# Patient Record
Sex: FEMALE | Race: WHITE | NOT HISPANIC OR LATINO | Employment: OTHER | URBAN - METROPOLITAN AREA
[De-identification: names, ages, dates, MRNs, and addresses within clinical notes are randomized per-mention and may not be internally consistent; named-entity substitution may affect disease eponyms.]

---

## 2017-03-02 ENCOUNTER — GENERIC CONVERSION - ENCOUNTER (OUTPATIENT)
Dept: OTHER | Facility: OTHER | Age: 82
End: 2017-03-02

## 2017-03-21 ENCOUNTER — HOSPITAL ENCOUNTER (INPATIENT)
Facility: HOSPITAL | Age: 82
LOS: 2 days | Discharge: HOME | DRG: 202 | End: 2017-03-23
Attending: EMERGENCY MEDICINE | Admitting: INTERNAL MEDICINE
Payer: MEDICARE

## 2017-03-21 ENCOUNTER — GENERIC CONVERSION - ENCOUNTER (OUTPATIENT)
Dept: OTHER | Facility: OTHER | Age: 82
End: 2017-03-21

## 2017-03-21 ENCOUNTER — APPOINTMENT (EMERGENCY)
Dept: RADIOLOGY | Facility: HOSPITAL | Age: 82
DRG: 202 | End: 2017-03-21
Payer: MEDICARE

## 2017-03-21 DIAGNOSIS — R06.02 SHORTNESS OF BREATH: Primary | ICD-10-CM

## 2017-03-21 LAB
ALBUMIN SERPL BCP-MCNC: 3.9 G/DL (ref 3.5–5)
ALP SERPL-CCNC: 92 U/L (ref 46–116)
ALT SERPL W P-5'-P-CCNC: 20 U/L (ref 12–78)
ANION GAP SERPL CALCULATED.3IONS-SCNC: 9 MMOL/L (ref 4–13)
ARTERIAL PATENCY WRIST A: ABNORMAL
AST SERPL W P-5'-P-CCNC: 20 U/L (ref 5–45)
BASE EXCESS BLDA CALC-SCNC: -1 MMOL/L (ref -2–3)
BASOPHILS # BLD AUTO: 0.03 THOUSANDS/ΜL (ref 0–0.1)
BASOPHILS NFR BLD AUTO: 0 % (ref 0–1)
BILIRUB SERPL-MCNC: 0.4 MG/DL (ref 0.2–1)
BUN SERPL-MCNC: 35 MG/DL (ref 5–25)
CA-I BLD-SCNC: 1.14 MMOL/L (ref 1.12–1.32)
CALCIUM SERPL-MCNC: 8.9 MG/DL (ref 8.3–10.1)
CHLORIDE SERPL-SCNC: 103 MMOL/L (ref 100–108)
CO2 SERPL-SCNC: 30 MMOL/L (ref 21–32)
CREAT SERPL-MCNC: 1.49 MG/DL (ref 0.6–1.3)
DS:DELIVERY SYSTEM: ABNORMAL
EOSINOPHIL # BLD AUTO: 0.56 THOUSAND/ΜL (ref 0–0.61)
EOSINOPHIL NFR BLD AUTO: 6 % (ref 0–6)
ERYTHROCYTE [DISTWIDTH] IN BLOOD BY AUTOMATED COUNT: 14.6 % (ref 11.6–15.1)
FIO2 GAS DIL.REBREATH: 32 L
GFR SERPL CREATININE-BSD FRML MDRD: 33.3 ML/MIN/1.73SQ M
GLUCOSE SERPL-MCNC: 95 MG/DL (ref 65–140)
GLUCOSE SERPL-MCNC: 98 MG/DL (ref 65–140)
HCO3 BLDA-SCNC: 22.9 MMOL/L (ref 22–28)
HCT VFR BLD AUTO: 35.5 % (ref 34.8–46.1)
HCT VFR BLD CALC: 35 % (ref 34.8–46.1)
HGB BLD-MCNC: 11.4 G/DL (ref 11.5–15.4)
HGB BLDA-MCNC: 11.9 G/DL (ref 11.5–15.4)
LYMPHOCYTES # BLD AUTO: 1.94 THOUSANDS/ΜL (ref 0.6–4.47)
LYMPHOCYTES NFR BLD AUTO: 21 % (ref 14–44)
MCH RBC QN AUTO: 29.5 PG (ref 26.8–34.3)
MCHC RBC AUTO-ENTMCNC: 32.1 G/DL (ref 31.4–37.4)
MCV RBC AUTO: 92 FL (ref 82–98)
MONOCYTES # BLD AUTO: 0.68 THOUSAND/ΜL (ref 0.17–1.22)
MONOCYTES NFR BLD AUTO: 8 % (ref 4–12)
NEUTROPHILS # BLD AUTO: 5.87 THOUSANDS/ΜL (ref 1.85–7.62)
NEUTS SEG NFR BLD AUTO: 65 % (ref 43–75)
NT-PROBNP SERPL-MCNC: 1316 PG/ML
PCO2 BLD: 24 MMOL/L (ref 21–32)
PCO2 BLD: 33.3 MM HG (ref 36–44)
PH BLD: 7.44 [PH] (ref 7.35–7.45)
PLATELET # BLD AUTO: 241 THOUSANDS/UL (ref 149–390)
PMV BLD AUTO: 10.5 FL (ref 8.9–12.7)
PO2 BLD: 70 MM HG (ref 75–129)
POTASSIUM BLD-SCNC: 3.9 MMOL/L (ref 3.5–5.3)
POTASSIUM SERPL-SCNC: 4 MMOL/L (ref 3.5–5.3)
PROT SERPL-MCNC: 7.8 G/DL (ref 6.4–8.2)
RBC # BLD AUTO: 3.87 MILLION/UL (ref 3.81–5.12)
SAMPLE SITE: ABNORMAL
SAO2 % BLD FROM PO2: 95 % (ref 95–98)
SODIUM BLD-SCNC: 141 MMOL/L (ref 136–145)
SODIUM SERPL-SCNC: 142 MMOL/L (ref 136–145)
SPECIMEN SOURCE: ABNORMAL
TROPONIN I SERPL-MCNC: <0.02 NG/ML
WBC # BLD AUTO: 9.08 THOUSAND/UL (ref 4.31–10.16)

## 2017-03-21 PROCEDURE — 36600 WITHDRAWAL OF ARTERIAL BLOOD: CPT

## 2017-03-21 PROCEDURE — 81002 URINALYSIS NONAUTO W/O SCOPE: CPT | Performed by: PHYSICIAN ASSISTANT

## 2017-03-21 PROCEDURE — 36415 COLL VENOUS BLD VENIPUNCTURE: CPT | Performed by: PHYSICIAN ASSISTANT

## 2017-03-21 PROCEDURE — 80053 COMPREHEN METABOLIC PANEL: CPT | Performed by: PHYSICIAN ASSISTANT

## 2017-03-21 PROCEDURE — 82947 ASSAY GLUCOSE BLOOD QUANT: CPT

## 2017-03-21 PROCEDURE — 84295 ASSAY OF SERUM SODIUM: CPT

## 2017-03-21 PROCEDURE — 71020 HB CHEST X-RAY 2VW FRONTAL&LATL: CPT

## 2017-03-21 PROCEDURE — 85025 COMPLETE CBC W/AUTO DIFF WBC: CPT | Performed by: PHYSICIAN ASSISTANT

## 2017-03-21 PROCEDURE — 96374 THER/PROPH/DIAG INJ IV PUSH: CPT

## 2017-03-21 PROCEDURE — 83880 ASSAY OF NATRIURETIC PEPTIDE: CPT | Performed by: PHYSICIAN ASSISTANT

## 2017-03-21 PROCEDURE — 82330 ASSAY OF CALCIUM: CPT

## 2017-03-21 PROCEDURE — 84484 ASSAY OF TROPONIN QUANT: CPT | Performed by: PHYSICIAN ASSISTANT

## 2017-03-21 PROCEDURE — 93005 ELECTROCARDIOGRAM TRACING: CPT | Performed by: PHYSICIAN ASSISTANT

## 2017-03-21 PROCEDURE — 84132 ASSAY OF SERUM POTASSIUM: CPT

## 2017-03-21 PROCEDURE — 85014 HEMATOCRIT: CPT

## 2017-03-21 PROCEDURE — 82803 BLOOD GASES ANY COMBINATION: CPT

## 2017-03-21 RX ORDER — METHYLPREDNISOLONE SODIUM SUCCINATE 125 MG/2ML
125 INJECTION, POWDER, LYOPHILIZED, FOR SOLUTION INTRAMUSCULAR; INTRAVENOUS ONCE
Status: COMPLETED | OUTPATIENT
Start: 2017-03-21 | End: 2017-03-21

## 2017-03-21 RX ADMIN — METHYLPREDNISOLONE SODIUM SUCCINATE 125 MG: 125 INJECTION, POWDER, FOR SOLUTION INTRAMUSCULAR; INTRAVENOUS at 22:28

## 2017-03-22 PROBLEM — J20.9 BRONCHITIS, ACUTE, WITH BRONCHOSPASM: Status: ACTIVE | Noted: 2017-03-22

## 2017-03-22 PROBLEM — R06.02 SHORTNESS OF BREATH: Status: ACTIVE | Noted: 2017-03-22

## 2017-03-22 LAB
ALBUMIN SERPL BCP-MCNC: 3.6 G/DL (ref 3.5–5)
ALP SERPL-CCNC: 83 U/L (ref 46–116)
ALT SERPL W P-5'-P-CCNC: 19 U/L (ref 12–78)
ANION GAP SERPL CALCULATED.3IONS-SCNC: 12 MMOL/L (ref 4–13)
AST SERPL W P-5'-P-CCNC: 19 U/L (ref 5–45)
ATRIAL RATE: 74 BPM
BILIRUB SERPL-MCNC: 0.3 MG/DL (ref 0.2–1)
BUN SERPL-MCNC: 30 MG/DL (ref 5–25)
CALCIUM SERPL-MCNC: 8.6 MG/DL (ref 8.3–10.1)
CHLORIDE SERPL-SCNC: 103 MMOL/L (ref 100–108)
CLARITY, POC: CLEAR
CO2 SERPL-SCNC: 27 MMOL/L (ref 21–32)
COLOR, POC: YELLOW
CREAT SERPL-MCNC: 1.5 MG/DL (ref 0.6–1.3)
ERYTHROCYTE [DISTWIDTH] IN BLOOD BY AUTOMATED COUNT: 14.4 % (ref 11.6–15.1)
EXT BILIRUBIN, UA: NEGATIVE
EXT BLOOD URINE: NEGATIVE
EXT GLUCOSE, UA: NEGATIVE
EXT KETONES: NEGATIVE
EXT NITRITE, UA: NEGATIVE
EXT PH, UA: 5
EXT PROTEIN, UA: NEGATIVE
EXT SPECIFIC GRAVITY, UA: 1.01
EXT UROBILINOGEN: 0.2
GFR SERPL CREATININE-BSD FRML MDRD: 33 ML/MIN/1.73SQ M
GLUCOSE SERPL-MCNC: 171 MG/DL (ref 65–140)
HCT VFR BLD AUTO: 32.2 % (ref 34.8–46.1)
HGB BLD-MCNC: 10.3 G/DL (ref 11.5–15.4)
L PNEUMO1 AG UR QL IA.RAPID: NEGATIVE
MCH RBC QN AUTO: 29.3 PG (ref 26.8–34.3)
MCHC RBC AUTO-ENTMCNC: 32 G/DL (ref 31.4–37.4)
MCV RBC AUTO: 92 FL (ref 82–98)
P AXIS: 72 DEGREES
PLATELET # BLD AUTO: 225 THOUSANDS/UL (ref 149–390)
PMV BLD AUTO: 10.5 FL (ref 8.9–12.7)
POTASSIUM SERPL-SCNC: 3.9 MMOL/L (ref 3.5–5.3)
PR INTERVAL: 210 MS
PROT SERPL-MCNC: 7.2 G/DL (ref 6.4–8.2)
QRS AXIS: 31 DEGREES
QRSD INTERVAL: 80 MS
QT INTERVAL: 400 MS
QTC INTERVAL: 444 MS
RBC # BLD AUTO: 3.52 MILLION/UL (ref 3.81–5.12)
S PNEUM AG UR QL: NEGATIVE
SODIUM SERPL-SCNC: 142 MMOL/L (ref 136–145)
T WAVE AXIS: 50 DEGREES
VENTRICULAR RATE: 74 BPM
WBC # BLD AUTO: 8.84 THOUSAND/UL (ref 4.31–10.16)
WBC # BLD EST: NORMAL 10*3/UL

## 2017-03-22 PROCEDURE — 87449 NOS EACH ORGANISM AG IA: CPT | Performed by: INTERNAL MEDICINE

## 2017-03-22 PROCEDURE — 94760 N-INVAS EAR/PLS OXIMETRY 1: CPT

## 2017-03-22 PROCEDURE — 36415 COLL VENOUS BLD VENIPUNCTURE: CPT | Performed by: INTERNAL MEDICINE

## 2017-03-22 PROCEDURE — 94664 DEMO&/EVAL PT USE INHALER: CPT

## 2017-03-22 PROCEDURE — 85027 COMPLETE CBC AUTOMATED: CPT | Performed by: INTERNAL MEDICINE

## 2017-03-22 PROCEDURE — 94640 AIRWAY INHALATION TREATMENT: CPT

## 2017-03-22 PROCEDURE — 80053 COMPREHEN METABOLIC PANEL: CPT

## 2017-03-22 PROCEDURE — 99285 EMERGENCY DEPT VISIT HI MDM: CPT

## 2017-03-22 RX ORDER — ROPINIROLE 1 MG/1
1 TABLET, FILM COATED ORAL EVERY 6 HOURS
Status: DISCONTINUED | OUTPATIENT
Start: 2017-03-22 | End: 2017-03-23 | Stop reason: HOSPADM

## 2017-03-22 RX ORDER — ALBUTEROL SULFATE 90 UG/1
2 AEROSOL, METERED RESPIRATORY (INHALATION) EVERY 6 HOURS PRN
Status: DISCONTINUED | OUTPATIENT
Start: 2017-03-22 | End: 2017-03-23 | Stop reason: HOSPADM

## 2017-03-22 RX ORDER — FLUTICASONE PROPIONATE 50 MCG
1 SPRAY, SUSPENSION (ML) NASAL DAILY
Status: DISCONTINUED | OUTPATIENT
Start: 2017-03-22 | End: 2017-03-23 | Stop reason: HOSPADM

## 2017-03-22 RX ORDER — LEVOTHYROXINE SODIUM 0.07 MG/1
37.5 TABLET ORAL
Status: DISCONTINUED | OUTPATIENT
Start: 2017-03-22 | End: 2017-03-23 | Stop reason: HOSPADM

## 2017-03-22 RX ORDER — METHYLPREDNISOLONE SODIUM SUCCINATE 40 MG/ML
40 INJECTION, POWDER, LYOPHILIZED, FOR SOLUTION INTRAMUSCULAR; INTRAVENOUS EVERY 8 HOURS SCHEDULED
Status: DISCONTINUED | OUTPATIENT
Start: 2017-03-22 | End: 2017-03-22

## 2017-03-22 RX ORDER — PANTOPRAZOLE SODIUM 40 MG/1
40 TABLET, DELAYED RELEASE ORAL 2 TIMES DAILY
Status: DISCONTINUED | OUTPATIENT
Start: 2017-03-22 | End: 2017-03-23 | Stop reason: HOSPADM

## 2017-03-22 RX ORDER — IPRATROPIUM BROMIDE AND ALBUTEROL SULFATE 2.5; .5 MG/3ML; MG/3ML
3 SOLUTION RESPIRATORY (INHALATION)
Status: DISCONTINUED | OUTPATIENT
Start: 2017-03-22 | End: 2017-03-22

## 2017-03-22 RX ORDER — FUROSEMIDE 10 MG/ML
20 INJECTION INTRAMUSCULAR; INTRAVENOUS ONCE
Status: COMPLETED | OUTPATIENT
Start: 2017-03-22 | End: 2017-03-22

## 2017-03-22 RX ORDER — LATANOPROST 50 UG/ML
1 SOLUTION/ DROPS OPHTHALMIC
Status: DISCONTINUED | OUTPATIENT
Start: 2017-03-22 | End: 2017-03-23 | Stop reason: HOSPADM

## 2017-03-22 RX ORDER — METOPROLOL TARTRATE 50 MG/1
50 TABLET, FILM COATED ORAL 2 TIMES DAILY
Status: DISCONTINUED | OUTPATIENT
Start: 2017-03-22 | End: 2017-03-23 | Stop reason: HOSPADM

## 2017-03-22 RX ORDER — GUAIFENESIN 100 MG/5ML
200 SOLUTION ORAL EVERY 4 HOURS PRN
Status: DISCONTINUED | OUTPATIENT
Start: 2017-03-22 | End: 2017-03-23 | Stop reason: HOSPADM

## 2017-03-22 RX ORDER — LORATADINE 10 MG/1
10 TABLET ORAL DAILY
Status: DISCONTINUED | OUTPATIENT
Start: 2017-03-22 | End: 2017-03-23 | Stop reason: HOSPADM

## 2017-03-22 RX ORDER — TRAMADOL HYDROCHLORIDE 50 MG/1
50 TABLET ORAL 2 TIMES DAILY PRN
Status: DISCONTINUED | OUTPATIENT
Start: 2017-03-22 | End: 2017-03-23 | Stop reason: HOSPADM

## 2017-03-22 RX ORDER — AMLODIPINE BESYLATE 5 MG/1
5 TABLET ORAL
Status: DISCONTINUED | OUTPATIENT
Start: 2017-03-22 | End: 2017-03-23 | Stop reason: HOSPADM

## 2017-03-22 RX ORDER — AZITHROMYCIN 250 MG/1
250 TABLET, FILM COATED ORAL EVERY 24 HOURS
Status: DISCONTINUED | OUTPATIENT
Start: 2017-03-23 | End: 2017-03-23 | Stop reason: HOSPADM

## 2017-03-22 RX ORDER — GABAPENTIN 300 MG/1
300 CAPSULE ORAL DAILY
Status: DISCONTINUED | OUTPATIENT
Start: 2017-03-22 | End: 2017-03-23 | Stop reason: HOSPADM

## 2017-03-22 RX ORDER — LOPERAMIDE HYDROCHLORIDE 2 MG/1
2 CAPSULE ORAL AS NEEDED
Status: DISCONTINUED | OUTPATIENT
Start: 2017-03-22 | End: 2017-03-23 | Stop reason: HOSPADM

## 2017-03-22 RX ORDER — LEVALBUTEROL 1.25 MG/.5ML
1.25 SOLUTION, CONCENTRATE RESPIRATORY (INHALATION)
Status: DISCONTINUED | OUTPATIENT
Start: 2017-03-22 | End: 2017-03-23 | Stop reason: HOSPADM

## 2017-03-22 RX ORDER — ACETAMINOPHEN 325 MG/1
650 TABLET ORAL ONCE
Status: COMPLETED | OUTPATIENT
Start: 2017-03-22 | End: 2017-03-22

## 2017-03-22 RX ORDER — BACLOFEN 10 MG/1
10 TABLET ORAL
Status: DISCONTINUED | OUTPATIENT
Start: 2017-03-22 | End: 2017-03-23 | Stop reason: HOSPADM

## 2017-03-22 RX ORDER — POLYVINYL ALCOHOL 14 MG/ML
1 SOLUTION/ DROPS OPHTHALMIC AS NEEDED
Status: DISCONTINUED | OUTPATIENT
Start: 2017-03-22 | End: 2017-03-23 | Stop reason: HOSPADM

## 2017-03-22 RX ORDER — DOCUSATE SODIUM 100 MG/1
100 CAPSULE, LIQUID FILLED ORAL 2 TIMES DAILY
COMMUNITY
End: 2017-03-23 | Stop reason: HOSPADM

## 2017-03-22 RX ORDER — ALBUTEROL SULFATE 90 UG/1
2 AEROSOL, METERED RESPIRATORY (INHALATION) EVERY 6 HOURS PRN
COMMUNITY
End: 2018-01-22 | Stop reason: ALTCHOICE

## 2017-03-22 RX ORDER — MELATONIN
2000 DAILY
Status: DISCONTINUED | OUTPATIENT
Start: 2017-03-22 | End: 2017-03-23 | Stop reason: HOSPADM

## 2017-03-22 RX ORDER — METHYLPREDNISOLONE SODIUM SUCCINATE 40 MG/ML
40 INJECTION, POWDER, LYOPHILIZED, FOR SOLUTION INTRAMUSCULAR; INTRAVENOUS EVERY 12 HOURS SCHEDULED
Status: DISCONTINUED | OUTPATIENT
Start: 2017-03-22 | End: 2017-03-23 | Stop reason: HOSPADM

## 2017-03-22 RX ORDER — MAGNESIUM HYDROXIDE/ALUMINUM HYDROXICE/SIMETHICONE 120; 1200; 1200 MG/30ML; MG/30ML; MG/30ML
15 SUSPENSION ORAL EVERY 4 HOURS PRN
Status: DISCONTINUED | OUTPATIENT
Start: 2017-03-22 | End: 2017-03-23 | Stop reason: HOSPADM

## 2017-03-22 RX ORDER — ALBUTEROL SULFATE 2.5 MG/3ML
2.5 SOLUTION RESPIRATORY (INHALATION) EVERY 4 HOURS PRN
Status: DISCONTINUED | OUTPATIENT
Start: 2017-03-22 | End: 2017-03-23 | Stop reason: HOSPADM

## 2017-03-22 RX ORDER — BISACODYL 10 MG
10 SUPPOSITORY, RECTAL RECTAL AS NEEDED
Status: DISCONTINUED | OUTPATIENT
Start: 2017-03-22 | End: 2017-03-23 | Stop reason: HOSPADM

## 2017-03-22 RX ORDER — DIPHENHYDRAMINE HCL 25 MG
25 TABLET ORAL
COMMUNITY
End: 2017-04-17 | Stop reason: HOSPADM

## 2017-03-22 RX ORDER — GABAPENTIN 300 MG/1
CAPSULE ORAL
Status: DISPENSED
Start: 2017-03-22 | End: 2017-03-23

## 2017-03-22 RX ORDER — OXCARBAZEPINE 150 MG/1
150 TABLET, FILM COATED ORAL 2 TIMES DAILY
Status: DISCONTINUED | OUTPATIENT
Start: 2017-03-22 | End: 2017-03-23 | Stop reason: HOSPADM

## 2017-03-22 RX ORDER — ACETAMINOPHEN 325 MG/1
650 TABLET ORAL EVERY 4 HOURS PRN
Status: DISCONTINUED | OUTPATIENT
Start: 2017-03-22 | End: 2017-03-23 | Stop reason: HOSPADM

## 2017-03-22 RX ADMIN — LEVALBUTEROL HYDROCHLORIDE 1.25 MG: 1.25 SOLUTION, CONCENTRATE RESPIRATORY (INHALATION) at 20:22

## 2017-03-22 RX ADMIN — ROPINIROLE HYDROCHLORIDE 1 MG: 1 TABLET, FILM COATED ORAL at 12:30

## 2017-03-22 RX ADMIN — PANTOPRAZOLE SODIUM 40 MG: 40 TABLET, DELAYED RELEASE ORAL at 13:29

## 2017-03-22 RX ADMIN — METHYLPREDNISOLONE SODIUM SUCCINATE 40 MG: 40 INJECTION, POWDER, FOR SOLUTION INTRAMUSCULAR; INTRAVENOUS at 05:35

## 2017-03-22 RX ADMIN — CHOLECALCIFEROL TAB 25 MCG (1000 UNIT) 2000 UNITS: 25 TAB at 13:29

## 2017-03-22 RX ADMIN — ACETAMINOPHEN 650 MG: 325 TABLET ORAL at 01:49

## 2017-03-22 RX ADMIN — ROPINIROLE HYDROCHLORIDE 1 MG: 1 TABLET, FILM COATED ORAL at 00:26

## 2017-03-22 RX ADMIN — IPRATROPIUM BROMIDE AND ALBUTEROL SULFATE 3 ML: .5; 3 SOLUTION RESPIRATORY (INHALATION) at 15:28

## 2017-03-22 RX ADMIN — IPRATROPIUM BROMIDE AND ALBUTEROL SULFATE 3 ML: .5; 3 SOLUTION RESPIRATORY (INHALATION) at 07:52

## 2017-03-22 RX ADMIN — GABAPENTIN 300 MG: 300 CAPSULE ORAL at 12:11

## 2017-03-22 RX ADMIN — PANTOPRAZOLE SODIUM 40 MG: 40 TABLET, DELAYED RELEASE ORAL at 17:52

## 2017-03-22 RX ADMIN — FUROSEMIDE 20 MG: 10 INJECTION, SOLUTION INTRAMUSCULAR; INTRAVENOUS at 04:49

## 2017-03-22 RX ADMIN — ROPINIROLE HYDROCHLORIDE 1 MG: 1 TABLET, FILM COATED ORAL at 17:52

## 2017-03-22 RX ADMIN — AMLODIPINE BESYLATE 5 MG: 5 TABLET ORAL at 22:41

## 2017-03-22 RX ADMIN — APIXABAN 5 MG: 5 TABLET, FILM COATED ORAL at 17:52

## 2017-03-22 RX ADMIN — APIXABAN 5 MG: 5 TABLET, FILM COATED ORAL at 13:29

## 2017-03-22 RX ADMIN — IPRATROPIUM BROMIDE 0.5 MG: 0.5 SOLUTION RESPIRATORY (INHALATION) at 20:22

## 2017-03-22 RX ADMIN — OXCARBAZEPINE 150 MG: 150 TABLET ORAL at 13:29

## 2017-03-22 RX ADMIN — METOPROLOL TARTRATE 50 MG: 50 TABLET ORAL at 13:29

## 2017-03-22 RX ADMIN — ROPINIROLE HYDROCHLORIDE 1 MG: 1 TABLET, FILM COATED ORAL at 06:12

## 2017-03-22 RX ADMIN — METOPROLOL TARTRATE 50 MG: 50 TABLET ORAL at 17:52

## 2017-03-22 RX ADMIN — OXCARBAZEPINE 150 MG: 150 TABLET ORAL at 17:51

## 2017-03-22 RX ADMIN — TRAMADOL HYDROCHLORIDE 50 MG: 50 TABLET, COATED ORAL at 15:50

## 2017-03-22 RX ADMIN — METHYLPREDNISOLONE SODIUM SUCCINATE 40 MG: 40 INJECTION, POWDER, FOR SOLUTION INTRAMUSCULAR; INTRAVENOUS at 20:40

## 2017-03-22 RX ADMIN — LATANOPROST 1 DROP: 50 SOLUTION OPHTHALMIC at 22:42

## 2017-03-22 RX ADMIN — BACLOFEN 10 MG: 10 TABLET ORAL at 22:41

## 2017-03-22 RX ADMIN — LORATADINE 10 MG: 10 TABLET ORAL at 13:29

## 2017-03-22 RX ADMIN — FLUTICASONE PROPIONATE 1 SPRAY: 50 SPRAY, METERED NASAL at 13:29

## 2017-03-22 RX ADMIN — AZITHROMYCIN FOR INJECTION INJECTION, POWDER, LYOPHILIZED, FOR SOLUTION 500 MG: 500 INJECTION INTRAVENOUS at 01:46

## 2017-03-22 RX ADMIN — BIMATOPROST 1 DROP: 0.1 SOLUTION/ DROPS OPHTHALMIC at 22:41

## 2017-03-22 RX ADMIN — LEVOTHYROXINE SODIUM 37.5 MCG: 75 TABLET ORAL at 13:28

## 2017-03-23 VITALS
TEMPERATURE: 98 F | SYSTOLIC BLOOD PRESSURE: 148 MMHG | HEIGHT: 60 IN | WEIGHT: 156.09 LBS | RESPIRATION RATE: 20 BRPM | BODY MASS INDEX: 30.64 KG/M2 | HEART RATE: 65 BPM | OXYGEN SATURATION: 95 % | DIASTOLIC BLOOD PRESSURE: 65 MMHG

## 2017-03-23 LAB
BASOPHILS # BLD MANUAL: 0 THOUSAND/UL (ref 0–0.1)
BASOPHILS NFR MAR MANUAL: 0 % (ref 0–1)
EOSINOPHIL # BLD MANUAL: 0 THOUSAND/UL (ref 0–0.4)
EOSINOPHIL NFR BLD MANUAL: 0 % (ref 0–6)
ERYTHROCYTE [DISTWIDTH] IN BLOOD BY AUTOMATED COUNT: 14.4 % (ref 11.6–15.1)
HCT VFR BLD AUTO: 32.6 % (ref 34.8–46.1)
HGB BLD-MCNC: 10.5 G/DL (ref 11.5–15.4)
LYMPHOCYTES # BLD AUTO: 0.84 THOUSAND/UL (ref 0.6–4.47)
LYMPHOCYTES # BLD AUTO: 6 % (ref 14–44)
MCH RBC QN AUTO: 29.2 PG (ref 26.8–34.3)
MCHC RBC AUTO-ENTMCNC: 32.2 G/DL (ref 31.4–37.4)
MCV RBC AUTO: 91 FL (ref 82–98)
MONOCYTES # BLD AUTO: 0.42 THOUSAND/UL (ref 0–1.22)
MONOCYTES NFR BLD: 3 % (ref 4–12)
NEUTROPHILS # BLD MANUAL: 12.77 THOUSAND/UL (ref 1.85–7.62)
NEUTS BAND NFR BLD MANUAL: 6 % (ref 0–8)
NEUTS SEG NFR BLD AUTO: 85 % (ref 43–75)
PLATELET # BLD AUTO: 260 THOUSANDS/UL (ref 149–390)
PLATELET BLD QL SMEAR: ADEQUATE
PMV BLD AUTO: 11.2 FL (ref 8.9–12.7)
RBC # BLD AUTO: 3.59 MILLION/UL (ref 3.81–5.12)
TOTAL CELLS COUNTED SPEC: 100
WBC # BLD AUTO: 14.03 THOUSAND/UL (ref 4.31–10.16)

## 2017-03-23 PROCEDURE — 85027 COMPLETE CBC AUTOMATED: CPT | Performed by: PHYSICIAN ASSISTANT

## 2017-03-23 PROCEDURE — 94760 N-INVAS EAR/PLS OXIMETRY 1: CPT

## 2017-03-23 PROCEDURE — 94640 AIRWAY INHALATION TREATMENT: CPT

## 2017-03-23 PROCEDURE — 85007 BL SMEAR W/DIFF WBC COUNT: CPT | Performed by: PHYSICIAN ASSISTANT

## 2017-03-23 RX ORDER — TORSEMIDE 20 MG/1
20 TABLET ORAL DAILY
Qty: 30 TABLET | Refills: 0 | Status: ON HOLD | OUTPATIENT
Start: 2017-03-24 | End: 2017-04-17

## 2017-03-23 RX ORDER — LEVOTHYROXINE SODIUM 0.07 MG/1
37.5 TABLET ORAL
Qty: 15 TABLET | Refills: 0 | Status: SHIPPED | OUTPATIENT
Start: 2017-03-23 | End: 2017-07-29 | Stop reason: HOSPADM

## 2017-03-23 RX ORDER — ALBUTEROL SULFATE 2.5 MG/3ML
2.5 SOLUTION RESPIRATORY (INHALATION) EVERY 4 HOURS PRN
Qty: 75 ML | Refills: 0 | Status: SHIPPED | OUTPATIENT
Start: 2017-03-23 | End: 2017-04-13

## 2017-03-23 RX ORDER — PREDNISONE 20 MG/1
60 TABLET ORAL SEE ADMIN INSTRUCTIONS
Qty: 30 TABLET | Refills: 0 | Status: SHIPPED | OUTPATIENT
Start: 2017-03-23 | End: 2017-04-04

## 2017-03-23 RX ORDER — TRAMADOL HYDROCHLORIDE 50 MG/1
50 TABLET ORAL 2 TIMES DAILY PRN
Qty: 30 TABLET | Refills: 0 | Status: SHIPPED | OUTPATIENT
Start: 2017-03-23 | End: 2017-04-02

## 2017-03-23 RX ORDER — AZITHROMYCIN 250 MG/1
250 TABLET, FILM COATED ORAL ONCE
Qty: 1 TABLET | Refills: 0 | Status: SHIPPED | OUTPATIENT
Start: 2017-03-24 | End: 2017-03-24

## 2017-03-23 RX ORDER — GUAIFENESIN 100 MG/5ML
200 SOLUTION ORAL EVERY 4 HOURS PRN
Qty: 30 ML | Refills: 0 | Status: SHIPPED | OUTPATIENT
Start: 2017-03-23 | End: 2017-04-17 | Stop reason: HOSPADM

## 2017-03-23 RX ADMIN — PANTOPRAZOLE SODIUM 40 MG: 40 TABLET, DELAYED RELEASE ORAL at 08:11

## 2017-03-23 RX ADMIN — TRAMADOL HYDROCHLORIDE 50 MG: 50 TABLET, COATED ORAL at 00:24

## 2017-03-23 RX ADMIN — METOPROLOL TARTRATE 50 MG: 50 TABLET ORAL at 08:10

## 2017-03-23 RX ADMIN — LORATADINE 10 MG: 10 TABLET ORAL at 08:11

## 2017-03-23 RX ADMIN — AZITHROMYCIN 250 MG: 250 TABLET, FILM COATED ORAL at 06:45

## 2017-03-23 RX ADMIN — METHYLPREDNISOLONE SODIUM SUCCINATE 40 MG: 40 INJECTION, POWDER, FOR SOLUTION INTRAMUSCULAR; INTRAVENOUS at 08:15

## 2017-03-23 RX ADMIN — FLUTICASONE PROPIONATE 1 SPRAY: 50 SPRAY, METERED NASAL at 08:15

## 2017-03-23 RX ADMIN — LEVALBUTEROL HYDROCHLORIDE 1.25 MG: 1.25 SOLUTION, CONCENTRATE RESPIRATORY (INHALATION) at 13:09

## 2017-03-23 RX ADMIN — LEVOTHYROXINE SODIUM 37.5 MCG: 75 TABLET ORAL at 06:45

## 2017-03-23 RX ADMIN — CHOLECALCIFEROL TAB 25 MCG (1000 UNIT) 2000 UNITS: 25 TAB at 08:14

## 2017-03-23 RX ADMIN — APIXABAN 5 MG: 5 TABLET, FILM COATED ORAL at 08:11

## 2017-03-23 RX ADMIN — IPRATROPIUM BROMIDE 0.5 MG: 0.5 SOLUTION RESPIRATORY (INHALATION) at 09:07

## 2017-03-23 RX ADMIN — ROPINIROLE HYDROCHLORIDE 1 MG: 1 TABLET, FILM COATED ORAL at 06:45

## 2017-03-23 RX ADMIN — GABAPENTIN 300 MG: 300 CAPSULE ORAL at 08:23

## 2017-03-23 RX ADMIN — LEVALBUTEROL HYDROCHLORIDE 1.25 MG: 1.25 SOLUTION, CONCENTRATE RESPIRATORY (INHALATION) at 09:06

## 2017-03-23 RX ADMIN — Medication 400 MG: at 08:11

## 2017-03-23 RX ADMIN — IPRATROPIUM BROMIDE 0.5 MG: 0.5 SOLUTION RESPIRATORY (INHALATION) at 13:09

## 2017-03-23 RX ADMIN — OXCARBAZEPINE 150 MG: 150 TABLET ORAL at 08:11

## 2017-03-23 RX ADMIN — ROPINIROLE HYDROCHLORIDE 1 MG: 1 TABLET, FILM COATED ORAL at 00:22

## 2017-03-23 RX ADMIN — ROPINIROLE HYDROCHLORIDE 1 MG: 1 TABLET, FILM COATED ORAL at 12:23

## 2017-03-24 ENCOUNTER — LAB REQUISITION (OUTPATIENT)
Dept: LAB | Facility: HOSPITAL | Age: 82
End: 2017-03-24
Payer: MEDICARE

## 2017-03-24 DIAGNOSIS — A41.01 SEPSIS DUE TO METHICILLIN SUSCEPTIBLE STAPHYLOCOCCUS AUREUS (HCC): ICD-10-CM

## 2017-03-24 PROCEDURE — 87147 CULTURE TYPE IMMUNOLOGIC: CPT | Performed by: PHYSICIAN ASSISTANT

## 2017-03-24 PROCEDURE — 87070 CULTURE OTHR SPECIMN AEROBIC: CPT | Performed by: PHYSICIAN ASSISTANT

## 2017-03-24 PROCEDURE — 87186 SC STD MICRODIL/AGAR DIL: CPT | Performed by: PHYSICIAN ASSISTANT

## 2017-03-24 PROCEDURE — 87205 SMEAR GRAM STAIN: CPT | Performed by: PHYSICIAN ASSISTANT

## 2017-03-27 LAB
BACTERIA WND AEROBE CULT: ABNORMAL
BACTERIA WND AEROBE CULT: ABNORMAL
GRAM STN SPEC: ABNORMAL

## 2017-04-03 ENCOUNTER — ALLSCRIPTS OFFICE VISIT (OUTPATIENT)
Dept: OTHER | Facility: OTHER | Age: 82
End: 2017-04-03

## 2017-04-13 ENCOUNTER — GENERIC CONVERSION - ENCOUNTER (OUTPATIENT)
Dept: OTHER | Facility: OTHER | Age: 82
End: 2017-04-13

## 2017-04-13 ENCOUNTER — APPOINTMENT (EMERGENCY)
Dept: RADIOLOGY | Facility: HOSPITAL | Age: 82
DRG: 291 | End: 2017-04-13
Payer: MEDICARE

## 2017-04-13 ENCOUNTER — HOSPITAL ENCOUNTER (INPATIENT)
Facility: HOSPITAL | Age: 82
LOS: 3 days | Discharge: NURSING, REHAB OR LONG-TERM CARE | DRG: 291 | End: 2017-04-17
Attending: EMERGENCY MEDICINE | Admitting: FAMILY MEDICINE
Payer: MEDICARE

## 2017-04-13 DIAGNOSIS — N18.30 CHRONIC KIDNEY DISEASE, STAGE III (MODERATE) (HCC): ICD-10-CM

## 2017-04-13 DIAGNOSIS — N28.9 ACUTE RENAL INSUFFICIENCY: ICD-10-CM

## 2017-04-13 DIAGNOSIS — I50.9 CHF EXACERBATION (HCC): Primary | ICD-10-CM

## 2017-04-13 DIAGNOSIS — I50.32 DIASTOLIC CHF, CHRONIC (HCC): ICD-10-CM

## 2017-04-13 DIAGNOSIS — I12.9 HYPERTENSIVE CHRONIC KIDNEY DISEASE WITH STAGE 1 THROUGH STAGE 4 CHRONIC KIDNEY DISEASE, OR UNSPECIFIED CHRONIC KIDNEY DISEASE: ICD-10-CM

## 2017-04-13 DIAGNOSIS — E78.5 HYPERLIPIDEMIA: ICD-10-CM

## 2017-04-13 PROBLEM — J20.9 BRONCHITIS, ACUTE, WITH BRONCHOSPASM: Status: RESOLVED | Noted: 2017-03-22 | Resolved: 2017-04-13

## 2017-04-13 LAB
ALBUMIN SERPL BCP-MCNC: 2.9 G/DL (ref 3.5–5)
ALP SERPL-CCNC: 81 U/L (ref 46–116)
ALT SERPL W P-5'-P-CCNC: 16 U/L (ref 12–78)
ANION GAP SERPL CALCULATED.3IONS-SCNC: 9 MMOL/L (ref 4–13)
AST SERPL W P-5'-P-CCNC: 20 U/L (ref 5–45)
ATRIAL RATE: 136 BPM
BASOPHILS # BLD AUTO: 0.03 THOUSANDS/ΜL (ref 0–0.1)
BASOPHILS NFR BLD AUTO: 0 % (ref 0–1)
BILIRUB SERPL-MCNC: 0.2 MG/DL (ref 0.2–1)
BUN SERPL-MCNC: 37 MG/DL (ref 5–25)
CALCIUM SERPL-MCNC: 8.4 MG/DL (ref 8.3–10.1)
CHLORIDE SERPL-SCNC: 103 MMOL/L (ref 100–108)
CO2 SERPL-SCNC: 30 MMOL/L (ref 21–32)
CREAT SERPL-MCNC: 1.72 MG/DL (ref 0.6–1.3)
EOSINOPHIL # BLD AUTO: 0.23 THOUSAND/ΜL (ref 0–0.61)
EOSINOPHIL NFR BLD AUTO: 3 % (ref 0–6)
ERYTHROCYTE [DISTWIDTH] IN BLOOD BY AUTOMATED COUNT: 14.4 % (ref 11.6–15.1)
GFR SERPL CREATININE-BSD FRML MDRD: 28.2 ML/MIN/1.73SQ M
GLUCOSE SERPL-MCNC: 118 MG/DL (ref 65–140)
HCT VFR BLD AUTO: 29.8 % (ref 34.8–46.1)
HGB BLD-MCNC: 9.5 G/DL (ref 11.5–15.4)
LYMPHOCYTES # BLD AUTO: 0.71 THOUSANDS/ΜL (ref 0.6–4.47)
LYMPHOCYTES NFR BLD AUTO: 9 % (ref 14–44)
MCH RBC QN AUTO: 29.4 PG (ref 26.8–34.3)
MCHC RBC AUTO-ENTMCNC: 31.9 G/DL (ref 31.4–37.4)
MCV RBC AUTO: 92 FL (ref 82–98)
MONOCYTES # BLD AUTO: 0.64 THOUSAND/ΜL (ref 0.17–1.22)
MONOCYTES NFR BLD AUTO: 8 % (ref 4–12)
NEUTROPHILS # BLD AUTO: 6.12 THOUSANDS/ΜL (ref 1.85–7.62)
NEUTS SEG NFR BLD AUTO: 80 % (ref 43–75)
NT-PROBNP SERPL-MCNC: 1127 PG/ML
PLATELET # BLD AUTO: 199 THOUSANDS/UL (ref 149–390)
PMV BLD AUTO: 11.3 FL (ref 8.9–12.7)
POTASSIUM SERPL-SCNC: 3.9 MMOL/L (ref 3.5–5.3)
PROT SERPL-MCNC: 6.9 G/DL (ref 6.4–8.2)
QRS AXIS: 4 DEGREES
QRSD INTERVAL: 86 MS
QT INTERVAL: 390 MS
QTC INTERVAL: 417 MS
RBC # BLD AUTO: 3.23 MILLION/UL (ref 3.81–5.12)
SODIUM SERPL-SCNC: 142 MMOL/L (ref 136–145)
T WAVE AXIS: 53 DEGREES
TROPONIN I SERPL-MCNC: <0.02 NG/ML
VENTRICULAR RATE: 69 BPM
WBC # BLD AUTO: 7.73 THOUSAND/UL (ref 4.31–10.16)

## 2017-04-13 PROCEDURE — 71020 HB CHEST X-RAY 2VW FRONTAL&LATL: CPT

## 2017-04-13 PROCEDURE — 85025 COMPLETE CBC W/AUTO DIFF WBC: CPT | Performed by: EMERGENCY MEDICINE

## 2017-04-13 PROCEDURE — 99285 EMERGENCY DEPT VISIT HI MDM: CPT

## 2017-04-13 PROCEDURE — 83880 ASSAY OF NATRIURETIC PEPTIDE: CPT | Performed by: EMERGENCY MEDICINE

## 2017-04-13 PROCEDURE — 93005 ELECTROCARDIOGRAM TRACING: CPT | Performed by: EMERGENCY MEDICINE

## 2017-04-13 PROCEDURE — 36415 COLL VENOUS BLD VENIPUNCTURE: CPT | Performed by: EMERGENCY MEDICINE

## 2017-04-13 PROCEDURE — 84484 ASSAY OF TROPONIN QUANT: CPT | Performed by: EMERGENCY MEDICINE

## 2017-04-13 PROCEDURE — 80053 COMPREHEN METABOLIC PANEL: CPT | Performed by: EMERGENCY MEDICINE

## 2017-04-13 RX ORDER — ONDANSETRON 2 MG/ML
4 INJECTION INTRAMUSCULAR; INTRAVENOUS EVERY 6 HOURS PRN
Status: DISCONTINUED | OUTPATIENT
Start: 2017-04-13 | End: 2017-04-17 | Stop reason: HOSPADM

## 2017-04-13 RX ORDER — ROPINIROLE 1 MG/1
1 TABLET, FILM COATED ORAL 3 TIMES DAILY
Status: DISCONTINUED | OUTPATIENT
Start: 2017-04-13 | End: 2017-04-13 | Stop reason: SDUPTHER

## 2017-04-13 RX ORDER — ALBUTEROL SULFATE 90 UG/1
2 AEROSOL, METERED RESPIRATORY (INHALATION) EVERY 6 HOURS PRN
Status: DISCONTINUED | OUTPATIENT
Start: 2017-04-13 | End: 2017-04-17 | Stop reason: HOSPADM

## 2017-04-13 RX ORDER — TRAMADOL HYDROCHLORIDE 50 MG/1
50 TABLET ORAL EVERY 6 HOURS
Status: DISCONTINUED | OUTPATIENT
Start: 2017-04-13 | End: 2017-04-17 | Stop reason: HOSPADM

## 2017-04-13 RX ORDER — LANOLIN ALCOHOL/MO/W.PET/CERES
3 CREAM (GRAM) TOPICAL
Status: DISCONTINUED | OUTPATIENT
Start: 2017-04-13 | End: 2017-04-17 | Stop reason: HOSPADM

## 2017-04-13 RX ORDER — BISACODYL 10 MG
10 SUPPOSITORY, RECTAL RECTAL AS NEEDED
Status: DISCONTINUED | OUTPATIENT
Start: 2017-04-13 | End: 2017-04-17 | Stop reason: HOSPADM

## 2017-04-13 RX ORDER — LORATADINE 10 MG/1
10 TABLET ORAL DAILY
Status: DISCONTINUED | OUTPATIENT
Start: 2017-04-13 | End: 2017-04-17 | Stop reason: HOSPADM

## 2017-04-13 RX ORDER — LATANOPROST 50 UG/ML
1 SOLUTION/ DROPS OPHTHALMIC
Status: DISCONTINUED | OUTPATIENT
Start: 2017-04-13 | End: 2017-04-17 | Stop reason: HOSPADM

## 2017-04-13 RX ORDER — GABAPENTIN 300 MG/1
300 CAPSULE ORAL
Status: DISCONTINUED | OUTPATIENT
Start: 2017-04-13 | End: 2017-04-17 | Stop reason: HOSPADM

## 2017-04-13 RX ORDER — ACETAMINOPHEN 325 MG/1
650 TABLET ORAL EVERY 4 HOURS PRN
Status: ON HOLD | COMMUNITY
End: 2017-07-29

## 2017-04-13 RX ORDER — AMPICILLIN TRIHYDRATE 250 MG
1 CAPSULE ORAL 2 TIMES DAILY
COMMUNITY
End: 2018-03-07 | Stop reason: SDUPTHER

## 2017-04-13 RX ORDER — ACETAMINOPHEN 325 MG/1
650 TABLET ORAL EVERY 6 HOURS PRN
Status: DISCONTINUED | OUTPATIENT
Start: 2017-04-13 | End: 2017-04-17 | Stop reason: HOSPADM

## 2017-04-13 RX ORDER — ROPINIROLE 1 MG/1
1 TABLET, FILM COATED ORAL EVERY 6 HOURS
Status: DISCONTINUED | OUTPATIENT
Start: 2017-04-13 | End: 2017-04-17 | Stop reason: HOSPADM

## 2017-04-13 RX ORDER — METOPROLOL TARTRATE 50 MG/1
50 TABLET, FILM COATED ORAL 2 TIMES DAILY
Status: DISCONTINUED | OUTPATIENT
Start: 2017-04-13 | End: 2017-04-17 | Stop reason: HOSPADM

## 2017-04-13 RX ORDER — MINERAL OIL 100 G/100G
1 OIL RECTAL ONCE
COMMUNITY
End: 2017-04-17 | Stop reason: HOSPADM

## 2017-04-13 RX ORDER — FUROSEMIDE 10 MG/ML
40 INJECTION INTRAMUSCULAR; INTRAVENOUS ONCE
Status: COMPLETED | OUTPATIENT
Start: 2017-04-13 | End: 2017-04-13

## 2017-04-13 RX ORDER — TRIAMCINOLONE ACETONIDE 1 MG/G
1 CREAM TOPICAL 2 TIMES DAILY
COMMUNITY
End: 2017-07-14 | Stop reason: ALTCHOICE

## 2017-04-13 RX ORDER — FUROSEMIDE 10 MG/ML
40 INJECTION INTRAMUSCULAR; INTRAVENOUS
Status: DISCONTINUED | OUTPATIENT
Start: 2017-04-14 | End: 2017-04-16

## 2017-04-13 RX ORDER — LANOLIN ALCOHOL/MO/W.PET/CERES
3 CREAM (GRAM) TOPICAL
COMMUNITY
End: 2017-07-29 | Stop reason: HOSPADM

## 2017-04-13 RX ORDER — CHOLECALCIFEROL (VITAMIN D3) 125 MCG
200 CAPSULE ORAL DAILY
Status: DISCONTINUED | OUTPATIENT
Start: 2017-04-13 | End: 2017-04-17 | Stop reason: HOSPADM

## 2017-04-13 RX ORDER — IBUPROFEN 200 MG
TABLET ORAL
COMMUNITY
End: 2017-04-17 | Stop reason: HOSPADM

## 2017-04-13 RX ORDER — CALCITRIOL 0.25 UG/1
0.25 CAPSULE, LIQUID FILLED ORAL DAILY
COMMUNITY
End: 2018-03-07 | Stop reason: SDUPTHER

## 2017-04-13 RX ORDER — AMLODIPINE BESYLATE 5 MG/1
5 TABLET ORAL
Status: DISCONTINUED | OUTPATIENT
Start: 2017-04-13 | End: 2017-04-17 | Stop reason: HOSPADM

## 2017-04-13 RX ORDER — FLUTICASONE PROPIONATE 50 MCG
1 SPRAY, SUSPENSION (ML) NASAL DAILY
Status: DISCONTINUED | OUTPATIENT
Start: 2017-04-13 | End: 2017-04-17 | Stop reason: HOSPADM

## 2017-04-13 RX ORDER — CALCITRIOL 0.25 UG/1
0.25 CAPSULE, LIQUID FILLED ORAL 3 TIMES WEEKLY
Status: DISCONTINUED | OUTPATIENT
Start: 2017-04-14 | End: 2017-04-17 | Stop reason: HOSPADM

## 2017-04-13 RX ORDER — LEVOTHYROXINE SODIUM 0.07 MG/1
37.5 TABLET ORAL
Status: DISCONTINUED | OUTPATIENT
Start: 2017-04-14 | End: 2017-04-17 | Stop reason: HOSPADM

## 2017-04-13 RX ORDER — MELATONIN
2000 DAILY
Status: DISCONTINUED | OUTPATIENT
Start: 2017-04-13 | End: 2017-04-17 | Stop reason: HOSPADM

## 2017-04-13 RX ORDER — BRIMONIDINE TARTRATE 0.15 %
1 DROPS OPHTHALMIC (EYE) 2 TIMES DAILY
Status: DISCONTINUED | OUTPATIENT
Start: 2017-04-13 | End: 2017-04-17 | Stop reason: HOSPADM

## 2017-04-13 RX ORDER — DOCUSATE SODIUM 100 MG/1
100 CAPSULE, LIQUID FILLED ORAL 2 TIMES DAILY
COMMUNITY
End: 2018-01-30 | Stop reason: HOSPADM

## 2017-04-13 RX ORDER — OXCARBAZEPINE 150 MG/1
150 TABLET, FILM COATED ORAL
Status: DISCONTINUED | OUTPATIENT
Start: 2017-04-13 | End: 2017-04-17 | Stop reason: HOSPADM

## 2017-04-13 RX ORDER — PANTOPRAZOLE SODIUM 40 MG/1
40 TABLET, DELAYED RELEASE ORAL
Status: DISCONTINUED | OUTPATIENT
Start: 2017-04-13 | End: 2017-04-17 | Stop reason: HOSPADM

## 2017-04-13 RX ORDER — BACLOFEN 10 MG/1
10 TABLET ORAL
Status: DISCONTINUED | OUTPATIENT
Start: 2017-04-13 | End: 2017-04-17 | Stop reason: HOSPADM

## 2017-04-13 RX ORDER — TRIAMCINOLONE ACETONIDE 1 MG/G
1 CREAM TOPICAL 2 TIMES DAILY
Status: DISCONTINUED | OUTPATIENT
Start: 2017-04-13 | End: 2017-04-17 | Stop reason: HOSPADM

## 2017-04-13 RX ADMIN — LATANOPROST 1 DROP: 50 SOLUTION/ DROPS OPHTHALMIC at 21:23

## 2017-04-13 RX ADMIN — GABAPENTIN 300 MG: 300 CAPSULE ORAL at 21:22

## 2017-04-13 RX ADMIN — BACLOFEN 10 MG: 10 TABLET ORAL at 21:22

## 2017-04-13 RX ADMIN — FUROSEMIDE 40 MG: 10 INJECTION, SOLUTION INTRAMUSCULAR; INTRAVENOUS at 14:05

## 2017-04-13 RX ADMIN — TRAMADOL HYDROCHLORIDE 50 MG: 50 TABLET, COATED ORAL at 17:19

## 2017-04-13 RX ADMIN — TRIAMCINOLONE ACETONIDE 1 APPLICATION: 1 CREAM TOPICAL at 17:19

## 2017-04-13 RX ADMIN — APIXABAN 2.5 MG: 2.5 TABLET, FILM COATED ORAL at 17:17

## 2017-04-13 RX ADMIN — FUROSEMIDE 40 MG: 10 INJECTION, SOLUTION INTRAMUSCULAR; INTRAVENOUS at 17:17

## 2017-04-13 RX ADMIN — BRIMONIDINE TARTRATE 1 DROP: 1.5 SOLUTION OPHTHALMIC at 17:19

## 2017-04-13 RX ADMIN — ROPINIROLE HYDROCHLORIDE 1 MG: 1 TABLET, FILM COATED ORAL at 23:24

## 2017-04-13 RX ADMIN — MELATONIN TAB 3 MG 3 MG: 3 TAB at 21:22

## 2017-04-13 RX ADMIN — TRAMADOL HYDROCHLORIDE 50 MG: 50 TABLET, COATED ORAL at 23:24

## 2017-04-13 RX ADMIN — METOPROLOL TARTRATE 50 MG: 50 TABLET ORAL at 17:17

## 2017-04-13 RX ADMIN — PANTOPRAZOLE SODIUM 40 MG: 40 TABLET, DELAYED RELEASE ORAL at 16:13

## 2017-04-13 RX ADMIN — BIMATOPROST 1 DROP: 0.1 SOLUTION/ DROPS OPHTHALMIC at 21:23

## 2017-04-13 RX ADMIN — ROPINIROLE HYDROCHLORIDE 1 MG: 1 TABLET, FILM COATED ORAL at 17:17

## 2017-04-13 RX ADMIN — AMLODIPINE BESYLATE 5 MG: 5 TABLET ORAL at 21:22

## 2017-04-13 RX ADMIN — OXCARBAZEPINE 150 MG: 150 TABLET ORAL at 21:22

## 2017-04-14 ENCOUNTER — APPOINTMENT (OUTPATIENT)
Dept: NON INVASIVE DIAGNOSTICS | Facility: HOSPITAL | Age: 82
DRG: 291 | End: 2017-04-14
Payer: MEDICARE

## 2017-04-14 ENCOUNTER — APPOINTMENT (OUTPATIENT)
Dept: RADIOLOGY | Facility: HOSPITAL | Age: 82
DRG: 291 | End: 2017-04-14
Payer: MEDICARE

## 2017-04-14 ENCOUNTER — GENERIC CONVERSION - ENCOUNTER (OUTPATIENT)
Dept: OTHER | Facility: OTHER | Age: 82
End: 2017-04-14

## 2017-04-14 PROBLEM — R07.9 CHEST PAIN: Status: ACTIVE | Noted: 2017-04-14

## 2017-04-14 LAB
ANION GAP SERPL CALCULATED.3IONS-SCNC: 10 MMOL/L (ref 4–13)
ATRIAL RATE: 88 BPM
BASOPHILS # BLD AUTO: 0.03 THOUSANDS/ΜL (ref 0–0.1)
BASOPHILS NFR BLD AUTO: 0 % (ref 0–1)
BUN SERPL-MCNC: 32 MG/DL (ref 5–25)
CALCIUM SERPL-MCNC: 8.5 MG/DL (ref 8.3–10.1)
CHLORIDE SERPL-SCNC: 104 MMOL/L (ref 100–108)
CO2 SERPL-SCNC: 29 MMOL/L (ref 21–32)
CREAT SERPL-MCNC: 1.52 MG/DL (ref 0.6–1.3)
EOSINOPHIL # BLD AUTO: 0.21 THOUSAND/ΜL (ref 0–0.61)
EOSINOPHIL NFR BLD AUTO: 3 % (ref 0–6)
ERYTHROCYTE [DISTWIDTH] IN BLOOD BY AUTOMATED COUNT: 14.3 % (ref 11.6–15.1)
ERYTHROCYTE [DISTWIDTH] IN BLOOD BY AUTOMATED COUNT: 14.4 % (ref 11.6–15.1)
GFR SERPL CREATININE-BSD FRML MDRD: 32.5 ML/MIN/1.73SQ M
GLUCOSE P FAST SERPL-MCNC: 97 MG/DL (ref 65–99)
GLUCOSE SERPL-MCNC: 97 MG/DL (ref 65–140)
HCT VFR BLD AUTO: 30.6 % (ref 34.8–46.1)
HCT VFR BLD AUTO: 32.1 % (ref 34.8–46.1)
HGB BLD-MCNC: 10.2 G/DL (ref 11.5–15.4)
HGB BLD-MCNC: 9.6 G/DL (ref 11.5–15.4)
LYMPHOCYTES # BLD AUTO: 1.01 THOUSANDS/ΜL (ref 0.6–4.47)
LYMPHOCYTES NFR BLD AUTO: 15 % (ref 14–44)
MCH RBC QN AUTO: 29.2 PG (ref 26.8–34.3)
MCH RBC QN AUTO: 29.2 PG (ref 26.8–34.3)
MCHC RBC AUTO-ENTMCNC: 31.4 G/DL (ref 31.4–37.4)
MCHC RBC AUTO-ENTMCNC: 31.8 G/DL (ref 31.4–37.4)
MCV RBC AUTO: 92 FL (ref 82–98)
MCV RBC AUTO: 93 FL (ref 82–98)
MONOCYTES # BLD AUTO: 0.58 THOUSAND/ΜL (ref 0.17–1.22)
MONOCYTES NFR BLD AUTO: 9 % (ref 4–12)
NEUTROPHILS # BLD AUTO: 4.86 THOUSANDS/ΜL (ref 1.85–7.62)
NEUTS SEG NFR BLD AUTO: 73 % (ref 43–75)
NT-PROBNP SERPL-MCNC: 1651 PG/ML
P AXIS: 54 DEGREES
PLATELET # BLD AUTO: 188 THOUSANDS/UL (ref 149–390)
PLATELET # BLD AUTO: 216 THOUSANDS/UL (ref 149–390)
PMV BLD AUTO: 10.9 FL (ref 8.9–12.7)
PMV BLD AUTO: 10.9 FL (ref 8.9–12.7)
POTASSIUM SERPL-SCNC: 3.7 MMOL/L (ref 3.5–5.3)
PR INTERVAL: 218 MS
QRS AXIS: 21 DEGREES
QRSD INTERVAL: 90 MS
QT INTERVAL: 378 MS
QTC INTERVAL: 457 MS
RBC # BLD AUTO: 3.29 MILLION/UL (ref 3.81–5.12)
RBC # BLD AUTO: 3.49 MILLION/UL (ref 3.81–5.12)
SODIUM SERPL-SCNC: 143 MMOL/L (ref 136–145)
T WAVE AXIS: 65 DEGREES
TROPONIN I SERPL-MCNC: <0.02 NG/ML
VENTRICULAR RATE: 88 BPM
WBC # BLD AUTO: 5.84 THOUSAND/UL (ref 4.31–10.16)
WBC # BLD AUTO: 6.69 THOUSAND/UL (ref 4.31–10.16)

## 2017-04-14 PROCEDURE — 84484 ASSAY OF TROPONIN QUANT: CPT | Performed by: FAMILY MEDICINE

## 2017-04-14 PROCEDURE — 71010 HB CHEST X-RAY 1 VIEW FRONTAL (PORTABLE): CPT

## 2017-04-14 PROCEDURE — 85027 COMPLETE CBC AUTOMATED: CPT | Performed by: FAMILY MEDICINE

## 2017-04-14 PROCEDURE — 93005 ELECTROCARDIOGRAM TRACING: CPT

## 2017-04-14 PROCEDURE — 85025 COMPLETE CBC W/AUTO DIFF WBC: CPT | Performed by: FAMILY MEDICINE

## 2017-04-14 PROCEDURE — 83880 ASSAY OF NATRIURETIC PEPTIDE: CPT | Performed by: FAMILY MEDICINE

## 2017-04-14 PROCEDURE — 80048 BASIC METABOLIC PNL TOTAL CA: CPT | Performed by: FAMILY MEDICINE

## 2017-04-14 PROCEDURE — 93306 TTE W/DOPPLER COMPLETE: CPT

## 2017-04-14 RX ORDER — POTASSIUM CHLORIDE 20 MEQ/1
20 TABLET, EXTENDED RELEASE ORAL ONCE
Status: COMPLETED | OUTPATIENT
Start: 2017-04-14 | End: 2017-04-14

## 2017-04-14 RX ORDER — LORAZEPAM 2 MG/ML
0.5 INJECTION INTRAMUSCULAR ONCE
Status: COMPLETED | OUTPATIENT
Start: 2017-04-14 | End: 2017-04-14

## 2017-04-14 RX ORDER — POTASSIUM CHLORIDE 20 MEQ/1
40 TABLET, EXTENDED RELEASE ORAL ONCE
Status: DISCONTINUED | OUTPATIENT
Start: 2017-04-14 | End: 2017-04-17 | Stop reason: HOSPADM

## 2017-04-14 RX ADMIN — ROPINIROLE HYDROCHLORIDE 1 MG: 1 TABLET, FILM COATED ORAL at 23:03

## 2017-04-14 RX ADMIN — APIXABAN 2.5 MG: 2.5 TABLET, FILM COATED ORAL at 17:41

## 2017-04-14 RX ADMIN — TRAMADOL HYDROCHLORIDE 50 MG: 50 TABLET, COATED ORAL at 17:42

## 2017-04-14 RX ADMIN — METOPROLOL TARTRATE 50 MG: 50 TABLET ORAL at 17:41

## 2017-04-14 RX ADMIN — OXCARBAZEPINE 150 MG: 150 TABLET ORAL at 21:20

## 2017-04-14 RX ADMIN — MELATONIN TAB 3 MG 3 MG: 3 TAB at 21:20

## 2017-04-14 RX ADMIN — METOPROLOL TARTRATE 50 MG: 50 TABLET ORAL at 09:52

## 2017-04-14 RX ADMIN — ROPINIROLE HYDROCHLORIDE 1 MG: 1 TABLET, FILM COATED ORAL at 17:42

## 2017-04-14 RX ADMIN — FLUTICASONE PROPIONATE 1 SPRAY: 50 SPRAY, METERED NASAL at 10:06

## 2017-04-14 RX ADMIN — PANTOPRAZOLE SODIUM 40 MG: 40 TABLET, DELAYED RELEASE ORAL at 06:22

## 2017-04-14 RX ADMIN — BACLOFEN 10 MG: 10 TABLET ORAL at 21:20

## 2017-04-14 RX ADMIN — TRAMADOL HYDROCHLORIDE 50 MG: 50 TABLET, COATED ORAL at 12:52

## 2017-04-14 RX ADMIN — FUROSEMIDE 40 MG: 10 INJECTION, SOLUTION INTRAMUSCULAR; INTRAVENOUS at 16:04

## 2017-04-14 RX ADMIN — CALCITRIOL 0.25 MCG: 0.25 CAPSULE ORAL at 10:07

## 2017-04-14 RX ADMIN — Medication 400 MG: at 09:52

## 2017-04-14 RX ADMIN — APIXABAN 2.5 MG: 2.5 TABLET, FILM COATED ORAL at 09:52

## 2017-04-14 RX ADMIN — FUROSEMIDE 80 MG: 10 INJECTION, SOLUTION INTRAMUSCULAR; INTRAVENOUS at 08:28

## 2017-04-14 RX ADMIN — LATANOPROST 1 DROP: 50 SOLUTION/ DROPS OPHTHALMIC at 21:24

## 2017-04-14 RX ADMIN — PANTOPRAZOLE SODIUM 40 MG: 40 TABLET, DELAYED RELEASE ORAL at 16:04

## 2017-04-14 RX ADMIN — BIMATOPROST 1 DROP: 0.1 SOLUTION/ DROPS OPHTHALMIC at 21:21

## 2017-04-14 RX ADMIN — Medication 200 MG: at 09:52

## 2017-04-14 RX ADMIN — GABAPENTIN 300 MG: 300 CAPSULE ORAL at 21:20

## 2017-04-14 RX ADMIN — LEVOTHYROXINE SODIUM 37.5 MCG: 75 TABLET ORAL at 06:21

## 2017-04-14 RX ADMIN — POTASSIUM CHLORIDE 20 MEQ: 1500 TABLET, EXTENDED RELEASE ORAL at 17:42

## 2017-04-14 RX ADMIN — BRIMONIDINE TARTRATE 1 DROP: 1.5 SOLUTION OPHTHALMIC at 10:06

## 2017-04-14 RX ADMIN — AMLODIPINE BESYLATE 5 MG: 5 TABLET ORAL at 21:18

## 2017-04-14 RX ADMIN — CHOLECALCIFEROL TAB 25 MCG (1000 UNIT) 2000 UNITS: 25 TAB at 09:52

## 2017-04-14 RX ADMIN — TRAMADOL HYDROCHLORIDE 50 MG: 50 TABLET, COATED ORAL at 06:22

## 2017-04-14 RX ADMIN — TRAMADOL HYDROCHLORIDE 50 MG: 50 TABLET, COATED ORAL at 23:03

## 2017-04-14 RX ADMIN — ROPINIROLE HYDROCHLORIDE 1 MG: 1 TABLET, FILM COATED ORAL at 12:52

## 2017-04-14 RX ADMIN — BRIMONIDINE TARTRATE 1 DROP: 1.5 SOLUTION OPHTHALMIC at 17:44

## 2017-04-14 RX ADMIN — ROPINIROLE HYDROCHLORIDE 1 MG: 1 TABLET, FILM COATED ORAL at 06:22

## 2017-04-14 RX ADMIN — TRIAMCINOLONE ACETONIDE 1 APPLICATION: 1 CREAM TOPICAL at 10:06

## 2017-04-14 RX ADMIN — LORATADINE 10 MG: 10 TABLET ORAL at 09:52

## 2017-04-14 RX ADMIN — LORAZEPAM 0.5 MG: 2 INJECTION, SOLUTION INTRAMUSCULAR; INTRAVENOUS at 09:51

## 2017-04-15 LAB
ANION GAP SERPL CALCULATED.3IONS-SCNC: 8 MMOL/L (ref 4–13)
BUN SERPL-MCNC: 31 MG/DL (ref 5–25)
CALCIUM SERPL-MCNC: 8.8 MG/DL (ref 8.3–10.1)
CHLORIDE SERPL-SCNC: 104 MMOL/L (ref 100–108)
CO2 SERPL-SCNC: 30 MMOL/L (ref 21–32)
CREAT SERPL-MCNC: 1.48 MG/DL (ref 0.6–1.3)
GFR SERPL CREATININE-BSD FRML MDRD: 33.5 ML/MIN/1.73SQ M
GLUCOSE SERPL-MCNC: 91 MG/DL (ref 65–140)
POTASSIUM SERPL-SCNC: 4.2 MMOL/L (ref 3.5–5.3)
SODIUM SERPL-SCNC: 142 MMOL/L (ref 136–145)

## 2017-04-15 PROCEDURE — G8979 MOBILITY GOAL STATUS: HCPCS

## 2017-04-15 PROCEDURE — 97162 PT EVAL MOD COMPLEX 30 MIN: CPT

## 2017-04-15 PROCEDURE — 80048 BASIC METABOLIC PNL TOTAL CA: CPT | Performed by: FAMILY MEDICINE

## 2017-04-15 PROCEDURE — G8978 MOBILITY CURRENT STATUS: HCPCS

## 2017-04-15 PROCEDURE — 97110 THERAPEUTIC EXERCISES: CPT

## 2017-04-15 RX ADMIN — ROPINIROLE HYDROCHLORIDE 1 MG: 1 TABLET, FILM COATED ORAL at 17:07

## 2017-04-15 RX ADMIN — TRAMADOL HYDROCHLORIDE 50 MG: 50 TABLET, COATED ORAL at 05:35

## 2017-04-15 RX ADMIN — OXCARBAZEPINE 150 MG: 150 TABLET ORAL at 21:24

## 2017-04-15 RX ADMIN — PANTOPRAZOLE SODIUM 40 MG: 40 TABLET, DELAYED RELEASE ORAL at 17:07

## 2017-04-15 RX ADMIN — LORATADINE 10 MG: 10 TABLET ORAL at 08:41

## 2017-04-15 RX ADMIN — METOPROLOL TARTRATE 50 MG: 50 TABLET ORAL at 17:07

## 2017-04-15 RX ADMIN — LEVOTHYROXINE SODIUM 37.5 MCG: 75 TABLET ORAL at 05:35

## 2017-04-15 RX ADMIN — FLUTICASONE PROPIONATE 1 SPRAY: 50 SPRAY, METERED NASAL at 08:41

## 2017-04-15 RX ADMIN — GABAPENTIN 300 MG: 300 CAPSULE ORAL at 21:24

## 2017-04-15 RX ADMIN — APIXABAN 2.5 MG: 2.5 TABLET, FILM COATED ORAL at 17:07

## 2017-04-15 RX ADMIN — TRAMADOL HYDROCHLORIDE 50 MG: 50 TABLET, COATED ORAL at 11:34

## 2017-04-15 RX ADMIN — ROPINIROLE HYDROCHLORIDE 1 MG: 1 TABLET, FILM COATED ORAL at 05:35

## 2017-04-15 RX ADMIN — TRAMADOL HYDROCHLORIDE 50 MG: 50 TABLET, COATED ORAL at 23:41

## 2017-04-15 RX ADMIN — FUROSEMIDE 40 MG: 10 INJECTION, SOLUTION INTRAMUSCULAR; INTRAVENOUS at 08:41

## 2017-04-15 RX ADMIN — AMLODIPINE BESYLATE 5 MG: 5 TABLET ORAL at 21:24

## 2017-04-15 RX ADMIN — ROPINIROLE HYDROCHLORIDE 1 MG: 1 TABLET, FILM COATED ORAL at 11:34

## 2017-04-15 RX ADMIN — BRIMONIDINE TARTRATE 1 DROP: 1.5 SOLUTION OPHTHALMIC at 08:41

## 2017-04-15 RX ADMIN — PANTOPRAZOLE SODIUM 40 MG: 40 TABLET, DELAYED RELEASE ORAL at 05:36

## 2017-04-15 RX ADMIN — CHOLECALCIFEROL TAB 25 MCG (1000 UNIT) 2000 UNITS: 25 TAB at 08:40

## 2017-04-15 RX ADMIN — BRIMONIDINE TARTRATE 1 DROP: 1.5 SOLUTION OPHTHALMIC at 17:08

## 2017-04-15 RX ADMIN — Medication 200 MG: at 08:40

## 2017-04-15 RX ADMIN — FUROSEMIDE 40 MG: 10 INJECTION, SOLUTION INTRAMUSCULAR; INTRAVENOUS at 17:07

## 2017-04-15 RX ADMIN — MELATONIN TAB 3 MG 3 MG: 3 TAB at 21:24

## 2017-04-15 RX ADMIN — LATANOPROST 1 DROP: 50 SOLUTION/ DROPS OPHTHALMIC at 21:24

## 2017-04-15 RX ADMIN — BIMATOPROST 1 DROP: 0.1 SOLUTION/ DROPS OPHTHALMIC at 21:24

## 2017-04-15 RX ADMIN — ROPINIROLE HYDROCHLORIDE 1 MG: 1 TABLET, FILM COATED ORAL at 23:41

## 2017-04-15 RX ADMIN — TRIAMCINOLONE ACETONIDE 1 APPLICATION: 1 CREAM TOPICAL at 08:42

## 2017-04-15 RX ADMIN — TRIAMCINOLONE ACETONIDE 1 APPLICATION: 1 CREAM TOPICAL at 17:08

## 2017-04-15 RX ADMIN — Medication 400 MG: at 08:41

## 2017-04-15 RX ADMIN — BACLOFEN 10 MG: 10 TABLET ORAL at 21:24

## 2017-04-15 RX ADMIN — METOPROLOL TARTRATE 50 MG: 50 TABLET ORAL at 08:40

## 2017-04-15 RX ADMIN — APIXABAN 2.5 MG: 2.5 TABLET, FILM COATED ORAL at 08:41

## 2017-04-15 RX ADMIN — TRAMADOL HYDROCHLORIDE 50 MG: 50 TABLET, COATED ORAL at 17:07

## 2017-04-16 ENCOUNTER — APPOINTMENT (INPATIENT)
Dept: ULTRASOUND IMAGING | Facility: HOSPITAL | Age: 82
DRG: 291 | End: 2017-04-16
Payer: MEDICARE

## 2017-04-16 LAB
ANION GAP SERPL CALCULATED.3IONS-SCNC: 7 MMOL/L (ref 4–13)
BUN SERPL-MCNC: 30 MG/DL (ref 5–25)
CALCIUM SERPL-MCNC: 8.8 MG/DL (ref 8.3–10.1)
CHLORIDE SERPL-SCNC: 100 MMOL/L (ref 100–108)
CO2 SERPL-SCNC: 33 MMOL/L (ref 21–32)
CREAT SERPL-MCNC: 1.65 MG/DL (ref 0.6–1.3)
GFR SERPL CREATININE-BSD FRML MDRD: 29.6 ML/MIN/1.73SQ M
GLUCOSE SERPL-MCNC: 95 MG/DL (ref 65–140)
POTASSIUM SERPL-SCNC: 3.7 MMOL/L (ref 3.5–5.3)
SODIUM SERPL-SCNC: 140 MMOL/L (ref 136–145)

## 2017-04-16 PROCEDURE — 80048 BASIC METABOLIC PNL TOTAL CA: CPT | Performed by: HOSPITALIST

## 2017-04-16 PROCEDURE — 93970 EXTREMITY STUDY: CPT

## 2017-04-16 RX ORDER — TORSEMIDE 20 MG/1
20 TABLET ORAL
Status: DISCONTINUED | OUTPATIENT
Start: 2017-04-16 | End: 2017-04-16

## 2017-04-16 RX ORDER — TORSEMIDE 20 MG/1
20 TABLET ORAL 2 TIMES DAILY
Status: DISCONTINUED | OUTPATIENT
Start: 2017-04-16 | End: 2017-04-17 | Stop reason: HOSPADM

## 2017-04-16 RX ADMIN — AMLODIPINE BESYLATE 5 MG: 5 TABLET ORAL at 21:38

## 2017-04-16 RX ADMIN — Medication 400 MG: at 07:54

## 2017-04-16 RX ADMIN — METOPROLOL TARTRATE 50 MG: 50 TABLET ORAL at 07:54

## 2017-04-16 RX ADMIN — BRIMONIDINE TARTRATE 1 DROP: 1.5 SOLUTION OPHTHALMIC at 17:30

## 2017-04-16 RX ADMIN — PANTOPRAZOLE SODIUM 40 MG: 40 TABLET, DELAYED RELEASE ORAL at 17:29

## 2017-04-16 RX ADMIN — GABAPENTIN 300 MG: 300 CAPSULE ORAL at 21:38

## 2017-04-16 RX ADMIN — CHOLECALCIFEROL TAB 25 MCG (1000 UNIT) 2000 UNITS: 25 TAB at 07:54

## 2017-04-16 RX ADMIN — BIMATOPROST 1 DROP: 0.1 SOLUTION/ DROPS OPHTHALMIC at 21:37

## 2017-04-16 RX ADMIN — LEVOTHYROXINE SODIUM 37.5 MCG: 75 TABLET ORAL at 05:07

## 2017-04-16 RX ADMIN — BACLOFEN 10 MG: 10 TABLET ORAL at 21:38

## 2017-04-16 RX ADMIN — PANTOPRAZOLE SODIUM 40 MG: 40 TABLET, DELAYED RELEASE ORAL at 05:07

## 2017-04-16 RX ADMIN — TRIAMCINOLONE ACETONIDE 1 APPLICATION: 1 CREAM TOPICAL at 07:54

## 2017-04-16 RX ADMIN — Medication 200 MG: at 07:54

## 2017-04-16 RX ADMIN — TRAMADOL HYDROCHLORIDE 50 MG: 50 TABLET, COATED ORAL at 05:07

## 2017-04-16 RX ADMIN — ROPINIROLE HYDROCHLORIDE 1 MG: 1 TABLET, FILM COATED ORAL at 17:29

## 2017-04-16 RX ADMIN — BRIMONIDINE TARTRATE 1 DROP: 1.5 SOLUTION OPHTHALMIC at 07:56

## 2017-04-16 RX ADMIN — FLUTICASONE PROPIONATE 1 SPRAY: 50 SPRAY, METERED NASAL at 07:54

## 2017-04-16 RX ADMIN — OXCARBAZEPINE 150 MG: 150 TABLET ORAL at 21:38

## 2017-04-16 RX ADMIN — ROPINIROLE HYDROCHLORIDE 1 MG: 1 TABLET, FILM COATED ORAL at 05:07

## 2017-04-16 RX ADMIN — TORSEMIDE 20 MG: 20 TABLET ORAL at 13:24

## 2017-04-16 RX ADMIN — TRAMADOL HYDROCHLORIDE 50 MG: 50 TABLET, COATED ORAL at 13:22

## 2017-04-16 RX ADMIN — TRIAMCINOLONE ACETONIDE 1 APPLICATION: 1 CREAM TOPICAL at 17:30

## 2017-04-16 RX ADMIN — ROPINIROLE HYDROCHLORIDE 1 MG: 1 TABLET, FILM COATED ORAL at 13:22

## 2017-04-16 RX ADMIN — APIXABAN 2.5 MG: 2.5 TABLET, FILM COATED ORAL at 17:29

## 2017-04-16 RX ADMIN — TRAMADOL HYDROCHLORIDE 50 MG: 50 TABLET, COATED ORAL at 17:29

## 2017-04-16 RX ADMIN — APIXABAN 2.5 MG: 2.5 TABLET, FILM COATED ORAL at 07:53

## 2017-04-16 RX ADMIN — LORATADINE 10 MG: 10 TABLET ORAL at 07:54

## 2017-04-16 RX ADMIN — METOPROLOL TARTRATE 50 MG: 50 TABLET ORAL at 17:30

## 2017-04-16 RX ADMIN — TORSEMIDE 20 MG: 20 TABLET ORAL at 17:30

## 2017-04-16 RX ADMIN — MELATONIN TAB 3 MG 3 MG: 3 TAB at 21:38

## 2017-04-16 RX ADMIN — LATANOPROST 1 DROP: 50 SOLUTION/ DROPS OPHTHALMIC at 21:38

## 2017-04-17 VITALS
HEIGHT: 60 IN | TEMPERATURE: 97.7 F | SYSTOLIC BLOOD PRESSURE: 136 MMHG | OXYGEN SATURATION: 98 % | RESPIRATION RATE: 20 BRPM | WEIGHT: 157.63 LBS | DIASTOLIC BLOOD PRESSURE: 60 MMHG | BODY MASS INDEX: 30.95 KG/M2 | HEART RATE: 65 BPM

## 2017-04-17 LAB
ANION GAP SERPL CALCULATED.3IONS-SCNC: 11 MMOL/L (ref 4–13)
BUN SERPL-MCNC: 29 MG/DL (ref 5–25)
CALCIUM SERPL-MCNC: 8.6 MG/DL (ref 8.3–10.1)
CHLORIDE SERPL-SCNC: 100 MMOL/L (ref 100–108)
CO2 SERPL-SCNC: 29 MMOL/L (ref 21–32)
CREAT SERPL-MCNC: 1.51 MG/DL (ref 0.6–1.3)
GFR SERPL CREATININE-BSD FRML MDRD: 32.8 ML/MIN/1.73SQ M
GLUCOSE SERPL-MCNC: 88 MG/DL (ref 65–140)
POTASSIUM SERPL-SCNC: 3.4 MMOL/L (ref 3.5–5.3)
SODIUM SERPL-SCNC: 140 MMOL/L (ref 136–145)

## 2017-04-17 PROCEDURE — 80048 BASIC METABOLIC PNL TOTAL CA: CPT | Performed by: HOSPITALIST

## 2017-04-17 RX ORDER — POTASSIUM CHLORIDE 20 MEQ/1
40 TABLET, EXTENDED RELEASE ORAL ONCE
Status: COMPLETED | OUTPATIENT
Start: 2017-04-17 | End: 2017-04-17

## 2017-04-17 RX ORDER — TORSEMIDE 20 MG/1
20 TABLET ORAL 2 TIMES DAILY
Qty: 60 TABLET | Refills: 0 | Status: SHIPPED | OUTPATIENT
Start: 2017-04-17 | End: 2017-07-20 | Stop reason: HOSPADM

## 2017-04-17 RX ORDER — TRAMADOL HYDROCHLORIDE 50 MG/1
50 TABLET ORAL EVERY 6 HOURS
Qty: 20 TABLET | Refills: 0 | Status: SHIPPED | OUTPATIENT
Start: 2017-04-17 | End: 2017-04-22

## 2017-04-17 RX ADMIN — TRAMADOL HYDROCHLORIDE 50 MG: 50 TABLET, COATED ORAL at 00:17

## 2017-04-17 RX ADMIN — TRIAMCINOLONE ACETONIDE 1 APPLICATION: 1 CREAM TOPICAL at 08:47

## 2017-04-17 RX ADMIN — PANTOPRAZOLE SODIUM 40 MG: 40 TABLET, DELAYED RELEASE ORAL at 06:21

## 2017-04-17 RX ADMIN — METOPROLOL TARTRATE 50 MG: 50 TABLET ORAL at 08:46

## 2017-04-17 RX ADMIN — Medication 200 MG: at 08:46

## 2017-04-17 RX ADMIN — ROPINIROLE HYDROCHLORIDE 1 MG: 1 TABLET, FILM COATED ORAL at 00:17

## 2017-04-17 RX ADMIN — POTASSIUM CHLORIDE 40 MEQ: 1500 TABLET, EXTENDED RELEASE ORAL at 12:07

## 2017-04-17 RX ADMIN — CALCITRIOL 0.25 MCG: 0.25 CAPSULE ORAL at 08:47

## 2017-04-17 RX ADMIN — CHOLECALCIFEROL TAB 25 MCG (1000 UNIT) 2000 UNITS: 25 TAB at 08:46

## 2017-04-17 RX ADMIN — ROPINIROLE HYDROCHLORIDE 1 MG: 1 TABLET, FILM COATED ORAL at 06:21

## 2017-04-17 RX ADMIN — TORSEMIDE 20 MG: 20 TABLET ORAL at 08:46

## 2017-04-17 RX ADMIN — ROPINIROLE HYDROCHLORIDE 1 MG: 1 TABLET, FILM COATED ORAL at 12:07

## 2017-04-17 RX ADMIN — BRIMONIDINE TARTRATE 1 DROP: 1.5 SOLUTION OPHTHALMIC at 08:47

## 2017-04-17 RX ADMIN — LORATADINE 10 MG: 10 TABLET ORAL at 08:46

## 2017-04-17 RX ADMIN — PANTOPRAZOLE SODIUM 40 MG: 40 TABLET, DELAYED RELEASE ORAL at 16:03

## 2017-04-17 RX ADMIN — TRAMADOL HYDROCHLORIDE 50 MG: 50 TABLET, COATED ORAL at 06:21

## 2017-04-17 RX ADMIN — APIXABAN 2.5 MG: 2.5 TABLET, FILM COATED ORAL at 08:46

## 2017-04-17 RX ADMIN — Medication 400 MG: at 08:46

## 2017-04-17 RX ADMIN — FLUTICASONE PROPIONATE 1 SPRAY: 50 SPRAY, METERED NASAL at 08:48

## 2017-04-17 RX ADMIN — TRAMADOL HYDROCHLORIDE 50 MG: 50 TABLET, COATED ORAL at 12:08

## 2017-04-17 RX ADMIN — LEVOTHYROXINE SODIUM 37.5 MCG: 75 TABLET ORAL at 06:21

## 2017-04-20 ENCOUNTER — ALLSCRIPTS OFFICE VISIT (OUTPATIENT)
Dept: OTHER | Facility: OTHER | Age: 82
End: 2017-04-20

## 2017-04-27 ENCOUNTER — ALLSCRIPTS OFFICE VISIT (OUTPATIENT)
Dept: OTHER | Facility: OTHER | Age: 82
End: 2017-04-27

## 2017-05-02 ENCOUNTER — GENERIC CONVERSION - ENCOUNTER (OUTPATIENT)
Dept: OTHER | Facility: OTHER | Age: 82
End: 2017-05-02

## 2017-05-16 ENCOUNTER — LAB REQUISITION (OUTPATIENT)
Dept: LAB | Facility: HOSPITAL | Age: 82
End: 2017-05-16
Payer: MEDICARE

## 2017-05-16 DIAGNOSIS — A41.01 SEPSIS DUE TO METHICILLIN SUSCEPTIBLE STAPHYLOCOCCUS AUREUS (HCC): ICD-10-CM

## 2017-05-16 PROCEDURE — 87205 SMEAR GRAM STAIN: CPT | Performed by: PHYSICIAN ASSISTANT

## 2017-05-16 PROCEDURE — 87147 CULTURE TYPE IMMUNOLOGIC: CPT | Performed by: PHYSICIAN ASSISTANT

## 2017-05-16 PROCEDURE — 87070 CULTURE OTHR SPECIMN AEROBIC: CPT | Performed by: PHYSICIAN ASSISTANT

## 2017-05-16 PROCEDURE — 87186 SC STD MICRODIL/AGAR DIL: CPT | Performed by: PHYSICIAN ASSISTANT

## 2017-05-18 ENCOUNTER — GENERIC CONVERSION - ENCOUNTER (OUTPATIENT)
Dept: OTHER | Facility: OTHER | Age: 82
End: 2017-05-18

## 2017-05-20 LAB
BACTERIA WND AEROBE CULT: ABNORMAL
GRAM STN SPEC: ABNORMAL

## 2017-05-25 DIAGNOSIS — N18.30 CHRONIC KIDNEY DISEASE, STAGE III (MODERATE) (HCC): ICD-10-CM

## 2017-05-25 DIAGNOSIS — I12.9 HYPERTENSIVE CHRONIC KIDNEY DISEASE WITH STAGE 1 THROUGH STAGE 4 CHRONIC KIDNEY DISEASE, OR UNSPECIFIED CHRONIC KIDNEY DISEASE: ICD-10-CM

## 2017-05-25 DIAGNOSIS — E78.5 HYPERLIPIDEMIA: ICD-10-CM

## 2017-07-06 ENCOUNTER — ALLSCRIPTS OFFICE VISIT (OUTPATIENT)
Dept: OTHER | Facility: OTHER | Age: 82
End: 2017-07-06

## 2017-07-14 ENCOUNTER — GENERIC CONVERSION - ENCOUNTER (OUTPATIENT)
Dept: OTHER | Facility: OTHER | Age: 82
End: 2017-07-14

## 2017-07-14 ENCOUNTER — APPOINTMENT (EMERGENCY)
Dept: ULTRASOUND IMAGING | Facility: HOSPITAL | Age: 82
DRG: 291 | End: 2017-07-14
Payer: MEDICARE

## 2017-07-14 ENCOUNTER — APPOINTMENT (EMERGENCY)
Dept: RADIOLOGY | Facility: HOSPITAL | Age: 82
DRG: 291 | End: 2017-07-14
Payer: MEDICARE

## 2017-07-14 ENCOUNTER — HOSPITAL ENCOUNTER (INPATIENT)
Facility: HOSPITAL | Age: 82
LOS: 3 days | Discharge: NURSING, REHAB OR LONG-TERM CARE | DRG: 291 | End: 2017-07-20
Attending: EMERGENCY MEDICINE | Admitting: INTERNAL MEDICINE
Payer: MEDICARE

## 2017-07-14 DIAGNOSIS — I50.33 ACUTE ON CHRONIC DIASTOLIC CONGESTIVE HEART FAILURE (HCC): ICD-10-CM

## 2017-07-14 DIAGNOSIS — R09.02 HYPOXIA: ICD-10-CM

## 2017-07-14 DIAGNOSIS — I25.10 CAD (CORONARY ARTERY DISEASE): ICD-10-CM

## 2017-07-14 DIAGNOSIS — N17.9 ACUTE RENAL FAILURE SUPERIMPOSED ON CHRONIC KIDNEY DISEASE (HCC): ICD-10-CM

## 2017-07-14 DIAGNOSIS — N18.9 ACUTE RENAL FAILURE SUPERIMPOSED ON CHRONIC KIDNEY DISEASE (HCC): ICD-10-CM

## 2017-07-14 DIAGNOSIS — L03.90 CELLULITIS: ICD-10-CM

## 2017-07-14 DIAGNOSIS — R50.9 FEVER: ICD-10-CM

## 2017-07-14 DIAGNOSIS — R07.9 CHEST PAIN, UNSPECIFIED TYPE: Primary | ICD-10-CM

## 2017-07-14 LAB
ALBUMIN SERPL BCP-MCNC: 4 G/DL (ref 3.5–5)
ALP SERPL-CCNC: 100 U/L (ref 46–116)
ALT SERPL W P-5'-P-CCNC: 19 U/L (ref 12–78)
ANION GAP SERPL CALCULATED.3IONS-SCNC: 8 MMOL/L (ref 4–13)
APTT PPP: 47 SECONDS (ref 23–35)
AST SERPL W P-5'-P-CCNC: 24 U/L (ref 5–45)
BACTERIA UR QL AUTO: ABNORMAL /HPF
BASOPHILS # BLD AUTO: 0.05 THOUSANDS/ΜL (ref 0–0.1)
BASOPHILS NFR BLD AUTO: 1 % (ref 0–1)
BILIRUB DIRECT SERPL-MCNC: 0.08 MG/DL (ref 0–0.2)
BILIRUB SERPL-MCNC: 0.3 MG/DL (ref 0.2–1)
BILIRUB UR QL STRIP: NEGATIVE
BUN SERPL-MCNC: 36 MG/DL (ref 5–25)
CALCIUM SERPL-MCNC: 9.6 MG/DL (ref 8.3–10.1)
CHLORIDE SERPL-SCNC: 101 MMOL/L (ref 100–108)
CLARITY UR: CLEAR
CO2 SERPL-SCNC: 32 MMOL/L (ref 21–32)
COLOR UR: YELLOW
CREAT SERPL-MCNC: 1.38 MG/DL (ref 0.6–1.3)
EOSINOPHIL # BLD AUTO: 0.42 THOUSAND/ΜL (ref 0–0.61)
EOSINOPHIL NFR BLD AUTO: 5 % (ref 0–6)
ERYTHROCYTE [DISTWIDTH] IN BLOOD BY AUTOMATED COUNT: 14.4 % (ref 11.6–15.1)
GFR SERPL CREATININE-BSD FRML MDRD: 36.3 ML/MIN/1.73SQ M
GLUCOSE SERPL-MCNC: 122 MG/DL (ref 65–140)
GLUCOSE SERPL-MCNC: 124 MG/DL (ref 65–140)
GLUCOSE UR STRIP-MCNC: NEGATIVE MG/DL
HCT VFR BLD AUTO: 35.5 % (ref 34.8–46.1)
HGB BLD-MCNC: 11.6 G/DL (ref 11.5–15.4)
HGB UR QL STRIP.AUTO: NEGATIVE
INR PPP: 1.22 (ref 0.86–1.16)
KETONES UR STRIP-MCNC: NEGATIVE MG/DL
LEUKOCYTE ESTERASE UR QL STRIP: ABNORMAL
LYMPHOCYTES # BLD AUTO: 1.1 THOUSANDS/ΜL (ref 0.6–4.47)
LYMPHOCYTES NFR BLD AUTO: 14 % (ref 14–44)
MCH RBC QN AUTO: 29.7 PG (ref 26.8–34.3)
MCHC RBC AUTO-ENTMCNC: 32.7 G/DL (ref 31.4–37.4)
MCV RBC AUTO: 91 FL (ref 82–98)
MONOCYTES # BLD AUTO: 0.64 THOUSAND/ΜL (ref 0.17–1.22)
MONOCYTES NFR BLD AUTO: 8 % (ref 4–12)
NEUTROPHILS # BLD AUTO: 5.7 THOUSANDS/ΜL (ref 1.85–7.62)
NEUTS SEG NFR BLD AUTO: 72 % (ref 43–75)
NITRITE UR QL STRIP: NEGATIVE
NON-SQ EPI CELLS URNS QL MICRO: ABNORMAL /HPF
NT-PROBNP SERPL-MCNC: 1398 PG/ML
OTHER STN SPEC: ABNORMAL
PH UR STRIP.AUTO: 8 [PH] (ref 4.5–8)
PLATELET # BLD AUTO: 314 THOUSANDS/UL (ref 149–390)
PLATELET # BLD AUTO: 321 THOUSANDS/UL (ref 149–390)
PMV BLD AUTO: 10.5 FL (ref 8.9–12.7)
PMV BLD AUTO: 10.7 FL (ref 8.9–12.7)
POTASSIUM SERPL-SCNC: 4.1 MMOL/L (ref 3.5–5.3)
PROT SERPL-MCNC: 8.1 G/DL (ref 6.4–8.2)
PROT UR STRIP-MCNC: NEGATIVE MG/DL
PROTHROMBIN TIME: 15.8 SECONDS (ref 12.1–14.4)
RBC # BLD AUTO: 3.9 MILLION/UL (ref 3.81–5.12)
RBC #/AREA URNS AUTO: ABNORMAL /HPF
SODIUM SERPL-SCNC: 141 MMOL/L (ref 136–145)
SP GR UR STRIP.AUTO: 1.01 (ref 1–1.03)
TROPONIN I SERPL-MCNC: <0.02 NG/ML
UROBILINOGEN UR QL STRIP.AUTO: 0.2 E.U./DL
WBC # BLD AUTO: 7.91 THOUSAND/UL (ref 4.31–10.16)
WBC #/AREA URNS AUTO: ABNORMAL /HPF

## 2017-07-14 PROCEDURE — 84484 ASSAY OF TROPONIN QUANT: CPT | Performed by: INTERNAL MEDICINE

## 2017-07-14 PROCEDURE — 85610 PROTHROMBIN TIME: CPT | Performed by: EMERGENCY MEDICINE

## 2017-07-14 PROCEDURE — 81001 URINALYSIS AUTO W/SCOPE: CPT | Performed by: INTERNAL MEDICINE

## 2017-07-14 PROCEDURE — 84484 ASSAY OF TROPONIN QUANT: CPT | Performed by: EMERGENCY MEDICINE

## 2017-07-14 PROCEDURE — 99285 EMERGENCY DEPT VISIT HI MDM: CPT

## 2017-07-14 PROCEDURE — 80076 HEPATIC FUNCTION PANEL: CPT | Performed by: EMERGENCY MEDICINE

## 2017-07-14 PROCEDURE — 87040 BLOOD CULTURE FOR BACTERIA: CPT | Performed by: INTERNAL MEDICINE

## 2017-07-14 PROCEDURE — 85025 COMPLETE CBC W/AUTO DIFF WBC: CPT | Performed by: EMERGENCY MEDICINE

## 2017-07-14 PROCEDURE — 36415 COLL VENOUS BLD VENIPUNCTURE: CPT | Performed by: EMERGENCY MEDICINE

## 2017-07-14 PROCEDURE — 85049 AUTOMATED PLATELET COUNT: CPT | Performed by: INTERNAL MEDICINE

## 2017-07-14 PROCEDURE — 85730 THROMBOPLASTIN TIME PARTIAL: CPT | Performed by: EMERGENCY MEDICINE

## 2017-07-14 PROCEDURE — 71020 HB CHEST X-RAY 2VW FRONTAL&LATL: CPT

## 2017-07-14 PROCEDURE — 80048 BASIC METABOLIC PNL TOTAL CA: CPT | Performed by: EMERGENCY MEDICINE

## 2017-07-14 PROCEDURE — 83880 ASSAY OF NATRIURETIC PEPTIDE: CPT | Performed by: EMERGENCY MEDICINE

## 2017-07-14 PROCEDURE — 82948 REAGENT STRIP/BLOOD GLUCOSE: CPT

## 2017-07-14 PROCEDURE — 93971 EXTREMITY STUDY: CPT

## 2017-07-14 PROCEDURE — 93005 ELECTROCARDIOGRAM TRACING: CPT

## 2017-07-14 PROCEDURE — 93005 ELECTROCARDIOGRAM TRACING: CPT | Performed by: INTERNAL MEDICINE

## 2017-07-14 RX ORDER — ACETAMINOPHEN 325 MG/1
650 TABLET ORAL EVERY 4 HOURS PRN
Status: DISCONTINUED | OUTPATIENT
Start: 2017-07-14 | End: 2017-07-20 | Stop reason: HOSPADM

## 2017-07-14 RX ORDER — TRAMADOL HYDROCHLORIDE 50 MG/1
50 TABLET ORAL EVERY 6 HOURS PRN
Status: ON HOLD | COMMUNITY
End: 2017-07-20

## 2017-07-14 RX ORDER — BACLOFEN 10 MG/1
10 TABLET ORAL
Status: DISCONTINUED | OUTPATIENT
Start: 2017-07-14 | End: 2017-07-20 | Stop reason: HOSPADM

## 2017-07-14 RX ORDER — AMLODIPINE BESYLATE 5 MG/1
5 TABLET ORAL
Status: DISCONTINUED | OUTPATIENT
Start: 2017-07-14 | End: 2017-07-17

## 2017-07-14 RX ORDER — NYSTATIN 100000 [USP'U]/G
POWDER TOPICAL 2 TIMES DAILY PRN
Status: DISCONTINUED | OUTPATIENT
Start: 2017-07-14 | End: 2017-07-20 | Stop reason: HOSPADM

## 2017-07-14 RX ORDER — TORSEMIDE 20 MG/1
20 TABLET ORAL
Status: DISCONTINUED | OUTPATIENT
Start: 2017-07-14 | End: 2017-07-18

## 2017-07-14 RX ORDER — TRAMADOL HYDROCHLORIDE 50 MG/1
50 TABLET ORAL EVERY 6 HOURS PRN
Status: DISCONTINUED | OUTPATIENT
Start: 2017-07-14 | End: 2017-07-20 | Stop reason: HOSPADM

## 2017-07-14 RX ORDER — CHOLECALCIFEROL (VITAMIN D3) 125 MCG
200 CAPSULE ORAL DAILY
Status: DISCONTINUED | OUTPATIENT
Start: 2017-07-15 | End: 2017-07-20 | Stop reason: HOSPADM

## 2017-07-14 RX ORDER — GABAPENTIN 300 MG/1
300 CAPSULE ORAL
Status: DISCONTINUED | OUTPATIENT
Start: 2017-07-14 | End: 2017-07-16

## 2017-07-14 RX ORDER — FUROSEMIDE 10 MG/ML
40 INJECTION INTRAMUSCULAR; INTRAVENOUS ONCE
Status: COMPLETED | OUTPATIENT
Start: 2017-07-14 | End: 2017-07-14

## 2017-07-14 RX ORDER — LATANOPROST 50 UG/ML
1 SOLUTION/ DROPS OPHTHALMIC
Status: DISCONTINUED | OUTPATIENT
Start: 2017-07-14 | End: 2017-07-20 | Stop reason: HOSPADM

## 2017-07-14 RX ORDER — CALCITRIOL 0.25 UG/1
0.25 CAPSULE, LIQUID FILLED ORAL 3 TIMES WEEKLY
Status: DISCONTINUED | OUTPATIENT
Start: 2017-07-14 | End: 2017-07-20 | Stop reason: HOSPADM

## 2017-07-14 RX ORDER — LORATADINE 10 MG/1
10 TABLET ORAL DAILY
Status: DISCONTINUED | OUTPATIENT
Start: 2017-07-15 | End: 2017-07-20 | Stop reason: HOSPADM

## 2017-07-14 RX ORDER — MAGNESIUM HYDROXIDE/ALUMINUM HYDROXICE/SIMETHICONE 120; 1200; 1200 MG/30ML; MG/30ML; MG/30ML
15 SUSPENSION ORAL EVERY 6 HOURS PRN
Status: DISCONTINUED | OUTPATIENT
Start: 2017-07-14 | End: 2017-07-20 | Stop reason: HOSPADM

## 2017-07-14 RX ORDER — DOCUSATE SODIUM 100 MG/1
100 CAPSULE, LIQUID FILLED ORAL 2 TIMES DAILY
Status: DISCONTINUED | OUTPATIENT
Start: 2017-07-14 | End: 2017-07-20 | Stop reason: HOSPADM

## 2017-07-14 RX ORDER — POLYETHYLENE GLYCOL 3350 17 G/17G
17 POWDER, FOR SOLUTION ORAL DAILY
Status: DISCONTINUED | OUTPATIENT
Start: 2017-07-15 | End: 2017-07-20 | Stop reason: HOSPADM

## 2017-07-14 RX ORDER — ALBUTEROL SULFATE 90 UG/1
2 AEROSOL, METERED RESPIRATORY (INHALATION) EVERY 6 HOURS PRN
Status: DISCONTINUED | OUTPATIENT
Start: 2017-07-14 | End: 2017-07-20 | Stop reason: HOSPADM

## 2017-07-14 RX ORDER — METOPROLOL TARTRATE 50 MG/1
50 TABLET, FILM COATED ORAL 2 TIMES DAILY
Status: DISCONTINUED | OUTPATIENT
Start: 2017-07-14 | End: 2017-07-20 | Stop reason: HOSPADM

## 2017-07-14 RX ORDER — FLUTICASONE PROPIONATE 50 MCG
1 SPRAY, SUSPENSION (ML) NASAL DAILY
Status: DISCONTINUED | OUTPATIENT
Start: 2017-07-15 | End: 2017-07-20 | Stop reason: HOSPADM

## 2017-07-14 RX ORDER — ROPINIROLE 1 MG/1
1 TABLET, FILM COATED ORAL EVERY 6 HOURS
Status: DISCONTINUED | OUTPATIENT
Start: 2017-07-14 | End: 2017-07-20 | Stop reason: HOSPADM

## 2017-07-14 RX ORDER — LANOLIN ALCOHOL/MO/W.PET/CERES
3 CREAM (GRAM) TOPICAL
Status: DISCONTINUED | OUTPATIENT
Start: 2017-07-14 | End: 2017-07-20 | Stop reason: HOSPADM

## 2017-07-14 RX ORDER — AMPICILLIN TRIHYDRATE 250 MG
1 CAPSULE ORAL 2 TIMES DAILY
Status: DISCONTINUED | OUTPATIENT
Start: 2017-07-14 | End: 2017-07-20 | Stop reason: HOSPADM

## 2017-07-14 RX ORDER — BRIMONIDINE TARTRATE 0.15 %
1 DROPS OPHTHALMIC (EYE) 2 TIMES DAILY
Status: DISCONTINUED | OUTPATIENT
Start: 2017-07-14 | End: 2017-07-20 | Stop reason: HOSPADM

## 2017-07-14 RX ORDER — MELATONIN
1000 DAILY
Status: DISCONTINUED | OUTPATIENT
Start: 2017-07-15 | End: 2017-07-20 | Stop reason: HOSPADM

## 2017-07-14 RX ORDER — PANTOPRAZOLE SODIUM 40 MG/1
40 TABLET, DELAYED RELEASE ORAL
Status: DISCONTINUED | OUTPATIENT
Start: 2017-07-15 | End: 2017-07-20 | Stop reason: HOSPADM

## 2017-07-14 RX ORDER — MELATONIN
1000 DAILY
COMMUNITY
End: 2018-03-20 | Stop reason: ALTCHOICE

## 2017-07-14 RX ORDER — OXCARBAZEPINE 150 MG/1
75 TABLET, FILM COATED ORAL
Status: DISCONTINUED | OUTPATIENT
Start: 2017-07-14 | End: 2017-07-20 | Stop reason: HOSPADM

## 2017-07-14 RX ORDER — FUROSEMIDE 10 MG/ML
40 INJECTION INTRAMUSCULAR; INTRAVENOUS DAILY
Status: DISCONTINUED | OUTPATIENT
Start: 2017-07-15 | End: 2017-07-17

## 2017-07-14 RX ORDER — BISACODYL 10 MG
10 SUPPOSITORY, RECTAL RECTAL AS NEEDED
Status: DISCONTINUED | OUTPATIENT
Start: 2017-07-14 | End: 2017-07-20 | Stop reason: HOSPADM

## 2017-07-14 RX ORDER — LEVOTHYROXINE SODIUM 0.07 MG/1
37.5 TABLET ORAL
Status: DISCONTINUED | OUTPATIENT
Start: 2017-07-15 | End: 2017-07-20 | Stop reason: HOSPADM

## 2017-07-14 RX ADMIN — TORSEMIDE 20 MG: 20 TABLET ORAL at 18:29

## 2017-07-14 RX ADMIN — NITROGLYCERIN 1 INCH: 20 OINTMENT TOPICAL at 13:25

## 2017-07-14 RX ADMIN — ROPINIROLE HYDROCHLORIDE 1 MG: 1 TABLET, FILM COATED ORAL at 21:08

## 2017-07-14 RX ADMIN — AMLODIPINE BESYLATE 5 MG: 5 TABLET ORAL at 21:07

## 2017-07-14 RX ADMIN — APIXABAN 5 MG: 5 TABLET, FILM COATED ORAL at 18:29

## 2017-07-14 RX ADMIN — DOCUSATE SODIUM 100 MG: 100 CAPSULE, LIQUID FILLED ORAL at 18:30

## 2017-07-14 RX ADMIN — ROPINIROLE HYDROCHLORIDE 1 MG: 1 TABLET, FILM COATED ORAL at 17:07

## 2017-07-14 RX ADMIN — OXCARBAZEPINE 75 MG: 150 TABLET ORAL at 21:08

## 2017-07-14 RX ADMIN — METOPROLOL TARTRATE 50 MG: 50 TABLET ORAL at 18:29

## 2017-07-14 RX ADMIN — MELATONIN TAB 3 MG 3 MG: 3 TAB at 21:07

## 2017-07-14 RX ADMIN — LATANOPROST 1 DROP: 50 SOLUTION/ DROPS OPHTHALMIC at 21:10

## 2017-07-14 RX ADMIN — BACLOFEN 10 MG: 10 TABLET ORAL at 21:07

## 2017-07-14 RX ADMIN — BIMATOPROST 1 DROP: 0.1 SOLUTION/ DROPS OPHTHALMIC at 21:10

## 2017-07-14 RX ADMIN — CEFAZOLIN SODIUM 1000 MG: 1 SOLUTION INTRAVENOUS at 18:30

## 2017-07-14 RX ADMIN — CALCITRIOL 0.25 MCG: 0.25 CAPSULE ORAL at 18:31

## 2017-07-14 RX ADMIN — GABAPENTIN 300 MG: 300 CAPSULE ORAL at 21:07

## 2017-07-14 RX ADMIN — FUROSEMIDE 40 MG: 10 INJECTION, SOLUTION INTRAMUSCULAR; INTRAVENOUS at 18:30

## 2017-07-15 ENCOUNTER — GENERIC CONVERSION - ENCOUNTER (OUTPATIENT)
Dept: OTHER | Facility: OTHER | Age: 82
End: 2017-07-15

## 2017-07-15 PROBLEM — R60.0 PEDAL EDEMA: Chronic | Status: ACTIVE | Noted: 2017-07-15

## 2017-07-15 LAB
ANION GAP SERPL CALCULATED.3IONS-SCNC: 10 MMOL/L (ref 4–13)
ATRIAL RATE: 78 BPM
ATRIAL RATE: 87 BPM
BUN SERPL-MCNC: 34 MG/DL (ref 5–25)
CALCIUM SERPL-MCNC: 9.5 MG/DL (ref 8.3–10.1)
CHLORIDE SERPL-SCNC: 101 MMOL/L (ref 100–108)
CO2 SERPL-SCNC: 29 MMOL/L (ref 21–32)
CREAT SERPL-MCNC: 1.25 MG/DL (ref 0.6–1.3)
ERYTHROCYTE [DISTWIDTH] IN BLOOD BY AUTOMATED COUNT: 14.5 % (ref 11.6–15.1)
GFR SERPL CREATININE-BSD FRML MDRD: 40.6 ML/MIN/1.73SQ M
GLUCOSE P FAST SERPL-MCNC: 97 MG/DL (ref 65–99)
GLUCOSE SERPL-MCNC: 97 MG/DL (ref 65–140)
HCT VFR BLD AUTO: 33.3 % (ref 34.8–46.1)
HGB BLD-MCNC: 10.6 G/DL (ref 11.5–15.4)
MCH RBC QN AUTO: 29.3 PG (ref 26.8–34.3)
MCHC RBC AUTO-ENTMCNC: 31.8 G/DL (ref 31.4–37.4)
MCV RBC AUTO: 92 FL (ref 82–98)
P AXIS: 82 DEGREES
P AXIS: 95 DEGREES
PLATELET # BLD AUTO: 304 THOUSANDS/UL (ref 149–390)
PMV BLD AUTO: 11.4 FL (ref 8.9–12.7)
POTASSIUM SERPL-SCNC: 3.5 MMOL/L (ref 3.5–5.3)
PR INTERVAL: 236 MS
PR INTERVAL: 246 MS
QRS AXIS: 10 DEGREES
QRS AXIS: 15 DEGREES
QRSD INTERVAL: 86 MS
QRSD INTERVAL: 92 MS
QT INTERVAL: 396 MS
QT INTERVAL: 420 MS
QTC INTERVAL: 476 MS
QTC INTERVAL: 478 MS
RBC # BLD AUTO: 3.62 MILLION/UL (ref 3.81–5.12)
SODIUM SERPL-SCNC: 140 MMOL/L (ref 136–145)
T WAVE AXIS: 37 DEGREES
T WAVE AXIS: 48 DEGREES
VENTRICULAR RATE: 78 BPM
VENTRICULAR RATE: 87 BPM
WBC # BLD AUTO: 9.13 THOUSAND/UL (ref 4.31–10.16)

## 2017-07-15 PROCEDURE — 80048 BASIC METABOLIC PNL TOTAL CA: CPT | Performed by: INTERNAL MEDICINE

## 2017-07-15 PROCEDURE — 93005 ELECTROCARDIOGRAM TRACING: CPT | Performed by: INTERNAL MEDICINE

## 2017-07-15 PROCEDURE — 85027 COMPLETE CBC AUTOMATED: CPT | Performed by: INTERNAL MEDICINE

## 2017-07-15 RX ADMIN — CHOLECALCIFEROL TAB 25 MCG (1000 UNIT) 1000 UNITS: 25 TAB at 08:18

## 2017-07-15 RX ADMIN — LATANOPROST 1 DROP: 50 SOLUTION/ DROPS OPHTHALMIC at 21:11

## 2017-07-15 RX ADMIN — TORSEMIDE 20 MG: 20 TABLET ORAL at 08:18

## 2017-07-15 RX ADMIN — CEFAZOLIN SODIUM 1000 MG: 1 SOLUTION INTRAVENOUS at 16:51

## 2017-07-15 RX ADMIN — ROPINIROLE HYDROCHLORIDE 1 MG: 1 TABLET, FILM COATED ORAL at 16:51

## 2017-07-15 RX ADMIN — METOPROLOL TARTRATE 50 MG: 50 TABLET ORAL at 08:18

## 2017-07-15 RX ADMIN — ROPINIROLE HYDROCHLORIDE 1 MG: 1 TABLET, FILM COATED ORAL at 05:14

## 2017-07-15 RX ADMIN — Medication 400 MG: at 08:18

## 2017-07-15 RX ADMIN — AMLODIPINE BESYLATE 5 MG: 5 TABLET ORAL at 21:12

## 2017-07-15 RX ADMIN — BIMATOPROST 1 DROP: 0.1 SOLUTION/ DROPS OPHTHALMIC at 21:21

## 2017-07-15 RX ADMIN — TRAMADOL HYDROCHLORIDE 50 MG: 50 TABLET, COATED ORAL at 09:41

## 2017-07-15 RX ADMIN — LEVOTHYROXINE SODIUM 37.5 MCG: 75 TABLET ORAL at 05:15

## 2017-07-15 RX ADMIN — TRAMADOL HYDROCHLORIDE 50 MG: 50 TABLET, COATED ORAL at 16:51

## 2017-07-15 RX ADMIN — OXCARBAZEPINE 75 MG: 150 TABLET ORAL at 21:19

## 2017-07-15 RX ADMIN — TRAMADOL HYDROCHLORIDE 50 MG: 50 TABLET, COATED ORAL at 00:08

## 2017-07-15 RX ADMIN — APIXABAN 5 MG: 5 TABLET, FILM COATED ORAL at 08:18

## 2017-07-15 RX ADMIN — METOPROLOL TARTRATE 50 MG: 50 TABLET ORAL at 17:47

## 2017-07-15 RX ADMIN — LORATADINE 10 MG: 10 TABLET ORAL at 08:18

## 2017-07-15 RX ADMIN — GABAPENTIN 300 MG: 300 CAPSULE ORAL at 21:13

## 2017-07-15 RX ADMIN — MELATONIN TAB 3 MG 3 MG: 3 TAB at 21:12

## 2017-07-15 RX ADMIN — FLUTICASONE PROPIONATE 1 SPRAY: 50 SPRAY, METERED NASAL at 08:20

## 2017-07-15 RX ADMIN — CEFAZOLIN SODIUM 1000 MG: 1 SOLUTION INTRAVENOUS at 05:15

## 2017-07-15 RX ADMIN — BRIMONIDINE TARTRATE 1 DROP: 1.5 SOLUTION OPHTHALMIC at 17:47

## 2017-07-15 RX ADMIN — Medication 200 MG: at 08:19

## 2017-07-15 RX ADMIN — ROPINIROLE HYDROCHLORIDE 1 MG: 1 TABLET, FILM COATED ORAL at 21:13

## 2017-07-15 RX ADMIN — BRIMONIDINE TARTRATE 1 DROP: 1.5 SOLUTION OPHTHALMIC at 08:20

## 2017-07-15 RX ADMIN — DOCUSATE SODIUM 100 MG: 100 CAPSULE, LIQUID FILLED ORAL at 17:47

## 2017-07-15 RX ADMIN — TORSEMIDE 20 MG: 20 TABLET ORAL at 16:51

## 2017-07-15 RX ADMIN — FUROSEMIDE 40 MG: 10 INJECTION, SOLUTION INTRAMUSCULAR; INTRAVENOUS at 09:41

## 2017-07-15 RX ADMIN — APIXABAN 5 MG: 5 TABLET, FILM COATED ORAL at 17:47

## 2017-07-15 RX ADMIN — DOCUSATE SODIUM 100 MG: 100 CAPSULE, LIQUID FILLED ORAL at 08:19

## 2017-07-15 RX ADMIN — ROPINIROLE HYDROCHLORIDE 1 MG: 1 TABLET, FILM COATED ORAL at 12:31

## 2017-07-15 RX ADMIN — BACLOFEN 10 MG: 10 TABLET ORAL at 21:12

## 2017-07-15 RX ADMIN — PANTOPRAZOLE SODIUM 40 MG: 40 TABLET, DELAYED RELEASE ORAL at 05:14

## 2017-07-16 ENCOUNTER — APPOINTMENT (OUTPATIENT)
Dept: ULTRASOUND IMAGING | Facility: HOSPITAL | Age: 82
DRG: 291 | End: 2017-07-16
Payer: MEDICARE

## 2017-07-16 PROCEDURE — 93923 UPR/LXTR ART STDY 3+ LVLS: CPT

## 2017-07-16 PROCEDURE — G8987 SELF CARE CURRENT STATUS: HCPCS

## 2017-07-16 PROCEDURE — G8988 SELF CARE GOAL STATUS: HCPCS

## 2017-07-16 PROCEDURE — 93005 ELECTROCARDIOGRAM TRACING: CPT | Performed by: INTERNAL MEDICINE

## 2017-07-16 PROCEDURE — 93925 LOWER EXTREMITY STUDY: CPT

## 2017-07-16 PROCEDURE — 97166 OT EVAL MOD COMPLEX 45 MIN: CPT

## 2017-07-16 RX ORDER — GABAPENTIN 300 MG/1
300 CAPSULE ORAL 2 TIMES DAILY
Status: DISCONTINUED | OUTPATIENT
Start: 2017-07-16 | End: 2017-07-20 | Stop reason: HOSPADM

## 2017-07-16 RX ORDER — GABAPENTIN 300 MG/1
300 CAPSULE ORAL 2 TIMES DAILY
Status: DISCONTINUED | OUTPATIENT
Start: 2017-07-16 | End: 2017-07-16

## 2017-07-16 RX ADMIN — BRIMONIDINE TARTRATE 1 DROP: 1.5 SOLUTION OPHTHALMIC at 10:19

## 2017-07-16 RX ADMIN — METOPROLOL TARTRATE 50 MG: 50 TABLET ORAL at 17:13

## 2017-07-16 RX ADMIN — DOCUSATE SODIUM 100 MG: 100 CAPSULE, LIQUID FILLED ORAL at 17:13

## 2017-07-16 RX ADMIN — Medication 200 MG: at 10:10

## 2017-07-16 RX ADMIN — DOCUSATE SODIUM 100 MG: 100 CAPSULE, LIQUID FILLED ORAL at 10:12

## 2017-07-16 RX ADMIN — AMLODIPINE BESYLATE 5 MG: 5 TABLET ORAL at 22:27

## 2017-07-16 RX ADMIN — TRAMADOL HYDROCHLORIDE 50 MG: 50 TABLET, COATED ORAL at 20:14

## 2017-07-16 RX ADMIN — TRAMADOL HYDROCHLORIDE 50 MG: 50 TABLET, COATED ORAL at 00:07

## 2017-07-16 RX ADMIN — Medication 400 MG: at 10:12

## 2017-07-16 RX ADMIN — GABAPENTIN 300 MG: 300 CAPSULE ORAL at 17:13

## 2017-07-16 RX ADMIN — TORSEMIDE 20 MG: 20 TABLET ORAL at 16:34

## 2017-07-16 RX ADMIN — METOPROLOL TARTRATE 50 MG: 50 TABLET ORAL at 10:11

## 2017-07-16 RX ADMIN — FUROSEMIDE 40 MG: 10 INJECTION, SOLUTION INTRAMUSCULAR; INTRAVENOUS at 10:12

## 2017-07-16 RX ADMIN — CHOLECALCIFEROL TAB 25 MCG (1000 UNIT) 1000 UNITS: 25 TAB at 10:11

## 2017-07-16 RX ADMIN — BIMATOPROST 1 DROP: 0.1 SOLUTION/ DROPS OPHTHALMIC at 22:31

## 2017-07-16 RX ADMIN — PANTOPRAZOLE SODIUM 40 MG: 40 TABLET, DELAYED RELEASE ORAL at 04:59

## 2017-07-16 RX ADMIN — OXCARBAZEPINE 75 MG: 150 TABLET ORAL at 22:27

## 2017-07-16 RX ADMIN — GABAPENTIN 300 MG: 300 CAPSULE ORAL at 10:11

## 2017-07-16 RX ADMIN — LATANOPROST 1 DROP: 50 SOLUTION/ DROPS OPHTHALMIC at 22:30

## 2017-07-16 RX ADMIN — TRAMADOL HYDROCHLORIDE 50 MG: 50 TABLET, COATED ORAL at 08:05

## 2017-07-16 RX ADMIN — MELATONIN TAB 3 MG 3 MG: 3 TAB at 22:29

## 2017-07-16 RX ADMIN — APIXABAN 5 MG: 5 TABLET, FILM COATED ORAL at 17:13

## 2017-07-16 RX ADMIN — CEFAZOLIN SODIUM 1000 MG: 1 SOLUTION INTRAVENOUS at 16:35

## 2017-07-16 RX ADMIN — LORATADINE 10 MG: 10 TABLET ORAL at 10:11

## 2017-07-16 RX ADMIN — LEVOTHYROXINE SODIUM 37.5 MCG: 75 TABLET ORAL at 04:58

## 2017-07-16 RX ADMIN — BACLOFEN 10 MG: 10 TABLET ORAL at 22:28

## 2017-07-16 RX ADMIN — APIXABAN 5 MG: 5 TABLET, FILM COATED ORAL at 10:11

## 2017-07-16 RX ADMIN — FLUTICASONE PROPIONATE 1 SPRAY: 50 SPRAY, METERED NASAL at 10:18

## 2017-07-16 RX ADMIN — ROPINIROLE HYDROCHLORIDE 1 MG: 1 TABLET, FILM COATED ORAL at 16:34

## 2017-07-16 RX ADMIN — CEFAZOLIN SODIUM 1000 MG: 1 SOLUTION INTRAVENOUS at 04:57

## 2017-07-16 RX ADMIN — TORSEMIDE 20 MG: 20 TABLET ORAL at 08:06

## 2017-07-16 RX ADMIN — ROPINIROLE HYDROCHLORIDE 1 MG: 1 TABLET, FILM COATED ORAL at 11:35

## 2017-07-16 RX ADMIN — BRIMONIDINE TARTRATE 1 DROP: 1.5 SOLUTION OPHTHALMIC at 17:15

## 2017-07-16 RX ADMIN — ROPINIROLE HYDROCHLORIDE 1 MG: 1 TABLET, FILM COATED ORAL at 22:28

## 2017-07-16 RX ADMIN — ROPINIROLE HYDROCHLORIDE 1 MG: 1 TABLET, FILM COATED ORAL at 04:59

## 2017-07-17 ENCOUNTER — GENERIC CONVERSION - ENCOUNTER (OUTPATIENT)
Dept: OTHER | Facility: OTHER | Age: 82
End: 2017-07-17

## 2017-07-17 ENCOUNTER — APPOINTMENT (INPATIENT)
Dept: RADIOLOGY | Facility: HOSPITAL | Age: 82
DRG: 291 | End: 2017-07-17
Payer: MEDICARE

## 2017-07-17 PROBLEM — R09.02 HYPOXIA: Status: ACTIVE | Noted: 2017-07-17

## 2017-07-17 PROBLEM — N18.9 ACUTE RENAL FAILURE SUPERIMPOSED ON CHRONIC KIDNEY DISEASE (HCC): Status: ACTIVE | Noted: 2017-07-17

## 2017-07-17 PROBLEM — N17.9 ACUTE RENAL FAILURE SUPERIMPOSED ON CHRONIC KIDNEY DISEASE (HCC): Status: ACTIVE | Noted: 2017-07-17

## 2017-07-17 LAB
AMMONIA PLAS-SCNC: <10 UMOL/L (ref 11–35)
ANION GAP SERPL CALCULATED.3IONS-SCNC: 11 MMOL/L (ref 4–13)
ARTERIAL PATENCY WRIST A: YES
ATRIAL RATE: 101 BPM
ATRIAL RATE: 98 BPM
BACTERIA UR QL AUTO: ABNORMAL /HPF
BASE EXCESS BLDA CALC-SCNC: 6.2 MMOL/L
BASOPHILS # BLD AUTO: 0.04 THOUSANDS/ΜL (ref 0–0.1)
BASOPHILS NFR BLD AUTO: 0 % (ref 0–1)
BILIRUB UR QL STRIP: NEGATIVE
BUN SERPL-MCNC: 39 MG/DL (ref 5–25)
CALCIUM SERPL-MCNC: 8.8 MG/DL (ref 8.3–10.1)
CHLORIDE SERPL-SCNC: 98 MMOL/L (ref 100–108)
CLARITY UR: CLEAR
CO2 SERPL-SCNC: 30 MMOL/L (ref 21–32)
COLOR UR: YELLOW
CREAT SERPL-MCNC: 1.77 MG/DL (ref 0.6–1.3)
EOSINOPHIL # BLD AUTO: 0.14 THOUSAND/ΜL (ref 0–0.61)
EOSINOPHIL NFR BLD AUTO: 1 % (ref 0–6)
ERYTHROCYTE [DISTWIDTH] IN BLOOD BY AUTOMATED COUNT: 14.2 % (ref 11.6–15.1)
GFR SERPL CREATININE-BSD FRML MDRD: 27.2 ML/MIN/1.73SQ M
GLUCOSE P FAST SERPL-MCNC: 111 MG/DL (ref 65–99)
GLUCOSE SERPL-MCNC: 111 MG/DL (ref 65–140)
GLUCOSE SERPL-MCNC: 141 MG/DL (ref 65–140)
GLUCOSE UR STRIP-MCNC: NEGATIVE MG/DL
HCO3 BLDA-SCNC: 30.8 MMOL/L (ref 22–28)
HCT VFR BLD AUTO: 32.1 % (ref 34.8–46.1)
HGB BLD-MCNC: 10.3 G/DL (ref 11.5–15.4)
HGB UR QL STRIP.AUTO: NEGATIVE
KETONES UR STRIP-MCNC: NEGATIVE MG/DL
LEUKOCYTE ESTERASE UR QL STRIP: NEGATIVE
LYMPHOCYTES # BLD AUTO: 1.65 THOUSANDS/ΜL (ref 0.6–4.47)
LYMPHOCYTES NFR BLD AUTO: 14 % (ref 14–44)
MCH RBC QN AUTO: 29.3 PG (ref 26.8–34.3)
MCHC RBC AUTO-ENTMCNC: 32.1 G/DL (ref 31.4–37.4)
MCV RBC AUTO: 92 FL (ref 82–98)
MONOCYTES # BLD AUTO: 1.46 THOUSAND/ΜL (ref 0.17–1.22)
MONOCYTES NFR BLD AUTO: 12 % (ref 4–12)
NEUTROPHILS # BLD AUTO: 8.6 THOUSANDS/ΜL (ref 1.85–7.62)
NEUTS SEG NFR BLD AUTO: 73 % (ref 43–75)
NITRITE UR QL STRIP: NEGATIVE
NON VENT ROOM AIR: 21 %
NON-SQ EPI CELLS URNS QL MICRO: ABNORMAL /HPF
O2 CT BLDA-SCNC: 14.2 ML/DL (ref 16–23)
OXYHGB MFR BLDA: 90.8 % (ref 94–97)
P AXIS: 77 DEGREES
P AXIS: 79 DEGREES
PCO2 BLDA: 44.5 MM HG (ref 36–44)
PH BLDA: 7.46 [PH] (ref 7.35–7.45)
PH UR STRIP.AUTO: 7 [PH] (ref 4.5–8)
PLATELET # BLD AUTO: 262 THOUSANDS/UL (ref 149–390)
PMV BLD AUTO: 10.7 FL (ref 8.9–12.7)
PO2 BLDA: 68.1 MM HG (ref 75–129)
POTASSIUM SERPL-SCNC: 3.5 MMOL/L (ref 3.5–5.3)
PR INTERVAL: 206 MS
PR INTERVAL: 232 MS
PROT UR STRIP-MCNC: ABNORMAL MG/DL
QRS AXIS: 17 DEGREES
QRS AXIS: 8 DEGREES
QRSD INTERVAL: 84 MS
QRSD INTERVAL: 90 MS
QT INTERVAL: 364 MS
QT INTERVAL: 374 MS
QTC INTERVAL: 464 MS
QTC INTERVAL: 484 MS
RBC # BLD AUTO: 3.51 MILLION/UL (ref 3.81–5.12)
RBC #/AREA URNS AUTO: ABNORMAL /HPF
SODIUM SERPL-SCNC: 139 MMOL/L (ref 136–145)
SP GR UR STRIP.AUTO: 1.01 (ref 1–1.03)
SPECIMEN SOURCE: ABNORMAL
T WAVE AXIS: 61 DEGREES
T WAVE AXIS: 73 DEGREES
UROBILINOGEN UR QL STRIP.AUTO: 0.2 E.U./DL
VENTRICULAR RATE: 101 BPM
VENTRICULAR RATE: 98 BPM
WBC # BLD AUTO: 11.89 THOUSAND/UL (ref 4.31–10.16)
WBC #/AREA URNS AUTO: ABNORMAL /HPF

## 2017-07-17 PROCEDURE — 82948 REAGENT STRIP/BLOOD GLUCOSE: CPT

## 2017-07-17 PROCEDURE — 93005 ELECTROCARDIOGRAM TRACING: CPT | Performed by: INTERNAL MEDICINE

## 2017-07-17 PROCEDURE — 87040 BLOOD CULTURE FOR BACTERIA: CPT | Performed by: INTERNAL MEDICINE

## 2017-07-17 PROCEDURE — 82805 BLOOD GASES W/O2 SATURATION: CPT | Performed by: INTERNAL MEDICINE

## 2017-07-17 PROCEDURE — 82140 ASSAY OF AMMONIA: CPT | Performed by: INTERNAL MEDICINE

## 2017-07-17 PROCEDURE — 85025 COMPLETE CBC W/AUTO DIFF WBC: CPT | Performed by: INTERNAL MEDICINE

## 2017-07-17 PROCEDURE — 71010 HB CHEST X-RAY 1 VIEW FRONTAL (PORTABLE): CPT

## 2017-07-17 PROCEDURE — 74000 HB X-RAY EXAM OF ABDOMEN (SINGLE ANTEROPOSTERIOR VIEW) (PORTABLE): CPT

## 2017-07-17 PROCEDURE — 80048 BASIC METABOLIC PNL TOTAL CA: CPT | Performed by: INTERNAL MEDICINE

## 2017-07-17 PROCEDURE — 81001 URINALYSIS AUTO W/SCOPE: CPT | Performed by: INTERNAL MEDICINE

## 2017-07-17 RX ORDER — LIDOCAINE 50 MG/G
1 PATCH TOPICAL EVERY 24 HOURS
Status: DISCONTINUED | OUTPATIENT
Start: 2017-07-17 | End: 2017-07-20 | Stop reason: HOSPADM

## 2017-07-17 RX ORDER — MORPHINE SULFATE 2 MG/ML
0.5 INJECTION, SOLUTION INTRAMUSCULAR; INTRAVENOUS EVERY 4 HOURS PRN
Status: DISCONTINUED | OUTPATIENT
Start: 2017-07-17 | End: 2017-07-20 | Stop reason: HOSPADM

## 2017-07-17 RX ORDER — LEVALBUTEROL 1.25 MG/.5ML
1.25 SOLUTION, CONCENTRATE RESPIRATORY (INHALATION) ONCE
Status: DISCONTINUED | OUTPATIENT
Start: 2017-07-17 | End: 2017-07-17

## 2017-07-17 RX ORDER — FUROSEMIDE 10 MG/ML
20 INJECTION INTRAMUSCULAR; INTRAVENOUS ONCE
Status: COMPLETED | OUTPATIENT
Start: 2017-07-17 | End: 2017-07-17

## 2017-07-17 RX ORDER — ONDANSETRON 2 MG/ML
4 INJECTION INTRAMUSCULAR; INTRAVENOUS EVERY 6 HOURS PRN
Status: DISCONTINUED | OUTPATIENT
Start: 2017-07-17 | End: 2017-07-20 | Stop reason: HOSPADM

## 2017-07-17 RX ORDER — DIPHENHYDRAMINE HYDROCHLORIDE 50 MG/ML
25 INJECTION INTRAMUSCULAR; INTRAVENOUS EVERY 6 HOURS PRN
Status: DISCONTINUED | OUTPATIENT
Start: 2017-07-17 | End: 2017-07-20 | Stop reason: HOSPADM

## 2017-07-17 RX ORDER — LIDOCAINE 50 MG/G
1 PATCH TOPICAL DAILY
Status: DISCONTINUED | OUTPATIENT
Start: 2017-07-18 | End: 2017-07-17

## 2017-07-17 RX ADMIN — ONDANSETRON 4 MG: 2 INJECTION INTRAMUSCULAR; INTRAVENOUS at 08:54

## 2017-07-17 RX ADMIN — FUROSEMIDE 20 MG: 10 INJECTION, SOLUTION INTRAMUSCULAR; INTRAVENOUS at 20:31

## 2017-07-17 RX ADMIN — ROPINIROLE HYDROCHLORIDE 1 MG: 1 TABLET, FILM COATED ORAL at 05:25

## 2017-07-17 RX ADMIN — BRIMONIDINE TARTRATE 1 DROP: 1.5 SOLUTION OPHTHALMIC at 08:50

## 2017-07-17 RX ADMIN — CEFTRIAXONE SODIUM 1000 MG: 10 INJECTION, POWDER, FOR SOLUTION INTRAVENOUS at 20:45

## 2017-07-17 RX ADMIN — BIMATOPROST 1 DROP: 0.1 SOLUTION/ DROPS OPHTHALMIC at 22:16

## 2017-07-17 RX ADMIN — MORPHINE SULFATE 0.5 MG: 2 INJECTION, SOLUTION INTRAMUSCULAR; INTRAVENOUS at 19:18

## 2017-07-17 RX ADMIN — LEVOTHYROXINE SODIUM 37.5 MCG: 75 TABLET ORAL at 05:24

## 2017-07-17 RX ADMIN — CEFAZOLIN SODIUM 1000 MG: 1 SOLUTION INTRAVENOUS at 05:24

## 2017-07-17 RX ADMIN — ACETAMINOPHEN 650 MG: 325 TABLET ORAL at 06:37

## 2017-07-17 RX ADMIN — LATANOPROST 1 DROP: 50 SOLUTION/ DROPS OPHTHALMIC at 22:24

## 2017-07-17 RX ADMIN — DIPHENHYDRAMINE HYDROCHLORIDE 25 MG: 50 INJECTION, SOLUTION INTRAMUSCULAR; INTRAVENOUS at 19:32

## 2017-07-17 RX ADMIN — PANTOPRAZOLE SODIUM 40 MG: 40 TABLET, DELAYED RELEASE ORAL at 05:25

## 2017-07-17 RX ADMIN — LIDOCAINE 1 PATCH: 50 PATCH CUTANEOUS at 21:55

## 2017-07-17 RX ADMIN — VANCOMYCIN HYDROCHLORIDE 1250 MG: 1 INJECTION, POWDER, LYOPHILIZED, FOR SOLUTION INTRAVENOUS at 09:55

## 2017-07-18 ENCOUNTER — GENERIC CONVERSION - ENCOUNTER (OUTPATIENT)
Dept: OTHER | Facility: OTHER | Age: 82
End: 2017-07-18

## 2017-07-18 LAB
ALBUMIN SERPL BCP-MCNC: 3.1 G/DL (ref 3.5–5)
ALP SERPL-CCNC: 72 U/L (ref 46–116)
ALT SERPL W P-5'-P-CCNC: 10 U/L (ref 12–78)
ANION GAP SERPL CALCULATED.3IONS-SCNC: 12 MMOL/L (ref 4–13)
AST SERPL W P-5'-P-CCNC: 19 U/L (ref 5–45)
BILIRUB SERPL-MCNC: 0.6 MG/DL (ref 0.2–1)
BUN SERPL-MCNC: 38 MG/DL (ref 5–25)
CALCIUM SERPL-MCNC: 8.8 MG/DL (ref 8.3–10.1)
CHLORIDE SERPL-SCNC: 100 MMOL/L (ref 100–108)
CO2 SERPL-SCNC: 29 MMOL/L (ref 21–32)
CREAT SERPL-MCNC: 1.79 MG/DL (ref 0.6–1.3)
GFR SERPL CREATININE-BSD FRML MDRD: 26.9 ML/MIN/1.73SQ M
GLUCOSE SERPL-MCNC: 118 MG/DL (ref 65–140)
POTASSIUM SERPL-SCNC: 3.1 MMOL/L (ref 3.5–5.3)
PROT SERPL-MCNC: 7.1 G/DL (ref 6.4–8.2)
SODIUM SERPL-SCNC: 141 MMOL/L (ref 136–145)

## 2017-07-18 PROCEDURE — G8978 MOBILITY CURRENT STATUS: HCPCS

## 2017-07-18 PROCEDURE — 97163 PT EVAL HIGH COMPLEX 45 MIN: CPT

## 2017-07-18 PROCEDURE — 97110 THERAPEUTIC EXERCISES: CPT

## 2017-07-18 PROCEDURE — 80053 COMPREHEN METABOLIC PANEL: CPT | Performed by: NURSE PRACTITIONER

## 2017-07-18 PROCEDURE — G8979 MOBILITY GOAL STATUS: HCPCS

## 2017-07-18 PROCEDURE — 93005 ELECTROCARDIOGRAM TRACING: CPT | Performed by: INTERNAL MEDICINE

## 2017-07-18 PROCEDURE — 92610 EVALUATE SWALLOWING FUNCTION: CPT

## 2017-07-18 RX ORDER — POTASSIUM CHLORIDE 14.9 MG/ML
20 INJECTION INTRAVENOUS EVERY 4 HOURS
Status: COMPLETED | OUTPATIENT
Start: 2017-07-18 | End: 2017-07-18

## 2017-07-18 RX ADMIN — ROPINIROLE HYDROCHLORIDE 1 MG: 1 TABLET, FILM COATED ORAL at 17:56

## 2017-07-18 RX ADMIN — MELATONIN TAB 3 MG 3 MG: 3 TAB at 21:21

## 2017-07-18 RX ADMIN — GABAPENTIN 300 MG: 300 CAPSULE ORAL at 10:29

## 2017-07-18 RX ADMIN — APIXABAN 5 MG: 5 TABLET, FILM COATED ORAL at 17:56

## 2017-07-18 RX ADMIN — OXCARBAZEPINE 75 MG: 150 TABLET ORAL at 21:21

## 2017-07-18 RX ADMIN — METOPROLOL TARTRATE 50 MG: 50 TABLET ORAL at 17:56

## 2017-07-18 RX ADMIN — FLUTICASONE PROPIONATE 1 SPRAY: 50 SPRAY, METERED NASAL at 10:30

## 2017-07-18 RX ADMIN — CEFTRIAXONE SODIUM 1000 MG: 10 INJECTION, POWDER, FOR SOLUTION INTRAVENOUS at 20:00

## 2017-07-18 RX ADMIN — POTASSIUM CHLORIDE 20 MEQ: 200 INJECTION, SOLUTION INTRAVENOUS at 12:50

## 2017-07-18 RX ADMIN — TRAMADOL HYDROCHLORIDE 50 MG: 50 TABLET, COATED ORAL at 13:03

## 2017-07-18 RX ADMIN — BACLOFEN 10 MG: 10 TABLET ORAL at 21:21

## 2017-07-18 RX ADMIN — MORPHINE SULFATE 0.5 MG: 2 INJECTION, SOLUTION INTRAMUSCULAR; INTRAVENOUS at 01:35

## 2017-07-18 RX ADMIN — ROPINIROLE HYDROCHLORIDE 1 MG: 1 TABLET, FILM COATED ORAL at 12:50

## 2017-07-18 RX ADMIN — TRAMADOL HYDROCHLORIDE 50 MG: 50 TABLET, COATED ORAL at 23:54

## 2017-07-18 RX ADMIN — BRIMONIDINE TARTRATE 1 DROP: 1.5 SOLUTION OPHTHALMIC at 10:30

## 2017-07-18 RX ADMIN — APIXABAN 5 MG: 5 TABLET, FILM COATED ORAL at 10:29

## 2017-07-18 RX ADMIN — ROPINIROLE HYDROCHLORIDE 1 MG: 1 TABLET, FILM COATED ORAL at 23:56

## 2017-07-18 RX ADMIN — POTASSIUM CHLORIDE 20 MEQ: 200 INJECTION, SOLUTION INTRAVENOUS at 20:44

## 2017-07-18 RX ADMIN — BIMATOPROST 1 DROP: 0.1 SOLUTION/ DROPS OPHTHALMIC at 21:38

## 2017-07-18 RX ADMIN — LATANOPROST 1 DROP: 50 SOLUTION/ DROPS OPHTHALMIC at 21:38

## 2017-07-18 RX ADMIN — DOCUSATE SODIUM 100 MG: 100 CAPSULE, LIQUID FILLED ORAL at 17:56

## 2017-07-18 RX ADMIN — ROPINIROLE HYDROCHLORIDE 1 MG: 1 TABLET, FILM COATED ORAL at 07:55

## 2017-07-18 RX ADMIN — GABAPENTIN 300 MG: 300 CAPSULE ORAL at 17:56

## 2017-07-18 RX ADMIN — METOPROLOL TARTRATE 50 MG: 50 TABLET ORAL at 10:29

## 2017-07-19 DIAGNOSIS — I50.32 CHRONIC DIASTOLIC HEART FAILURE (HCC): ICD-10-CM

## 2017-07-19 LAB
ANION GAP SERPL CALCULATED.3IONS-SCNC: 10 MMOL/L (ref 4–13)
ATRIAL RATE: 111 BPM
ATRIAL RATE: 79 BPM
BACTERIA BLD CULT: NORMAL
BACTERIA BLD CULT: NORMAL
BUN SERPL-MCNC: 42 MG/DL (ref 5–25)
CALCIUM SERPL-MCNC: 8.8 MG/DL (ref 8.3–10.1)
CHLORIDE SERPL-SCNC: 102 MMOL/L (ref 100–108)
CO2 SERPL-SCNC: 26 MMOL/L (ref 21–32)
CREAT SERPL-MCNC: 1.54 MG/DL (ref 0.6–1.3)
GFR SERPL CREATININE-BSD FRML MDRD: 31.9 ML/MIN/1.73SQ M
GLUCOSE SERPL-MCNC: 105 MG/DL (ref 65–140)
P AXIS: 101 DEGREES
P AXIS: 71 DEGREES
POTASSIUM SERPL-SCNC: 4.1 MMOL/L (ref 3.5–5.3)
PR INTERVAL: 210 MS
PR INTERVAL: 238 MS
QRS AXIS: 10 DEGREES
QRS AXIS: 6 DEGREES
QRSD INTERVAL: 90 MS
QRSD INTERVAL: 94 MS
QT INTERVAL: 362 MS
QT INTERVAL: 424 MS
QTC INTERVAL: 486 MS
QTC INTERVAL: 489 MS
SODIUM SERPL-SCNC: 138 MMOL/L (ref 136–145)
T WAVE AXIS: 41 DEGREES
T WAVE AXIS: 74 DEGREES
VENTRICULAR RATE: 110 BPM
VENTRICULAR RATE: 79 BPM

## 2017-07-19 PROCEDURE — 97535 SELF CARE MNGMENT TRAINING: CPT

## 2017-07-19 PROCEDURE — 97530 THERAPEUTIC ACTIVITIES: CPT

## 2017-07-19 PROCEDURE — 93005 ELECTROCARDIOGRAM TRACING: CPT | Performed by: INTERNAL MEDICINE

## 2017-07-19 PROCEDURE — 80048 BASIC METABOLIC PNL TOTAL CA: CPT | Performed by: HOSPITALIST

## 2017-07-19 RX ORDER — TORSEMIDE 20 MG/1
20 TABLET ORAL DAILY
Status: DISCONTINUED | OUTPATIENT
Start: 2017-07-19 | End: 2017-07-20 | Stop reason: HOSPADM

## 2017-07-19 RX ORDER — TAMSULOSIN HYDROCHLORIDE 0.4 MG/1
0.4 CAPSULE ORAL
Status: DISCONTINUED | OUTPATIENT
Start: 2017-07-19 | End: 2017-07-20 | Stop reason: HOSPADM

## 2017-07-19 RX ADMIN — LIDOCAINE 1 PATCH: 50 PATCH CUTANEOUS at 21:53

## 2017-07-19 RX ADMIN — APIXABAN 5 MG: 5 TABLET, FILM COATED ORAL at 17:36

## 2017-07-19 RX ADMIN — TRAMADOL HYDROCHLORIDE 50 MG: 50 TABLET, COATED ORAL at 19:56

## 2017-07-19 RX ADMIN — OXCARBAZEPINE 75 MG: 150 TABLET ORAL at 21:52

## 2017-07-19 RX ADMIN — BRIMONIDINE TARTRATE 1 DROP: 1.5 SOLUTION OPHTHALMIC at 08:28

## 2017-07-19 RX ADMIN — ROPINIROLE HYDROCHLORIDE 1 MG: 1 TABLET, FILM COATED ORAL at 17:37

## 2017-07-19 RX ADMIN — DOCUSATE SODIUM 100 MG: 100 CAPSULE, LIQUID FILLED ORAL at 08:24

## 2017-07-19 RX ADMIN — LATANOPROST 1 DROP: 50 SOLUTION/ DROPS OPHTHALMIC at 22:00

## 2017-07-19 RX ADMIN — ROPINIROLE HYDROCHLORIDE 1 MG: 1 TABLET, FILM COATED ORAL at 12:18

## 2017-07-19 RX ADMIN — GABAPENTIN 300 MG: 300 CAPSULE ORAL at 17:37

## 2017-07-19 RX ADMIN — MELATONIN TAB 3 MG 3 MG: 3 TAB at 21:52

## 2017-07-19 RX ADMIN — APIXABAN 5 MG: 5 TABLET, FILM COATED ORAL at 08:24

## 2017-07-19 RX ADMIN — CALCITRIOL 0.25 MCG: 0.25 CAPSULE ORAL at 12:18

## 2017-07-19 RX ADMIN — PANTOPRAZOLE SODIUM 40 MG: 40 TABLET, DELAYED RELEASE ORAL at 06:02

## 2017-07-19 RX ADMIN — METOPROLOL TARTRATE 50 MG: 50 TABLET ORAL at 17:36

## 2017-07-19 RX ADMIN — LEVOTHYROXINE SODIUM 37.5 MCG: 75 TABLET ORAL at 05:54

## 2017-07-19 RX ADMIN — DOCUSATE SODIUM 100 MG: 100 CAPSULE, LIQUID FILLED ORAL at 17:37

## 2017-07-19 RX ADMIN — GABAPENTIN 300 MG: 300 CAPSULE ORAL at 08:24

## 2017-07-19 RX ADMIN — FLUTICASONE PROPIONATE 1 SPRAY: 50 SPRAY, METERED NASAL at 08:24

## 2017-07-19 RX ADMIN — METOPROLOL TARTRATE 50 MG: 50 TABLET ORAL at 08:24

## 2017-07-19 RX ADMIN — BACLOFEN 10 MG: 10 TABLET ORAL at 21:53

## 2017-07-19 RX ADMIN — TORSEMIDE 20 MG: 20 TABLET ORAL at 14:43

## 2017-07-19 RX ADMIN — BIMATOPROST 1 DROP: 0.1 SOLUTION/ DROPS OPHTHALMIC at 22:00

## 2017-07-19 RX ADMIN — TRAMADOL HYDROCHLORIDE 50 MG: 50 TABLET, COATED ORAL at 12:17

## 2017-07-19 RX ADMIN — BRIMONIDINE TARTRATE 1 DROP: 1.5 SOLUTION OPHTHALMIC at 17:37

## 2017-07-19 RX ADMIN — TAMSULOSIN HYDROCHLORIDE 0.4 MG: 0.4 CAPSULE ORAL at 17:36

## 2017-07-19 RX ADMIN — ROPINIROLE HYDROCHLORIDE 1 MG: 1 TABLET, FILM COATED ORAL at 23:14

## 2017-07-19 RX ADMIN — CEFTRIAXONE SODIUM 1000 MG: 10 INJECTION, POWDER, FOR SOLUTION INTRAVENOUS at 19:56

## 2017-07-19 RX ADMIN — ROPINIROLE HYDROCHLORIDE 1 MG: 1 TABLET, FILM COATED ORAL at 05:53

## 2017-07-19 RX ADMIN — ACETAMINOPHEN 650 MG: 325 TABLET ORAL at 21:52

## 2017-07-20 ENCOUNTER — GENERIC CONVERSION - ENCOUNTER (OUTPATIENT)
Dept: OTHER | Facility: OTHER | Age: 82
End: 2017-07-20

## 2017-07-20 VITALS
WEIGHT: 169.31 LBS | HEART RATE: 92 BPM | OXYGEN SATURATION: 96 % | TEMPERATURE: 98.2 F | HEIGHT: 60 IN | DIASTOLIC BLOOD PRESSURE: 62 MMHG | BODY MASS INDEX: 33.24 KG/M2 | SYSTOLIC BLOOD PRESSURE: 114 MMHG | RESPIRATION RATE: 20 BRPM

## 2017-07-20 LAB
ANION GAP SERPL CALCULATED.3IONS-SCNC: 12 MMOL/L (ref 4–13)
ATRIAL RATE: 71 BPM
BUN SERPL-MCNC: 45 MG/DL (ref 5–25)
CALCIUM SERPL-MCNC: 8.7 MG/DL (ref 8.3–10.1)
CHLORIDE SERPL-SCNC: 100 MMOL/L (ref 100–108)
CO2 SERPL-SCNC: 27 MMOL/L (ref 21–32)
CREAT SERPL-MCNC: 1.75 MG/DL (ref 0.6–1.3)
GFR SERPL CREATININE-BSD FRML MDRD: 27.6 ML/MIN/1.73SQ M
GLUCOSE SERPL-MCNC: 94 MG/DL (ref 65–140)
P AXIS: 75 DEGREES
POTASSIUM SERPL-SCNC: 3.6 MMOL/L (ref 3.5–5.3)
PR INTERVAL: 224 MS
QRS AXIS: 8 DEGREES
QRSD INTERVAL: 98 MS
QT INTERVAL: 446 MS
QTC INTERVAL: 484 MS
SODIUM SERPL-SCNC: 139 MMOL/L (ref 136–145)
T WAVE AXIS: 47 DEGREES
VENTRICULAR RATE: 71 BPM

## 2017-07-20 PROCEDURE — 97164 PT RE-EVAL EST PLAN CARE: CPT

## 2017-07-20 PROCEDURE — G8978 MOBILITY CURRENT STATUS: HCPCS

## 2017-07-20 PROCEDURE — 97110 THERAPEUTIC EXERCISES: CPT

## 2017-07-20 PROCEDURE — 80048 BASIC METABOLIC PNL TOTAL CA: CPT | Performed by: NURSE PRACTITIONER

## 2017-07-20 PROCEDURE — 97530 THERAPEUTIC ACTIVITIES: CPT

## 2017-07-20 PROCEDURE — G8979 MOBILITY GOAL STATUS: HCPCS

## 2017-07-20 RX ORDER — GABAPENTIN 300 MG/1
300 CAPSULE ORAL 2 TIMES DAILY
Qty: 14 CAPSULE | Refills: 0 | Status: SHIPPED | OUTPATIENT
Start: 2017-07-20 | End: 2017-07-29 | Stop reason: HOSPADM

## 2017-07-20 RX ORDER — BACLOFEN 10 MG/1
10 TABLET ORAL
Qty: 7 TABLET | Refills: 0 | Status: SHIPPED | OUTPATIENT
Start: 2017-07-20 | End: 2017-07-29 | Stop reason: HOSPADM

## 2017-07-20 RX ORDER — TRAMADOL HYDROCHLORIDE 50 MG/1
50 TABLET ORAL EVERY 6 HOURS PRN
Qty: 20 TABLET | Refills: 0 | Status: SHIPPED | OUTPATIENT
Start: 2017-07-20 | End: 2017-07-29 | Stop reason: HOSPADM

## 2017-07-20 RX ORDER — TAMSULOSIN HYDROCHLORIDE 0.4 MG/1
0.4 CAPSULE ORAL
Qty: 7 CAPSULE | Refills: 0 | Status: SHIPPED | OUTPATIENT
Start: 2017-07-20 | End: 2018-03-29 | Stop reason: ALTCHOICE

## 2017-07-20 RX ORDER — TORSEMIDE 20 MG/1
20 TABLET ORAL DAILY
Qty: 30 TABLET | Refills: 0 | Status: SHIPPED | OUTPATIENT
Start: 2017-07-20 | End: 2018-01-30 | Stop reason: HOSPADM

## 2017-07-20 RX ADMIN — ONDANSETRON 4 MG: 2 INJECTION INTRAMUSCULAR; INTRAVENOUS at 05:58

## 2017-07-20 RX ADMIN — ACETAMINOPHEN 650 MG: 325 TABLET ORAL at 04:31

## 2017-07-20 RX ADMIN — MORPHINE SULFATE 0.5 MG: 2 INJECTION, SOLUTION INTRAMUSCULAR; INTRAVENOUS at 02:08

## 2017-07-20 RX ADMIN — FLUTICASONE PROPIONATE 1 SPRAY: 50 SPRAY, METERED NASAL at 10:52

## 2017-07-20 RX ADMIN — GABAPENTIN 300 MG: 300 CAPSULE ORAL at 10:52

## 2017-07-20 RX ADMIN — ALUMINUM HYDROXIDE, MAGNESIUM HYDROXIDE, AND SIMETHICONE 15 ML: 200; 200; 20 SUSPENSION ORAL at 05:58

## 2017-07-20 RX ADMIN — BRIMONIDINE TARTRATE 1 DROP: 1.5 SOLUTION OPHTHALMIC at 10:53

## 2017-07-20 RX ADMIN — TRAMADOL HYDROCHLORIDE 50 MG: 50 TABLET, COATED ORAL at 07:51

## 2017-07-20 RX ADMIN — APIXABAN 5 MG: 5 TABLET, FILM COATED ORAL at 10:52

## 2017-07-20 RX ADMIN — ROPINIROLE HYDROCHLORIDE 1 MG: 1 TABLET, FILM COATED ORAL at 07:51

## 2017-07-20 RX ADMIN — LEVOTHYROXINE SODIUM 37.5 MCG: 75 TABLET ORAL at 05:56

## 2017-07-20 RX ADMIN — ROPINIROLE HYDROCHLORIDE 1 MG: 1 TABLET, FILM COATED ORAL at 12:15

## 2017-07-20 RX ADMIN — METOPROLOL TARTRATE 50 MG: 50 TABLET ORAL at 10:52

## 2017-07-20 RX ADMIN — TORSEMIDE 20 MG: 20 TABLET ORAL at 10:52

## 2017-07-21 ENCOUNTER — APPOINTMENT (EMERGENCY)
Dept: RADIOLOGY | Facility: HOSPITAL | Age: 82
DRG: 948 | End: 2017-07-21
Payer: MEDICARE

## 2017-07-21 ENCOUNTER — HOSPITAL ENCOUNTER (INPATIENT)
Facility: HOSPITAL | Age: 82
LOS: 8 days | Discharge: NURSING, REHAB OR LONG-TERM CARE | DRG: 948 | End: 2017-07-29
Attending: EMERGENCY MEDICINE | Admitting: FAMILY MEDICINE
Payer: MEDICARE

## 2017-07-21 ENCOUNTER — APPOINTMENT (INPATIENT)
Dept: NEUROLOGY | Facility: AMBULATORY SURGERY CENTER | Age: 82
DRG: 948 | End: 2017-07-21
Payer: MEDICARE

## 2017-07-21 ENCOUNTER — GENERIC CONVERSION - ENCOUNTER (OUTPATIENT)
Dept: OTHER | Facility: OTHER | Age: 82
End: 2017-07-21

## 2017-07-21 DIAGNOSIS — I63.9 CEREBROVASCULAR ACCIDENT (CVA), UNSPECIFIED MECHANISM (HCC): Primary | ICD-10-CM

## 2017-07-21 DIAGNOSIS — R47.01 MUTE: ICD-10-CM

## 2017-07-21 DIAGNOSIS — R41.82 ALTERED MENTAL STATUS, UNSPECIFIED ALTERED MENTAL STATUS TYPE: ICD-10-CM

## 2017-07-21 DIAGNOSIS — R41.82 ALTERED MENTAL STATUS: ICD-10-CM

## 2017-07-21 DIAGNOSIS — N18.30 CKD (CHRONIC KIDNEY DISEASE) STAGE 3, GFR 30-59 ML/MIN (HCC): ICD-10-CM

## 2017-07-21 LAB
ABO GROUP BLD: NORMAL
ANION GAP BLD CALC-SCNC: 16 MMOL/L (ref 4–13)
ANION GAP SERPL CALCULATED.3IONS-SCNC: 7 MMOL/L (ref 4–13)
APTT PPP: 42 SECONDS (ref 23–35)
ATRIAL RATE: 86 BPM
BACTERIA UR QL AUTO: NORMAL /HPF
BASE EXCESS BLDA CALC-SCNC: 4 MMOL/L (ref -2–3)
BILIRUB UR QL STRIP: NEGATIVE
BLD GP AB SCN SERPL QL: NEGATIVE
BUN BLD-MCNC: 47 MG/DL (ref 5–25)
BUN SERPL-MCNC: 42 MG/DL (ref 5–25)
CA-I BLD-SCNC: 1.14 MMOL/L (ref 1.12–1.32)
CA-I BLD-SCNC: 1.16 MMOL/L (ref 1.12–1.32)
CALCIUM SERPL-MCNC: 9.2 MG/DL (ref 8.3–10.1)
CHLORIDE BLD-SCNC: 101 MMOL/L (ref 100–108)
CHLORIDE SERPL-SCNC: 103 MMOL/L (ref 100–108)
CLARITY UR: CLEAR
CO2 SERPL-SCNC: 30 MMOL/L (ref 21–32)
COLOR UR: YELLOW
COLOR, POC: YELLOW
CREAT BLD-MCNC: 1.6 MG/DL (ref 0.6–1.3)
CREAT SERPL-MCNC: 1.63 MG/DL (ref 0.6–1.3)
ERYTHROCYTE [DISTWIDTH] IN BLOOD BY AUTOMATED COUNT: 13.6 % (ref 11.6–15.1)
FIO2 GAS DIL.REBREATH: 0.21 L
GFR SERPL CREATININE-BSD FRML MDRD: 29.9 ML/MIN/1.73SQ M
GFR SERPL CREATININE-BSD FRML MDRD: 30.6 ML/MIN/1.73SQ M
GLUCOSE SERPL-MCNC: 100 MG/DL (ref 65–140)
GLUCOSE SERPL-MCNC: 101 MG/DL (ref 65–140)
GLUCOSE SERPL-MCNC: 116 MG/DL (ref 65–140)
GLUCOSE UR STRIP-MCNC: NEGATIVE MG/DL
HCO3 BLDA-SCNC: 27 MMOL/L (ref 22–28)
HCT VFR BLD AUTO: 33.3 % (ref 34.8–46.1)
HCT VFR BLD CALC: 29 % (ref 34.8–46.1)
HCT VFR BLD CALC: 32 % (ref 34.8–46.1)
HGB BLD-MCNC: 10.9 G/DL (ref 11.5–15.4)
HGB BLDA-MCNC: 10.9 G/DL (ref 11.5–15.4)
HGB BLDA-MCNC: 9.9 G/DL (ref 11.5–15.4)
HGB UR QL STRIP.AUTO: ABNORMAL
HYALINE CASTS #/AREA URNS LPF: NORMAL /LPF
INR PPP: 1.5 (ref 0.86–1.16)
KETONES UR STRIP-MCNC: NEGATIVE MG/DL
LEUKOCYTE ESTERASE UR QL STRIP: NEGATIVE
MCH RBC QN AUTO: 29.7 PG (ref 26.8–34.3)
MCHC RBC AUTO-ENTMCNC: 32.7 G/DL (ref 31.4–37.4)
MCV RBC AUTO: 91 FL (ref 82–98)
NITRITE UR QL STRIP: NEGATIVE
NON-SQ EPI CELLS URNS QL MICRO: NORMAL /HPF
P AXIS: 112 DEGREES
PCO2 BLD: 28 MMOL/L (ref 21–32)
PCO2 BLD: 31 MMOL/L (ref 21–32)
PCO2 BLD: 35.5 MM HG (ref 36–44)
PH BLD: 7.49 [PH] (ref 7.35–7.45)
PH UR STRIP.AUTO: 5.5 [PH] (ref 4.5–8)
PLATELET # BLD AUTO: 305 THOUSANDS/UL (ref 149–390)
PMV BLD AUTO: 10.7 FL (ref 8.9–12.7)
PO2 BLD: 64 MM HG (ref 75–129)
POTASSIUM BLD-SCNC: 3.6 MMOL/L (ref 3.5–5.3)
POTASSIUM BLD-SCNC: 4.5 MMOL/L (ref 3.5–5.3)
POTASSIUM SERPL-SCNC: 4 MMOL/L (ref 3.5–5.3)
PR INTERVAL: 234 MS
PROT UR STRIP-MCNC: NEGATIVE MG/DL
PROTHROMBIN TIME: 18.2 SECONDS (ref 12.1–14.4)
QRS AXIS: -4 DEGREES
QRSD INTERVAL: 88 MS
QT INTERVAL: 402 MS
QTC INTERVAL: 481 MS
RBC # BLD AUTO: 3.67 MILLION/UL (ref 3.81–5.12)
RBC #/AREA URNS AUTO: NORMAL /HPF
RH BLD: NEGATIVE
SAO2 % BLD FROM PO2: 94 % (ref 95–98)
SODIUM BLD-SCNC: 140 MMOL/L (ref 136–145)
SODIUM BLD-SCNC: 142 MMOL/L (ref 136–145)
SODIUM SERPL-SCNC: 140 MMOL/L (ref 136–145)
SP GR UR STRIP.AUTO: 1.01 (ref 1–1.03)
SPECIMEN EXPIRATION DATE: NORMAL
SPECIMEN SOURCE: ABNORMAL
SPECIMEN SOURCE: ABNORMAL
T WAVE AXIS: 64 DEGREES
TROPONIN I SERPL-MCNC: <0.02 NG/ML
UROBILINOGEN UR QL STRIP.AUTO: 0.2 E.U./DL
VENTRICULAR RATE: 86 BPM
WBC # BLD AUTO: 7.62 THOUSAND/UL (ref 4.31–10.16)
WBC #/AREA URNS AUTO: NORMAL /HPF

## 2017-07-21 PROCEDURE — 81001 URINALYSIS AUTO W/SCOPE: CPT

## 2017-07-21 PROCEDURE — 86901 BLOOD TYPING SEROLOGIC RH(D): CPT | Performed by: EMERGENCY MEDICINE

## 2017-07-21 PROCEDURE — 82947 ASSAY GLUCOSE BLOOD QUANT: CPT

## 2017-07-21 PROCEDURE — 84295 ASSAY OF SERUM SODIUM: CPT

## 2017-07-21 PROCEDURE — 94760 N-INVAS EAR/PLS OXIMETRY 1: CPT

## 2017-07-21 PROCEDURE — 80047 BASIC METABLC PNL IONIZED CA: CPT

## 2017-07-21 PROCEDURE — 99291 CRITICAL CARE FIRST HOUR: CPT

## 2017-07-21 PROCEDURE — 82330 ASSAY OF CALCIUM: CPT

## 2017-07-21 PROCEDURE — 85027 COMPLETE CBC AUTOMATED: CPT

## 2017-07-21 PROCEDURE — 85610 PROTHROMBIN TIME: CPT | Performed by: EMERGENCY MEDICINE

## 2017-07-21 PROCEDURE — 80048 BASIC METABOLIC PNL TOTAL CA: CPT | Performed by: EMERGENCY MEDICINE

## 2017-07-21 PROCEDURE — 86900 BLOOD TYPING SEROLOGIC ABO: CPT | Performed by: EMERGENCY MEDICINE

## 2017-07-21 PROCEDURE — 70496 CT ANGIOGRAPHY HEAD: CPT

## 2017-07-21 PROCEDURE — 71010 HB CHEST X-RAY 1 VIEW FRONTAL (PORTABLE): CPT

## 2017-07-21 PROCEDURE — 36415 COLL VENOUS BLD VENIPUNCTURE: CPT

## 2017-07-21 PROCEDURE — 93005 ELECTROCARDIOGRAM TRACING: CPT | Performed by: EMERGENCY MEDICINE

## 2017-07-21 PROCEDURE — 85014 HEMATOCRIT: CPT

## 2017-07-21 PROCEDURE — 95951 HB EEG MONITORING/VIDEORECORD: CPT

## 2017-07-21 PROCEDURE — 81002 URINALYSIS NONAUTO W/O SCOPE: CPT | Performed by: EMERGENCY MEDICINE

## 2017-07-21 PROCEDURE — 84484 ASSAY OF TROPONIN QUANT: CPT | Performed by: EMERGENCY MEDICINE

## 2017-07-21 PROCEDURE — 85730 THROMBOPLASTIN TIME PARTIAL: CPT | Performed by: EMERGENCY MEDICINE

## 2017-07-21 PROCEDURE — 86850 RBC ANTIBODY SCREEN: CPT | Performed by: EMERGENCY MEDICINE

## 2017-07-21 PROCEDURE — 36600 WITHDRAWAL OF ARTERIAL BLOOD: CPT

## 2017-07-21 PROCEDURE — 70498 CT ANGIOGRAPHY NECK: CPT

## 2017-07-21 PROCEDURE — 70450 CT HEAD/BRAIN W/O DYE: CPT

## 2017-07-21 PROCEDURE — 84132 ASSAY OF SERUM POTASSIUM: CPT

## 2017-07-21 PROCEDURE — 82803 BLOOD GASES ANY COMBINATION: CPT

## 2017-07-21 RX ORDER — AMPICILLIN TRIHYDRATE 250 MG
1 CAPSULE ORAL 2 TIMES DAILY
Status: DISCONTINUED | OUTPATIENT
Start: 2017-07-21 | End: 2017-07-21 | Stop reason: RX

## 2017-07-21 RX ORDER — LEVOTHYROXINE SODIUM 0.07 MG/1
37.5 TABLET ORAL
Status: DISCONTINUED | OUTPATIENT
Start: 2017-07-22 | End: 2017-07-24

## 2017-07-21 RX ORDER — TORSEMIDE 20 MG/1
20 TABLET ORAL DAILY
Status: DISCONTINUED | OUTPATIENT
Start: 2017-07-22 | End: 2017-07-29 | Stop reason: HOSPADM

## 2017-07-21 RX ORDER — BACLOFEN 10 MG/1
10 TABLET ORAL
Status: DISCONTINUED | OUTPATIENT
Start: 2017-07-21 | End: 2017-07-27

## 2017-07-21 RX ORDER — TRAMADOL HYDROCHLORIDE 50 MG/1
50 TABLET ORAL EVERY 6 HOURS PRN
Status: DISCONTINUED | OUTPATIENT
Start: 2017-07-21 | End: 2017-07-27

## 2017-07-21 RX ORDER — LORATADINE 10 MG/1
10 TABLET ORAL DAILY
Status: DISCONTINUED | OUTPATIENT
Start: 2017-07-22 | End: 2017-07-27

## 2017-07-21 RX ORDER — ALBUTEROL SULFATE 90 UG/1
2 AEROSOL, METERED RESPIRATORY (INHALATION) EVERY 4 HOURS PRN
Status: DISCONTINUED | OUTPATIENT
Start: 2017-07-21 | End: 2017-07-29 | Stop reason: HOSPADM

## 2017-07-21 RX ORDER — ONDANSETRON 2 MG/ML
4 INJECTION INTRAMUSCULAR; INTRAVENOUS EVERY 6 HOURS PRN
Status: DISCONTINUED | OUTPATIENT
Start: 2017-07-21 | End: 2017-07-29 | Stop reason: HOSPADM

## 2017-07-21 RX ORDER — CALCITRIOL 0.25 UG/1
0.25 CAPSULE, LIQUID FILLED ORAL 3 TIMES WEEKLY
Status: DISCONTINUED | OUTPATIENT
Start: 2017-07-24 | End: 2017-07-29 | Stop reason: HOSPADM

## 2017-07-21 RX ORDER — TAMSULOSIN HYDROCHLORIDE 0.4 MG/1
0.4 CAPSULE ORAL
Status: DISCONTINUED | OUTPATIENT
Start: 2017-07-21 | End: 2017-07-29 | Stop reason: HOSPADM

## 2017-07-21 RX ORDER — POLYETHYLENE GLYCOL 3350 17 G/17G
17 POWDER, FOR SOLUTION ORAL DAILY PRN
Status: DISCONTINUED | OUTPATIENT
Start: 2017-07-21 | End: 2017-07-27

## 2017-07-21 RX ORDER — PANTOPRAZOLE SODIUM 40 MG/1
40 TABLET, DELAYED RELEASE ORAL
Status: DISCONTINUED | OUTPATIENT
Start: 2017-07-22 | End: 2017-07-29 | Stop reason: HOSPADM

## 2017-07-21 RX ORDER — FLUTICASONE PROPIONATE 50 MCG
1 SPRAY, SUSPENSION (ML) NASAL DAILY
Status: DISCONTINUED | OUTPATIENT
Start: 2017-07-22 | End: 2017-07-29 | Stop reason: HOSPADM

## 2017-07-21 RX ORDER — DOCUSATE SODIUM 100 MG/1
100 CAPSULE, LIQUID FILLED ORAL 2 TIMES DAILY
Status: DISCONTINUED | OUTPATIENT
Start: 2017-07-21 | End: 2017-07-29 | Stop reason: HOSPADM

## 2017-07-21 RX ORDER — GABAPENTIN 300 MG/1
300 CAPSULE ORAL 2 TIMES DAILY
Status: DISCONTINUED | OUTPATIENT
Start: 2017-07-21 | End: 2017-07-22

## 2017-07-21 RX ORDER — LANOLIN ALCOHOL/MO/W.PET/CERES
3 CREAM (GRAM) TOPICAL
Status: DISCONTINUED | OUTPATIENT
Start: 2017-07-21 | End: 2017-07-27

## 2017-07-21 RX ORDER — ALBUTEROL SULFATE 90 UG/1
2 AEROSOL, METERED RESPIRATORY (INHALATION) EVERY 6 HOURS PRN
Status: DISCONTINUED | OUTPATIENT
Start: 2017-07-21 | End: 2017-07-21

## 2017-07-21 RX ORDER — ASPIRIN 300 MG/1
300 SUPPOSITORY RECTAL ONCE
Status: COMPLETED | OUTPATIENT
Start: 2017-07-21 | End: 2017-07-21

## 2017-07-21 RX ORDER — METOPROLOL TARTRATE 50 MG/1
50 TABLET, FILM COATED ORAL EVERY 12 HOURS SCHEDULED
Status: DISCONTINUED | OUTPATIENT
Start: 2017-07-21 | End: 2017-07-29 | Stop reason: HOSPADM

## 2017-07-21 RX ORDER — LATANOPROST 50 UG/ML
1 SOLUTION/ DROPS OPHTHALMIC
Status: DISCONTINUED | OUTPATIENT
Start: 2017-07-21 | End: 2017-07-29 | Stop reason: HOSPADM

## 2017-07-21 RX ORDER — BRIMONIDINE TARTRATE 0.15 %
1 DROPS OPHTHALMIC (EYE) 2 TIMES DAILY
Status: DISCONTINUED | OUTPATIENT
Start: 2017-07-21 | End: 2017-07-29 | Stop reason: HOSPADM

## 2017-07-21 RX ORDER — BISACODYL 10 MG
10 SUPPOSITORY, RECTAL RECTAL AS NEEDED
Status: DISCONTINUED | OUTPATIENT
Start: 2017-07-21 | End: 2017-07-29 | Stop reason: HOSPADM

## 2017-07-21 RX ORDER — ROPINIROLE 1 MG/1
1 TABLET, FILM COATED ORAL EVERY 6 HOURS
Status: DISCONTINUED | OUTPATIENT
Start: 2017-07-21 | End: 2017-07-27

## 2017-07-21 RX ORDER — OXCARBAZEPINE 150 MG/1
75 TABLET, FILM COATED ORAL
Status: DISCONTINUED | OUTPATIENT
Start: 2017-07-21 | End: 2017-07-26

## 2017-07-21 RX ORDER — ACETAMINOPHEN 325 MG/1
650 TABLET ORAL EVERY 4 HOURS PRN
Status: DISCONTINUED | OUTPATIENT
Start: 2017-07-21 | End: 2017-07-29 | Stop reason: HOSPADM

## 2017-07-21 RX ORDER — MELATONIN
1000 DAILY
Status: DISCONTINUED | OUTPATIENT
Start: 2017-07-22 | End: 2017-07-29 | Stop reason: HOSPADM

## 2017-07-21 RX ADMIN — ROPINIROLE 1 MG: 1 TABLET, FILM COATED ORAL at 19:54

## 2017-07-21 RX ADMIN — OXCARBAZEPINE 75 MG: 150 TABLET ORAL at 21:47

## 2017-07-21 RX ADMIN — BIMATOPROST 1 DROP: 0.1 SOLUTION/ DROPS OPHTHALMIC at 21:39

## 2017-07-21 RX ADMIN — METOPROLOL TARTRATE 50 MG: 50 TABLET ORAL at 21:48

## 2017-07-21 RX ADMIN — BACLOFEN 10 MG: 10 TABLET ORAL at 21:47

## 2017-07-21 RX ADMIN — ASPIRIN 300 MG: 300 SUPPOSITORY RECTAL at 13:07

## 2017-07-21 RX ADMIN — TRAMADOL HYDROCHLORIDE 50 MG: 50 TABLET, COATED ORAL at 19:56

## 2017-07-21 RX ADMIN — SODIUM CHLORIDE 1000 ML: 0.9 INJECTION, SOLUTION INTRAVENOUS at 13:07

## 2017-07-21 RX ADMIN — IODIXANOL 75 ML: 320 INJECTION, SOLUTION INTRAVASCULAR at 12:49

## 2017-07-21 RX ADMIN — TAMSULOSIN HYDROCHLORIDE 0.4 MG: 0.4 CAPSULE ORAL at 20:10

## 2017-07-21 RX ADMIN — APIXABAN 2.5 MG: 2.5 TABLET, FILM COATED ORAL at 19:56

## 2017-07-21 RX ADMIN — MELATONIN TAB 3 MG 3 MG: 3 TAB at 21:48

## 2017-07-21 RX ADMIN — GABAPENTIN 300 MG: 300 CAPSULE ORAL at 19:55

## 2017-07-21 RX ADMIN — LATANOPROST 1 DROP: 50 SOLUTION OPHTHALMIC at 21:39

## 2017-07-22 ENCOUNTER — APPOINTMENT (INPATIENT)
Dept: NEUROLOGY | Facility: AMBULATORY SURGERY CENTER | Age: 82
DRG: 948 | End: 2017-07-22
Payer: MEDICARE

## 2017-07-22 ENCOUNTER — GENERIC CONVERSION - ENCOUNTER (OUTPATIENT)
Dept: OTHER | Facility: OTHER | Age: 82
End: 2017-07-22

## 2017-07-22 PROBLEM — R41.82 ALTERED MENTAL STATUS: Status: ACTIVE | Noted: 2017-07-22

## 2017-07-22 LAB
ALBUMIN SERPL BCP-MCNC: 2.9 G/DL (ref 3.5–5)
ALP SERPL-CCNC: 69 U/L (ref 46–116)
ALT SERPL W P-5'-P-CCNC: 10 U/L (ref 12–78)
ANION GAP SERPL CALCULATED.3IONS-SCNC: 9 MMOL/L (ref 4–13)
AST SERPL W P-5'-P-CCNC: 14 U/L (ref 5–45)
BACTERIA BLD CULT: NORMAL
BACTERIA BLD CULT: NORMAL
BILIRUB SERPL-MCNC: 0.12 MG/DL (ref 0.2–1)
BUN SERPL-MCNC: 34 MG/DL (ref 5–25)
CALCIUM SERPL-MCNC: 8.9 MG/DL (ref 8.3–10.1)
CHLORIDE SERPL-SCNC: 107 MMOL/L (ref 100–108)
CO2 SERPL-SCNC: 26 MMOL/L (ref 21–32)
CREAT SERPL-MCNC: 1.29 MG/DL (ref 0.6–1.3)
ERYTHROCYTE [DISTWIDTH] IN BLOOD BY AUTOMATED COUNT: 13.7 % (ref 11.6–15.1)
GFR SERPL CREATININE-BSD FRML MDRD: 39.2 ML/MIN/1.73SQ M
GLUCOSE SERPL-MCNC: 113 MG/DL (ref 65–140)
HCT VFR BLD AUTO: 29.2 % (ref 34.8–46.1)
HGB BLD-MCNC: 9.6 G/DL (ref 11.5–15.4)
MCH RBC QN AUTO: 29.8 PG (ref 26.8–34.3)
MCHC RBC AUTO-ENTMCNC: 32.9 G/DL (ref 31.4–37.4)
MCV RBC AUTO: 91 FL (ref 82–98)
PLATELET # BLD AUTO: 300 THOUSANDS/UL (ref 149–390)
PMV BLD AUTO: 10.3 FL (ref 8.9–12.7)
POTASSIUM SERPL-SCNC: 3.7 MMOL/L (ref 3.5–5.3)
PROT SERPL-MCNC: 6.9 G/DL (ref 6.4–8.2)
RBC # BLD AUTO: 3.22 MILLION/UL (ref 3.81–5.12)
SODIUM SERPL-SCNC: 142 MMOL/L (ref 136–145)
WBC # BLD AUTO: 6.01 THOUSAND/UL (ref 4.31–10.16)

## 2017-07-22 PROCEDURE — 95951 HB EEG MONITORING/VIDEORECORD: CPT

## 2017-07-22 PROCEDURE — 80053 COMPREHEN METABOLIC PANEL: CPT | Performed by: INTERNAL MEDICINE

## 2017-07-22 PROCEDURE — 92610 EVALUATE SWALLOWING FUNCTION: CPT

## 2017-07-22 PROCEDURE — 85027 COMPLETE CBC AUTOMATED: CPT | Performed by: INTERNAL MEDICINE

## 2017-07-22 RX ORDER — GABAPENTIN 300 MG/1
300 CAPSULE ORAL 2 TIMES DAILY
Status: DISCONTINUED | OUTPATIENT
Start: 2017-07-23 | End: 2017-07-29 | Stop reason: HOSPADM

## 2017-07-22 RX ORDER — GABAPENTIN 300 MG/1
600 CAPSULE ORAL
Status: DISCONTINUED | OUTPATIENT
Start: 2017-07-22 | End: 2017-07-29 | Stop reason: HOSPADM

## 2017-07-22 RX ADMIN — LEVOTHYROXINE SODIUM 37.5 MCG: 75 TABLET ORAL at 06:13

## 2017-07-22 RX ADMIN — BRIMONIDINE TARTRATE 1 DROP: 1.5 SOLUTION OPHTHALMIC at 08:13

## 2017-07-22 RX ADMIN — VITAMIN D, TAB 1000IU (100/BT) 1000 UNITS: 25 TAB at 08:13

## 2017-07-22 RX ADMIN — OXCARBAZEPINE 75 MG: 150 TABLET ORAL at 22:24

## 2017-07-22 RX ADMIN — DOCUSATE SODIUM 100 MG: 100 CAPSULE, LIQUID FILLED ORAL at 18:10

## 2017-07-22 RX ADMIN — ROPINIROLE 1 MG: 1 TABLET, FILM COATED ORAL at 13:09

## 2017-07-22 RX ADMIN — GABAPENTIN 300 MG: 300 CAPSULE ORAL at 08:13

## 2017-07-22 RX ADMIN — ROPINIROLE 1 MG: 1 TABLET, FILM COATED ORAL at 06:14

## 2017-07-22 RX ADMIN — LATANOPROST 1 DROP: 50 SOLUTION OPHTHALMIC at 22:38

## 2017-07-22 RX ADMIN — BIMATOPROST 1 DROP: 0.1 SOLUTION/ DROPS OPHTHALMIC at 22:38

## 2017-07-22 RX ADMIN — TRAMADOL HYDROCHLORIDE 50 MG: 50 TABLET, COATED ORAL at 02:50

## 2017-07-22 RX ADMIN — BRIMONIDINE TARTRATE 1 DROP: 1.5 SOLUTION OPHTHALMIC at 18:10

## 2017-07-22 RX ADMIN — ROPINIROLE 1 MG: 1 TABLET, FILM COATED ORAL at 18:10

## 2017-07-22 RX ADMIN — GABAPENTIN 600 MG: 300 CAPSULE ORAL at 22:23

## 2017-07-22 RX ADMIN — Medication 400 MG: at 08:13

## 2017-07-22 RX ADMIN — LORATADINE 10 MG: 10 TABLET ORAL at 08:13

## 2017-07-22 RX ADMIN — APIXABAN 2.5 MG: 2.5 TABLET, FILM COATED ORAL at 08:13

## 2017-07-22 RX ADMIN — ROPINIROLE 1 MG: 1 TABLET, FILM COATED ORAL at 23:59

## 2017-07-22 RX ADMIN — ROPINIROLE 1 MG: 1 TABLET, FILM COATED ORAL at 00:51

## 2017-07-22 RX ADMIN — TORSEMIDE 20 MG: 20 TABLET ORAL at 08:13

## 2017-07-22 RX ADMIN — TAMSULOSIN HYDROCHLORIDE 0.4 MG: 0.4 CAPSULE ORAL at 18:10

## 2017-07-22 RX ADMIN — MELATONIN TAB 3 MG 3 MG: 3 TAB at 22:23

## 2017-07-22 RX ADMIN — TRAMADOL HYDROCHLORIDE 50 MG: 50 TABLET, COATED ORAL at 22:24

## 2017-07-22 RX ADMIN — PANTOPRAZOLE SODIUM 40 MG: 40 TABLET, DELAYED RELEASE ORAL at 06:14

## 2017-07-22 RX ADMIN — FLUTICASONE PROPIONATE 1 SPRAY: 50 SPRAY, METERED NASAL at 08:13

## 2017-07-22 RX ADMIN — BACLOFEN 10 MG: 10 TABLET ORAL at 22:24

## 2017-07-22 RX ADMIN — METOPROLOL TARTRATE 50 MG: 50 TABLET ORAL at 22:38

## 2017-07-22 RX ADMIN — METOPROLOL TARTRATE 50 MG: 50 TABLET ORAL at 08:13

## 2017-07-22 RX ADMIN — DOCUSATE SODIUM 100 MG: 100 CAPSULE, LIQUID FILLED ORAL at 08:13

## 2017-07-22 RX ADMIN — APIXABAN 2.5 MG: 2.5 TABLET, FILM COATED ORAL at 18:10

## 2017-07-23 ENCOUNTER — GENERIC CONVERSION - ENCOUNTER (OUTPATIENT)
Dept: OTHER | Facility: OTHER | Age: 82
End: 2017-07-23

## 2017-07-23 ENCOUNTER — APPOINTMENT (INPATIENT)
Dept: NEUROLOGY | Facility: AMBULATORY SURGERY CENTER | Age: 82
DRG: 948 | End: 2017-07-23
Payer: MEDICARE

## 2017-07-23 ENCOUNTER — APPOINTMENT (INPATIENT)
Dept: NON INVASIVE DIAGNOSTICS | Facility: HOSPITAL | Age: 82
DRG: 948 | End: 2017-07-23
Payer: MEDICARE

## 2017-07-23 LAB
ARTERIAL PATENCY WRIST A: YES
BASE EXCESS BLDA CALC-SCNC: 5 MMOL/L
HCO3 BLDA-SCNC: 28.6 MMOL/L (ref 22–28)
NON VENT ROOM AIR: 21 %
O2 CT BLDA-SCNC: 13.1 ML/DL (ref 16–23)
OXYHGB MFR BLDA: 95.4 % (ref 94–97)
PCO2 BLDA: 38 MM HG (ref 36–44)
PH BLDA: 7.49 [PH] (ref 7.35–7.45)
PO2 BLDA: 79.3 MM HG (ref 75–129)
SPECIMEN SOURCE: ABNORMAL

## 2017-07-23 PROCEDURE — 95951 HB EEG MONITORING/VIDEORECORD: CPT

## 2017-07-23 PROCEDURE — 93306 TTE W/DOPPLER COMPLETE: CPT

## 2017-07-23 PROCEDURE — 82805 BLOOD GASES W/O2 SATURATION: CPT | Performed by: PSYCHIATRY & NEUROLOGY

## 2017-07-23 RX ADMIN — BRIMONIDINE TARTRATE 1 DROP: 1.5 SOLUTION OPHTHALMIC at 17:07

## 2017-07-23 RX ADMIN — PANTOPRAZOLE SODIUM 40 MG: 40 TABLET, DELAYED RELEASE ORAL at 06:17

## 2017-07-23 RX ADMIN — LATANOPROST 1 DROP: 50 SOLUTION OPHTHALMIC at 21:49

## 2017-07-23 RX ADMIN — MELATONIN TAB 3 MG 3 MG: 3 TAB at 21:43

## 2017-07-23 RX ADMIN — Medication 400 MG: at 09:30

## 2017-07-23 RX ADMIN — LORATADINE 10 MG: 10 TABLET ORAL at 09:30

## 2017-07-23 RX ADMIN — BACLOFEN 10 MG: 10 TABLET ORAL at 21:48

## 2017-07-23 RX ADMIN — GABAPENTIN 300 MG: 300 CAPSULE ORAL at 09:30

## 2017-07-23 RX ADMIN — GABAPENTIN 300 MG: 300 CAPSULE ORAL at 15:24

## 2017-07-23 RX ADMIN — GABAPENTIN 600 MG: 300 CAPSULE ORAL at 21:48

## 2017-07-23 RX ADMIN — ROPINIROLE 1 MG: 1 TABLET, FILM COATED ORAL at 17:07

## 2017-07-23 RX ADMIN — METOPROLOL TARTRATE 50 MG: 50 TABLET ORAL at 21:49

## 2017-07-23 RX ADMIN — APIXABAN 2.5 MG: 2.5 TABLET, FILM COATED ORAL at 09:31

## 2017-07-23 RX ADMIN — TAMSULOSIN HYDROCHLORIDE 0.4 MG: 0.4 CAPSULE ORAL at 17:07

## 2017-07-23 RX ADMIN — BRIMONIDINE TARTRATE 1 DROP: 1.5 SOLUTION OPHTHALMIC at 09:33

## 2017-07-23 RX ADMIN — APIXABAN 2.5 MG: 2.5 TABLET, FILM COATED ORAL at 17:07

## 2017-07-23 RX ADMIN — OXCARBAZEPINE 75 MG: 150 TABLET ORAL at 21:43

## 2017-07-23 RX ADMIN — FLUTICASONE PROPIONATE 1 SPRAY: 50 SPRAY, METERED NASAL at 09:32

## 2017-07-23 RX ADMIN — TRAMADOL HYDROCHLORIDE 50 MG: 50 TABLET, COATED ORAL at 17:07

## 2017-07-23 RX ADMIN — TORSEMIDE 20 MG: 20 TABLET ORAL at 09:31

## 2017-07-23 RX ADMIN — ROPINIROLE 1 MG: 1 TABLET, FILM COATED ORAL at 06:17

## 2017-07-23 RX ADMIN — TRAMADOL HYDROCHLORIDE 50 MG: 50 TABLET, COATED ORAL at 06:19

## 2017-07-23 RX ADMIN — BIMATOPROST 1 DROP: 0.1 SOLUTION/ DROPS OPHTHALMIC at 21:49

## 2017-07-23 RX ADMIN — METOPROLOL TARTRATE 50 MG: 50 TABLET ORAL at 09:31

## 2017-07-24 ENCOUNTER — APPOINTMENT (INPATIENT)
Dept: RADIOLOGY | Facility: HOSPITAL | Age: 82
DRG: 948 | End: 2017-07-24
Payer: MEDICARE

## 2017-07-24 ENCOUNTER — GENERIC CONVERSION - ENCOUNTER (OUTPATIENT)
Dept: OTHER | Facility: OTHER | Age: 82
End: 2017-07-24

## 2017-07-24 PROBLEM — R06.02 SOB (SHORTNESS OF BREATH): Status: RESOLVED | Noted: 2017-03-22 | Resolved: 2017-07-24

## 2017-07-24 PROBLEM — N17.9 ACUTE RENAL FAILURE SUPERIMPOSED ON CHRONIC KIDNEY DISEASE (HCC): Status: RESOLVED | Noted: 2017-07-17 | Resolved: 2017-07-24

## 2017-07-24 PROBLEM — N18.9 ACUTE RENAL FAILURE SUPERIMPOSED ON CHRONIC KIDNEY DISEASE (HCC): Status: RESOLVED | Noted: 2017-07-17 | Resolved: 2017-07-24

## 2017-07-24 PROBLEM — E21.3 HYPERPARATHYROIDISM (HCC): Chronic | Status: ACTIVE | Noted: 2017-07-24

## 2017-07-24 PROBLEM — R60.0 PEDAL EDEMA: Chronic | Status: RESOLVED | Noted: 2017-07-15 | Resolved: 2017-07-24

## 2017-07-24 PROBLEM — R07.9 CHEST PAIN: Status: RESOLVED | Noted: 2017-04-14 | Resolved: 2017-07-24

## 2017-07-24 PROBLEM — R09.02 HYPOXIA: Status: RESOLVED | Noted: 2017-07-17 | Resolved: 2017-07-24

## 2017-07-24 PROBLEM — L03.90 CELLULITIS: Status: RESOLVED | Noted: 2017-07-14 | Resolved: 2017-07-24

## 2017-07-24 LAB
ANION GAP SERPL CALCULATED.3IONS-SCNC: 9 MMOL/L (ref 4–13)
BUN SERPL-MCNC: 31 MG/DL (ref 5–25)
CALCIUM SERPL-MCNC: 8.6 MG/DL (ref 8.3–10.1)
CHLORIDE SERPL-SCNC: 107 MMOL/L (ref 100–108)
CO2 SERPL-SCNC: 28 MMOL/L (ref 21–32)
CREAT SERPL-MCNC: 1.33 MG/DL (ref 0.6–1.3)
ERYTHROCYTE [DISTWIDTH] IN BLOOD BY AUTOMATED COUNT: 13.7 % (ref 11.6–15.1)
GFR SERPL CREATININE-BSD FRML MDRD: 37.8 ML/MIN/1.73SQ M
GLUCOSE SERPL-MCNC: 114 MG/DL (ref 65–140)
HCT VFR BLD AUTO: 29.1 % (ref 34.8–46.1)
HGB BLD-MCNC: 9.4 G/DL (ref 11.5–15.4)
MCH RBC QN AUTO: 29.6 PG (ref 26.8–34.3)
MCHC RBC AUTO-ENTMCNC: 32.3 G/DL (ref 31.4–37.4)
MCV RBC AUTO: 92 FL (ref 82–98)
PLATELET # BLD AUTO: 331 THOUSANDS/UL (ref 149–390)
PMV BLD AUTO: 10.2 FL (ref 8.9–12.7)
POTASSIUM SERPL-SCNC: 3.6 MMOL/L (ref 3.5–5.3)
RBC # BLD AUTO: 3.18 MILLION/UL (ref 3.81–5.12)
SODIUM SERPL-SCNC: 144 MMOL/L (ref 136–145)
T3FREE SERPL-MCNC: 1.93 PG/ML (ref 2.3–4.2)
T4 FREE SERPL-MCNC: 0.95 NG/DL (ref 0.76–1.46)
TSH SERPL DL<=0.05 MIU/L-ACNC: 13 UIU/ML (ref 0.36–3.74)
WBC # BLD AUTO: 6.99 THOUSAND/UL (ref 4.31–10.16)

## 2017-07-24 PROCEDURE — 84481 FREE ASSAY (FT-3): CPT | Performed by: INTERNAL MEDICINE

## 2017-07-24 PROCEDURE — 84439 ASSAY OF FREE THYROXINE: CPT | Performed by: INTERNAL MEDICINE

## 2017-07-24 PROCEDURE — 85027 COMPLETE CBC AUTOMATED: CPT | Performed by: PHYSICIAN ASSISTANT

## 2017-07-24 PROCEDURE — 80048 BASIC METABOLIC PNL TOTAL CA: CPT | Performed by: PHYSICIAN ASSISTANT

## 2017-07-24 PROCEDURE — 70551 MRI BRAIN STEM W/O DYE: CPT

## 2017-07-24 PROCEDURE — 84443 ASSAY THYROID STIM HORMONE: CPT | Performed by: INTERNAL MEDICINE

## 2017-07-24 RX ORDER — LEVOTHYROXINE SODIUM 0.07 MG/1
75 TABLET ORAL
Status: DISCONTINUED | OUTPATIENT
Start: 2017-07-25 | End: 2017-07-29 | Stop reason: HOSPADM

## 2017-07-24 RX ADMIN — TAMSULOSIN HYDROCHLORIDE 0.4 MG: 0.4 CAPSULE ORAL at 15:52

## 2017-07-24 RX ADMIN — CALCITRIOL 0.25 MCG: 0.25 CAPSULE, LIQUID FILLED ORAL at 08:36

## 2017-07-24 RX ADMIN — ROPINIROLE 1 MG: 1 TABLET, FILM COATED ORAL at 12:03

## 2017-07-24 RX ADMIN — PANTOPRAZOLE SODIUM 40 MG: 40 TABLET, DELAYED RELEASE ORAL at 06:22

## 2017-07-24 RX ADMIN — VITAMIN D, TAB 1000IU (100/BT) 1000 UNITS: 25 TAB at 08:37

## 2017-07-24 RX ADMIN — TORSEMIDE 20 MG: 20 TABLET ORAL at 08:35

## 2017-07-24 RX ADMIN — Medication 400 MG: at 08:35

## 2017-07-24 RX ADMIN — BIMATOPROST 1 DROP: 0.1 SOLUTION/ DROPS OPHTHALMIC at 21:14

## 2017-07-24 RX ADMIN — BACLOFEN 10 MG: 10 TABLET ORAL at 21:04

## 2017-07-24 RX ADMIN — BRIMONIDINE TARTRATE 1 DROP: 1.5 SOLUTION OPHTHALMIC at 08:38

## 2017-07-24 RX ADMIN — FLUTICASONE PROPIONATE 1 SPRAY: 50 SPRAY, METERED NASAL at 08:38

## 2017-07-24 RX ADMIN — BRIMONIDINE TARTRATE 1 DROP: 1.5 SOLUTION OPHTHALMIC at 17:49

## 2017-07-24 RX ADMIN — APIXABAN 2.5 MG: 2.5 TABLET, FILM COATED ORAL at 08:37

## 2017-07-24 RX ADMIN — METOPROLOL TARTRATE 50 MG: 50 TABLET ORAL at 21:11

## 2017-07-24 RX ADMIN — TRAMADOL HYDROCHLORIDE 50 MG: 50 TABLET, COATED ORAL at 03:11

## 2017-07-24 RX ADMIN — LATANOPROST 1 DROP: 50 SOLUTION OPHTHALMIC at 21:14

## 2017-07-24 RX ADMIN — MELATONIN TAB 3 MG 3 MG: 3 TAB at 21:04

## 2017-07-24 RX ADMIN — GABAPENTIN 300 MG: 300 CAPSULE ORAL at 08:36

## 2017-07-24 RX ADMIN — LORATADINE 10 MG: 10 TABLET ORAL at 08:36

## 2017-07-24 RX ADMIN — ROPINIROLE 1 MG: 1 TABLET, FILM COATED ORAL at 06:22

## 2017-07-24 RX ADMIN — GABAPENTIN 600 MG: 300 CAPSULE ORAL at 21:03

## 2017-07-24 RX ADMIN — METOPROLOL TARTRATE 50 MG: 50 TABLET ORAL at 08:36

## 2017-07-24 RX ADMIN — ROPINIROLE 1 MG: 1 TABLET, FILM COATED ORAL at 18:14

## 2017-07-24 RX ADMIN — GABAPENTIN 300 MG: 300 CAPSULE ORAL at 15:52

## 2017-07-24 RX ADMIN — APIXABAN 2.5 MG: 2.5 TABLET, FILM COATED ORAL at 17:49

## 2017-07-24 RX ADMIN — OXCARBAZEPINE 75 MG: 150 TABLET ORAL at 21:05

## 2017-07-24 RX ADMIN — TRAMADOL HYDROCHLORIDE 50 MG: 50 TABLET, COATED ORAL at 18:14

## 2017-07-25 LAB
ANION GAP SERPL CALCULATED.3IONS-SCNC: 7 MMOL/L (ref 4–13)
BASOPHILS # BLD AUTO: 0.04 THOUSANDS/ΜL (ref 0–0.1)
BASOPHILS NFR BLD AUTO: 1 % (ref 0–1)
BUN SERPL-MCNC: 26 MG/DL (ref 5–25)
CALCIUM SERPL-MCNC: 9.2 MG/DL (ref 8.3–10.1)
CHLORIDE SERPL-SCNC: 104 MMOL/L (ref 100–108)
CHOLEST SERPL-MCNC: 224 MG/DL (ref 50–200)
CO2 SERPL-SCNC: 29 MMOL/L (ref 21–32)
CREAT SERPL-MCNC: 1.17 MG/DL (ref 0.6–1.3)
EOSINOPHIL # BLD AUTO: 0.47 THOUSAND/ΜL (ref 0–0.61)
EOSINOPHIL NFR BLD AUTO: 6 % (ref 0–6)
ERYTHROCYTE [DISTWIDTH] IN BLOOD BY AUTOMATED COUNT: 13.7 % (ref 11.6–15.1)
EST. AVERAGE GLUCOSE BLD GHB EST-MCNC: 111 MG/DL
GFR SERPL CREATININE-BSD FRML MDRD: 42 ML/MIN/1.73SQ M
GLUCOSE SERPL-MCNC: 87 MG/DL (ref 65–140)
HBA1C MFR BLD: 5.5 % (ref 4.2–6.3)
HCT VFR BLD AUTO: 31.7 % (ref 34.8–46.1)
HDLC SERPL-MCNC: 33 MG/DL (ref 40–60)
HGB BLD-MCNC: 10.3 G/DL (ref 11.5–15.4)
LDLC SERPL CALC-MCNC: 139 MG/DL (ref 0–100)
LYMPHOCYTES # BLD AUTO: 1.3 THOUSANDS/ΜL (ref 0.6–4.47)
LYMPHOCYTES NFR BLD AUTO: 17 % (ref 14–44)
MCH RBC QN AUTO: 29.3 PG (ref 26.8–34.3)
MCHC RBC AUTO-ENTMCNC: 32.5 G/DL (ref 31.4–37.4)
MCV RBC AUTO: 90 FL (ref 82–98)
MONOCYTES # BLD AUTO: 0.64 THOUSAND/ΜL (ref 0.17–1.22)
MONOCYTES NFR BLD AUTO: 8 % (ref 4–12)
NEUTROPHILS # BLD AUTO: 5.03 THOUSANDS/ΜL (ref 1.85–7.62)
NEUTS SEG NFR BLD AUTO: 68 % (ref 43–75)
NRBC BLD AUTO-RTO: 0 /100 WBCS
PLATELET # BLD AUTO: 384 THOUSANDS/UL (ref 149–390)
PMV BLD AUTO: 10.2 FL (ref 8.9–12.7)
POTASSIUM SERPL-SCNC: 3.8 MMOL/L (ref 3.5–5.3)
RBC # BLD AUTO: 3.51 MILLION/UL (ref 3.81–5.12)
SODIUM SERPL-SCNC: 140 MMOL/L (ref 136–145)
TRIGL SERPL-MCNC: 262 MG/DL
WBC # BLD AUTO: 7.58 THOUSAND/UL (ref 4.31–10.16)

## 2017-07-25 PROCEDURE — 80061 LIPID PANEL: CPT | Performed by: PHYSICIAN ASSISTANT

## 2017-07-25 PROCEDURE — G8979 MOBILITY GOAL STATUS: HCPCS

## 2017-07-25 PROCEDURE — 85025 COMPLETE CBC W/AUTO DIFF WBC: CPT | Performed by: INTERNAL MEDICINE

## 2017-07-25 PROCEDURE — 83036 HEMOGLOBIN GLYCOSYLATED A1C: CPT | Performed by: PHYSICIAN ASSISTANT

## 2017-07-25 PROCEDURE — 80048 BASIC METABOLIC PNL TOTAL CA: CPT | Performed by: INTERNAL MEDICINE

## 2017-07-25 PROCEDURE — G8978 MOBILITY CURRENT STATUS: HCPCS

## 2017-07-25 PROCEDURE — 97163 PT EVAL HIGH COMPLEX 45 MIN: CPT

## 2017-07-25 PROCEDURE — 80183 DRUG SCRN QUANT OXCARBAZEPIN: CPT | Performed by: PHYSICIAN ASSISTANT

## 2017-07-25 PROCEDURE — 97530 THERAPEUTIC ACTIVITIES: CPT

## 2017-07-25 RX ADMIN — TRAMADOL HYDROCHLORIDE 50 MG: 50 TABLET, COATED ORAL at 23:56

## 2017-07-25 RX ADMIN — VITAMIN D, TAB 1000IU (100/BT) 1000 UNITS: 25 TAB at 07:46

## 2017-07-25 RX ADMIN — TORSEMIDE 20 MG: 20 TABLET ORAL at 07:53

## 2017-07-25 RX ADMIN — TRAMADOL HYDROCHLORIDE 50 MG: 50 TABLET, COATED ORAL at 00:00

## 2017-07-25 RX ADMIN — APIXABAN 2.5 MG: 2.5 TABLET, FILM COATED ORAL at 18:29

## 2017-07-25 RX ADMIN — APIXABAN 2.5 MG: 2.5 TABLET, FILM COATED ORAL at 07:45

## 2017-07-25 RX ADMIN — GABAPENTIN 300 MG: 300 CAPSULE ORAL at 13:12

## 2017-07-25 RX ADMIN — TRAMADOL HYDROCHLORIDE 50 MG: 50 TABLET, COATED ORAL at 06:00

## 2017-07-25 RX ADMIN — ROPINIROLE 1 MG: 1 TABLET, FILM COATED ORAL at 05:59

## 2017-07-25 RX ADMIN — OXCARBAZEPINE 75 MG: 150 TABLET ORAL at 23:09

## 2017-07-25 RX ADMIN — BRIMONIDINE TARTRATE 1 DROP: 1.5 SOLUTION OPHTHALMIC at 07:45

## 2017-07-25 RX ADMIN — TAMSULOSIN HYDROCHLORIDE 0.4 MG: 0.4 CAPSULE ORAL at 15:36

## 2017-07-25 RX ADMIN — BACLOFEN 10 MG: 10 TABLET ORAL at 23:08

## 2017-07-25 RX ADMIN — DOCUSATE SODIUM 100 MG: 100 CAPSULE, LIQUID FILLED ORAL at 07:49

## 2017-07-25 RX ADMIN — ROPINIROLE 1 MG: 1 TABLET, FILM COATED ORAL at 11:55

## 2017-07-25 RX ADMIN — PANTOPRAZOLE SODIUM 40 MG: 40 TABLET, DELAYED RELEASE ORAL at 05:59

## 2017-07-25 RX ADMIN — BRIMONIDINE TARTRATE 1 DROP: 1.5 SOLUTION OPHTHALMIC at 18:30

## 2017-07-25 RX ADMIN — METOPROLOL TARTRATE 50 MG: 50 TABLET ORAL at 07:51

## 2017-07-25 RX ADMIN — LORATADINE 10 MG: 10 TABLET ORAL at 07:50

## 2017-07-25 RX ADMIN — DOCUSATE SODIUM 100 MG: 100 CAPSULE, LIQUID FILLED ORAL at 18:30

## 2017-07-25 RX ADMIN — LATANOPROST 1 DROP: 50 SOLUTION OPHTHALMIC at 23:08

## 2017-07-25 RX ADMIN — LEVOTHYROXINE SODIUM 75 MCG: 75 TABLET ORAL at 07:44

## 2017-07-25 RX ADMIN — FLUTICASONE PROPIONATE 1 SPRAY: 50 SPRAY, METERED NASAL at 07:50

## 2017-07-25 RX ADMIN — Medication 400 MG: at 07:51

## 2017-07-25 RX ADMIN — BIMATOPROST 1 DROP: 0.1 SOLUTION/ DROPS OPHTHALMIC at 23:08

## 2017-07-25 RX ADMIN — ROPINIROLE 1 MG: 1 TABLET, FILM COATED ORAL at 00:00

## 2017-07-25 RX ADMIN — MELATONIN TAB 3 MG 3 MG: 3 TAB at 23:08

## 2017-07-25 RX ADMIN — ROPINIROLE 1 MG: 1 TABLET, FILM COATED ORAL at 18:30

## 2017-07-25 RX ADMIN — GABAPENTIN 300 MG: 300 CAPSULE ORAL at 07:50

## 2017-07-25 RX ADMIN — METOPROLOL TARTRATE 50 MG: 50 TABLET ORAL at 21:09

## 2017-07-25 RX ADMIN — GABAPENTIN 600 MG: 300 CAPSULE ORAL at 23:08

## 2017-07-26 RX ORDER — OXCARBAZEPINE 150 MG/1
150 TABLET, FILM COATED ORAL
Status: DISCONTINUED | OUTPATIENT
Start: 2017-07-26 | End: 2017-07-29 | Stop reason: HOSPADM

## 2017-07-26 RX ADMIN — ROPINIROLE 1 MG: 1 TABLET, FILM COATED ORAL at 11:22

## 2017-07-26 RX ADMIN — BRIMONIDINE TARTRATE 1 DROP: 1.5 SOLUTION OPHTHALMIC at 08:32

## 2017-07-26 RX ADMIN — METOPROLOL TARTRATE 50 MG: 50 TABLET ORAL at 08:31

## 2017-07-26 RX ADMIN — LORATADINE 10 MG: 10 TABLET ORAL at 08:31

## 2017-07-26 RX ADMIN — TRAMADOL HYDROCHLORIDE 50 MG: 50 TABLET, COATED ORAL at 08:31

## 2017-07-26 RX ADMIN — BIMATOPROST 1 DROP: 0.1 SOLUTION/ DROPS OPHTHALMIC at 21:23

## 2017-07-26 RX ADMIN — GABAPENTIN 300 MG: 300 CAPSULE ORAL at 16:34

## 2017-07-26 RX ADMIN — VITAMIN D, TAB 1000IU (100/BT) 1000 UNITS: 25 TAB at 08:30

## 2017-07-26 RX ADMIN — DOCUSATE SODIUM 100 MG: 100 CAPSULE, LIQUID FILLED ORAL at 16:34

## 2017-07-26 RX ADMIN — OXCARBAZEPINE 150 MG: 150 TABLET ORAL at 21:15

## 2017-07-26 RX ADMIN — BRIMONIDINE TARTRATE 1 DROP: 1.5 SOLUTION OPHTHALMIC at 17:34

## 2017-07-26 RX ADMIN — CALCITRIOL 0.25 MCG: 0.25 CAPSULE, LIQUID FILLED ORAL at 08:30

## 2017-07-26 RX ADMIN — ROPINIROLE 1 MG: 1 TABLET, FILM COATED ORAL at 00:28

## 2017-07-26 RX ADMIN — DOCUSATE SODIUM 100 MG: 100 CAPSULE, LIQUID FILLED ORAL at 08:30

## 2017-07-26 RX ADMIN — APIXABAN 2.5 MG: 2.5 TABLET, FILM COATED ORAL at 08:30

## 2017-07-26 RX ADMIN — LEVOTHYROXINE SODIUM 75 MCG: 75 TABLET ORAL at 06:54

## 2017-07-26 RX ADMIN — TORSEMIDE 20 MG: 20 TABLET ORAL at 08:31

## 2017-07-26 RX ADMIN — METOPROLOL TARTRATE 50 MG: 50 TABLET ORAL at 21:15

## 2017-07-26 RX ADMIN — ROPINIROLE 1 MG: 1 TABLET, FILM COATED ORAL at 17:34

## 2017-07-26 RX ADMIN — GABAPENTIN 600 MG: 300 CAPSULE ORAL at 21:15

## 2017-07-26 RX ADMIN — BACLOFEN 10 MG: 10 TABLET ORAL at 21:17

## 2017-07-26 RX ADMIN — TAMSULOSIN HYDROCHLORIDE 0.4 MG: 0.4 CAPSULE ORAL at 16:34

## 2017-07-26 RX ADMIN — FLUTICASONE PROPIONATE 1 SPRAY: 50 SPRAY, METERED NASAL at 08:32

## 2017-07-26 RX ADMIN — LATANOPROST 1 DROP: 50 SOLUTION OPHTHALMIC at 21:16

## 2017-07-26 RX ADMIN — ROPINIROLE 1 MG: 1 TABLET, FILM COATED ORAL at 06:54

## 2017-07-26 RX ADMIN — APIXABAN 2.5 MG: 2.5 TABLET, FILM COATED ORAL at 17:34

## 2017-07-26 RX ADMIN — MELATONIN TAB 3 MG 3 MG: 3 TAB at 21:15

## 2017-07-26 RX ADMIN — GABAPENTIN 300 MG: 300 CAPSULE ORAL at 08:30

## 2017-07-26 RX ADMIN — Medication 400 MG: at 08:30

## 2017-07-26 RX ADMIN — PANTOPRAZOLE SODIUM 40 MG: 40 TABLET, DELAYED RELEASE ORAL at 06:54

## 2017-07-27 PROBLEM — R41.89 UNRESPONSIVE EPISODE: Status: ACTIVE | Noted: 2017-07-27

## 2017-07-27 PROBLEM — Z79.899 POLYPHARMACY: Status: ACTIVE | Noted: 2017-07-27

## 2017-07-27 PROBLEM — R53.81 PHYSICAL DECONDITIONING: Status: ACTIVE | Noted: 2017-07-27

## 2017-07-27 PROBLEM — H54.7 VISUAL IMPAIRMENT: Status: ACTIVE | Noted: 2017-07-27

## 2017-07-27 PROBLEM — E88.09 HYPOALBUMINEMIA DUE TO PROTEIN-CALORIE MALNUTRITION (HCC): Status: ACTIVE | Noted: 2017-07-27

## 2017-07-27 PROBLEM — E46 HYPOALBUMINEMIA DUE TO PROTEIN-CALORIE MALNUTRITION (HCC): Status: ACTIVE | Noted: 2017-07-27

## 2017-07-27 LAB
ALBUMIN SERPL BCP-MCNC: 3 G/DL (ref 3.5–5)
ALP SERPL-CCNC: 68 U/L (ref 46–116)
ALT SERPL W P-5'-P-CCNC: 10 U/L (ref 12–78)
ANION GAP SERPL CALCULATED.3IONS-SCNC: 10 MMOL/L (ref 4–13)
AST SERPL W P-5'-P-CCNC: 15 U/L (ref 5–45)
BILIRUB SERPL-MCNC: 0.25 MG/DL (ref 0.2–1)
BUN SERPL-MCNC: 32 MG/DL (ref 5–25)
CALCIUM SERPL-MCNC: 8.9 MG/DL (ref 8.3–10.1)
CHLORIDE SERPL-SCNC: 103 MMOL/L (ref 100–108)
CO2 SERPL-SCNC: 27 MMOL/L (ref 21–32)
CREAT SERPL-MCNC: 1.21 MG/DL (ref 0.6–1.3)
GFR SERPL CREATININE-BSD FRML MDRD: 41 ML/MIN/1.73SQ M
GLUCOSE SERPL-MCNC: 89 MG/DL (ref 65–140)
OXCARBAZEPINE SERPL-MCNC: 2 UG/ML (ref 10–35)
POTASSIUM SERPL-SCNC: 3.9 MMOL/L (ref 3.5–5.3)
PROT SERPL-MCNC: 6.5 G/DL (ref 6.4–8.2)
SODIUM SERPL-SCNC: 140 MMOL/L (ref 136–145)

## 2017-07-27 PROCEDURE — 80053 COMPREHEN METABOLIC PANEL: CPT | Performed by: INTERNAL MEDICINE

## 2017-07-27 RX ORDER — LANOLIN ALCOHOL/MO/W.PET/CERES
6 CREAM (GRAM) TOPICAL
Status: DISCONTINUED | OUTPATIENT
Start: 2017-07-27 | End: 2017-07-29 | Stop reason: HOSPADM

## 2017-07-27 RX ORDER — OXYCODONE HYDROCHLORIDE 5 MG/1
5 TABLET ORAL EVERY 6 HOURS PRN
Status: DISCONTINUED | OUTPATIENT
Start: 2017-07-27 | End: 2017-07-28

## 2017-07-27 RX ORDER — OXYCODONE HYDROCHLORIDE 5 MG/1
2.5 TABLET ORAL EVERY 6 HOURS PRN
Status: DISCONTINUED | OUTPATIENT
Start: 2017-07-27 | End: 2017-07-28

## 2017-07-27 RX ADMIN — ROPINIROLE 1 MG: 1 TABLET, FILM COATED ORAL at 06:21

## 2017-07-27 RX ADMIN — TAMSULOSIN HYDROCHLORIDE 0.4 MG: 0.4 CAPSULE ORAL at 16:54

## 2017-07-27 RX ADMIN — METOPROLOL TARTRATE 50 MG: 50 TABLET ORAL at 21:42

## 2017-07-27 RX ADMIN — APIXABAN 2.5 MG: 2.5 TABLET, FILM COATED ORAL at 08:43

## 2017-07-27 RX ADMIN — BIMATOPROST 1 DROP: 0.1 SOLUTION/ DROPS OPHTHALMIC at 21:42

## 2017-07-27 RX ADMIN — TRAMADOL HYDROCHLORIDE 50 MG: 50 TABLET, COATED ORAL at 11:48

## 2017-07-27 RX ADMIN — FLUTICASONE PROPIONATE 1 SPRAY: 50 SPRAY, METERED NASAL at 08:44

## 2017-07-27 RX ADMIN — TORSEMIDE 20 MG: 20 TABLET ORAL at 08:43

## 2017-07-27 RX ADMIN — ROPINIROLE 1 MG: 1 TABLET, FILM COATED ORAL at 00:08

## 2017-07-27 RX ADMIN — BRIMONIDINE TARTRATE 1 DROP: 1.5 SOLUTION OPHTHALMIC at 16:55

## 2017-07-27 RX ADMIN — BRIMONIDINE TARTRATE 1 DROP: 1.5 SOLUTION OPHTHALMIC at 08:44

## 2017-07-27 RX ADMIN — GABAPENTIN 600 MG: 300 CAPSULE ORAL at 21:42

## 2017-07-27 RX ADMIN — DOCUSATE SODIUM 100 MG: 100 CAPSULE, LIQUID FILLED ORAL at 08:43

## 2017-07-27 RX ADMIN — OXYCODONE HYDROCHLORIDE 2.5 MG: 5 TABLET ORAL at 16:53

## 2017-07-27 RX ADMIN — GABAPENTIN 300 MG: 300 CAPSULE ORAL at 16:54

## 2017-07-27 RX ADMIN — Medication 400 MG: at 08:44

## 2017-07-27 RX ADMIN — VITAMIN D, TAB 1000IU (100/BT) 1000 UNITS: 25 TAB at 08:43

## 2017-07-27 RX ADMIN — LEVOTHYROXINE SODIUM 75 MCG: 75 TABLET ORAL at 06:21

## 2017-07-27 RX ADMIN — GABAPENTIN 300 MG: 300 CAPSULE ORAL at 08:43

## 2017-07-27 RX ADMIN — METOPROLOL TARTRATE 50 MG: 50 TABLET ORAL at 08:43

## 2017-07-27 RX ADMIN — PANTOPRAZOLE SODIUM 40 MG: 40 TABLET, DELAYED RELEASE ORAL at 06:21

## 2017-07-27 RX ADMIN — TRAMADOL HYDROCHLORIDE 50 MG: 50 TABLET, COATED ORAL at 06:21

## 2017-07-27 RX ADMIN — APIXABAN 2.5 MG: 2.5 TABLET, FILM COATED ORAL at 16:54

## 2017-07-27 RX ADMIN — LATANOPROST 1 DROP: 50 SOLUTION OPHTHALMIC at 21:42

## 2017-07-27 RX ADMIN — OXCARBAZEPINE 150 MG: 150 TABLET ORAL at 21:42

## 2017-07-27 RX ADMIN — LORATADINE 10 MG: 10 TABLET ORAL at 08:44

## 2017-07-27 RX ADMIN — DOCUSATE SODIUM 100 MG: 100 CAPSULE, LIQUID FILLED ORAL at 16:54

## 2017-07-27 RX ADMIN — TRAMADOL HYDROCHLORIDE 50 MG: 50 TABLET, COATED ORAL at 00:08

## 2017-07-27 RX ADMIN — MELATONIN TAB 3 MG 6 MG: 3 TAB at 20:50

## 2017-07-28 ENCOUNTER — GENERIC CONVERSION - ENCOUNTER (OUTPATIENT)
Dept: OTHER | Facility: OTHER | Age: 82
End: 2017-07-28

## 2017-07-28 LAB
ANION GAP SERPL CALCULATED.3IONS-SCNC: 11 MMOL/L (ref 4–13)
BUN SERPL-MCNC: 37 MG/DL (ref 5–25)
CALCIUM SERPL-MCNC: 8.8 MG/DL (ref 8.3–10.1)
CHLORIDE SERPL-SCNC: 102 MMOL/L (ref 100–108)
CO2 SERPL-SCNC: 25 MMOL/L (ref 21–32)
CREAT SERPL-MCNC: 1.54 MG/DL (ref 0.6–1.3)
GFR SERPL CREATININE-BSD FRML MDRD: 30 ML/MIN/1.73SQ M
GLUCOSE SERPL-MCNC: 88 MG/DL (ref 65–140)
MAGNESIUM SERPL-MCNC: 2 MG/DL (ref 1.6–2.6)
PHOSPHATE SERPL-MCNC: 3.9 MG/DL (ref 2.3–4.1)
POTASSIUM SERPL-SCNC: 4.3 MMOL/L (ref 3.5–5.3)
SODIUM SERPL-SCNC: 138 MMOL/L (ref 136–145)

## 2017-07-28 PROCEDURE — 80048 BASIC METABOLIC PNL TOTAL CA: CPT | Performed by: INTERNAL MEDICINE

## 2017-07-28 PROCEDURE — 97116 GAIT TRAINING THERAPY: CPT

## 2017-07-28 PROCEDURE — 83735 ASSAY OF MAGNESIUM: CPT | Performed by: INTERNAL MEDICINE

## 2017-07-28 PROCEDURE — 84100 ASSAY OF PHOSPHORUS: CPT | Performed by: INTERNAL MEDICINE

## 2017-07-28 PROCEDURE — 97530 THERAPEUTIC ACTIVITIES: CPT

## 2017-07-28 RX ORDER — ROPINIROLE 0.25 MG/1
0.25 TABLET, FILM COATED ORAL
Status: DISCONTINUED | OUTPATIENT
Start: 2017-07-28 | End: 2017-07-29

## 2017-07-28 RX ADMIN — Medication 400 MG: at 08:09

## 2017-07-28 RX ADMIN — ROPINIROLE 0.25 MG: 0.25 TABLET, FILM COATED ORAL at 21:24

## 2017-07-28 RX ADMIN — METOPROLOL TARTRATE 50 MG: 50 TABLET ORAL at 21:24

## 2017-07-28 RX ADMIN — BRIMONIDINE TARTRATE 1 DROP: 1.5 SOLUTION OPHTHALMIC at 08:08

## 2017-07-28 RX ADMIN — APIXABAN 2.5 MG: 2.5 TABLET, FILM COATED ORAL at 17:21

## 2017-07-28 RX ADMIN — GABAPENTIN 600 MG: 300 CAPSULE ORAL at 21:24

## 2017-07-28 RX ADMIN — TAMSULOSIN HYDROCHLORIDE 0.4 MG: 0.4 CAPSULE ORAL at 17:21

## 2017-07-28 RX ADMIN — DOCUSATE SODIUM 100 MG: 100 CAPSULE, LIQUID FILLED ORAL at 08:08

## 2017-07-28 RX ADMIN — LEVOTHYROXINE SODIUM 75 MCG: 75 TABLET ORAL at 06:38

## 2017-07-28 RX ADMIN — APIXABAN 2.5 MG: 2.5 TABLET, FILM COATED ORAL at 08:09

## 2017-07-28 RX ADMIN — PANTOPRAZOLE SODIUM 40 MG: 40 TABLET, DELAYED RELEASE ORAL at 06:38

## 2017-07-28 RX ADMIN — FLUTICASONE PROPIONATE 1 SPRAY: 50 SPRAY, METERED NASAL at 08:08

## 2017-07-28 RX ADMIN — CALCITRIOL 0.25 MCG: 0.25 CAPSULE, LIQUID FILLED ORAL at 08:09

## 2017-07-28 RX ADMIN — TORSEMIDE 20 MG: 20 TABLET ORAL at 08:09

## 2017-07-28 RX ADMIN — BIMATOPROST 1 DROP: 0.1 SOLUTION/ DROPS OPHTHALMIC at 21:27

## 2017-07-28 RX ADMIN — GABAPENTIN 300 MG: 300 CAPSULE ORAL at 08:09

## 2017-07-28 RX ADMIN — DOCUSATE SODIUM 100 MG: 100 CAPSULE, LIQUID FILLED ORAL at 17:21

## 2017-07-28 RX ADMIN — ONDANSETRON 4 MG: 2 INJECTION INTRAMUSCULAR; INTRAVENOUS at 01:07

## 2017-07-28 RX ADMIN — LATANOPROST 1 DROP: 50 SOLUTION OPHTHALMIC at 21:27

## 2017-07-28 RX ADMIN — GABAPENTIN 300 MG: 300 CAPSULE ORAL at 14:01

## 2017-07-28 RX ADMIN — MELATONIN TAB 3 MG 6 MG: 3 TAB at 21:24

## 2017-07-28 RX ADMIN — METOPROLOL TARTRATE 50 MG: 50 TABLET ORAL at 08:09

## 2017-07-28 RX ADMIN — BRIMONIDINE TARTRATE 1 DROP: 1.5 SOLUTION OPHTHALMIC at 17:22

## 2017-07-28 RX ADMIN — VITAMIN D, TAB 1000IU (100/BT) 1000 UNITS: 25 TAB at 08:08

## 2017-07-28 RX ADMIN — OXCARBAZEPINE 150 MG: 150 TABLET ORAL at 21:24

## 2017-07-28 RX ADMIN — OXYCODONE HYDROCHLORIDE 2.5 MG: 5 TABLET ORAL at 01:07

## 2017-07-29 VITALS
SYSTOLIC BLOOD PRESSURE: 133 MMHG | DIASTOLIC BLOOD PRESSURE: 71 MMHG | OXYGEN SATURATION: 95 % | HEART RATE: 62 BPM | RESPIRATION RATE: 20 BRPM | WEIGHT: 159.17 LBS | HEIGHT: 60 IN | BODY MASS INDEX: 31.25 KG/M2 | TEMPERATURE: 97.7 F

## 2017-07-29 PROBLEM — R41.82 ALTERED MENTAL STATUS: Status: RESOLVED | Noted: 2017-07-22 | Resolved: 2017-07-29

## 2017-07-29 PROBLEM — R41.89 UNRESPONSIVE EPISODE: Status: RESOLVED | Noted: 2017-07-27 | Resolved: 2017-07-29

## 2017-07-29 LAB
ANION GAP SERPL CALCULATED.3IONS-SCNC: 10 MMOL/L (ref 4–13)
BUN SERPL-MCNC: 36 MG/DL (ref 5–25)
CALCIUM SERPL-MCNC: 9 MG/DL (ref 8.3–10.1)
CHLORIDE SERPL-SCNC: 105 MMOL/L (ref 100–108)
CO2 SERPL-SCNC: 27 MMOL/L (ref 21–32)
CREAT SERPL-MCNC: 1.38 MG/DL (ref 0.6–1.3)
GFR SERPL CREATININE-BSD FRML MDRD: 35 ML/MIN/1.73SQ M
GLUCOSE SERPL-MCNC: 84 MG/DL (ref 65–140)
POTASSIUM SERPL-SCNC: 3.8 MMOL/L (ref 3.5–5.3)
SODIUM SERPL-SCNC: 142 MMOL/L (ref 136–145)

## 2017-07-29 PROCEDURE — 80048 BASIC METABOLIC PNL TOTAL CA: CPT | Performed by: PHYSICIAN ASSISTANT

## 2017-07-29 RX ORDER — ACETAMINOPHEN 325 MG/1
325 TABLET ORAL EVERY 6 HOURS PRN
Qty: 30 TABLET | Refills: 0 | Status: SHIPPED | OUTPATIENT
Start: 2017-07-29 | End: 2017-12-11

## 2017-07-29 RX ORDER — ROPINIROLE 0.25 MG/1
0.5 TABLET, FILM COATED ORAL EVERY 6 HOURS
Status: DISCONTINUED | OUTPATIENT
Start: 2017-07-29 | End: 2017-07-29 | Stop reason: HOSPADM

## 2017-07-29 RX ORDER — LEVOTHYROXINE SODIUM 0.07 MG/1
75 TABLET ORAL
Qty: 30 TABLET | Refills: 0 | Status: SHIPPED | OUTPATIENT
Start: 2017-07-29 | End: 2018-03-29 | Stop reason: ALTCHOICE

## 2017-07-29 RX ORDER — GABAPENTIN 300 MG/1
300 CAPSULE ORAL 2 TIMES DAILY
Qty: 60 CAPSULE | Refills: 0 | Status: SHIPPED | OUTPATIENT
Start: 2017-07-29 | End: 2017-12-11

## 2017-07-29 RX ORDER — ROPINIROLE 0.5 MG/1
0.5 TABLET, FILM COATED ORAL EVERY 6 HOURS
Qty: 30 TABLET | Refills: 0 | Status: SHIPPED | OUTPATIENT
Start: 2017-07-29 | End: 2018-01-30 | Stop reason: HOSPADM

## 2017-07-29 RX ORDER — LANOLIN ALCOHOL/MO/W.PET/CERES
6 CREAM (GRAM) TOPICAL
Qty: 30 TABLET | Refills: 0 | Status: SHIPPED | OUTPATIENT
Start: 2017-07-29 | End: 2018-03-29 | Stop reason: ALTCHOICE

## 2017-07-29 RX ORDER — ACETAMINOPHEN 500 MG
500 TABLET ORAL EVERY 12 HOURS
Qty: 30 TABLET | Refills: 0 | Status: SHIPPED | OUTPATIENT
Start: 2017-07-29 | End: 2018-04-02

## 2017-07-29 RX ORDER — OXCARBAZEPINE 150 MG/1
150 TABLET, FILM COATED ORAL
Qty: 30 TABLET | Refills: 0 | Status: SHIPPED | OUTPATIENT
Start: 2017-07-29 | End: 2018-03-29 | Stop reason: ALTCHOICE

## 2017-07-29 RX ORDER — GABAPENTIN 300 MG/1
600 CAPSULE ORAL
Qty: 30 CAPSULE | Refills: 0 | Status: SHIPPED | OUTPATIENT
Start: 2017-07-29 | End: 2018-04-16 | Stop reason: HOSPADM

## 2017-07-29 RX ORDER — ROPINIROLE 0.25 MG/1
0.25 TABLET, FILM COATED ORAL 2 TIMES DAILY
Status: DISCONTINUED | OUTPATIENT
Start: 2017-07-29 | End: 2017-07-29

## 2017-07-29 RX ADMIN — APIXABAN 2.5 MG: 2.5 TABLET, FILM COATED ORAL at 08:17

## 2017-07-29 RX ADMIN — ROPINIROLE 0.25 MG: 0.25 TABLET, FILM COATED ORAL at 09:14

## 2017-07-29 RX ADMIN — ACETAMINOPHEN 650 MG: 325 TABLET, FILM COATED ORAL at 01:11

## 2017-07-29 RX ADMIN — DOCUSATE SODIUM 100 MG: 100 CAPSULE, LIQUID FILLED ORAL at 08:17

## 2017-07-29 RX ADMIN — Medication 400 MG: at 08:17

## 2017-07-29 RX ADMIN — GABAPENTIN 300 MG: 300 CAPSULE ORAL at 08:17

## 2017-07-29 RX ADMIN — METOPROLOL TARTRATE 50 MG: 50 TABLET ORAL at 08:17

## 2017-07-29 RX ADMIN — TORSEMIDE 20 MG: 20 TABLET ORAL at 08:17

## 2017-07-29 RX ADMIN — ONDANSETRON 4 MG: 2 INJECTION INTRAMUSCULAR; INTRAVENOUS at 12:13

## 2017-07-29 RX ADMIN — FLUTICASONE PROPIONATE 1 SPRAY: 50 SPRAY, METERED NASAL at 08:18

## 2017-07-29 RX ADMIN — BRIMONIDINE TARTRATE 1 DROP: 1.5 SOLUTION OPHTHALMIC at 08:18

## 2017-07-29 RX ADMIN — PANTOPRAZOLE SODIUM 40 MG: 40 TABLET, DELAYED RELEASE ORAL at 05:45

## 2017-07-29 RX ADMIN — VITAMIN D, TAB 1000IU (100/BT) 1000 UNITS: 25 TAB at 08:17

## 2017-07-29 RX ADMIN — LEVOTHYROXINE SODIUM 75 MCG: 75 TABLET ORAL at 05:45

## 2017-08-01 DIAGNOSIS — I12.9 HYPERTENSIVE CHRONIC KIDNEY DISEASE WITH STAGE 1 THROUGH STAGE 4 CHRONIC KIDNEY DISEASE, OR UNSPECIFIED CHRONIC KIDNEY DISEASE: ICD-10-CM

## 2017-08-01 DIAGNOSIS — R60.9 EDEMA: ICD-10-CM

## 2017-08-01 DIAGNOSIS — E78.5 HYPERLIPIDEMIA: ICD-10-CM

## 2017-08-01 DIAGNOSIS — I70.1 ATHEROSCLEROSIS OF RENAL ARTERY (HCC): ICD-10-CM

## 2017-08-01 DIAGNOSIS — E83.40 DISORDER OF MAGNESIUM METABOLISM: ICD-10-CM

## 2017-08-01 DIAGNOSIS — N17.9 ACUTE KIDNEY FAILURE (HCC): ICD-10-CM

## 2017-08-01 DIAGNOSIS — N18.30 CHRONIC KIDNEY DISEASE, STAGE III (MODERATE) (HCC): ICD-10-CM

## 2017-08-04 ENCOUNTER — GENERIC CONVERSION - ENCOUNTER (OUTPATIENT)
Dept: OTHER | Facility: OTHER | Age: 82
End: 2017-08-04

## 2017-08-09 ENCOUNTER — ALLSCRIPTS OFFICE VISIT (OUTPATIENT)
Dept: OTHER | Facility: OTHER | Age: 82
End: 2017-08-09

## 2017-08-11 ENCOUNTER — GENERIC CONVERSION - ENCOUNTER (OUTPATIENT)
Dept: OTHER | Facility: OTHER | Age: 82
End: 2017-08-11

## 2017-08-14 ENCOUNTER — GENERIC CONVERSION - ENCOUNTER (OUTPATIENT)
Dept: OTHER | Facility: OTHER | Age: 82
End: 2017-08-14

## 2017-09-11 DIAGNOSIS — N18.30 CHRONIC KIDNEY DISEASE, STAGE III (MODERATE) (HCC): ICD-10-CM

## 2017-09-11 DIAGNOSIS — E78.5 HYPERLIPIDEMIA: ICD-10-CM

## 2017-09-11 DIAGNOSIS — I12.9 HYPERTENSIVE CHRONIC KIDNEY DISEASE WITH STAGE 1 THROUGH STAGE 4 CHRONIC KIDNEY DISEASE, OR UNSPECIFIED CHRONIC KIDNEY DISEASE: ICD-10-CM

## 2017-09-11 DIAGNOSIS — I70.1 ATHEROSCLEROSIS OF RENAL ARTERY (HCC): ICD-10-CM

## 2017-09-11 DIAGNOSIS — N17.9 ACUTE KIDNEY FAILURE (HCC): ICD-10-CM

## 2017-09-11 DIAGNOSIS — E83.40 DISORDER OF MAGNESIUM METABOLISM: ICD-10-CM

## 2017-09-11 DIAGNOSIS — R60.9 EDEMA: ICD-10-CM

## 2017-09-13 ENCOUNTER — LAB REQUISITION (OUTPATIENT)
Dept: LAB | Facility: HOSPITAL | Age: 82
End: 2017-09-13
Payer: MEDICARE

## 2017-09-13 DIAGNOSIS — A41.01 SEPSIS DUE TO METHICILLIN SUSCEPTIBLE STAPHYLOCOCCUS AUREUS (HCC): ICD-10-CM

## 2017-09-13 PROCEDURE — 87205 SMEAR GRAM STAIN: CPT | Performed by: PHYSICIAN ASSISTANT

## 2017-09-13 PROCEDURE — 87070 CULTURE OTHR SPECIMN AEROBIC: CPT | Performed by: PHYSICIAN ASSISTANT

## 2017-09-15 LAB
BACTERIA WND AEROBE CULT: NORMAL
GRAM STN SPEC: NORMAL

## 2017-09-22 ENCOUNTER — ALLSCRIPTS OFFICE VISIT (OUTPATIENT)
Dept: OTHER | Facility: OTHER | Age: 82
End: 2017-09-22

## 2017-10-20 ENCOUNTER — ALLSCRIPTS OFFICE VISIT (OUTPATIENT)
Dept: OTHER | Facility: OTHER | Age: 82
End: 2017-10-20

## 2017-10-21 NOTE — PROGRESS NOTES
Assessment  Assessed    1  Atrial fibrillation (427 31) (I48 91)   2  Aortic stenosis (424 1) (I35 0)   3  Chronic diastolic congestive heart failure (428 32,428 0) (I50 32)   4  Chronic kidney disease, stage 3 (585 3) (N18 3)   5  Edema (782 3) (R60 9)   6  Hyperlipidemia (272 4) (E78 5)   7  Hypertension (401 9) (I10)   8  Moderate mitral regurgitation (424 0) (I34 0)    Plan  Atrial fibrillation, Hypertension    · EKG/ECG- POC; Status:Complete;   Done: 97UOZ3418   Perform: In Office; 21 ; Last Updated By:Francesca Bolaños; 10/20/2017 1:13:17 PM;Ordered; For:Atrial fibrillation, Hypertension; Ordered By:Tariq Murphy;  Chronic diastolic congestive heart failure    · Follow-up visit in 6 months Evaluation and Treatment  Follow-up  Status: Complete   Done: 20Oct2017   Ordered; For: Chronic diastolic congestive heart failure; Ordered By: Alberto Argueta Performed:  Due: 17VET1806; Last Updated By: Elle Meade; 10/20/2017 1:40:19 PM    Discussion/Summary  Cardiology Discussion Summary Free Text Note Form [facility]:   Lucita Burks returns to see me at the [address] [place] [address]  diastolic CHF - edema has been better off Amlodipine  3lbs weight gain last 2 days, but no change in edema  artery stenosis - history of left CEA - ultrasound January 2014 showed minimal disease on the right and widely patent on the left  No significant new symptoms  - very well controlled, currently on metoprolol   - no statin due to age  regurgitation - mild prolapse, moderate mitral regurgitation  No change by echo 4/17  Stenosis - mild by echo 4/17  vascular disease - she does have mild to moderate bilateral lower extremity atherosclerosis  up 3lb, some belly distention, + JVD torsemide to 40mg daily for 1 week, then reduce back to 40mg MWF and 20mg the rest my office for weight change of 3lbs in 24 hours or 5lbs in 1 week  Chief Complaint  Chief Complaint Free Text Note Form: pt is here follow up visit  c/o left leg swelling     Chief Complaint Chronic Condition [facility]: Patient is here today for follow up of chronic conditions described in HPI  History of Present Illness  Cardiology HPI Free Text Note Form [facility]: Jair Jackson has hypertension, hyperlipidemia, carotid artery stenosis, but no recent stroke-like symptoms or new weakness  She is in a wheelchair as usual, complains of lower extremity discomfort and cramping mostly at night  She does have restless leg syndrome  She has chronic diastolic CHF, nursing home records indicate a 3 pound weight gain in the last 24 hours  She does have a bandlike sensation of tightness around her upper abdomen  She has been on torsemide 40 milligrams Monday Wednesday Friday and 20 milligrams the rest per Nephrology  She denies chest pains, lightheadedness, palpitations  Her EKG today reveals sinus rhythm with first-degree AV block  She was previously on amlodipine for hypertension, stop because of the edema, blood pressure remains controlled on metoprolol alone  Review of Systems  Cardiology Female ROS:     Cardiac: as noted in HPI  Skin: Rash on her face  Genitourinary: recurrent urinary tract infections,-- frequent urination at night-- and-- post menopausal   Psychological: No complaints of feeling depressed, anxiety, panic attacks, or difficulty concentrating  General: trouble sleeping-- and-- lack of energy/fatigue  Respiratory: shortness of breath, but-- no cough/sputum  Musculoskeletal: arthritis   ROS Reviewed:   ROS reviewed  Active Problems  Problems    1  Abnormal heart sounds (785 3) (R01 2)   2  Acute kidney injury (584 9) (N17 9)   3  Aortic stenosis (424 1) (I35 0)   4  Asthma (493 90) (J45 909)   5  Atherosclerosis of artery of extremity with ulceration (440 23) (I70 299,L97 909)   6  Atrial fibrillation (427 31) (I48 91)   7  Benign hypertension with chronic kidney disease, stage III (403 10,585 3) (I12 9,N18 3)   8  Carotid artery stenosis, asymptomatic (433 10) (I65 29)   9  Chest pain (786 50) (R07 9)   10  Chronic diastolic congestive heart failure (428 32,428 0) (I50 32)   11  Chronic kidney disease, stage 3 (585 3) (N18 3)   12  Chronic toe ulcer (707 15) (L97 509)   13  Constipation (564 00) (K59 00)   14  CRD (chronic renal disease) (585 9) (N18 9)   15  Diastolic murmur (135 4) (L74)   16  Dizziness (780 4) (R42)   17  Dysuria (788 1) (R30 0)   18  Edema (782 3) (R60 9)   19  Elevated serum creatinine (790 99) (R79 89)   20  Hospital discharge follow-up (V67 59) (Z09)   21  Hospital discharge follow-up (V67 59) (Z09)   22  Hyperlipidemia (272 4) (E78 5)   23  Hypertension (401 9) (I10)   24  Hypothyroidism (244 9) (E03 9)   25  Left renal artery stenosis (440 1) (I70 1)   26  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)   27  Moderate mitral regurgitation (424 0) (I34 0)   28  Other chronic pain (338 29) (G89 29)   29  Pulmonary artery hypertension (416 8) (I27 21)   30  Shortness of breath (786 05) (R06 02)    Past Medical History  Problems    1  History of Depression (311) (F32 9)   2  History of Dyslipidemia (272 4) (E78 5)   3  History of Epilepsy (345 90)   4  History of Hemorrhagic Stroke (432 9)   5  History of chronic kidney disease (V13 09) (Z87 448)   6  History of edema (V13 89) (Z87 898)   7  History of glaucoma (V12 49) (Z86 69)   8  History of Stroke Syndrome (436)   9  History of Takotsubo syndrome (429 83) (I51 81)  Active Problems And Past Medical History Reviewed: The active problems and past medical history were reviewed and updated today  Surgical History  Problems    1  History of Appendectomy   2  History of Bladder Surgery   3  History of Endarterectomy   4  History of Ovarian Surgery    Family History  Father    1  Family history of Hypertension (V17 49)   2   Family history of Stroke Syndrome (V17 1)    Social History  Problems    · Alcohol Use (History)   · Marital History -    · Denied: History of Never A Smoker   · Never A Smoker   · Tobacco Non-user  Social History Reviewed: The social history was reviewed and updated today  The social history was reviewed and is unchanged  Current Meds   1  Acetaminophen 500 MG Oral Tablet; TAKE 1 TABLET EVERY 4 DAILY ; Therapy: 87UPS9729 to Recorded   2  Antacid 200-200-20 MG/5ML Oral Suspension; SWALLOW 30 ML Every 6 hours AS   NEEDED FOR GI UPSET; Therapy: 65YFO3103 to Recorded   3  Artificial Tears 1 4 % Ophthalmic Solution; INSTILL 1-2 DROPS INTO EACH EYES TWICE   DAILY; Therapy: (Recorded:31Oct2014) to Recorded   4  Ascriptin 325 MG Oral Tablet; TAKE 1 TABLET Every 4 hours or PRN; Therapy: 50ZCF2211 to Recorded   5  Bisac-Evac 10 MG Rectal Suppository; Therapy: (Recorded:02Mar2016) to Recorded   6  Calcitriol 0 25 MCG Oral Capsule; TAKE 1 CAPSULE ORALLY DAILY (VITAMIN   DEFICIENCY); Therapy: 05ZUO3481 to (0487 72 23 66)  Requested for: 58YQT1589; Last   Rx:14Dcw4079 Ordered   7  Co Q-10 200 MG Oral Capsule; TAKE 1 CAPSULE ORALLY DAILY (VITAMIN   DEFICIENCY); Therapy: 73Uzl3306 to (Evaluate:16Phm5585)  Requested for: 40Fms3099; Last   Rx:22Eeq3118 Ordered   8  Docusate Sodium 100 MG Oral Tablet; TAKE 1 TABLET TWICE DAILY AS DIRECTED    (COLACE); Therapy: (Recorded:13Mar2014) to Recorded   9  Eliquis 2 5 MG Oral Tablet; Take 1 tablet twice daily; Therapy: 53JMK5432 to (Evaluate:15Oct2018)  Requested for: 32OOR3541 Recorded   10  Fluticasone Propionate 50 MCG/ACT Nasal Suspension; USE 1 SPRAY IN EACH    NOSTRIL ONCE DAILY; Therapy: 88CHQ3105 to Recorded   11  Gabapentin 300 MG Oral Capsule; TAKE 1 CAPSULE TWICE DAILY FOR PAIN;    Therapy: (Chryl Hausen) to Recorded   12  Gabapentin 600 MG Oral Tablet; TAKE 2 TABLET Bedtime; Therapy: 62DVK5743 to Recorded   13  Latanoprost 0 005 % Ophthalmic Solution; Therapy: (Recorded:03Xns0448) to Recorded   14  Laxative 10 MG Rectal Suppository; every 4th day or PRN for constipation MDD:1;    Therapy: 87IBF7650 to Recorded   15  Levothyroxine Sodium 88 MCG Oral Tablet; TAKE 1 TABLET DAILY; Therapy: (Yum[place]) to Recorded   16  Loratadine 10 MG Oral Tablet; TAKE 1 TABLET DAILY; Therapy: 06LRE5301 to Recorded   17  Lumigan 0 01 % Ophthalmic Solution; Therapy: (Recorded:21Jhv0210) to Recorded   18  Magnesium 400 MG CAPS; take 1 capsule daily; Therapy: (Recorded:01Qne6879) to Recorded   19  Melatonin 3 MG Oral Capsule; Therapy: (Recorded:90Kli2560) to Recorded   20  Metoprolol Tartrate 50 MG Oral Tablet; Take 1 tablet twice daily; Therapy: (Recorded:39Ndm8687) to Recorded   21  OXcarbazepine 150 MG Oral Tablet; TAKE 1 TABLET Bedtime; Therapy: (Recorded:45Vlu1557) to Recorded   22  Polyethylene Glycol 3350 Oral Powder; MIX GM  PRN; Therapy: 89YHK0534 to Recorded   23  ProAir  (90 Base) MCG/ACT Inhalation Aerosol Solution; 2 puffs every 6 hours as    needed; Therapy: 05OFQ8293 to Recorded   24  Protonix 40 MG Oral Tablet Delayed Release; Take 1 tablet twice daily; Therapy: (Recorded:16Znm0234) to Recorded   25  Red Yeast Rice Extract 600 MG Oral Capsule; TAKE 1 CAPSULE ORALLY TWICE DAILY    (VITAMIN DEFICIENCY); Therapy: 22HJH6380 to (Evaluate:09Dsd5341)  Requested for: 32Ign5664; Last    Rx:15Ozd9792 Ordered   26  ROPINIRole HCl - 0 5 MG Oral Tablet; take 0 5 tablet daily; Therapy: 10NML2344 to Recorded   27  ROPINIRole HCl - 1 MG Oral Tablet; PRN; Therapy: (Recorded:23Hrk1810) to Recorded   28  Tamsulosin HCl - 0 4 MG Oral Capsule; take 1 capsule daily; Therapy: 32GKN5219 to Recorded   29  Torsemide 20 MG Oral Tablet; Take 1 tablet BID on Monday, Wenesday and Friday; Therapy: 87OTO5553 to Recorded   30  Torsemide 20 MG Oral Tablet; TAKE 1 TABLET ORALLY DAILY ON SUNDAY, TUESDAY,    THURSDAY & SATURDAY (40MG ALL OTHER DAYS) (CONGESTIVE HEART    FAILURE);TAKE 2 TABLETS (40MG) OR; Therapy: 30LNR0574 to (26 [id number])  Requested for: 69JNZ0535; Last    Rx:07Uow6776 Ordered   31  TraMADol HCl - 50 MG Oral Tablet; TAKE 1 TABLET Every 6 hours PRN; Therapy: 86BOT0690 to Recorded   32  Ventolin  (90 Base) MCG/ACT Inhalation Aerosol Solution; INHALE 1 TO 2 PUFFS    EVERY 6 HOURS AS NEEDED; Therapy: 59PMF1650 to Recorded   33  Vitamin D3 1000 UNIT Oral Tablet; Therapy: (Recorded:69Ijn2260) to Recorded  Medication List Reviewed: The medication list was reviewed and updated today  Allergies  Medication    1  Lipitor TABS   2  Morphine Derivatives   3  Percocet TABS   4  Spironolactone TABS   5  Statins   6  Sulfa Drugs  Denied    7  Acetaminophen TABS    Vitals  Vital Signs    Recorded: 38QAJ2744 01:13PM   Heart Rate 72   Systolic 377, RUE, Sitting   Diastolic 60, RUE, Sitting   BP CUFF SIZE Large   Height 5 ft 6 in   Weight Unobtainable Yes   O2 Saturation 97     Physical Exam    Constitutional   General appearance: No acute distress, well appearing and well nourished  -- overweight  Eyes   Conjunctiva and Sclera examination: Conjunctiva pink, sclera anicteric  Ears, Nose, Mouth, and Throat - Oropharynx: Clear, nares are clear, mucous membranes are moist    Neck   Neck and thyroid: Normal, supple, trachea midline, no thyromegaly  -- + JVD  Pulmonary   Respiratory effort: No increased work of breathing or signs of respiratory distress  Auscultation of lungs: Clear to auscultation, no rales, no rhonchi, no wheezing, good air movement  Cardiovascular rrr, 1/6 SMmurmur apex to axilla  Pedal pulses: Normal, 2+ bilaterally  -- 2+ bilateral edema  Abdomen   Abdomen: Non-tender and no distention  Musculoskeletal brace on left leg, in wheel chair  Skin - Skin and subcutaneous tissue: Normal without rashes or lesions  Skin is warm and well perfused, normal turgor  Neurologic - Cranial nerves: II - XII intact     Psychiatric - Orientation to person, place, and time: Normal -- Mood and affect: Normal       Results/Data  Diagnostic Studies Reviewed Cardio:   Lab Review: 6/23/16 - Cr 1 58, K 4 6- Cr 1 31   Echocardiogram/MARIA M: 3/15 - EF 65%, LVH, mild aortic stenosis valve area 1 6 cmÂ², moderate mitral regurgitation  - EF 65%, mild to moderate LVH, mild AS, moderate MR with bileaflet prolapse- LVEF 60%, mild to moderate LVH, grade 2 diastolic dysfunction, RV normal, moderate LAE, moderate MR, moderate prolapse of both leaflets, mild aortic stenosis and mild aortic regurgitation  ECG Report: 8/14/15 - normal sinus rhythm, first degree AV block- normal sinus rhythm with occasional PVCs- sinus rhythm, occasional premature ventricular contractions and premature atrial complexes - SR, 1st deg AVB        Signatures   Electronically signed by : Wendy To MD; Oct 20 2017  1:42PM EST                       (Author)

## 2017-11-20 ENCOUNTER — APPOINTMENT (EMERGENCY)
Dept: RADIOLOGY | Facility: HOSPITAL | Age: 82
End: 2017-11-20
Payer: MEDICARE

## 2017-11-20 ENCOUNTER — APPOINTMENT (EMERGENCY)
Dept: CT IMAGING | Facility: HOSPITAL | Age: 82
End: 2017-11-20
Payer: MEDICARE

## 2017-11-20 ENCOUNTER — HOSPITAL ENCOUNTER (EMERGENCY)
Facility: HOSPITAL | Age: 82
Discharge: HOME | End: 2017-11-20
Attending: EMERGENCY MEDICINE | Admitting: EMERGENCY MEDICINE
Payer: MEDICARE

## 2017-11-20 VITALS
DIASTOLIC BLOOD PRESSURE: 81 MMHG | WEIGHT: 184.97 LBS | RESPIRATION RATE: 20 BRPM | OXYGEN SATURATION: 96 % | TEMPERATURE: 98.4 F | HEART RATE: 82 BPM | SYSTOLIC BLOOD PRESSURE: 190 MMHG | BODY MASS INDEX: 36.12 KG/M2

## 2017-11-20 DIAGNOSIS — A04.72 CLOSTRIDIUM DIFFICILE DIARRHEA: Primary | ICD-10-CM

## 2017-11-20 DIAGNOSIS — B35.6 TINEA CRURIS: ICD-10-CM

## 2017-11-20 LAB
ALBUMIN SERPL BCP-MCNC: 3.9 G/DL (ref 3.5–5)
ALP SERPL-CCNC: 89 U/L (ref 46–116)
ALT SERPL W P-5'-P-CCNC: 21 U/L (ref 12–78)
ANION GAP SERPL CALCULATED.3IONS-SCNC: 12 MMOL/L (ref 4–13)
APTT PPP: 42 SECONDS (ref 23–35)
AST SERPL W P-5'-P-CCNC: 24 U/L (ref 5–45)
BASOPHILS # BLD AUTO: 0.04 THOUSANDS/ΜL (ref 0–0.1)
BASOPHILS NFR BLD AUTO: 1 % (ref 0–1)
BILIRUB SERPL-MCNC: 0.3 MG/DL (ref 0.2–1)
BILIRUB UR QL STRIP: NEGATIVE
BUN SERPL-MCNC: 60 MG/DL (ref 5–25)
CALCIUM SERPL-MCNC: 9.1 MG/DL (ref 8.3–10.1)
CHLORIDE SERPL-SCNC: 103 MMOL/L (ref 100–108)
CLARITY UR: CLEAR
CO2 SERPL-SCNC: 28 MMOL/L (ref 21–32)
COLOR UR: YELLOW
CREAT SERPL-MCNC: 2.04 MG/DL (ref 0.6–1.3)
EOSINOPHIL # BLD AUTO: 0.42 THOUSAND/ΜL (ref 0–0.61)
EOSINOPHIL NFR BLD AUTO: 7 % (ref 0–6)
ERYTHROCYTE [DISTWIDTH] IN BLOOD BY AUTOMATED COUNT: 15.1 % (ref 11.6–15.1)
GFR SERPL CREATININE-BSD FRML MDRD: 22 ML/MIN/1.73SQ M
GLUCOSE SERPL-MCNC: 105 MG/DL (ref 65–140)
GLUCOSE UR STRIP-MCNC: NEGATIVE MG/DL
HCT VFR BLD AUTO: 33.9 % (ref 34.8–46.1)
HGB BLD-MCNC: 10.8 G/DL (ref 11.5–15.4)
HGB UR QL STRIP.AUTO: NEGATIVE
INR PPP: 1.22 (ref 0.86–1.16)
KETONES UR STRIP-MCNC: NEGATIVE MG/DL
LEUKOCYTE ESTERASE UR QL STRIP: NEGATIVE
LYMPHOCYTES # BLD AUTO: 1.11 THOUSANDS/ΜL (ref 0.6–4.47)
LYMPHOCYTES NFR BLD AUTO: 19 % (ref 14–44)
MCH RBC QN AUTO: 28.7 PG (ref 26.8–34.3)
MCHC RBC AUTO-ENTMCNC: 31.9 G/DL (ref 31.4–37.4)
MCV RBC AUTO: 90 FL (ref 82–98)
MONOCYTES # BLD AUTO: 0.64 THOUSAND/ΜL (ref 0.17–1.22)
MONOCYTES NFR BLD AUTO: 11 % (ref 4–12)
NEUTROPHILS # BLD AUTO: 3.6 THOUSANDS/ΜL (ref 1.85–7.62)
NEUTS SEG NFR BLD AUTO: 62 % (ref 43–75)
NITRITE UR QL STRIP: NEGATIVE
PH UR STRIP.AUTO: 6 [PH] (ref 4.5–8)
PLATELET # BLD AUTO: 260 THOUSANDS/UL (ref 149–390)
PMV BLD AUTO: 10.3 FL (ref 8.9–12.7)
POTASSIUM SERPL-SCNC: 4.5 MMOL/L (ref 3.5–5.3)
PROT SERPL-MCNC: 8.1 G/DL (ref 6.4–8.2)
PROT UR STRIP-MCNC: NEGATIVE MG/DL
PROTHROMBIN TIME: 15.5 SECONDS (ref 12.1–14.4)
RBC # BLD AUTO: 3.76 MILLION/UL (ref 3.81–5.12)
SODIUM SERPL-SCNC: 143 MMOL/L (ref 136–145)
SP GR UR STRIP.AUTO: 1.01 (ref 1–1.03)
TROPONIN I SERPL-MCNC: <0.02 NG/ML
UROBILINOGEN UR QL STRIP.AUTO: 0.2 E.U./DL
WBC # BLD AUTO: 5.81 THOUSAND/UL (ref 4.31–10.16)

## 2017-11-20 PROCEDURE — 81003 URINALYSIS AUTO W/O SCOPE: CPT | Performed by: PHYSICIAN ASSISTANT

## 2017-11-20 PROCEDURE — 87040 BLOOD CULTURE FOR BACTERIA: CPT | Performed by: PHYSICIAN ASSISTANT

## 2017-11-20 PROCEDURE — 99285 EMERGENCY DEPT VISIT HI MDM: CPT

## 2017-11-20 PROCEDURE — 80053 COMPREHEN METABOLIC PANEL: CPT | Performed by: PHYSICIAN ASSISTANT

## 2017-11-20 PROCEDURE — 71020 HB CHEST X-RAY 2VW FRONTAL&LATL: CPT

## 2017-11-20 PROCEDURE — 85025 COMPLETE CBC W/AUTO DIFF WBC: CPT | Performed by: PHYSICIAN ASSISTANT

## 2017-11-20 PROCEDURE — 85730 THROMBOPLASTIN TIME PARTIAL: CPT | Performed by: PHYSICIAN ASSISTANT

## 2017-11-20 PROCEDURE — 74176 CT ABD & PELVIS W/O CONTRAST: CPT

## 2017-11-20 PROCEDURE — 36415 COLL VENOUS BLD VENIPUNCTURE: CPT | Performed by: PHYSICIAN ASSISTANT

## 2017-11-20 PROCEDURE — 84484 ASSAY OF TROPONIN QUANT: CPT | Performed by: PHYSICIAN ASSISTANT

## 2017-11-20 PROCEDURE — 85610 PROTHROMBIN TIME: CPT | Performed by: PHYSICIAN ASSISTANT

## 2017-11-20 PROCEDURE — 96360 HYDRATION IV INFUSION INIT: CPT

## 2017-11-20 PROCEDURE — 93005 ELECTROCARDIOGRAM TRACING: CPT | Performed by: PHYSICIAN ASSISTANT

## 2017-11-20 RX ORDER — NYSTATIN 100000 [USP'U]/G
POWDER TOPICAL 3 TIMES DAILY
Qty: 15 G | Refills: 0 | Status: SHIPPED | OUTPATIENT
Start: 2017-11-20 | End: 2017-12-11

## 2017-11-20 RX ORDER — ROPINIROLE 1 MG/1
1 TABLET, FILM COATED ORAL
COMMUNITY
End: 2017-12-11

## 2017-11-20 RX ORDER — METRONIDAZOLE 500 MG/1
500 TABLET ORAL 3 TIMES DAILY
Qty: 30 TABLET | Refills: 0 | Status: SHIPPED | OUTPATIENT
Start: 2017-11-20 | End: 2017-11-30

## 2017-11-20 RX ADMIN — SODIUM CHLORIDE 500 ML: 0.9 INJECTION, SOLUTION INTRAVENOUS at 15:50

## 2017-11-20 NOTE — ED NOTES
Daughter at bedside  Straight cath performed per PA order  Fungal rash noted in bilateral groin/perinium area  +prolapse noted in vaginal vault  ARTURO Hackett notified       Gabriel Peguero RN  11/20/17 [id number]

## 2017-11-20 NOTE — ED NOTES
Pt laying on stretcher with HOB elevated in negative distress  1st AVB, NSR on monitor  Vs  No complaints at present time  Awaiting results   Will continue to monitor   Fide Prasad RN  11/20/17   Candelario Stern [address] [place]  11/20/17 [id number]

## 2017-11-20 NOTE — ED PROVIDER NOTES
History  Chief Complaint   Patient presents with    Possible UTI     Pt brought to ER via EMS from nursing home for IV antibiotics for recent diagnosis of UTI and C-diff     15-year-old female presents to the emergency department with complaints of lower abdominal discomfort and dysuria  States she has had symptoms over the past week  History of urinary tract infections  As per nursing staff at the nursing home patient was recently on IV antibiotics for urinary tract infection and was diagnosed with C diff  Concern for repeat infection  Not currently taking any antibiotics at the nursing home  She denies fevers  No nausea or vomiting  Also complains of a slight cough which is dry in nature  History of congestive heart failure  She denies shortness of breath  History provided by:  Patient   used: No        Prior to Admission Medications   Prescriptions Last Dose Informant Patient Reported? Taking?    Alum & Mag Hydroxide-Simeth (ANTACID ANTI-GAS PO)   Yes No   Sig: Take 30 mL by mouth every 6 (six) hours as needed     CO-ENZYME Q-10 PO   Yes Yes   Sig: Take 200 mg by mouth daily   OXcarbazepine (TRILEPTAL) 150 mg tablet   No Yes   Sig: Take 1 tablet by mouth daily at bedtime   Red Yeast Rice 600 MG CAPS   Yes Yes   Sig: Take 1 capsule by mouth 2 (two) times a day   acetaminophen (TYLENOL) 325 mg tablet   No Yes   Sig: Take 1 tablet by mouth every 6 (six) hours as needed for mild pain   Patient taking differently: Take 325 mg by mouth every 4 (four) hours as needed for mild pain     acetaminophen (TYLENOL) 500 mg tablet   No Yes   Sig: Take 1 tablet by mouth every 12 (twelve) hours   albuterol (PROVENTIL HFA,VENTOLIN HFA) 90 mcg/act inhaler   Yes Yes   Sig: Inhale 2 puffs every 6 (six) hours as needed for wheezing   apixaban (ELIQUIS) 2 5 mg   No Yes   Sig: Take 1 tablet by mouth 2 (two) times a day   bimatoprost (LUMIGAN) 0 01 % ophthalmic drops   Yes Yes   Sig: Administer 1 drop to both eyes daily at bedtime     bisacodyl (DULCOLAX) 10 mg suppository   Yes Yes   Sig: Insert 10 mg into the rectum as needed for constipation (EVERY 4TH DAY PRN)     brimonidine (ALPHAGAN P) 0 1 %   Yes Yes   Sig: Administer 1 drop to both eyes 3 (three) times a day     calcitriol (ROCALTROL) 0 25 mcg capsule   Yes Yes   Sig: Take 0 25 mcg by mouth daily Monday, Wednesday, Friday     cholecalciferol (VITAMIN D3) 1,000 units tablet   Yes Yes   Sig: Take 1,000 Units by mouth daily   docusate sodium (COLACE) 100 mg capsule   Yes Yes   Sig: Take 100 mg by mouth 2 (two) times a day   fluticasone (FLONASE) 50 mcg/act nasal spray   Yes Yes   Si spray into each nostril daily  gabapentin (NEURONTIN) 300 mg capsule   No Yes   Sig: Take 1 capsule by mouth 2 (two) times a day   gabapentin (NEURONTIN) 300 mg capsule   No Yes   Sig: Take 2 capsules by mouth daily at bedtime   latanoprost (XALATAN) 0 005 % ophthalmic solution   Yes Yes   Sig: Administer 1 drop to both eyes daily at bedtime  levothyroxine 75 mcg tablet   No Yes   Sig: Take 1 tablet by mouth daily in the early morning   Patient taking differently: Take 88 mcg by mouth daily in the early morning     magnesium oxide (MAG-OX) 400 mg   No Yes   Sig: Take 1 tablet by mouth daily for 30 days   melatonin 3 mg   No Yes   Sig: Take 2 tablets by mouth daily at bedtime   metoprolol tartrate (LOPRESSOR) 50 mg tablet   Yes Yes   Sig: Take 50 mg by mouth 2 (two) times a day   pantoprazole (PROTONIX) 40 mg tablet   Yes Yes   Sig: Take 40 mg by mouth daily     polyethylene glycol (MIRALAX) 17 g packet   Yes Yes   Sig: Take 17 g by mouth as needed     rOPINIRole (REQUIP) 0 5 mg tablet   No Yes   Sig: Take 1 tablet by mouth every 6 (six) hours   Patient taking differently: Take 0 5 mg by mouth 3 (three) times a day     rOPINIRole (REQUIP) 1 mg tablet   Yes Yes   Sig: Take 1 mg by mouth daily at bedtime   tamsulosin (FLOMAX) 0 4 mg   No Yes   Sig: Take 1 capsule by mouth daily with dinner   torsemide (DEMADEX) 20 mg tablet   No Yes   Sig: Take 1 tablet by mouth daily   Patient taking differently: Take 20 mg by mouth see administration instructions 20mg po Q Mon, Wed, Fri   40mg po QTue, Thur, Sat, Sun       Facility-Administered Medications: None       Past Medical History:   Diagnosis Date    A-fib ([place] [address] )     Anemia     Anemia     Anxiety     Asthma     CAD (coronary artery disease)     [facility]     CHF (congestive heart failure) [facility]     CKD (chronic kidney disease) stage 3, GFR 30-59 ml/min     ckd3    Constipation     CVA (cerebral vascular accident) ([place] [address] )     left hemiparesis    Diastolic CHF, chronic ([facility])     Disease of thyroid gland     Dry eye     First degree AV block     Gastritis     Gastritis     GERD (gastroesophageal reflux disease)     Glaucoma     Glaucoma     Hemiparesis ([facility])     left side    Hemiparesis affecting left side as late effect of stroke ([facility])     Hyperlipidemia     Hypertension     Impetigo     Insomnia     Knee pain     Neuropathy     Osteoarthritis     Parkinson disease ([place] [address] )     Pulmonary HTN     PVD (peripheral vascular disease) ([place] [address] )     Restless leg     Vitamin D deficiency        Past Surgical History:   Procedure Laterality Date    APPENDECTOMY      BLADDER SURGERY      CAROTID ENDARTERECTOMY Left     ESOPHAGOGASTRODUODENOSCOPY N/A 4/8/2016    Procedure: ESOPHAGOGASTRODUODENOSCOPY (EGD); Surgeon: Haroon Guevara MD;  Location: [facility]; Service:     RENAL ARTERY STENT         Family History   Problem Relation Age of Onset    Stroke Father     Cancer Brother     No Known Problems Mother      I have reviewed and agree with the history as documented      Social History   Substance Use Topics    Smoking status: Never Smoker    Smokeless tobacco: Never Used    Alcohol use No        Review of Systems   Constitutional: Negative for activity change, appetite change, chills and fever  HENT: Negative for congestion, dental problem, drooling, ear discharge, ear pain, mouth sores, nosebleeds, rhinorrhea, sore throat and trouble swallowing  Eyes: Negative for pain, discharge and itching  Respiratory: Positive for cough  Negative for chest tightness, shortness of breath and wheezing  Cardiovascular: Negative for chest pain and palpitations  Gastrointestinal: Positive for abdominal pain  Negative for blood in stool, constipation, diarrhea, nausea and vomiting  Endocrine: Negative for cold intolerance and heat intolerance  Genitourinary: Positive for dysuria  Negative for difficulty urinating, flank pain, frequency and urgency  Skin: Negative for rash and wound  Allergic/Immunologic: Negative for food allergies and immunocompromised state  Neurological: Negative for dizziness, seizures, syncope, weakness, numbness and headaches  Psychiatric/Behavioral: Negative for agitation, behavioral problems and confusion  Physical Exam  ED Triage Vitals   Temperature Pulse Respirations Blood Pressure SpO2   11/20/17 1420 11/20/17 1425 11/20/17 1425 11/20/17 1425 11/20/17 1425   98 4 °F (36 9 °C) 87 16 140/90 96 %      Temp Source Heart Rate Source Patient Position - Orthostatic VS BP Location FiO2 (%)   11/20/17 1420 11/20/17 1425 11/20/17 1425 11/20/17 1425 --   Oral Monitor Lying Right arm       Pain Score       11/20/17 1425       6           Orthostatic Vital Signs  Vitals:    11/20/17 1425 11/20/17 1500 11/20/17 1545 11/20/17 1840   BP: 140/90  168/72 (!) 190/81   Pulse: 87 80 76 82   Patient Position - Orthostatic VS: Lying Lying Lying Lying       Physical Exam   Constitutional: She is oriented to person, place, and time  She appears well-developed and well-nourished  No distress  HENT:   Head: Normocephalic and atraumatic  Right Ear: External ear normal    Left Ear: External ear normal    Mouth/Throat: Oropharynx is clear and moist  No oropharyngeal exudate  Eyes: Conjunctivae are normal    Neck: No JVD present  No tracheal deviation present  Cardiovascular: Normal rate, regular rhythm and normal heart sounds  Exam reveals no gallop and no friction rub  No murmur heard  Pulmonary/Chest: Effort normal  No respiratory distress  She has wheezes  She has no rales  She exhibits no tenderness  Abdominal: Soft  Bowel sounds are normal  She exhibits no distension  There is tenderness (mild) in the right lower quadrant, suprapubic area and left lower quadrant  There is no guarding  Musculoskeletal: Normal range of motion  She exhibits no edema, tenderness or deformity  Lymphadenopathy:     She has no cervical adenopathy  Neurological: She is alert and oriented to person, place, and time  Skin: Skin is warm and dry  No rash noted  She is not diaphoretic  No erythema  Psychiatric: She has a normal mood and affect  Her behavior is normal    Nursing note and vitals reviewed  ED Medications  Medications   sodium chloride 0 9 % bolus 500 mL (0 mL Intravenous Stopped 11/20/17 1650)       Diagnostic Studies  Results Reviewed     Procedure Component Value Units Date/Time    Protime-INR [[id number]]  (Abnormal) Collected:  11/20/17 1450    Lab Status:  Final result Specimen:  Blood from Arm, Right Updated:  11/20/17 2051     Protime 15 5 (H) seconds      INR 1 22 (H)    APTT [[id number]]  (Abnormal) Collected:  11/20/17 1450    Lab Status:  Final result Specimen:  Blood from Arm, Right Updated:  11/20/17 2051     PTT 42 (H) seconds     Narrative:          Therapeutic Heparin Range = 60-90 seconds    UA w Reflex to Microscopic w Reflex to Culture [[id number]]  (Normal) Collected:  11/20/17 1700    Lab Status:  Final result Specimen:  Urine from Urine, Straight Cath Updated:  11/20/17 1719     Color, UA Yellow     Clarity, UA Clear     Specific Gravity, UA 1 010     pH, UA 6 0     Leukocytes, UA Negative     Nitrite, UA Negative     Protein, UA Negative mg/dl Glucose, UA Negative mg/dl      Ketones, UA Negative mg/dl      Urobilinogen, UA 0 2 E U /dl      Bilirubin, UA Negative     Blood, UA Negative    Comprehensive metabolic panel [[id number]]  (Abnormal) Collected:  11/20/17 1450    Lab Status:  Final result Specimen:  Blood from Arm, Right Updated:  11/20/17 1540     Sodium 143 mmol/L      Potassium 4 5 mmol/L      Chloride 103 mmol/L      CO2 28 mmol/L      Anion Gap 12 mmol/L      BUN 60 (H) mg/dL      Creatinine 2 04 (H) mg/dL      Glucose 105 mg/dL      Calcium 9 1 mg/dL      AST 24 U/L      ALT 21 U/L      Alkaline Phosphatase 89 U/L      Total Protein 8 1 g/dL      Albumin 3 9 g/dL      Total Bilirubin 0 30 mg/dL      eGFR 22 ml/min/1 73sq m     Narrative:         National Kidney Disease Education Program recommendations are as follows:  GFR calculation is accurate only with a steady state creatinine  Chronic Kidney disease less than 60 ml/min/1 73 sq  meters  Kidney failure less than 15 ml/min/1 73 sq  meters  Troponin I [[id number]]  (Normal) Collected:  11/20/17 1450    Lab Status:  Final result Specimen:  Blood from Arm, Right Updated:  11/20/17 1533     Troponin I <0 02 ng/mL     Narrative:         [employer] Chemistry analyzer 99% cutoff is > 0 04 ng/mL in network labs    o cTnI 99% cutoff is useful only when applied to patients in the clinical setting of myocardial ischemia  o cTnI 99% cutoff should be interpreted in the context of clinical history, ECG findings and possibly cardiac imaging to establish correct diagnosis  o cTnI 99% cutoff may be suggestive but clearly not indicative of a coronary event without the clinical setting of myocardial ischemia      CBC and differential [[id number]]  (Abnormal) Collected:  11/20/17 1450    Lab Status:  Final result Specimen:  Blood from Arm, Right Updated:  11/20/17 1511     WBC 5 81 Thousand/uL      RBC 3 76 (L) Million/uL      Hemoglobin 10 8 (L) g/dL      Hematocrit 33 9 (L) %      MCV 90 fL      MCH 28 7 pg MCHC 31 9 g/dL      RDW 15 1 %      MPV 10 3 fL      Platelets 560 Thousands/uL      Neutrophils Relative 62 %      Lymphocytes Relative 19 %      Monocytes Relative 11 %      Eosinophils Relative 7 (H) %      Basophils Relative 1 %      Neutrophils Absolute 3 60 Thousands/µL      Lymphocytes Absolute 1 11 Thousands/µL      Monocytes Absolute 0 64 Thousand/µL      Eosinophils Absolute 0 42 Thousand/µL      Basophils Absolute 0 04 Thousands/µL     Blood culture #2 [[id number]] Collected:  11/20/17 1455    Lab Status: In process Specimen:  Blood from Arm, Left Updated:  11/20/17 1506    Blood culture #1 [[id number]] Collected:  11/20/17 1450    Lab Status: In process Specimen:  Blood from Arm, Right Updated:  11/20/17 1506                 CT abdomen pelvis wo contrast   Final Result by Avery Alvarez DO (11/20 3834)   Limited exam without oral or IV contrast    No CT explanation for patient's symptoms  Workstation performed: [id number]         XR chest 2 views   Final Result by Mikaela Lassiter MD (11/20 2043)      No active pulmonary disease           Workstation performed: [id number]                    Procedures  ECG 12 Lead Documentation  Date/Time: 11/20/2017 3:35 PM  Performed by: Boyd Henning  Authorized by: Antonio MADERA     Indications / Diagnosis:  Weakness  ECG reviewed by me, the ED Provider: yes    Patient location:  ED  Previous ECG:     Previous ECG:  Compared to current    Comparison ECG info:  7/21/2017    Similarity:  No change  Interpretation:     Interpretation: abnormal    Rate:     ECG rate:  80    ECG rate assessment: normal    Rhythm:     Rhythm: sinus rhythm    Ectopy:     Ectopy: none    QRS:     QRS axis:  Normal    QRS intervals:  Normal  Conduction:     Conduction: abnormal      Abnormal conduction: 1st degree    ST segments:     ST segments:  Normal  T waves:     T waves: normal             Phone Contacts  ED Phone Contact    ED Course  ED Course as of Nov 20 Alessandro Humphreyra Nov 20, 2017   2791 Spoke with [facility]  States that initial urine sample was obtained via patient voiding  Likely contaminated sample due to skin rash  Will send back to nursing home on oral antibiotics for C diff and send copies of lab results obtained through the emergency department  MDM  Number of Diagnoses or Management Options  Clostridium difficile diarrhea:   Tinea cruris:   Diagnosis management comments: Differential diagnosis includes but not limited to:  Upper respiratory infection, bronchitis, pneumonia, urinary tract infection, diverticulitis  Discussed treatment plan with nursing staff at patient's care facility  Initial urinalysis obtained via voice  A believe that this is a contaminated specimen due to patient's skin irritation and rash  Clean catch ear was normal   Will send copies to facility  Discussed treatment for Clostridium difficile  Able to give oral antibiotics at the facility  Will discharge to home  Amount and/or Complexity of Data Reviewed  Clinical lab tests: ordered and reviewed  Tests in the radiology section of CPT®: ordered and reviewed  Obtain history from someone other than the patient: yes  Discuss the patient with other providers: yes  Independent visualization of images, tracings, or specimens: yes      CritCare Time    Disposition  Final diagnoses:   Clostridium difficile diarrhea   Tinea cruris     Time reflects when diagnosis was documented in both MDM as applicable and the Disposition within this note     Time User Action Codes Description Comment    11/20/2017  5:51 PM Ferdie Fam Add [A04 72] Clostridium difficile diarrhea     11/20/2017  5:52 PM Ferdie Fam Add [B35 6] Tinea cruris       ED Disposition     ED Disposition Condition Comment    Discharge  [facility] discharge to home/self care      Condition at discharge: Stable        Follow-up Information     Follow up With Specialties Details Why 12 Nikolas Frances MD Internal Medicine Schedule an appointment as soon as possible for a visit  [address]  [phone]          Discharge Medication List as of 11/20/2017  5:53 PM      START taking these medications    Details   metroNIDAZOLE (FLAGYL) 500 mg tablet Take 1 tablet by mouth 3 (three) times a day for 10 days, Starting Mon 11/20/2017, Until Thu 11/30/2017, Print      nystatin (MYCOSTATIN) powder Apply topically 3 (three) times a day, Starting Mon 11/20/2017, Print         CONTINUE these medications which have NOT CHANGED    Details   !! acetaminophen (TYLENOL) 325 mg tablet Take 1 tablet by mouth every 6 (six) hours as needed for mild pain, Starting Sat 7/29/2017, Print      !! acetaminophen (TYLENOL) 500 mg tablet Take 1 tablet by mouth every 12 (twelve) hours, Starting Sat 7/29/2017, Print      albuterol (PROVENTIL HFA,VENTOLIN HFA) 90 mcg/act inhaler Inhale 2 puffs every 6 (six) hours as needed for wheezing, Until Discontinued, Historical Med      apixaban (ELIQUIS) 2 5 mg Take 1 tablet by mouth 2 (two) times a day, Starting Sat 7/29/2017, Print      bimatoprost (LUMIGAN) 0 01 % ophthalmic drops Administer 1 drop to both eyes daily at bedtime  , Until Discontinued, Historical Med      bisacodyl (DULCOLAX) 10 mg suppository Insert 10 mg into the rectum as needed for constipation (EVERY 4TH DAY PRN)  , Until Discontinued, Historical Med      brimonidine (ALPHAGAN P) 0 1 % Administer 1 drop to both eyes 3 (three) times a day  , Historical Med      calcitriol (ROCALTROL) 0 25 mcg capsule Take 0 25 mcg by mouth daily Monday, Wednesday, Friday  , Historical Med      cholecalciferol (VITAMIN D3) 1,000 units tablet Take 1,000 Units by mouth daily, Historical Med      CO-ENZYME Q-10 PO Take 200 mg by mouth daily, Historical Med      docusate sodium (COLACE) 100 mg capsule Take 100 mg by mouth 2 (two) times a day, Until Discontinued, Historical Med      fluticasone (FLONASE) 50 mcg/act nasal spray 1 spray into each nostril daily  , Until Discontinued, Historical Med      !! gabapentin (NEURONTIN) 300 mg capsule Take 1 capsule by mouth 2 (two) times a day, Starting Sat 7/29/2017, Print      !! gabapentin (NEURONTIN) 300 mg capsule Take 2 capsules by mouth daily at bedtime, Starting Sat 7/29/2017, Print      latanoprost (XALATAN) 0 005 % ophthalmic solution Administer 1 drop to both eyes daily at bedtime  , Until Discontinued, Historical Med      levothyroxine 75 mcg tablet Take 1 tablet by mouth daily in the early morning, Starting Sat 7/29/2017, Print      magnesium oxide (MAG-OX) 400 mg Take 1 tablet by mouth daily for 30 days, Starting Sat 7/29/2017, Until Mon 11/20/2017, Print      melatonin 3 mg Take 2 tablets by mouth daily at bedtime, Starting Sat 7/29/2017, Print      metoprolol tartrate (LOPRESSOR) 50 mg tablet Take 50 mg by mouth 2 (two) times a day, Until Discontinued, Historical Med      OXcarbazepine (TRILEPTAL) 150 mg tablet Take 1 tablet by mouth daily at bedtime, Starting Sat 7/29/2017, Print      pantoprazole (PROTONIX) 40 mg tablet Take 40 mg by mouth daily  , Historical Med      polyethylene glycol (MIRALAX) 17 g packet Take 17 g by mouth as needed  , Until Discontinued, Historical Med      Red Yeast Rice 600 MG CAPS Take 1 capsule by mouth 2 (two) times a day, Until Discontinued, Historical Med      !! rOPINIRole (REQUIP) 0 5 mg tablet Take 1 tablet by mouth every 6 (six) hours, Starting Sat 7/29/2017, Print      !! rOPINIRole (REQUIP) 1 mg tablet Take 1 mg by mouth daily at bedtime, Historical Med      tamsulosin (FLOMAX) 0 4 mg Take 1 capsule by mouth daily with dinner, Starting Thu 7/20/2017, Print      torsemide (DEMADEX) 20 mg tablet Take 1 tablet by mouth daily, Starting Thu 7/20/2017, Print      Alum & Mag Hydroxide-Simeth (ANTACID ANTI-GAS PO) Take 30 mL by mouth every 6 (six) hours as needed  , Until Discontinued, Historical Med       !! - Potential duplicate medications found  Please discuss with provider  No discharge procedures on file      ED Provider  Electronically Signed by           De Hoskins PA-C  11/20/17 2485

## 2017-11-20 NOTE — DISCHARGE INSTRUCTIONS
Skin Yeast Infection   WHAT YOU NEED TO KNOW:   Yeast is normally present on the skin  Infection happens when you have too much yeast, or when it gets into a cut on your skin  Certain types of mold and fungus can cause a yeast infection  A skin yeast infection can appear anywhere on your skin or nail beds  Skin yeast infections are usually found on warm, moist parts of the body  Examples include between skin folds or under the breasts  DISCHARGE INSTRUCTIONS:   Return to the emergency department if:   · You have signs of infection, such as pus, warmth or red streaks coming from the wound, or a fever  Contact your healthcare provider if:   · Your symptoms worsen or do not get better within 7 to 10 days  · You have new or returning signs of a skin yeast infection after treatment  · You have questions or concerns about your condition or care  Medicines:   · Antifungal medicine  may be given as a cream, ointment, or pill  · Take your medicine as directed  Contact your healthcare provider if you think your medicine is not helping or if you have side effects  Tell him or her if you are allergic to any medicine  Keep a list of the medicines, vitamins, and herbs you take  Include the amounts, and when and why you take them  Bring the list or the pill bottles to follow-up visits  Carry your medicine list with you in case of an emergency  Care for the skin near the infection:  You may only have discolored patches of skin, or areas that are dry and flaking  Care for these skin problems as directed by your healthcare provider  If you have painful skin or an open sore, you will need to protect the skin and prevent damage  You will also need to keep the skin dry as much as possible  Ask your healthcare provider how to care for your skin while the infection clears  The following are general guidelines for caring for painful or open skin:  · Keep the skin clean    Ask your healthcare provider if you should wash with mild soap and water  Do not use soap that contains alcohol  Alcohol can dry and irritate the skin and make symptoms worse  Your baby's healthcare provider may tell you to use diaper cream or ointment when you change his diaper  This will protect the skin and prevent moisture from collecting  · Keep the skin dry  Pat the area dry with a towel  Do not rub, because this may irritate the skin  If you have a skin yeast infection between skin folds, lift the top part gently and hold it while you dry between your skin folds  Always dry your feet completely after you swim or bathe, including between your toes  Dry your skin if you are sweating from exercise or exposure to heat  Use a clean towel each time to prevent spreading or continuing the infection  · Keep the skin protected  Ask your healthcare provider if you should cover the area with a bandage or leave it open  Check your skin each day to make sure you do not have new or worsening problems  You may need to have someone check the skin if you cannot see the area easily  Prevent another skin yeast infection:   · Do not share clothing or towels    · Wear shower shoes if you need to use a public shower    · Dry your feet completely after you bathe, and apply antifungal powder or cream as directed    · Put on socks before you get dressed so you do not spread fungus from your feet    · Wear light clothing that allows air to get to your skin    · Manage your weight to prevent skin folds where yeast can collect    · Manage diabetes    · Change your baby's diaper often, and keep the area clean and dry as much as possible    · Use a diaper cream or ointment that contains zinc oxide or dimethicone on your baby's diaper area as directed  Follow up with your healthcare provider as directed:  Write down your questions so you remember to ask them during your visits     © 2017 [employer] Information is for End User's use only and may not be sold, redistributed or otherwise used for commercial purposes  All illustrations and images included in CareNotes® are the copyrighted property of [employer]  or [employer]  The above information is an  only  It is not intended as medical advice for individual conditions or treatments  Talk to your doctor, nurse or pharmacist before following any medical regimen to see if it is safe and effective for you  Clostridium Difficile Infection   WHAT YOU NEED TO KNOW:   Clostridium difficile infection, or C  difficile, is an infection in your colon caused by bacteria  Different types of bacteria live inside the colon, creating a healthy balance between good and bad bacteria  If the C  difficile bacteria grow rapidly, this can disrupt the healthy balance of the colon  This can cause the lining of the colon to swell, which leads to an infection  DISCHARGE INSTRUCTIONS:   Medicines:   · Antibiotics: This medicine is given to keep the C  difficile bacteria from growing and allow the normal bacteria in your intestines to grow  Always take your antibiotics exactly as ordered by your healthcare provider  Do not stop taking your medicine unless directed by your healthcare provider  Never save antibiotics or take leftover antibiotics that were given to you for another illness  · Take your medicine as directed  Contact your healthcare provider if you think your medicine is not helping or if you have side effects  Tell him or her if you are allergic to any medicine  Keep a list of the medicines, vitamins, and herbs you take  Include the amounts, and when and why you take them  Bring the list or the pill bottles to follow-up visits  Carry your medicine list with you in case of an emergency  Follow up with your healthcare provider within 1 to 2 days:  Write down your questions so you remember to ask them during your visits    Self care:   · Drink liquids to prevent dehydration:  Ask how much liquid you should drink to prevent dehydration caused by diarrhea  Most adults should drink between 9 and 13 eight-ounce cups of liquid every day  For most people, good liquids to drink are water, juice, and broth  · Wash your hands:  Wash your hands often with germ-killing soap and warm, running water  Alcohol-based hand rubs do not kill C  difficile bacteria  Always wash your hands well after you use the toilet, diaper a child, and before you prepare or serve food  Tell anyone who touches you to wear gloves and wash their hands  · Clean surfaces with bleach:  Clean tabletops, desks, and other surfaces before anyone else touches or uses them  Clean with chlorine-based disinfectants, such as household bleach  · Avoid the spread of C  difficile:  Do not share any items with other people  Use as many disposable items, such as paper plates, as you can  Do this until your diarrhea has gone away  Contact your healthcare provider if:   · You have a fever  · Your signs and symptoms do not go away, or they come back, even after treatment  · You have questions or concerns about your condition or care  Return to the emergency department if:   · Your diarrhea and stomach cramps get worse  · Your abdomen is hard or feels swollen  · You have black or bright red stools  · You vomit blood  · You cannot eat or drink  · You are short of breath, or feel like you are going to faint       · You have 1 or more of the following signs of dehydration:     ¨ Dizziness or weakness, or extreme sleepiness    ¨ Dry mouth, cracked lips, or you feel very thirsty    ¨ Fast heartbeat or rapid breathing    ¨ More sleepy than usual    ¨ Very little urine or no urine    ¨ Sunken eyes     ¨ A child may be more irritable or fussy than usual  The soft spot on a baby's head may look sunken in   © 2017 [address] is for End User's use only and may not be sold, redistributed or otherwise used for commercial purposes  All illustrations and images included in CareNotes® are the copyrighted property of [employer]  or [employer]  The above information is an  only  It is not intended as medical advice for individual conditions or treatments  Talk to your doctor, nurse or pharmacist before following any medical regimen to see if it is safe and effective for you

## 2017-11-21 ENCOUNTER — ALLSCRIPTS OFFICE VISIT (OUTPATIENT)
Dept: OTHER | Facility: OTHER | Age: 82
End: 2017-11-21

## 2017-11-21 LAB
ATRIAL RATE: 80 BPM
P AXIS: 107 DEGREES
PR INTERVAL: 272 MS
QRS AXIS: 10 DEGREES
QRSD INTERVAL: 92 MS
QT INTERVAL: 406 MS
QTC INTERVAL: 468 MS
T WAVE AXIS: 56 DEGREES
VENTRICULAR RATE: 80 BPM

## 2017-11-21 NOTE — ED NOTES
Pt appeared to be in no acute distress upon discharge  Verbal understanding obtained from pt on d/c instructions as well as Rx and follow up care  Pt able to ambulate well without assistance upon exiting       Pao Guerra RN  11/20/17 2035

## 2017-11-21 NOTE — ED NOTES
Pt resting comfortably in bed, pending transportation information for [place] to [place]  Call bell within reach, will continue to monitor       [address] WIN Qureshi  11/20/17 [id number]

## 2017-11-22 NOTE — PROGRESS NOTES
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)   6  Left renal artery stenosis (440 1) (I70 1)   7  Anemia associated with stage 4 chronic renal failure (285 21,585 4) (N18 4,D63 1)    Plan  Anemia associated with stage 4 chronic renal failure, Benign hypertension with chronickidney disease, stage III, Chronic kidney disease, stage 3, Edema, Hyperlipidemia, Leftrenal artery stenosis, Magnesium metabolism disorder    · (1) BASIC METABOLIC PROFILE; Status:Active; Requested for:09Hgs1536;    Perform:[facility]; ONC:[id number]; Ordered; For:Anemia associated with stage 4 chronic renal failure, Benign hypertension with chronic kidney disease, stage III, Chronic kidney disease, stage 3, Edema, Hyperlipidemia, Left renal artery stenosis, Magnesium metabolism disorder; Ordered By:Jax Ocasio;   · (1) BASIC METABOLIC PROFILE; Status:Active; Requested for:27Nov2017;    Perform:[facility]; AHD:[id number]; Ordered; For:Anemia associated with stage 4 chronic renal failure, Benign hypertension with chronic kidney disease, stage III, Chronic kidney disease, stage 3, Edema, Hyperlipidemia, Left renal artery stenosis, Magnesium metabolism disorder; Ordered By:Jax Ocasio;   · (1) CBC/PLT/DIFF; Status:Active; Requested WGE:[id number]; Perform:[facility]; CKL:[id number];PFWGHCY; For:Anemia associated with stage 4 chronic renal failure, Benign hypertension with chronic kidney disease, stage III, Chronic kidney disease, stage 3, Edema, Hyperlipidemia, Left renal artery stenosis, Magnesium metabolism disorder; Ordered By:Jax Ocsaio;   · (1) CBC/PLT/DIFF; Status:Active; Requested OGP:[id number]; Perform:[facility]; XRN:[id number];LJHDFFS; For:Anemia associated with stage 4 chronic renal failure, Benign hypertension with chronic kidney disease, stage III, Chronic kidney disease, stage 3, Edema, Hyperlipidemia, Left renal artery stenosis, Magnesium metabolism disorder; Ordered By:Jax Ocasio;   · (1) CBC/PLT/DIFF; Status:Active; Requested MYRNA:[id number];    Perform:[facility]; FQB:[id number]; Ordered; For:Anemia associated with stage 4 chronic renal failure, Benign hypertension with chronic kidney disease, stage III, Chronic kidney disease, stage 3, Edema, Hyperlipidemia, Left renal artery stenosis, Magnesium metabolism disorder; Ordered By:Jax Ocasio;   · (1) COMPREHENSIVE METABOLIC PANEL; Status:Active; Requested NWR:[id number]; Perform:[facility]; WIN:[id number];DPFDHIB; For:Anemia associated with stage 4 chronic renal failure, Benign hypertension with chronic kidney disease, stage III, Chronic kidney disease, stage 3, Edema, Hyperlipidemia, Left renal artery stenosis, Magnesium metabolism disorder; Ordered By:Jax Ocasio;   · (1) COMPREHENSIVE METABOLIC PANEL; Status:Active; Requested MHC:[id number];    Perform:[facility]; MMD:[id number]; Ordered; For:Anemia associated with stage 4 chronic renal failure, Benign hypertension with chronic kidney disease, stage III, Chronic kidney disease, stage 3, Edema, Hyperlipidemia, Left renal artery stenosis, Magnesium metabolism disorder; Ordered By:Jax Ocasio;   · (1) FERRITIN; Status:Active; Requested OHN:[id number]; Perform:[facility]; PRU:[id number];DPKYWVR; For:Anemia associated with stage 4 chronic renal failure, Benign hypertension with chronic kidney disease, stage III, Chronic kidney disease, stage 3, Edema, Hyperlipidemia, Left renal artery stenosis, Magnesium metabolism disorder; Ordered By:Jax Ocasio;   · (1) FERRITIN; Status:Active; Requested FOS:[id number];    Perform:[facility]; RAMÍREZ:[id number]; Ordered; For:Anemia associated with stage 4 chronic renal failure, Benign hypertension with chronic kidney disease, stage III, Chronic kidney disease, stage 3, Edema, Hyperlipidemia, Left renal artery stenosis, Magnesium metabolism disorder; Ordered By:Jax Ocasio;   · (1) IRON SATURATION %, TIBC; Status:Active; Requested OLE:[id number]; Perform:[facility]; OGV:[id number];VDWVBKZ; For:Anemia associated with stage 4 chronic renal failure, Benign hypertension with chronic kidney disease, stage III, Chronic kidney disease, stage 3, Edema, Hyperlipidemia, Left renal artery stenosis, Magnesium metabolism disorder; Ordered By:Jax Ocasio;   · (1) IRON SATURATION %, TIBC; Status:Active; Requested ASL:[id number];    Perform:[facility]; GMP:[id number]; Ordered;associated with stage 4 chronic renal failure, Benign hypertension with chronic kidney disease, stage III, Chronic kidney disease, stage 3, Edema, Hyperlipidemia, Left renal artery stenosis, Magnesium metabolism disorder; Ordered By:Jax Ocasio;   · (1) LIPID PANEL FASTING W DIRECT LDL REFLEX; Status:Active; Requestedfor:01Mar2018; Perform:[facility]; KNN:[id number];DLBAMUI; For:Anemia associated with stage 4 chronic renal failure, Benign hypertension with chronic kidney disease, stage III, Chronic kidney disease, stage 3, Edema, Hyperlipidemia, Left renal artery stenosis, Magnesium metabolism disorder; Ordered By:Jax Ocasio;   · (1) MAGNESIUM; Status:Active; Requested CKL:[id number]; Perform:[facility]; AEB:[id number];QVHUKMO; For:Anemia associated with stage 4 chronic renal failure, Benign hypertension with chronic kidney disease, stage III, Chronic kidney disease, stage 3, Edema, Hyperlipidemia, Left renal artery stenosis, Magnesium metabolism disorder; Ordered By:Jax Ocasio;   · (1) MAGNESIUM; Status:Active; Requested OFF:14MDG2167; Perform:[facility]; XUZ:[id number];HLXBLGI; For:Anemia associated with stage 4 chronic renal failure, Benign hypertension with chronic kidney disease, stage III, Chronic kidney disease, stage 3, Edema, Hyperlipidemia, Left renal artery stenosis, Magnesium metabolism disorder; Ordered By:Jax Ocasio;   · (1) MAGNESIUM; Status:Active; Requested for:27Nov2017;    Perform:[facility]; DRJ:[id number]; Ordered; For:Anemia associated with stage 4 chronic renal failure, Benign hypertension with chronic kidney disease, stage III, Chronic kidney disease, stage 3, Edema, Hyperlipidemia, Left renal artery stenosis, Magnesium metabolism disorder; Ordered By:Jax Ocasio;   · (1) PHOSPHORUS; Status:Active; Requested ZPK:[id number]; Perform:[facility]; IKJ:[id number];USRVDUX; For:Anemia associated with stage 4 chronic renal failure, Benign hypertension with chronic kidney disease, stage III, Chronic kidney disease, stage 3, Edema, Hyperlipidemia, Left renal artery stenosis, Magnesium metabolism disorder; Ordered By:Jax Ocasio;   · (1) PTH N-TERMINAL (INTACT); Status:Active; Requested XKB:[id number]; Perform:[facility]; LBM:[id number];QSWLKQK; For:Anemia associated with stage 4 chronic renal failure, Benign hypertension with chronic kidney disease, stage III, Chronic kidney disease, stage 3, Edema, Hyperlipidemia, Left renal artery stenosis, Magnesium metabolism disorder; Ordered By:Jax Ocasio;   · (1) URINE PROTEIN CREATININE RATIO; Status:Active; Requested HTT:[id number]; Perform:[facility]; ZYE:[id number];DUWHNXO;HZFYNTRSOL with stage 4 chronic renal failure, Benign hypertension with chronic kidney disease, stage III, Chronic kidney disease, stage 3, Edema, Hyperlipidemia, Left renal artery stenosis, Magnesium metabolism disorder; Ordered By:Jax Ocasio;   · Follow-up visit in 4 Months Evaluation and Treatment  Follow-up  Status: Hold For -Scheduling  Requested for: [id number]   Ordered; Anemia associated with stage 4 chronic renal failure, Benign hypertension with chronic kidney disease, stage III, Chronic kidney disease, stage 3, Edema, Hyperlipidemia, Left renal artery stenosis, Magnesium metabolism disorder; Ordered By: Mariola Swanson Performed:  Due: 26LDJ1254   · 1 - Zac House (Nurse Practitioner) Co-Management  *  Status: Hold For -Scheduling  Requested for: [id number]   Ordered; Anemia associated with stage 4 chronic renal failure, Benign hypertension with chronic kidney disease, stage III, Chronic kidney disease, stage 3, Edema, Hyperlipidemia, Left renal artery stenosis, Magnesium metabolism disorder; Ordered By: Mariola Swanson Performed:  Due: 52CHN9100  Care Summary provided  : Yes  Unlinked    · Magnesium 400 MG CAPS   Dispense: 0 Days ; #: Sufficient Capsule; Refill: 0; PAOLA = N; Record; Last Updated By: Mariola Swanson; 11/21/2017 2:19:24 PM  1  MEDICATION CHANGES: -DECREASE GABAPENTIN  MG TWICE DURING THE DAY  MG THE EVENING  IF HER RENAL FUNCTION IMPROVE YOU CAN INCREASE THE DOSE  -STOP MAGNESIUM OXIDE GIVEN DIARRHEA -ADD AMLODIPINE 5 MG AT NIGHTTIME FOR BLOOD PRESSURE WATCH FOR SWELLING -MAINTAIN TORSEMIDE 20 MG TWICE A DAY, SHOULD HER WEIGHT GO UP BY MORE THAN 2-3 LB IN 1-2 DAYS YOU CAN USE AN EXTRA DOSE OF 20 MG IN THE MORNING FOR TOTAL 40 MG THE MORNING AND 20 MG IN THE EVENING  SHOULD HER SWELLING PERSIST OR WEIGHT CONTINUE TO RISE CALL  2   PLEASE CONTINUE TO TAKE WEIGHTS DAILY 3   PLEASE SEND IN 1 WEEK A BLOOD PRESSURE READINGS AFTER WAITING 1 WEEK MORNING AND EVENING, THE MORNING READINGS ARE PRIOR TO THE MORNING MEDICATIONS 4  PLEASE HAVE THE PATIENT GO FOR LABS DECEMBER 4TH 5  PLEASE HAVE THE PATIENT SEE MY ADVANCED PRACTITIONER APPROXIMATELY THE WEEK OF DECEMBER 4TH 6  FOLLOW-UP WITH ME IN 4 MONTHS WITH THE LAB WORK IS ORDERED  AT THAT APPOINTMENT PLEASE SEND IN 1 WEEK A BLOOD PRESSURE READINGS MORNING AND EVENING   7   GENERAL RECOMMENDATIONS: -AVOID SALT -CONTINUE TO MONITOR WEIGHTS AS OUTLINED ABOVE ALONG WITH SWELLING AND CALL FOR PROBLEM -AVOID MEDICINES SUCH AS MOTRIN, NAPROSYN, ALEVE OR ADVIL OR IBUPROFEN; USE TYLENOL AS NEEDED 8  PLEASE FAX INACCURATE NEW MEDICATION LIST INCLUDING THE ONES THAT WERE PRESCRIBED IN THE EMERGENCY ROOM  9   IF THE PATIENT HAS PERSISTENT DYSURIA PLEASE HAVE UROLOGY SEE HER ON CONSULTATION  Discussion/Summary    #1  Chronic kidney disease stage III: Baseline creatinine 1 3-1 95: 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, arteriolar nephrosclerosis, cardiorenal syndrome, renal artery disease  Her creatinine is most recently increased to 2 04 mg/dL in association with the increase torsemide  We have Re adjusted her medications now that she remains essentially euvolemic to 20 mg of torsemide twice a day and on as needed torsemide extra 20 mg in the morning for a total 40 mg in morning 20 mg in the evening she gains more than 2-3 lb in the course of 1 day or worsening swelling  If her swelling and or weight gain persists call us  CHF/volume status: EF 46%, grade 2 diastolic dysfunction, mild valvular dysfunction  Please see above recommendations regarding torsemide  She is euvolemic today and I would like to keep her at this weight  Follow up with Cardiology as well  Hypertension/renal artery disease: Patient's blood pressure is elevated today  I will add amlodipine 5 mg at nighttime watch for swelling  Monitor blood pressures and readjust  She will be seeing 1 of my advanced practitioner's in about 2 weeks to re-evaluate her edema, creatinine and blood pressure  Avoid salt as usual Electrolytes: Potassium 4  Five  Acceptable  Other electrolytes are normal CKD MBD: Secondary 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  Follow-up studies order  I would medication adjust Gabapentin for renal function at this time to 100 mg twice during the day, and once that evening  You can increase the gabapentin as renal function improves  History of MGUS follows with Dr Rohan Ayala- last hemoglobin 10 2   History of CVA, restless leg syndrome, elevated liver function tests secondary to biliary sludge7  C difficile: Treatment per nursing home  In terms of urinary tract infection, urinalysis is completely bland in the emergency room not suggesting any urinary tract infection  If urine symptoms persists please send to urologist if there is no evidence of an infection  spent an additional 20 minutes besides usual 20 minutes appointment time reviewing hospital records, correlating care at the nursing home, and discussing all the above with the covering physician Yeyo Peters will review her medications patient and our plan  The patient, patient's caretaker was counseled regarding diagnostic results,-- instructions for management,-- risk factor reductions,-- impressions,-- importance of compliance with treatment  total time of encounter was 40 minutes-- and-- 20 minutes was spent counseling  The patient has the current Goals: Monitor the edema and weight gainslabsin the nursing home regarding urinary studies  Patient is unable to Self-Care:   Possible side effects of new medications were reviewed with the patient/guardian today  The treatment plan was reviewed with the patient/guardian  The patient/guardian understands and agrees with the treatment plan    Indication for Services: CKD Stage 3  CKD Teaching includes hypertension management, Avoid nephrotoxic medication, sodium restriction and fluid management  Reason For Visit  Follow-up      History of Present Illness  She had an increase in weight and swelling  She was treated with torsemide 80 mg twice a day for 3 days and her weight came down approximately 6 lb as of today  She is currently back in torsemide 20 mg twice a day  was sent to the emergency room yesterday with dysuria and subsequently was thought to have urinary tract infection  Apparently she was recently given intravenous antibiotics at the nursing home for urinary tract infection was also diagnosed with C difficile   There was a concern for recurrent infection  She was sent home on oral antibiotics for C difficile  She has had diarrhea for about 2 weeks  Rare nausea but no vomiting  fevers or chills  Appetite is fair  Energy is also fair  complains of ongoing dysuria for couple weeks, no hematuria and no other urinary symptomsdyspnea on exertion she thinks is slightly worse, and she still feels she has persistent swelling but no chest pain  She does complain of a nonproductive coughsinus type headache, no dizziness or lightheadednesspressure medications:back down to 20 mg twice a daytartrate 50 mg twice a day      Past Medical History    The active problems and past medical history were reviewed and updated today  Surgical History    The surgical history was reviewed and updated today  Family History    The family history was reviewed and updated today  Social History  The social history was reviewed and updated today  The social history was reviewed and is unchanged  Current Meds   1  Acetaminophen 500 MG Oral Tablet; TAKE 1 TABLET EVERY 4 DAILY ; Therapy: 86VDO6996 to Recorded   2  Antacid 200-200-20 MG/5ML Oral Suspension; SWALLOW 30 ML Every 6 hours AS NEEDED FOR GI UPSET; Therapy: 58NXK9455 to Recorded   3  Artificial Tears 1 4 % Ophthalmic Solution; INSTILL 1-2 DROPS INTO EACH EYES TWICE DAILY; Therapy: (Recorded:31Oct2014) to Recorded   4  Ascriptin 325 MG Oral Tablet; TAKE 1 TABLET Every 4 hours or PRN; Therapy: 68UIO4059 to Recorded   5  Bisac-Evac 10 MG Rectal Suppository; Therapy: (Recorded:02Mar2016) to Recorded   6  Calcitriol 0 25 MCG Oral Capsule; TAKE 1 CAPSULE ORALLY DAILY (VITAMIN DEFICIENCY); Therapy: 32JZL4749 to [phone])  Requested for: 55SJP1563; Last Rx:[id number] Ordered   7  Co Q-10 200 MG Oral Capsule; TAKE 1 CAPSULE ORALLY DAILY (VITAMIN DEFICIENCY); Therapy: 60Vpb6655 to (Evaluate:68Opi0363)  Requested for: 55Lec4366; Last Rx:31Cfy0005 Ordered   8   Docusate Sodium 100 MG Oral Tablet; TAKE 1 TABLET TWICE DAILY AS DIRECTED  (COLACE); Therapy: (Recorded:13Mar2014) to Recorded   9  Eliquis 2 5 MG Oral Tablet; Take 1 tablet twice daily; Therapy: 24DGF3034 to (Evaluate:15Oct2018)  Requested for: 25MQH8245 Recorded   10  Fluticasone Propionate 50 MCG/ACT Nasal Suspension; USE 1 SPRAY IN EACH  NOSTRIL ONCE DAILY; Therapy: 55KKC7609 to Recorded   11  Latanoprost 0 005 % Ophthalmic Solution; Therapy: (Recorded:22Qyd7307) to Recorded   12  Laxative 10 MG Rectal Suppository; every 4th day or PRN for constipation MDD:1;  Therapy: 10JFG7657 to Recorded   13  Levothyroxine Sodium 88 MCG Oral Tablet; TAKE 1 TABLET DAILY; Therapy: (Dora Delgadillo) to Recorded   14  Loratadine 10 MG Oral Tablet; TAKE 1 TABLET DAILY; Therapy: 03PVT5369 to Recorded   15  Lumigan 0 01 % Ophthalmic Solution; Therapy: (Recorded:19Zyt0431) to Recorded   16  Magnesium 400 MG CAPS; take 1 capsule daily; Therapy: (Recorded:32Ahd8756) to Recorded   17  Melatonin 3 MG Oral Capsule; Therapy: (Recorded:38Wtl4318) to Recorded   18  Metoprolol Tartrate 50 MG Oral Tablet; Take 1 tablet twice daily; Therapy: (Recorded:76Mld4364) to Recorded   19  OXcarbazepine 150 MG Oral Tablet; TAKE 1 TABLET Bedtime; Therapy: (Recorded:89Cdx0438) to Recorded   20  Polyethylene Glycol 3350 Oral Powder; MIX GM  PRN; Therapy: 01PHH2462 to Recorded   21  ProAir  (90 Base) MCG/ACT Inhalation Aerosol Solution; 2 puffs every 6 hours as  needed; Therapy: 37GRM0044 to Recorded   22  Protonix 40 MG Oral Tablet Delayed Release; Take 1 tablet twice daily; Therapy: (Recorded:09Vuj9033) to Recorded   23  Red Yeast Rice Extract 600 MG Oral Capsule; TAKE 1 CAPSULE ORALLY TWICE DAILY  (VITAMIN DEFICIENCY); Therapy: 97FVJ3720 to (Evaluate:72Env9509)  Requested for: 07Sep2017; Last  Rx:07Sep2017 Ordered   24  ROPINIRole HCl - 0 5 MG Oral Tablet; take 0 5 tablet daily; Therapy: 97PBR3764 to Recorded   25   ROPINIRole HCl - 1 MG Oral Tablet; PRN; Therapy: (Recorded:89Uua7893) to Recorded   26  Tamsulosin HCl - 0 4 MG Oral Capsule; take 1 capsule daily; Therapy: 27PUB9215 to Recorded   27  Torsemide 20 MG Oral Tablet; Take 1 tablet BID on Monday, Wenesday and Friday; Therapy: 36ACZ5706 to Recorded   28  Torsemide 20 MG Oral Tablet; TAKE 1 TABLET ORALLY DAILY ON SUNDAY, TUESDAY,  THURSDAY & SATURDAY (40MG ALL OTHER DAYS) (CONGESTIVE HEART  FAILURE);TAKE 2 TABLETS (40MG) OR; Therapy: 15GUE2064 to (Tc Chacko)  Requested for: 84WPG3499; Last  Rx:93Ebd2232 Ordered   29  TraMADol HCl - 50 MG Oral Tablet; TAKE 1 TABLET Every 6 hours PRN; Therapy: 20XRB1385 to Recorded   30  Ventolin  (90 Base) MCG/ACT Inhalation Aerosol Solution; INHALE 1 TO 2 PUFFS  EVERY 6 HOURS AS NEEDED; Therapy: 15HFG5749 to Recorded   31  Vitamin D3 1000 UNIT Oral Tablet; Therapy: (Recorded:38Jrr6245) to Recorded    The medication list was reviewed and updated today  Allergies  1  Lipitor TABS   2  Morphine Derivatives   3  Percocet TABS   4  Spironolactone TABS   5  Statins   6  Sulfa Drugs  Denied    7  Acetaminophen TABS    Results/Data  (1) PT WITH INR [id number] 02:50PM EPIC, Provider   Test ordered by: Abdiaziz Garcia     Test Name Result Flag Reference   INR 1 22 H 0 86-1 16   PT 15 5 seconds H 12 1-14 4     (1) APTT [id number] 02:50PM EPIC, Provider   Test ordered by: Abdiaziz Garcia     Test Name Result Flag Reference   PARTIAL THROMBOPLASTIN TIME 42 seconds H 23-35     Therapeutic Heparin Range = 60-90 seconds     Nephrology Flowsheet [id number] 05:30PM [facility]     Test Name Result Flag Reference   Chloride 106         Thank you very much for allowing me to participate in the care of this patient  If you have any questions, please do not hesitate to contact me        Signatures   Electronically signed by : Ellen Dubin, M D ; Nov 21 2017  2:31PM EST                       (Author)

## 2017-11-24 DIAGNOSIS — E83.40 DISORDER OF MAGNESIUM METABOLISM: ICD-10-CM

## 2017-11-24 DIAGNOSIS — E78.5 HYPERLIPIDEMIA: ICD-10-CM

## 2017-11-24 DIAGNOSIS — N18.4 CHRONIC KIDNEY DISEASE, STAGE IV (SEVERE) (HCC): ICD-10-CM

## 2017-11-24 DIAGNOSIS — R60.9 EDEMA: ICD-10-CM

## 2017-11-24 DIAGNOSIS — I70.1 ATHEROSCLEROSIS OF RENAL ARTERY (HCC): ICD-10-CM

## 2017-11-24 DIAGNOSIS — I12.9 HYPERTENSIVE CHRONIC KIDNEY DISEASE WITH STAGE 1 THROUGH STAGE 4 CHRONIC KIDNEY DISEASE, OR UNSPECIFIED CHRONIC KIDNEY DISEASE: ICD-10-CM

## 2017-11-24 DIAGNOSIS — N18.30 CHRONIC KIDNEY DISEASE, STAGE III (MODERATE) (HCC): ICD-10-CM

## 2017-11-25 LAB
BACTERIA BLD CULT: NORMAL
BACTERIA BLD CULT: NORMAL

## 2017-12-01 DIAGNOSIS — N18.4 CHRONIC KIDNEY DISEASE, STAGE IV (SEVERE) (HCC): ICD-10-CM

## 2017-12-01 DIAGNOSIS — E78.5 HYPERLIPIDEMIA: ICD-10-CM

## 2017-12-01 DIAGNOSIS — N17.9 ACUTE KIDNEY FAILURE (HCC): ICD-10-CM

## 2017-12-01 DIAGNOSIS — N18.30 CHRONIC KIDNEY DISEASE, STAGE III (MODERATE) (HCC): ICD-10-CM

## 2017-12-01 DIAGNOSIS — R60.9 EDEMA: ICD-10-CM

## 2017-12-01 DIAGNOSIS — I12.9 HYPERTENSIVE CHRONIC KIDNEY DISEASE WITH STAGE 1 THROUGH STAGE 4 CHRONIC KIDNEY DISEASE, OR UNSPECIFIED CHRONIC KIDNEY DISEASE: ICD-10-CM

## 2017-12-01 DIAGNOSIS — E83.40 DISORDER OF MAGNESIUM METABOLISM: ICD-10-CM

## 2017-12-01 DIAGNOSIS — I70.1 ATHEROSCLEROSIS OF RENAL ARTERY (HCC): ICD-10-CM

## 2017-12-04 ENCOUNTER — GENERIC CONVERSION - ENCOUNTER (OUTPATIENT)
Dept: OTHER | Facility: OTHER | Age: 82
End: 2017-12-04

## 2017-12-07 ENCOUNTER — GENERIC CONVERSION - ENCOUNTER (OUTPATIENT)
Dept: OTHER | Facility: OTHER | Age: 82
End: 2017-12-07

## 2017-12-11 ENCOUNTER — HOSPITAL ENCOUNTER (OUTPATIENT)
Facility: HOSPITAL | Age: 82
Setting detail: OBSERVATION
Discharge: HOME | End: 2017-12-12
Attending: EMERGENCY MEDICINE | Admitting: INTERNAL MEDICINE
Payer: MEDICARE

## 2017-12-11 ENCOUNTER — APPOINTMENT (EMERGENCY)
Dept: RADIOLOGY | Facility: HOSPITAL | Age: 82
End: 2017-12-11
Payer: MEDICARE

## 2017-12-11 DIAGNOSIS — R07.9 CHEST PAIN: ICD-10-CM

## 2017-12-11 DIAGNOSIS — R06.02 SOB (SHORTNESS OF BREATH) ON EXERTION: Primary | ICD-10-CM

## 2017-12-11 LAB
ALBUMIN SERPL BCP-MCNC: 3.9 G/DL (ref 3.5–5)
ALP SERPL-CCNC: 88 U/L (ref 46–116)
ALT SERPL W P-5'-P-CCNC: 18 U/L (ref 12–78)
ANION GAP SERPL CALCULATED.3IONS-SCNC: 10 MMOL/L (ref 4–13)
AST SERPL W P-5'-P-CCNC: 22 U/L (ref 5–45)
BASOPHILS # BLD AUTO: 0.05 THOUSANDS/ΜL (ref 0–0.1)
BASOPHILS NFR BLD AUTO: 1 % (ref 0–1)
BILIRUB DIRECT SERPL-MCNC: 0.03 MG/DL (ref 0–0.2)
BILIRUB SERPL-MCNC: 0.3 MG/DL (ref 0.2–1)
BUN SERPL-MCNC: 45 MG/DL (ref 5–25)
CALCIUM SERPL-MCNC: 9.4 MG/DL (ref 8.3–10.1)
CHLORIDE SERPL-SCNC: 102 MMOL/L (ref 100–108)
CO2 SERPL-SCNC: 29 MMOL/L (ref 21–32)
CREAT SERPL-MCNC: 1.59 MG/DL (ref 0.6–1.3)
EOSINOPHIL # BLD AUTO: 0.35 THOUSAND/ΜL (ref 0–0.61)
EOSINOPHIL NFR BLD AUTO: 5 % (ref 0–6)
ERYTHROCYTE [DISTWIDTH] IN BLOOD BY AUTOMATED COUNT: 14.4 % (ref 11.6–15.1)
GFR SERPL CREATININE-BSD FRML MDRD: 29 ML/MIN/1.73SQ M
GLUCOSE SERPL-MCNC: 134 MG/DL (ref 65–140)
HCT VFR BLD AUTO: 35.5 % (ref 34.8–46.1)
HGB BLD-MCNC: 11.6 G/DL (ref 11.5–15.4)
LYMPHOCYTES # BLD AUTO: 1.14 THOUSANDS/ΜL (ref 0.6–4.47)
LYMPHOCYTES NFR BLD AUTO: 17 % (ref 14–44)
MCH RBC QN AUTO: 28.8 PG (ref 26.8–34.3)
MCHC RBC AUTO-ENTMCNC: 32.7 G/DL (ref 31.4–37.4)
MCV RBC AUTO: 88 FL (ref 82–98)
MONOCYTES # BLD AUTO: 0.51 THOUSAND/ΜL (ref 0.17–1.22)
MONOCYTES NFR BLD AUTO: 8 % (ref 4–12)
NEUTROPHILS # BLD AUTO: 4.61 THOUSANDS/ΜL (ref 1.85–7.62)
NEUTS SEG NFR BLD AUTO: 69 % (ref 43–75)
NT-PROBNP SERPL-MCNC: 683 PG/ML
PLATELET # BLD AUTO: 289 THOUSANDS/UL (ref 149–390)
PMV BLD AUTO: 10.4 FL (ref 8.9–12.7)
POTASSIUM SERPL-SCNC: 3.6 MMOL/L (ref 3.5–5.3)
PROT SERPL-MCNC: 8.1 G/DL (ref 6.4–8.2)
RBC # BLD AUTO: 4.03 MILLION/UL (ref 3.81–5.12)
SODIUM SERPL-SCNC: 141 MMOL/L (ref 136–145)
TROPONIN I SERPL-MCNC: <0.02 NG/ML
TROPONIN I SERPL-MCNC: <0.02 NG/ML
WBC # BLD AUTO: 6.66 THOUSAND/UL (ref 4.31–10.16)

## 2017-12-11 PROCEDURE — 85025 COMPLETE CBC W/AUTO DIFF WBC: CPT | Performed by: EMERGENCY MEDICINE

## 2017-12-11 PROCEDURE — 83880 ASSAY OF NATRIURETIC PEPTIDE: CPT | Performed by: EMERGENCY MEDICINE

## 2017-12-11 PROCEDURE — 84484 ASSAY OF TROPONIN QUANT: CPT | Performed by: INTERNAL MEDICINE

## 2017-12-11 PROCEDURE — 36415 COLL VENOUS BLD VENIPUNCTURE: CPT | Performed by: EMERGENCY MEDICINE

## 2017-12-11 PROCEDURE — 71020 HB CHEST X-RAY 2VW FRONTAL&LATL: CPT

## 2017-12-11 PROCEDURE — 93005 ELECTROCARDIOGRAM TRACING: CPT | Performed by: EMERGENCY MEDICINE

## 2017-12-11 PROCEDURE — 84484 ASSAY OF TROPONIN QUANT: CPT | Performed by: EMERGENCY MEDICINE

## 2017-12-11 PROCEDURE — 80048 BASIC METABOLIC PNL TOTAL CA: CPT | Performed by: EMERGENCY MEDICINE

## 2017-12-11 PROCEDURE — 87081 CULTURE SCREEN ONLY: CPT | Performed by: INTERNAL MEDICINE

## 2017-12-11 PROCEDURE — 87040 BLOOD CULTURE FOR BACTERIA: CPT | Performed by: EMERGENCY MEDICINE

## 2017-12-11 PROCEDURE — 99285 EMERGENCY DEPT VISIT HI MDM: CPT

## 2017-12-11 PROCEDURE — 80076 HEPATIC FUNCTION PANEL: CPT | Performed by: EMERGENCY MEDICINE

## 2017-12-11 RX ORDER — FLUTICASONE PROPIONATE 50 MCG
1 SPRAY, SUSPENSION (ML) NASAL DAILY
Status: DISCONTINUED | OUTPATIENT
Start: 2017-12-12 | End: 2017-12-12 | Stop reason: HOSPADM

## 2017-12-11 RX ORDER — GABAPENTIN 300 MG/1
300 CAPSULE ORAL
Status: DISCONTINUED | OUTPATIENT
Start: 2017-12-11 | End: 2017-12-12 | Stop reason: HOSPADM

## 2017-12-11 RX ORDER — LORATADINE 10 MG/1
10 TABLET ORAL DAILY
Status: DISCONTINUED | OUTPATIENT
Start: 2017-12-12 | End: 2017-12-12 | Stop reason: HOSPADM

## 2017-12-11 RX ORDER — OXCARBAZEPINE 150 MG/1
150 TABLET, FILM COATED ORAL
Status: DISCONTINUED | OUTPATIENT
Start: 2017-12-11 | End: 2017-12-12 | Stop reason: HOSPADM

## 2017-12-11 RX ORDER — ALBUTEROL SULFATE 90 UG/1
2 AEROSOL, METERED RESPIRATORY (INHALATION) EVERY 6 HOURS PRN
Status: DISCONTINUED | OUTPATIENT
Start: 2017-12-11 | End: 2017-12-12 | Stop reason: HOSPADM

## 2017-12-11 RX ORDER — AMLODIPINE BESYLATE 5 MG/1
5 TABLET ORAL DAILY
COMMUNITY
End: 2018-04-16 | Stop reason: HOSPADM

## 2017-12-11 RX ORDER — PANTOPRAZOLE SODIUM 40 MG/1
40 TABLET, DELAYED RELEASE ORAL DAILY
Status: DISCONTINUED | OUTPATIENT
Start: 2017-12-12 | End: 2017-12-12 | Stop reason: HOSPADM

## 2017-12-11 RX ORDER — DOCUSATE SODIUM 100 MG/1
100 CAPSULE, LIQUID FILLED ORAL 2 TIMES DAILY
Status: DISCONTINUED | OUTPATIENT
Start: 2017-12-11 | End: 2017-12-12 | Stop reason: HOSPADM

## 2017-12-11 RX ORDER — ROPINIROLE 0.25 MG/1
0.5 TABLET, FILM COATED ORAL ONCE
Status: DISCONTINUED | OUTPATIENT
Start: 2017-12-11 | End: 2017-12-12 | Stop reason: HOSPADM

## 2017-12-11 RX ORDER — ACETAMINOPHEN 325 MG/1
650 TABLET ORAL EVERY 6 HOURS PRN
Status: DISCONTINUED | OUTPATIENT
Start: 2017-12-11 | End: 2017-12-12 | Stop reason: HOSPADM

## 2017-12-11 RX ORDER — ROPINIROLE 1 MG/1
1 TABLET, FILM COATED ORAL EVERY 8 HOURS
Status: DISCONTINUED | OUTPATIENT
Start: 2017-12-11 | End: 2017-12-12 | Stop reason: HOSPADM

## 2017-12-11 RX ORDER — AMPICILLIN TRIHYDRATE 250 MG
1 CAPSULE ORAL 2 TIMES DAILY
Status: DISCONTINUED | OUTPATIENT
Start: 2017-12-11 | End: 2017-12-12 | Stop reason: HOSPADM

## 2017-12-11 RX ORDER — BRIMONIDINE TARTRATE 0.15 %
1 DROPS OPHTHALMIC (EYE) 2 TIMES DAILY
Status: DISCONTINUED | OUTPATIENT
Start: 2017-12-11 | End: 2017-12-12 | Stop reason: HOSPADM

## 2017-12-11 RX ORDER — CALCITRIOL 0.25 UG/1
0.25 CAPSULE, LIQUID FILLED ORAL DAILY
Status: DISCONTINUED | OUTPATIENT
Start: 2017-12-12 | End: 2017-12-12 | Stop reason: HOSPADM

## 2017-12-11 RX ORDER — LATANOPROST 50 UG/ML
1 SOLUTION/ DROPS OPHTHALMIC
Status: DISCONTINUED | OUTPATIENT
Start: 2017-12-11 | End: 2017-12-11 | Stop reason: ALTCHOICE

## 2017-12-11 RX ORDER — CHOLECALCIFEROL (VITAMIN D3) 125 MCG
100 CAPSULE ORAL DAILY
Status: DISCONTINUED | OUTPATIENT
Start: 2017-12-12 | End: 2017-12-12 | Stop reason: HOSPADM

## 2017-12-11 RX ORDER — LEVOTHYROXINE SODIUM 0.07 MG/1
75 TABLET ORAL
Status: DISCONTINUED | OUTPATIENT
Start: 2017-12-12 | End: 2017-12-12 | Stop reason: HOSPADM

## 2017-12-11 RX ORDER — ONDANSETRON 2 MG/ML
4 INJECTION INTRAMUSCULAR; INTRAVENOUS EVERY 6 HOURS PRN
Status: DISCONTINUED | OUTPATIENT
Start: 2017-12-11 | End: 2017-12-12 | Stop reason: HOSPADM

## 2017-12-11 RX ORDER — LANOLIN ALCOHOL/MO/W.PET/CERES
6 CREAM (GRAM) TOPICAL
Status: DISCONTINUED | OUTPATIENT
Start: 2017-12-11 | End: 2017-12-12 | Stop reason: HOSPADM

## 2017-12-11 RX ORDER — GABAPENTIN 300 MG/1
300 CAPSULE ORAL ONCE
Status: COMPLETED | OUTPATIENT
Start: 2017-12-11 | End: 2017-12-11

## 2017-12-11 RX ORDER — AMLODIPINE BESYLATE 5 MG/1
5 TABLET ORAL DAILY
Status: DISCONTINUED | OUTPATIENT
Start: 2017-12-12 | End: 2017-12-12 | Stop reason: HOSPADM

## 2017-12-11 RX ORDER — GABAPENTIN 100 MG/1
100 CAPSULE ORAL 2 TIMES DAILY
Status: DISCONTINUED | OUTPATIENT
Start: 2017-12-11 | End: 2017-12-12 | Stop reason: HOSPADM

## 2017-12-11 RX ORDER — POLYETHYLENE GLYCOL 3350 17 G/17G
17 POWDER, FOR SOLUTION ORAL DAILY PRN
Status: DISCONTINUED | OUTPATIENT
Start: 2017-12-11 | End: 2017-12-12 | Stop reason: HOSPADM

## 2017-12-11 RX ORDER — METOPROLOL TARTRATE 50 MG/1
50 TABLET, FILM COATED ORAL 2 TIMES DAILY
Status: DISCONTINUED | OUTPATIENT
Start: 2017-12-11 | End: 2017-12-12 | Stop reason: HOSPADM

## 2017-12-11 RX ORDER — TORSEMIDE 20 MG/1
20 TABLET ORAL 2 TIMES DAILY
Status: DISCONTINUED | OUTPATIENT
Start: 2017-12-11 | End: 2017-12-12 | Stop reason: HOSPADM

## 2017-12-11 RX ORDER — TAMSULOSIN HYDROCHLORIDE 0.4 MG/1
0.4 CAPSULE ORAL
Status: DISCONTINUED | OUTPATIENT
Start: 2017-12-11 | End: 2017-12-12 | Stop reason: HOSPADM

## 2017-12-11 RX ORDER — MAGNESIUM HYDROXIDE/ALUMINUM HYDROXICE/SIMETHICONE 120; 1200; 1200 MG/30ML; MG/30ML; MG/30ML
30 SUSPENSION ORAL EVERY 4 HOURS PRN
Status: DISCONTINUED | OUTPATIENT
Start: 2017-12-11 | End: 2017-12-12 | Stop reason: HOSPADM

## 2017-12-11 RX ORDER — GABAPENTIN 100 MG/1
100 CAPSULE ORAL 2 TIMES DAILY
COMMUNITY
End: 2018-04-16 | Stop reason: HOSPADM

## 2017-12-11 RX ORDER — LORATADINE 10 MG/1
10 TABLET ORAL DAILY
COMMUNITY
End: 2018-03-29 | Stop reason: ALTCHOICE

## 2017-12-11 RX ORDER — ACETAMINOPHEN 325 MG/1
650 TABLET ORAL ONCE
Status: COMPLETED | OUTPATIENT
Start: 2017-12-11 | End: 2017-12-11

## 2017-12-11 RX ORDER — SIMETHICONE 80 MG
80 TABLET,CHEWABLE ORAL 4 TIMES DAILY PRN
Status: DISCONTINUED | OUTPATIENT
Start: 2017-12-11 | End: 2017-12-12 | Stop reason: HOSPADM

## 2017-12-11 RX ORDER — MELATONIN
1000 DAILY
Status: DISCONTINUED | OUTPATIENT
Start: 2017-12-12 | End: 2017-12-12 | Stop reason: HOSPADM

## 2017-12-11 RX ADMIN — MELATONIN 6 MG: 3 TAB ORAL at 22:11

## 2017-12-11 RX ADMIN — OXCARBAZEPINE 150 MG: 150 TABLET ORAL at 22:11

## 2017-12-11 RX ADMIN — GABAPENTIN 300 MG: 300 CAPSULE ORAL at 22:11

## 2017-12-11 RX ADMIN — DOCUSATE SODIUM 100 MG: 100 CAPSULE, LIQUID FILLED ORAL at 21:55

## 2017-12-11 RX ADMIN — ACETAMINOPHEN 650 MG: 325 TABLET ORAL at 18:48

## 2017-12-11 RX ADMIN — TORSEMIDE 20 MG: 20 TABLET ORAL at 21:57

## 2017-12-11 RX ADMIN — ROPINIROLE 1 MG: 0.25 TABLET, FILM COATED ORAL at 21:12

## 2017-12-11 RX ADMIN — GABAPENTIN 300 MG: 300 CAPSULE ORAL at 18:48

## 2017-12-11 RX ADMIN — TAMSULOSIN HYDROCHLORIDE 0.4 MG: 0.4 CAPSULE ORAL at 21:57

## 2017-12-11 RX ADMIN — METOPROLOL TARTRATE 50 MG: 50 TABLET ORAL at 21:56

## 2017-12-11 RX ADMIN — APIXABAN 2.5 MG: 2.5 TABLET, FILM COATED ORAL at 21:55

## 2017-12-11 RX ADMIN — BRIMONIDINE TARTRATE 1 DROP: 1.5 SOLUTION OPHTHALMIC at 22:11

## 2017-12-11 NOTE — ED PROVIDER NOTES
History  Chief Complaint   Patient presents with    Chest Pain     Pt from [facility], pt started 1 hour ago with R sided CP and "a little SOB"  Pt c/o R sided 4/10 CP and c/o SOB  pt had 1 tablet nitro and 324mg aspirin by EMS, pt reports pain unchanged  History provided by:  Patient  Chest Pain   Pain location:  Substernal area  Pain quality: aching    Pain radiates to:  Does not radiate  Pain radiates to the back: no    Pain severity:  Moderate  Onset quality:  Gradual  Duration:  1 hour  Timing:  Intermittent  Progression:  Worsening  Chronicity:  New  Context: no movement    Relieved by:  None tried  Worsened by:  Nothing tried  Ineffective treatments:  None tried  Associated symptoms: palpitations and shortness of breath    Associated symptoms: no abdominal pain, no anxiety, no cough, no diaphoresis, no dizziness, no fever, no headache, no nausea, no numbness and not vomiting        Prior to Admission Medications   Prescriptions Last Dose Informant Patient Reported? Taking?    Alum & Mag Hydroxide-Simeth (ANTACID ANTI-GAS PO)   Yes No   Sig: Take 30 mL by mouth every 6 (six) hours as needed     CO-ENZYME Q-10 PO   Yes Yes   Sig: Take 200 mg by mouth daily   OXcarbazepine (TRILEPTAL) 150 mg tablet   No Yes   Sig: Take 1 tablet by mouth daily at bedtime   Red Yeast Rice 600 MG CAPS   Yes Yes   Sig: Take 1 capsule by mouth 2 (two) times a day   acetaminophen (TYLENOL) 500 mg tablet   No Yes   Sig: Take 1 tablet by mouth every 12 (twelve) hours   albuterol (PROVENTIL HFA,VENTOLIN HFA) 90 mcg/act inhaler   Yes No   Sig: Inhale 2 puffs every 6 (six) hours as needed for wheezing   amLODIPine (NORVASC) 5 mg tablet   Yes Yes   Sig: Take 5 mg by mouth daily   apixaban (ELIQUIS) 2 5 mg   No Yes   Sig: Take 1 tablet by mouth 2 (two) times a day   bimatoprost (LUMIGAN) 0 01 % ophthalmic drops   Yes Yes   Sig: Administer 1 drop to both eyes daily at bedtime     brimonidine (ALPHAGAN P) 0 1 %   Yes Yes   Sig: Administer 1 drop to both eyes 2 (two) times a day     calcitriol (ROCALTROL) 0 25 mcg capsule   Yes Yes   Sig: Take 0 25 mcg by mouth daily     cholecalciferol (VITAMIN D3) 1,000 units tablet   Yes No   Sig: Take 1,000 Units by mouth daily   docusate sodium (COLACE) 100 mg capsule   Yes No   Sig: Take 100 mg by mouth 2 (two) times a day   fluticasone (FLONASE) 50 mcg/act nasal spray   Yes Yes   Si spray into each nostril daily  gabapentin (NEURONTIN) 100 mg capsule   Yes Yes   Sig: Take 100 mg by mouth 2 (two) times a day   gabapentin (NEURONTIN) 300 mg capsule   No Yes   Sig: Take 2 capsules by mouth daily at bedtime   Patient taking differently: Take 300 mg by mouth daily at bedtime     latanoprost (XALATAN) 0 005 % ophthalmic solution   Yes Yes   Sig: Administer 1 drop to both eyes daily at bedtime  levothyroxine 75 mcg tablet   No Yes   Sig: Take 1 tablet by mouth daily in the early morning   loratadine (CLARITIN) 10 mg tablet   Yes Yes   Sig: Take 10 mg by mouth daily   melatonin 3 mg   No Yes   Sig: Take 2 tablets by mouth daily at bedtime   metoprolol tartrate (LOPRESSOR) 50 mg tablet   Yes Yes   Sig: Take 50 mg by mouth 2 (two) times a day   pantoprazole (PROTONIX) 40 mg tablet   Yes Yes   Sig: Take 40 mg by mouth daily     polyethylene glycol (MIRALAX) 17 g packet   Yes No   Sig: Take 17 g by mouth as needed     rOPINIRole (REQUIP) 0 5 mg tablet   No Yes   Sig: Take 1 tablet by mouth every 6 (six) hours   Patient taking differently: Take 1 mg by mouth every 8 (eight) hours     tamsulosin (FLOMAX) 0 4 mg   No Yes   Sig: Take 1 capsule by mouth daily with dinner   torsemide (DEMADEX) 20 mg tablet   No Yes   Sig: Take 1 tablet by mouth daily   Patient taking differently: Take 20 mg by mouth 2 (two) times a day        Facility-Administered Medications: None       Past Medical History:   Diagnosis Date    A-fib ([place] [id number] )     Anemia     Anemia     Anxiety     Asthma     CAD (coronary artery disease)  [facility]     CH[facility]congestive [facility] failure) [facility]     CKD (chronic kidney disease) stage 3, GFR 30-59 ml/min     ckd3    Constipation     [facility] )     left hemiparesis    Diastolic CHF, chronic (HCC)     Disease of thyroid gland     Dry eye     First degree AV block     Gastritis     Gastritis     GERD (gastroesophageal reflux disease)     Glaucoma     Glaucoma     Hemiparesis ([facility])     left side    Hemiparesis affecting left side as late effect of stroke ([facility])     Hyperlipidemia     Hypertension     Impetigo     Insomnia     Knee pain     Neuropathy     Osteoarthritis     Parkinson disease ([facility] )     Pulmonary HTN     PVD (peripheral vascular disease) ([facility])     Restless leg     Vitamin D deficiency        Past Surgical History:   Procedure Laterality Date    APPENDECTOMY      BLADDER SURGERY      CAROTID ENDARTERECTOMY Left     ESOPHAGOGASTRODUODENOSCOPY N/A 4/8/2016    Procedure: ESOPHAGOGASTRODUODENOSCOPY (EGD); Surgeon: Sanket Jo MD;  Location: [facility]; Service:     RENAL ARTERY STENT         Family History   Problem Relation Age of Onset    Stroke Father     Cancer Brother     No Known Problems Mother      I have reviewed and agree with the history as documented  Social History   Substance Use Topics    Smoking status: Never Smoker    Smokeless tobacco: Never Used    Alcohol use No        Review of Systems   Constitutional: Negative for activity change, chills, diaphoresis and fever  HENT: Negative for congestion, sinus pressure and sore throat  Eyes: Negative for pain and visual disturbance  Respiratory: Positive for shortness of breath  Negative for cough, chest tightness, wheezing and stridor  Cardiovascular: Positive for chest pain and palpitations  Gastrointestinal: Negative for abdominal distention, abdominal pain, constipation, diarrhea, nausea and vomiting     Genitourinary: Negative for dysuria and frequency  Musculoskeletal: Negative for neck pain and neck stiffness  Skin: Negative for rash  Neurological: Negative for dizziness, speech difficulty, light-headedness, numbness and headaches  Physical Exam  ED Triage Vitals [12/11/17 1650]   Temperature Pulse Respirations Blood Pressure SpO2   97 8 °F (36 6 °C) 93 22 142/66 96 %      Temp Source Heart Rate Source Patient Position - Orthostatic VS BP Location FiO2 (%)   Oral Monitor Sitting Right arm --      Pain Score       4           Orthostatic Vital Signs  Vitals:    12/11/17 1650   BP: 142/66   Pulse: 93   Patient Position - Orthostatic VS: Sitting       Physical Exam   Constitutional: She is oriented to person, place, and time  She appears well-developed  No distress  HENT:   Head: Normocephalic and atraumatic  Eyes: Pupils are equal, round, and reactive to light  Neck: Normal range of motion  Neck supple  No tracheal deviation present  Cardiovascular: Normal rate, regular rhythm, normal heart sounds and intact distal pulses  No murmur heard  Pulmonary/Chest: Effort normal and breath sounds normal  No stridor  No respiratory distress  Abdominal: Soft  She exhibits no distension  There is no tenderness  There is no rebound and no guarding  Musculoskeletal: Normal range of motion  She exhibits edema  Neurological: She is alert and oriented to person, place, and time  Skin: Skin is warm and dry  She is not diaphoretic  No erythema  No pallor  Psychiatric: She has a normal mood and affect  Vitals reviewed        ED Medications  Medications - No data to display    Diagnostic Studies  Results Reviewed     Procedure Component Value Units Date/Time    Hepatic function panel [[id number]]  (Normal) Collected:  12/11/17 1715    Lab Status:  Final result Specimen:  Blood from Arm, Right Updated:  12/11/17 1817     Total Bilirubin 0 30 mg/dL      Bilirubin, Direct 0 03 mg/dL      Alkaline Phosphatase 88 U/L AST 22 U/L      ALT 18 U/L      Total Protein 8 1 g/dL      Albumin 3 9 g/dL     B-type natriuretic peptide [[id number]]  (Abnormal) Collected:  12/11/17 1715    Lab Status:  Final result Specimen:  Blood from Arm, Right Updated:  12/11/17 1801     NT-proBNP 683 (H) pg/mL     Basic metabolic panel [[id number]]  (Abnormal) Collected:  12/11/17 1715    Lab Status:  Final result Specimen:  Blood from Arm, Right Updated:  12/11/17 1751     Sodium 141 mmol/L      Potassium 3 6 mmol/L      Chloride 102 mmol/L      CO2 29 mmol/L      Anion Gap 10 mmol/L      BUN 45 (H) mg/dL      Creatinine 1 59 (H) mg/dL      Glucose 134 mg/dL      Calcium 9 4 mg/dL      eGFR 29 ml/min/1 73sq m     Narrative:         National Kidney Disease Education Program recommendations are as follows:  GFR calculation is accurate only with a steady state creatinine  Chronic Kidney disease less than 60 ml/min/1 73 sq  meters  Kidney failure less than 15 ml/min/1 73 sq  meters  Troponin I [[id number]]  (Normal) Collected:  12/11/17 1715    Lab Status:  Final result Specimen:  Blood from Arm, Right Updated:  12/11/17 1751     Troponin I <0 02 ng/mL     Narrative:         [employer] Chemistry analyzer 99% cutoff is > 0 04 ng/mL in network labs    o cTnI 99% cutoff is useful only when applied to patients in the clinical setting of myocardial ischemia  o cTnI 99% cutoff should be interpreted in the context of clinical history, ECG findings and possibly cardiac imaging to establish correct diagnosis  o cTnI 99% cutoff may be suggestive but clearly not indicative of a coronary event without the clinical setting of myocardial ischemia      CBC and differential [[id number]]  (Normal) Collected:  12/11/17 1714    Lab Status:  Final result Specimen:  Blood from Arm, Right Updated:  12/11/17 1735     WBC 6 66 Thousand/uL      RBC 4 03 Million/uL      Hemoglobin 11 6 g/dL      Hematocrit 35 5 %      MCV 88 fL      MCH 28 8 pg      MCHC 32 7 g/dL      RDW 14 4 %      MPV 10 4 fL      Platelets 426 Thousands/uL      Neutrophils Relative 69 %      Lymphocytes Relative 17 %      Monocytes Relative 8 %      Eosinophils Relative 5 %      Basophils Relative 1 %      Neutrophils Absolute 4 61 Thousands/µL      Lymphocytes Absolute 1 14 Thousands/µL      Monocytes Absolute 0 51 Thousand/µL      Eosinophils Absolute 0 35 Thousand/µL      Basophils Absolute 0 05 Thousands/µL     Blood culture #2 [[id number]] Collected:  12/11/17 1715    Lab Status: In process Specimen:  Blood from Arm, Right Updated:  12/11/17 1730    Blood culture #1 [[id number]] Collected:  12/11/17 1724    Lab Status:   In process Specimen:  Blood from Hand, Left Updated:  12/11/17 1730                 XR chest 2 views   ED Interpretation by Miranda Wylie DO (12/11 1800)   No acute pathology                 Procedures  ECG 12 Lead Documentation  Date/Time: 12/11/2017 5:05 PM  Performed by: Shirin Kitchen by: Aminata Schmidt     ECG reviewed by me, the ED Provider: yes    Patient location:  ED  Previous ECG:     Previous ECG:  Compared to current    Comparison ECG info:  11 20 2017    Similarity:  No change  Interpretation:     Interpretation: non-specific    Rate:     ECG rate:  86    ECG rate assessment: normal    Rhythm:     Rhythm: sinus rhythm    Ectopy:     Ectopy: PVCs      PVCs:  Infrequent  QRS:     QRS axis:  Normal    QRS intervals:  Normal  Conduction:     Conduction: normal    ST segments:     ST segments:  Non-specific  T waves:     T waves: non-specific             Phone Contacts  ED Phone Contact    ED Course  ED Course          HEART Risk Score    Flowsheet Row Most Recent Value   History  1 Filed at: 12/11/2017 1707   ECG  0 Filed at: 12/11/2017 1707   Age  2 Filed at: 12/11/2017 1707   Risk Factors  2 Filed at: 12/11/2017 1707   Troponin  0 Filed at: 12/11/2017 1707   Heart Score Risk Calculator   History  1 Filed at: 12/11/2017 1707   ECG  0 Filed at: 12/11/2017 1707   Age  2 Filed at: 12/11/2017 1707   Risk Factors  2 Filed at: 12/11/2017 1707   Troponin  0 Filed at: 12/11/2017 1707   HEART Score  5 Filed at: 12/11/2017 1707   HEART Score  5 Filed at: 12/11/2017 1707                            [facility]  Number of Diagnoses or Management Options  Chest pain: new and requires workup  SOB (shortness of breath) on exertion: new and requires workup  Diagnosis management comments:  80year-old female shortness of breath associated with chest pain, multiple risk factors, elevated heart score patient will need to be admitted hospital for rule out       Amount and/or Complexity of Data Reviewed  Clinical lab tests: ordered and reviewed  Tests in the radiology section of CPT®: ordered and reviewed  Review and summarize past medical records: yes  Discuss the patient with other providers: yes  Independent visualization of images, tracings, or specimens: yes      CritCare Time    Disposition  Final diagnoses:   SOB (shortness of breath) on exertion   Chest pain     Time reflects when diagnosis was documented in both MDM as applicable and the Disposition within this note     Time User Action Codes Description Comment    12/11/2017  6:29 PM Yamile Mohr Add [R06 02] SOB (shortness of breath) on exertion     12/11/2017  6:29 PM Yamile Mohr Add [R07 9] Chest pain       ED Disposition     ED Disposition Condition Comment    Admit  Case was discussed with Dr Matthew Donato and the patient's admission status was agreed to be Admission Status: observation status to the service of Dr Matthew Donato   Follow-up Information    None       Patient's Medications   Discharge Prescriptions    No medications on file     No discharge procedures on file      ED Provider  Electronically Signed by           Bronwyn Lui DO  12/11/17 3909

## 2017-12-11 NOTE — H&P
History and Physical - [age] [facility] Internal Medicine    Patient Information: Teofilo Hernandez 80 y o  female MRN: [id number]  Unit/Bed#: ED 24 Encounter: [id number]  Admitting Physician: Carlos Finch MD  PCP: Alecia Tee MD  Date of Admission:  12/11/17    Assessment/Plan:    Hospital Problem List:     Principal Problem:    Chest pain  Active Problems:    CKD (chronic kidney disease) stage 3, GFR 30-59 ml/min    Hemiparesis affecting left side as late effect of stroke ([address] )    Restless leg      Plan for the Primary Problem(s):  · Chest Pain  · Patient's pain is atypical   I had a long conversation with patient patient's son they are on interested in stress test and/or definitely understand the cardiac catheterization  Will rule out for myocardial infarction with serial enzymes assuming enzymes are negative would recommend no further workup continue medical treatment for this atypical pain chest pain  She had incised the positive consideration could be given for Cardiology evaluation    Plan for Additional Problems:   · Chronic kidney disease stage 3 renal function at baseline  · Restless leg syndrome on medication given in ER  · Chronic hemiparesis of left side secondary to stroke continue anticoagulation    VTE Prophylaxis: Apixaban (Eliquis)  / sequential compression device   Code Status:  DNR level 3  POLST: POLST form is not discussed and not completed at this time  Anticipated Length of Stay:  Patient will be admitted on an Observation basis with an anticipated length of stay of  less than 2 midnights  Justification for Hospital Stay:  Rule out myocardial fracture with plates discharged home    Total Time for Visit, including Counseling / Coordination of Care: 30 minutes  Greater than 50% of this total time spent on direct patient counseling and coordination of care      Chief Complaint:   I did not feel good    History of Present Illness:    Teofilo Hernandez is a 80 y o  female who presents with chest pain  Patient was home had a piece of cake and glass of milk with family  After which she developed some right-sided chest pain radiating to the right shoulder  Denies palpitations shortness of breath or diaphoresis pain was self-limited and resolved spontaneously after approximately 1 hour  Patient presented emergency room for evaluation  I long conversation the patient patient's son there is no desire for a stress test or cardiac catheterization but would like to rule out myocardial infarction  Will admit observation as above  Review of Systems:    Review of Systems   Constitutional: Negative for chills, diaphoresis and fatigue  HENT: Negative for drooling and sore throat  Eyes: Negative for visual disturbance  Respiratory: Negative for cough, choking, chest tightness, shortness of breath, wheezing and stridor  Cardiovascular: Positive for chest pain  Negative for palpitations and leg swelling  Gastrointestinal: Negative for constipation, diarrhea and nausea  Endocrine: Negative for polydipsia, polyphagia and polyuria  Genitourinary: Negative for dysuria and frequency  Musculoskeletal: Negative for back pain  Neurological: Negative for seizures and weakness  Hematological: Does not bruise/bleed easily  Psychiatric/Behavioral: Negative for confusion         Past Medical and Surgical History:     Past Medical History:   Diagnosis Date    A-fib ([facility] )     Anemia     Anemia     Anxiety     Asthma     CAD (coronary artery disease)     [facility] failure[facility]     CHF [facility]congestive heart failure) [facility]     CKD (chronic kidney disease) stage 3, GFR 30-59 ml/min     ckd3    Constipation     CVA (cerebral vascular accident) ([place] [id number] )     left hemiparesis    Diastolic CHF, chronic ([facility])     Disease of thyroid gland     Dry eye     First degree AV block     Gastritis     Gastritis     GERD (gastroesophageal reflux disease)     Glaucoma     Glaucoma     Hemiparesis [facility]     left side    Hemiparesis affecting left side as late effect of stroke (HCC)     Hyperlipidemia     Hypertension     Impetigo     Insomnia     Knee pain     Neuropathy     Osteoarthritis     Parkinson disease [facility]     Pulmonary HTN     PVD [facility]peripheral vascular disease) ([facility]     Restless leg     Vitamin D deficiency        Past Surgical History:   Procedure Laterality Date    APPENDECTOMY      BLADDER SURGERY      CAROTID ENDARTERECTOMY Left     ESOPHAGOGASTRODUODENOSCOPY N/A 4/8/2016    Procedure: ESOPHAGOGASTRODUODENOSCOPY (EGD); Surgeon: Ijeoma Garncia MD;  Location: [facility]; Service:     RENAL ARTERY STENT         Meds/Allergies:    Prior to Admission medications    Medication Sig Start Date End Date Taking? Authorizing Provider   acetaminophen (TYLENOL) 500 mg tablet Take 1 tablet by mouth every 12 (twelve) hours 7/29/17  Yes Kevin Ni MD   amLODIPine (NORVASC) 5 mg tablet Take 5 mg by mouth daily   Yes Historical Provider, MD   apixaban (ELIQUIS) 2 5 mg Take 1 tablet by mouth 2 (two) times a day 7/29/17  Yes Kevin Ni MD   bimatoprost (LUMIGAN) 0 01 % ophthalmic drops Administer 1 drop to both eyes daily at bedtime     Yes Historical Provider, MD   brimonidine (ALPHAGAN P) 0 1 % Administer 1 drop to both eyes 2 (two) times a day     Yes Historical Provider, MD   calcitriol (ROCALTROL) 0 25 mcg capsule Take 0 25 mcg by mouth daily     Yes Historical Provider, MD   CO-ENZYME Q-10 PO Take 200 mg by mouth daily   Yes Historical Provider, MD   fluticasone (FLONASE) 50 mcg/act nasal spray 1 spray into each nostril daily     Yes Historical Provider, MD   gabapentin (NEURONTIN) 100 mg capsule Take 100 mg by mouth 2 (two) times a day   Yes Historical Provider, MD   gabapentin (NEURONTIN) 300 mg capsule Take 2 capsules by mouth daily at bedtime  Patient taking differently: Take 300 mg by mouth daily at bedtime   7/29/17  Yes Kevin Ni MD latanoprost (XALATAN) 0 005 % ophthalmic solution Administer 1 drop to both eyes daily at bedtime  Yes Historical Provider, MD   levothyroxine 75 mcg tablet Take 1 tablet by mouth daily in the early morning 7/29/17  Yes Zacarias Godoy MD   loratadine (CLARITIN) 10 mg tablet Take 10 mg by mouth daily   Yes Historical Provider, MD   melatonin 3 mg Take 2 tablets by mouth daily at bedtime 7/29/17  Yes Zacarias Godoy MD   metoprolol tartrate (LOPRESSOR) 50 mg tablet Take 50 mg by mouth 2 (two) times a day   Yes Historical Provider, MD   OXcarbazepine (TRILEPTAL) 150 mg tablet Take 1 tablet by mouth daily at bedtime 7/29/17  Yes Zacarias Godoy MD   pantoprazole (PROTONIX) 40 mg tablet Take 40 mg by mouth daily     Yes Historical Provider, MD   Red Yeast Rice 600 MG CAPS Take 1 capsule by mouth 2 (two) times a day   Yes Historical Provider, MD   rOPINIRole (REQUIP) 0 5 mg tablet Take 1 tablet by mouth every 6 (six) hours  Patient taking differently: Take 1 mg by mouth every 8 (eight) hours   7/29/17  Yes Zacarias Godoy MD   tamsulosin (FLOMAX) 0 4 mg Take 1 capsule by mouth daily with dinner 7/20/17  Yes Geryl Bernheim, MD   torsemide (DEMADEX) 20 mg tablet Take 1 tablet by mouth daily  Patient taking differently: Take 20 mg by mouth 2 (two) times a day   7/20/17  Yes Geryl Bernheim, MD   albuterol (PROVENTIL HFA,VENTOLIN HFA) 90 mcg/act inhaler Inhale 2 puffs every 6 (six) hours as needed for wheezing    Historical Provider, MD   Alum & Mag Hydroxide-Simeth (ANTACID ANTI-GAS PO) Take 30 mL by mouth every 6 (six) hours as needed      Historical Provider, MD   cholecalciferol (VITAMIN D3) 1,000 units tablet Take 1,000 Units by mouth daily    Historical Provider, MD   docusate sodium (COLACE) 100 mg capsule Take 100 mg by mouth 2 (two) times a day    Historical Provider, MD   polyethylene glycol (MIRALAX) 17 g packet Take 17 g by mouth as needed      Historical Provider, MD   acetaminophen (TYLENOL) 325 mg tablet Take 1 tablet by mouth every 6 (six) hours as needed for mild pain  Patient taking differently: Take 325 mg by mouth every 4 (four) hours as needed for mild pain   7/29/17 12/11/17  Marshia Dance, MD   bisacodyl (DULCOLAX) 10 mg suppository Insert 10 mg into the rectum as needed for constipation (EVERY 4TH DAY PRN)    12/11/17  Historical Provider, MD   gabapentin (NEURONTIN) 300 mg capsule Take 1 capsule by mouth 2 (two) times a day 7/29/17 12/11/17  Marshia Dance, MD   magnesium oxide (MAG-OX) 400 mg Take 1 tablet by mouth daily for 30 days 7/29/17 12/11/17  Marshia Dance, MD   nystatin (MYCOSTATIN) powder Apply topically 3 (three) times a day 11/20/17 12/11/17  Roxann Alfaro PA-C   rOPINIRole (REQUIP) 1 mg tablet Take 1 mg by mouth daily at bedtime  12/11/17  Historical Provider, MD     I have reviewed home medications using allscripts  Allergies: Allergies   Allergen Reactions    Lipitor [Atorvastatin]     Morphine     Percocet [Oxycodone-Acetaminophen] GI Intolerance    Spironolactone     Sulfa Antibiotics        Social History:     Marital Status:    Occupation:   Patient Pre-hospital Living Situation:   Patient Pre-hospital Level of Mobility:   Patient Pre-hospital Diet Restrictions:   Substance Use History:   History   Alcohol Use No     History   Smoking Status    Never Smoker   Smokeless Tobacco    Never Used     History   Drug Use No       Family History:    Family History   Problem Relation Age of Onset    Stroke Father     Cancer Brother     No Known Problems Mother        Physical Exam:     Vitals:   Blood Pressure: 142/66 (12/11/17 1650)  Pulse: 93 (12/11/17 1650)  Temperature: 97 8 °F (36 6 °C) (12/11/17 1650)  Temp Source: Oral (12/11/17 1650)  Respirations: 22 (12/11/17 1650)  Weight - Scale: 84 1 kg (185 lb 6 5 oz) (12/11/17 1650)  SpO2: 96 % (12/11/17 1650)    Physical Exam   Constitutional: No distress     Patient appears chronically ill   Eyes: Pupils are equal, round, and reactive to light  No scleral icterus  Neck: No JVD present  Cardiovascular: Normal rate  Exam reveals no gallop and no friction rub  No murmur heard  Pulmonary/Chest: Effort normal  No respiratory distress  She has no wheezes  She has no rales  Abdominal: She exhibits no distension  There is no tenderness  There is no rebound and no guarding  Musculoskeletal: She exhibits no edema or tenderness  Lymphadenopathy:     She has no cervical adenopathy  Neurological: She is alert  Skin: Skin is warm and dry  She is not diaphoretic  Psychiatric: She has a normal mood and affect  Additional Data:     Lab Results: I have personally reviewed pertinent reports  Results from last 7 days  Lab Units 12/11/17  1714   WBC Thousand/uL 6 66   HEMOGLOBIN g/dL 11 6   HEMATOCRIT % 35 5   PLATELETS Thousands/uL 289   NEUTROS PCT % 69   LYMPHS PCT % 17   MONOS PCT % 8   EOS PCT % 5       Results from last 7 days  Lab Units 12/11/17  1715   SODIUM mmol/L 141   POTASSIUM mmol/L 3 6   CHLORIDE mmol/L 102   CO2 mmol/L 29   BUN mg/dL 45*   CREATININE mg/dL 1 59*   CALCIUM mg/dL 9 4   TOTAL PROTEIN g/dL 8 1   BILIRUBIN TOTAL mg/dL 0 30   ALK PHOS U/L 88   ALT U/L 18   AST U/L 22   GLUCOSE RANDOM mg/dL 134           Imaging: I have personally reviewed pertinent reports  EKG, Pathology, and Other Studies Reviewed on Admission:   · EKG:  Normal sinus rhythm with first-degree AV block consistent with prior EKG    Allscripts / Epic Records Reviewed: Yes     ** Please Note: This note has been constructed using a voice recognition system   **

## 2017-12-12 VITALS
SYSTOLIC BLOOD PRESSURE: 159 MMHG | TEMPERATURE: 97.5 F | HEART RATE: 80 BPM | WEIGHT: 177.47 LBS | BODY MASS INDEX: 34.84 KG/M2 | HEIGHT: 60 IN | RESPIRATION RATE: 18 BRPM | DIASTOLIC BLOOD PRESSURE: 72 MMHG | OXYGEN SATURATION: 100 %

## 2017-12-12 PROBLEM — R07.9 CHEST PAIN: Status: RESOLVED | Noted: 2017-04-14 | Resolved: 2017-12-12

## 2017-12-12 LAB
GLUCOSE SERPL-MCNC: 96 MG/DL (ref 65–140)
TROPONIN I SERPL-MCNC: <0.02 NG/ML

## 2017-12-12 PROCEDURE — 84484 ASSAY OF TROPONIN QUANT: CPT | Performed by: INTERNAL MEDICINE

## 2017-12-12 PROCEDURE — 82948 REAGENT STRIP/BLOOD GLUCOSE: CPT

## 2017-12-12 RX ADMIN — LORATADINE 10 MG: 10 TABLET ORAL at 09:31

## 2017-12-12 RX ADMIN — CALCITRIOL 0.25 MCG: 0.25 CAPSULE, LIQUID FILLED ORAL at 09:34

## 2017-12-12 RX ADMIN — Medication 100 MG: at 09:31

## 2017-12-12 RX ADMIN — PANTOPRAZOLE SODIUM 40 MG: 40 TABLET, DELAYED RELEASE ORAL at 09:31

## 2017-12-12 RX ADMIN — ROPINIROLE 1 MG: 0.25 TABLET, FILM COATED ORAL at 02:32

## 2017-12-12 RX ADMIN — METOPROLOL TARTRATE 50 MG: 50 TABLET ORAL at 09:31

## 2017-12-12 RX ADMIN — AMLODIPINE BESYLATE 5 MG: 5 TABLET ORAL at 09:31

## 2017-12-12 RX ADMIN — APIXABAN 2.5 MG: 2.5 TABLET, FILM COATED ORAL at 09:31

## 2017-12-12 RX ADMIN — LEVOTHYROXINE SODIUM 75 MCG: 75 TABLET ORAL at 05:25

## 2017-12-12 RX ADMIN — CHOLECALCIFEROL TAB 25 MCG (1000 UNIT) 1000 UNITS: 25 TAB at 09:31

## 2017-12-12 RX ADMIN — BIMATOPROST 1 DROP: 0.1 SOLUTION/ DROPS OPHTHALMIC at 00:09

## 2017-12-12 RX ADMIN — TORSEMIDE 20 MG: 20 TABLET ORAL at 09:30

## 2017-12-12 RX ADMIN — BRIMONIDINE TARTRATE 1 DROP: 1.5 SOLUTION OPHTHALMIC at 09:34

## 2017-12-12 RX ADMIN — GABAPENTIN 100 MG: 100 CAPSULE ORAL at 09:31

## 2017-12-12 RX ADMIN — ACETAMINOPHEN 650 MG: 325 TABLET ORAL at 05:25

## 2017-12-12 NOTE — DISCHARGE SUMMARY
Discharge Summary - [facility] Internal Medicine    Patient Information: Doug Zuniga 80 y o  female MRN: [id number]  Unit/Bed#: -01 Encounter: [id number]    Discharging Physician / Practitioner: Eduardo Castaneda MD  PCP: Vidal Ceja MD  Admission Date: 12/11/2017  Discharge Date: 12/12/17    Reason for Admission:  Chest pain    Discharge Diagnoses:     Principal Problem:    Chest pain  Active Problems:    Restless leg    Hemiparesis affecting left side as late effect of stroke (HCC)    CKD (chronic kidney disease) stage 3, GFR 30-59 ml/min  Resolved Problems:    * No resolved hospital problems  *      Consultations During Hospital Stay:  · None    Procedures Performed:   · Chest x-ray:  Normal    Hospital Course:     Doug Zuniga is a 80 y o  female patient who originally presented to the hospital on 12/11/2017 due to chest pain  Patient had a piece of cake and glass of milk with her family and subsequently developed right-sided chest pain which radiated to right shoulder  Patient was subsequently observed overnight, cardiac enzymes performed all came back negative  Patient remained stable without any further chest pain  She is being discharged home in stable condition    Condition at Discharge: good     Discharge Day Visit / Exam:     Subjective:  Feeling better today  No complaints  Vitals: Blood Pressure: 159/72 (12/12/17 0700)  Pulse: 80 (12/12/17 0700)  Temperature: 97 5 °F (36 4 °C) (12/12/17 0700)  Temp Source: Oral (12/11/17 2300)  Respirations: 18 (12/12/17 0700)  Height: 5' (152 4 cm) (12/11/17 1930)  Weight - Scale: 80 5 kg (177 lb 7 5 oz) (12/11/17 1930)  SpO2: 100 % (12/12/17 0700)  Exam:   Physical Exam     Gen -Patient comfortable at rest  Neck- Supple  No thyromegaly or lymphadenopathy  Lungs-Clear bilaterally without any wheeze or rales   Heart S1-S2, regular rate and rhythm, no murmurs  Abdomen-soft nontender, no organomegaly   Bowel sounds present  Extremities-no cyanosi,  clubbing ; trace edema  Skin- no rash  Neuro-nonfocal         Discussion with Family:     Discharge instructions/Information to patient and family:   See after visit summary for information provided to patient and family  Provisions for Follow-Up Care:  See after visit summary for information related to follow-up care and any pertinent home health orders  Disposition:     Home    For Discharges to [facility] SNF:   · Not Applicable to this Patient - Not Applicable to this Patient    Planned Readmission: no     Discharge Statement:  I spent 35 minutes discharging the patient  This time was spent on the day of discharge  I had direct contact with the patient on the day of discharge  Greater than 50% of the total time was spent examining patient, answering all patient questions, arranging and discussing plan of care with patient as well as directly providing post-discharge instructions  Additional time then spent on discharge activities  Discharge Medications:  See after visit summary for reconciled discharge medications provided to patient and family        ** Please Note: This note has been constructed using a voice recognition system **

## 2017-12-12 NOTE — PLAN OF CARE
Problem: DISCHARGE PLANNING - CARE MANAGEMENT  Goal: Discharge to post-acute care or home with appropriate resources  INTERVENTIONS:  - Conduct assessment to determine patient/family and health care team treatment goals, and need for post-acute services based on payer coverage, community resources, and patient preferences, and barriers to discharge  - Address psychosocial, clinical, and financial barriers to discharge as identified in assessment in conjunction with the patient/family and health care team  - Arrange appropriate level of post-acute services according to patients   needs and preference and payer coverage in collaboration with the physician and health care team  - Communicate with and update the patient/family, physician, and health care team regarding progress on the discharge plan  - Arrange appropriate transportation to post-acute venues  Outcome: Progressing  CM met with Pt at bedside to discuss [facility]I  Pt reports she is from [place]  CM called Pt's STAN Arias to discuss transportation and was unable to reach her  CM then received a voicemail from pt's Sherren Sayres stating that CM should contact Pt's son Farzad Garnica as Lashaun Arias may not be able to answer her phone  CM contact Farzad Limonchristen Garnica reports he will  Pt and transport her back to [facility] rather than Pt paying for [address]   Anthony Garnica will be here at 2:15pm  CM made Dr Jose R Newsome, and nurse Yamileth Carson made Pt aware

## 2017-12-12 NOTE — CASE MANAGEMENT
Initial Clinical Review    Admission: Date/Time/Statement: 12/11/2017 @ 18:28    Orders Placed This Encounter   Procedures    Place in Observation (expected length of stay for this patient is less than two midnights)     Standing Status:   Standing     Number of Occurrences:   1     Order Specific Question:   Admitting Physician     Answer:   Lesly Francis [[id number]]     Order Specific Question:   Level of Care     Answer:   Med Surg [16]         ED: Date/Time/Mode of Arrival:   ED Arrival Information     Expected Arrival Acuity Means of Arrival Escorted By Service Admission Type    - 12/11/2017 16:48 Urgent Ambulance [facility] Ambulance General Medicine Urgent    Arrival Complaint    CHEST PAIN          Chief Complaint:   Chief Complaint   Patient presents with    Chest Pain     Pt from [facility], pt started 1 hour ago with R sided CP and "a little SOB"  Pt c/o R sided 4/10 CP and c/o SOB  pt had 1 tablet nitro and 324mg aspirin by EMS, pt reports pain unchanged  History of Illness: 80 y o  female who presents with chest pain  Patient was home had a piece of cake and glass of milk with family  After which she developed some right-sided chest pain radiating to the right shoulder  Denies palpitations shortness of breath or diaphoresis pain was self-limited and resolved spontaneously after approximately 1 hour  Patient presented emergency room for evaluation  I long conversation the patient patient's son there is no desire for a stress test or cardiac catheterization but would like to rule out myocardial infarction      ED Vital Signs:   ED Triage Vitals [12/11/17 1650]   Temperature Pulse Respirations Blood Pressure SpO2   97 8 °F (36 6 °C) 93 22 142/66 96 %      Temp Source Heart Rate Source Patient Position - Orthostatic VS BP Location FiO2 (%)   Oral Monitor Sitting Right arm --      Pain Score       4        Wt Readings from Last 1 Encounters:   12/11/17 80 5 kg (177 lb 7 5 oz) Abnormal Labs/Diagnostic Test Results: NT-pro , Bun 45, Creat 1 59    CXR: Cardiomegaly   No active pulmonary disease  ED Treatment:   Medication Administration from 12/11/2017 1648 to 12/11/2017 1929       Date/Time Order Dose Route Action Action by Comments     12/11/2017 1848 gabapentin (NEURONTIN) capsule 300 mg 300 mg Oral Given Kirk Crook RN      12/11/2017 1848 acetaminophen (TYLENOL) tablet 650 mg 650 mg Oral Given Chelsy Solano RN         Physical Exam   Constitutional: She is oriented to person, place, and time  She appears well-developed  No distress  Cardiovascular: Normal rate, regular rhythm, normal heart sounds and intact distal pulses  No murmur heard  Pulmonary/Chest: Effort normal and breath sounds normal  No stridor  No respiratory distress  Musculoskeletal: Normal range of motion  She exhibits edema  Past Medical/Surgical History:    Active Ambulatory Problems     Diagnosis Date Noted    Hypothyroidism 01/09/2016    Pulmonary hypertension 01/09/2016    Transaminitis 01/09/2016    CAD (coronary artery disease)     Diastolic CHF, acute on chronic (HCC)     Hypertension     Hyperlipidemia     Restless leg     Hemiparesis affecting left side as late effect of stroke ([facility])     GERD (gastroesophageal reflux disease)     Vitamin D deficiency     PVD (peripheral vascular disease)     Neuropathy     [facility]re[facility]     Anxiety     History of stroke 07/17/2016    Atrial fibrillation ([facility] ) 07/20/2016    CKD (chronic kidney disease) stage 3, GFR 30-59 ml/min     First degree AV block     Glaucoma     Transaminitis 10/12/2016    Chest pain 04/14/2017    Hyperparathyroidism ([address] ) 07/24/2017    Polypharmacy 07/27/2017    Physical deconditioning 07/27/2017    Hypoalbuminemia due to protein-calorie malnutrition ([facility] ) 07/27/2017    Visual impairment 07/27/2017     Resolved Ambulatory Problems     Diagnosis Date Noted    Acute on chronic diastolic heart failure ([facility] ) 01/09/2016    Generalized weakness 01/09/2016    Ambulatory dysfunction 01/09/2016    Glaucoma     Insomnia     Asthma     Gastritis     Acute kidney injury 04/01/2016    UTI (urinary tract infection) 04/01/2016    Acute metabolic encephalopathy 93/77/7394    Encephalopathy 04/11/2016    Encephalopathy acute 07/17/2016    [facility]F [facility]acute renal failure) [facility]) 07/17/2016    Fever 07/30/2016    Leukocytosis 07/30/2016    Intractable nausea and vomiting 08/01/2016    Bacteremia 08/01/2016    Aspiration pneumonia ([facility] [address] ) 08/04/2016    A-fib ([place] [address] )     Gastritis     Fall 10/12/2016    Elevated lipase 10/12/2016    SOB (shortness of breath) 03/22/2017    Bronchitis, acute, with bronchospasm 03/22/2017    Cellulitis 07/14/2017    Pedal edema 07/15/2017    Acute renal failure superimposed on chronic kidney disease [facility] 07/17/2017    Hypoxia 07/17/2017    Altered mental status 07/22/2017    Unresponsive episode 07/27/2017     Past Medical History:   Diagnosis Date    A-fib ([facility]     Anemia     Anemia     Anxiety     Asthma     CAD (coronary artery disease)     CH[facility]congestive heart failure) ([facility]     CHF (congestive heart failure) ([facility])     CKD (chronic kidney disease) stage 3, GFR 30-59 ml/min     Constipation     CVA (cerebral vascular accident) ([facility]     Diastolic CHF, chronic ([facility] )     Disease of thyroid gland     Dry eye     First degree AV block     Gastritis     Gastritis     GERD (gastroesophageal reflux disease)     Glaucoma     Glaucoma     Hemiparesis ([facility]     Hemiparesis affecting left side as late effect of stroke ([facility] )     Hyperlipidemia     Hypertension     Impetigo     Insomnia     Knee pain     Neuropathy     Osteoarthritis     Parkinson disease ([facility]     Pulmonary HTN     PVD (peripheral vascular disease) ([facility])     Restless leg     Vitamin D deficiency        Admitting Diagnosis: Chest pain [R07 9]  SOB (shortness of breath) on exertion [R06 02]    Age/Sex: 80 y o  female    Assessment/Plan:     Hospital Problem List:      Principal Problem:    Chest pain  Active Problems:    CKD (chronic kidney disease) stage 3, GFR 30-59 ml/min    Hemiparesis affecting left side as late effect of stroke (HCC)    Restless leg        Plan for the Primary Problem(s):  · Chest Pain  ? Patient's pain is atypical   I had a long conversation with patient patient's son they are on interested in stress test and/or definitely understand the cardiac catheterization  Will rule out for myocardial infarction with serial enzymes assuming enzymes are negative would recommend no further workup continue medical treatment for this atypical pain chest pain  She had incised the positive consideration could be given for Cardiology evaluation     Plan for Additional Problems:   · Chronic kidney disease stage 3 renal function at baseline  · Restless leg syndrome on medication given in ER  · Chronic hemiparesis of left side secondary to stroke continue anticoagulation     VTE Prophylaxis: Apixaban (Eliquis)  / sequential compression device   Code Status:  DNR level 3  POLST: POLST form is not discussed and not completed at this time      Anticipated Length of Stay:  Patient will be admitted on an Observation basis with an anticipated length of stay of  less than 2 midnights     Justification for Hospital Stay:  Rule out myocardial fracture with plates discharged home     Admission Orders:  Observation/Tele  Continuous Cardiac Monitoring  Serial Cardiac Enzymes q6h x 3  Bilateral Sequential Compression Device    Scheduled Meds:   amLODIPine 5 mg Oral Daily   apixaban 2 5 mg Oral BID   bimatoprost 1 drop Both Eyes HS   brimonidine 1 drop Both Eyes BID   calcitriol 0 25 mcg Oral Daily   cholecalciferol 1,000 Units Oral Daily   co-enzyme Q-10 100 mg Oral Daily   docusate sodium 100 mg Oral BID   fluticasone 1 spray Nasal Daily   gabapentin 100 mg Oral BID   gabapentin 300 mg Oral HS   levothyroxine 75 mcg Oral Early Morning   loratadine 10 mg Oral Daily   melatonin 6 mg Oral HS   metoprolol tartrate 50 mg Oral BID   OXcarbazepine 150 mg Oral HS   pantoprazole 40 mg Oral Daily   Red Yeast Rice 1 capsule Oral BID   rOPINIRole 0 5 mg Oral Once   rOPINIRole 1 mg Oral Q8H   tamsulosin 0 4 mg Oral Daily With Dinner   torsemide 20 mg Oral BID

## 2017-12-13 ENCOUNTER — GENERIC CONVERSION - ENCOUNTER (OUTPATIENT)
Dept: OTHER | Facility: OTHER | Age: 82
End: 2017-12-13

## 2017-12-13 LAB
ATRIAL RATE: 89 BPM
MRSA NOSE QL CULT: NORMAL
QRS AXIS: 11 DEGREES
QRSD INTERVAL: 96 MS
QT INTERVAL: 380 MS
QTC INTERVAL: 454 MS
T WAVE AXIS: 66 DEGREES
VENTRICULAR RATE: 86 BPM

## 2017-12-16 LAB
BACTERIA BLD CULT: NORMAL
BACTERIA BLD CULT: NORMAL

## 2017-12-21 ENCOUNTER — GENERIC CONVERSION - ENCOUNTER (OUTPATIENT)
Dept: OTHER | Facility: OTHER | Age: 82
End: 2017-12-21

## 2017-12-28 ENCOUNTER — HOSPITAL ENCOUNTER (EMERGENCY)
Facility: HOSPITAL | Age: 82
Discharge: HOME | End: 2017-12-28
Attending: EMERGENCY MEDICINE | Admitting: EMERGENCY MEDICINE
Payer: MEDICARE

## 2017-12-28 ENCOUNTER — APPOINTMENT (EMERGENCY)
Dept: RADIOLOGY | Facility: HOSPITAL | Age: 82
End: 2017-12-28
Payer: MEDICARE

## 2017-12-28 ENCOUNTER — APPOINTMENT (EMERGENCY)
Dept: CT IMAGING | Facility: HOSPITAL | Age: 82
End: 2017-12-28
Payer: MEDICARE

## 2017-12-28 DIAGNOSIS — S09.90XA CLOSED HEAD INJURY, INITIAL ENCOUNTER: ICD-10-CM

## 2017-12-28 DIAGNOSIS — Z79.01 CHRONIC ANTICOAGULATION: ICD-10-CM

## 2017-12-28 DIAGNOSIS — S50.02XA CONTUSION OF LEFT ELBOW, INITIAL ENCOUNTER: Primary | ICD-10-CM
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
## 2017-12-28 PROCEDURE — 73030 X-RAY EXAM OF SHOULDER: CPT

## 2017-12-28 PROCEDURE — 93005 ELECTROCARDIOGRAM TRACING: CPT

## 2017-12-28 PROCEDURE — 73070 X-RAY EXAM OF ELBOW: CPT

## 2017-12-28 PROCEDURE — 99284 EMERGENCY DEPT VISIT MOD MDM: CPT

## 2017-12-28 PROCEDURE — 70450 CT HEAD/BRAIN W/O DYE: CPT

## 2017-12-28 RX ORDER — EPLERENONE 25 MG/1
25 TABLET, FILM COATED ORAL DAILY
COMMUNITY
End: 2018-04-16 | Stop reason: HOSPADM

## 2017-12-28 RX ORDER — TRAMADOL HYDROCHLORIDE 50 MG/1
50 TABLET ORAL EVERY 6 HOURS PRN
COMMUNITY
End: 2018-01-30 | Stop reason: HOSPADM

## 2017-12-28 RX ORDER — ACETAMINOPHEN 325 MG/1
650 TABLET ORAL ONCE
Status: COMPLETED | OUTPATIENT
Start: 2017-12-28 | End: 2017-12-28

## 2017-12-28 RX ADMIN — ACETAMINOPHEN 650 MG: 325 TABLET, FILM COATED ORAL at 22:00

## 2017-12-29 VITALS
SYSTOLIC BLOOD PRESSURE: 142 MMHG | HEART RATE: 80 BPM | BODY MASS INDEX: 36.47 KG/M2 | DIASTOLIC BLOOD PRESSURE: 63 MMHG | WEIGHT: 186.73 LBS | TEMPERATURE: 97.4 F | RESPIRATION RATE: 20 BRPM | OXYGEN SATURATION: 95 %

## 2017-12-29 LAB
ATRIAL RATE: 576 BPM
QRS AXIS: 19 DEGREES
QRSD INTERVAL: 88 MS
QT INTERVAL: 368 MS
QTC INTERVAL: 467 MS
T WAVE AXIS: 67 DEGREES
VENTRICULAR RATE: 97 BPM

## 2017-12-29 NOTE — ED PROVIDER NOTES
History  Chief Complaint   Patient presents with    Fall     Pt presents from NH d/t witnessed fall while transferring pt, states she fell on her left side, c/o left arm and leg pain, also states she "bumped her head" denies LOC, pt on Eliquis  Pt states she has had productive cough and wheezing x2 wks    Wheezing       History provided by:  Patient  Fall   Mechanism of injury: fall    Injury location:  Shoulder/arm  Shoulder/arm injury location:  L elbow  Incident location:  [facility]  Time since incident:  2 hours  Arrived directly from scene: yes    Fall:     Fall occurred:  Tripped and walking    Impact surface:  Hard floor    Point of impact: left elbow  Suspicion of alcohol use: no    Suspicion of drug use: no    Tetanus status:  Up to date  Prior to arrival data:     Bystander interventions:  None    Blood loss:  None    Responsiveness at scene:  Alert    Orientation at scene:  Person, place, situation and time    Loss of consciousness: no      Amnesic to event: no      Airway interventions:  None    IV access status:  None    Immobilization:  None  Current pain details:     Pain quality:  Dull    Pain Severity:  Mild    Pain timing:  Constant  Associated symptoms: headaches    Associated symptoms: no abdominal pain, no chest pain, no hearing loss, no loss of consciousness, no nausea, no neck pain, no seizures and no vomiting        Prior to Admission Medications   Prescriptions Last Dose Informant Patient Reported? Taking?    Alum & Mag Hydroxide-Simeth (ANTACID ANTI-GAS PO)   Yes No   Sig: Take 30 mL by mouth every 6 (six) hours as needed     CO-ENZYME Q-10 PO   Yes No   Sig: Take 200 mg by mouth daily   OXcarbazepine (TRILEPTAL) 150 mg tablet   No Yes   Sig: Take 1 tablet by mouth daily at bedtime   Red Yeast Rice 600 MG CAPS   Yes No   Sig: Take 1 capsule by mouth 2 (two) times a day   acetaminophen (TYLENOL) 500 mg tablet   No Yes   Sig: Take 1 tablet by mouth every 12 (twelve) hours   albuterol (PROVENTIL HFA,VENTOLIN HFA) 90 mcg/act inhaler   Yes No   Sig: Inhale 2 puffs every 6 (six) hours as needed for wheezing   amLODIPine (NORVASC) 5 mg tablet   Yes Yes   Sig: Take 5 mg by mouth daily   apixaban (ELIQUIS) 2 5 mg   No Yes   Sig: Take 1 tablet by mouth 2 (two) times a day   bimatoprost (LUMIGAN) 0 01 % ophthalmic drops   Yes Yes   Sig: Administer 1 drop to both eyes daily at bedtime     brimonidine (ALPHAGAN P) 0 1 %   Yes Yes   Sig: Administer 1 drop to both eyes 2 (two) times a day     calcitriol (ROCALTROL) 0 25 mcg capsule   Yes Yes   Sig: Take 0 25 mcg by mouth daily     cholecalciferol (VITAMIN D3) 1,000 units tablet   Yes No   Sig: Take 1,000 Units by mouth daily   docusate sodium (COLACE) 100 mg capsule   Yes No   Sig: Take 100 mg by mouth 2 (two) times a day   eplerenone (INSPRA) 25 mg tablet   Yes Yes   Sig: Take 25 mg by mouth daily   fluticasone (FLONASE) 50 mcg/act nasal spray   Yes Yes   Si spray into each nostril daily  gabapentin (NEURONTIN) 100 mg capsule   Yes Yes   Sig: Take 100 mg by mouth 2 (two) times a day   gabapentin (NEURONTIN) 300 mg capsule   No Yes   Sig: Take 2 capsules by mouth daily at bedtime   Patient taking differently: Take 300 mg by mouth daily at bedtime     latanoprost (XALATAN) 0 005 % ophthalmic solution   Yes No   Sig: Administer 1 drop to both eyes daily at bedtime     levothyroxine 75 mcg tablet   No Yes   Sig: Take 1 tablet by mouth daily in the early morning   Patient taking differently: Take 88 mcg by mouth daily in the early morning     loratadine (CLARITIN) 10 mg tablet   Yes Yes   Sig: Take 10 mg by mouth daily   melatonin 3 mg   No Yes   Sig: Take 2 tablets by mouth daily at bedtime   metoprolol tartrate (LOPRESSOR) 50 mg tablet   Yes Yes   Sig: Take 50 mg by mouth 2 (two) times a day   pantoprazole (PROTONIX) 40 mg tablet   Yes Yes   Sig: Take 40 mg by mouth daily     polyethylene glycol (MIRALAX) 17 g packet   Yes No   Sig: Take 17 g by mouth as needed  rOPINIRole (REQUIP) 0 5 mg tablet   No Yes   Sig: Take 1 tablet by mouth every 6 (six) hours   Patient taking differently: Take 1 mg by mouth every 8 (eight) hours     tamsulosin (FLOMAX) 0 4 mg   No Yes   Sig: Take 1 capsule by mouth daily with dinner   torsemide (DEMADEX) 20 mg tablet   No Yes   Sig: Take 1 tablet by mouth daily   Patient taking differently: Take 20 mg by mouth 2 (two) times a day     traMADol (ULTRAM) 50 mg tablet   Yes Yes   Sig: Take 50 mg by mouth every 6 (six) hours as needed for moderate pain      Facility-Administered Medications: None       Past Medical History:   Diagnosis Date    A-fib ([place] [address] )     Anemia     Anemia     Anxiety     Asthma     CAD (coronary artery disease)     [facility]     CHF (congestive heart failure) ([facility]     CKD (chronic kidney disease) stage 3, GFR 30-59 ml/min     ckd3    Constipation     CVA (cerebral vascular accident) ([place] [address] )     left hemiparesis    Diastolic CHF, chronic ([facility])     Disease of thyroid gland     Dry eye     First degree AV block     Gastritis     Gastritis     GERD (gastroesophageal reflux disease)     Glaucoma     Glaucoma     Hemiparesis ([facility])     left side    Hemiparesis affecting left side as late effect of stroke ([facility])     Hyperlipidemia     Hypertension     Impetigo     Insomnia     Knee pain     Neuropathy     Osteoarthritis     Parkinson disease ([place] [address] )     Pulmonary HTN     PVD (peripheral vascular disease) ([facility]     Restless leg     Vitamin D deficiency        Past Surgical History:   Procedure Laterality Date    APPENDECTOMY      BLADDER SURGERY      CAROTID ENDARTERECTOMY Left     ESOPHAGOGASTRODUODENOSCOPY N/A 4/8/2016    Procedure: ESOPHAGOGASTRODUODENOSCOPY (EGD); Surgeon: Alfa Jeffrey MD;  Location: [facility];   Service:     RENAL ARTERY STENT         Family History   Problem Relation Age of Onset    Stroke Father     Cancer Brother     No Known Problems Mother      I have reviewed and agree with the history as documented  Social History   Substance Use Topics    Smoking status: Never Smoker    Smokeless tobacco: Never Used    Alcohol use No        Review of Systems   Constitutional: Negative for activity change, chills, diaphoresis and fever  HENT: Negative for congestion, hearing loss, sinus pressure and sore throat  Eyes: Negative for pain and visual disturbance  Respiratory: Negative for cough, chest tightness, shortness of breath, wheezing and stridor  Cardiovascular: Negative for chest pain and palpitations  Gastrointestinal: Negative for abdominal distention, abdominal pain, constipation, diarrhea, nausea and vomiting  Genitourinary: Negative for dysuria and frequency  Musculoskeletal: Negative for neck pain and neck stiffness  Skin: Negative for rash  Neurological: Positive for headaches  Negative for dizziness, seizures, loss of consciousness, speech difficulty, light-headedness and numbness  Physical Exam  ED Triage Vitals [12/28/17 2025]   Temperature Pulse Respirations Blood Pressure SpO2   (!) 97 4 °F (36 3 °C) 102 20 144/88 96 %      Temp Source Heart Rate Source Patient Position - Orthostatic VS BP Location FiO2 (%)   Oral Monitor Sitting Right arm --      Pain Score       --           Orthostatic Vital Signs  Vitals:    12/28/17 2025 12/28/17 2100   BP: 144/88 146/65   Pulse: 102 80   Patient Position - Orthostatic VS: Sitting        Physical Exam   Constitutional: She is oriented to person, place, and time  She appears well-developed  No distress  HENT:   Head: Normocephalic and atraumatic  Eyes: Pupils are equal, round, and reactive to light  Neck: Normal range of motion  Neck supple  No tracheal deviation present  Cardiovascular: Normal rate, regular rhythm, normal heart sounds and intact distal pulses  No murmur heard  Pulmonary/Chest: Effort normal and breath sounds normal  No stridor   No respiratory distress  Abdominal: Soft  She exhibits no distension  There is no tenderness  There is no rebound and no guarding  Musculoskeletal: Normal range of motion  Moderate size hematoma over left elbow, small skin tear not amenable to suture repair, tenderness over radial head   Neurological: She is alert and oriented to person, place, and time  Skin: Skin is warm and dry  She is not diaphoretic  No erythema  No pallor  Psychiatric: She has a normal mood and affect  Vitals reviewed  ED Medications  Medications   acetaminophen (TYLENOL) tablet 650 mg (not administered)       Diagnostic Studies  Results Reviewed     None                 CT head without contrast   Final Result by Gracie Bowens MD (12/28 2145)      No acute intracranial abnormality  Moderate microangiopathic changes  Stable exam from 7/21/2017           Workstation performed: [id number]         XR shoulder 2+ views LEFT   ED Interpretation by Leonora Perrin DO (12/28 2144)   Significant arthritis no acute fracture      XR elbow 2 vw left   ED Interpretation by Leonora Perrin DO (12/28 2144)   Significant arthritis no acute fracture       by Anila Curry (12/28 2131)                 Procedures  Procedures       Phone Contacts  ED Phone Contact    ED Course  ED Course                                MDM  Number of Diagnoses or Management Options  Chronic anticoagulation: new and requires workup  Closed head injury, initial encounter: new and requires workup  Contusion of left elbow, initial encounter: new and requires workup  Fall, initial encounter: new and requires workup  Diagnosis management comments: 27-year-old female on Eliquis mechanical fall from standing did strike her head mild headache but main complaint is left elbow pain will x-ray to evaluate for fracture, will x-ray shoulder to evaluate for fracture, will CT head to evaluate for intracranial hemorrhage       Amount and/or Complexity of Data Reviewed  Tests in the radiology section of CPT®: ordered and reviewed  Review and summarize past medical records: yes  Independent visualization of images, tracings, or specimens: yes      CritCare Time    Disposition  Final diagnoses:   Fall, initial encounter   Contusion of left elbow, initial encounter   Closed head injury, initial encounter   Chronic anticoagulation     Time reflects when diagnosis was documented in both MDM as applicable and the Disposition within this note     Time User Action Codes Description Comment    12/28/2017 10:04 PM Kehinde Jenise Add [J15  TTZG] Fall, initial encounter     12/28/2017 10:04 PM Gilford Odyssey Thera J Add [S50 02XA] Contusion of left elbow, initial encounter     12/28/2017 10:04 PM Kehinde Jenise Add [S09 90XA] Closed head injury, initial encounter     12/28/2017 10:04 PM Gilford Canada J Add [Z79 01] Chronic anticoagulation     12/28/2017 10:04 PM Jaja Valiente [V04  KLXG] Fall, initial encounter     12/28/2017 10:04 PM Kehinde Burden Modify [S50 02XA] Contusion of left elbow, initial encounter       ED Disposition     ED Disposition Condition Comment    Discharge  [address] discharge to home/self care  Condition at discharge: Good        Follow-up Information    None       Patient's Medications   Discharge Prescriptions    No medications on file     No discharge procedures on file      ED Provider  Electronically Signed by           Irma Ravi DO  12/28/17 9897

## 2017-12-29 NOTE — ED NOTES
Report called to [facility]     Alexia engel, [address] [facility]  12/29/17 8722

## 2017-12-29 NOTE — DISCHARGE INSTRUCTIONS
Blood Thinners   WHAT YOU NEED TO KNOW:   Blood thinners are medicines that prevent blood clots from forming in an artery, vein, or the heart  These medicines may also prevent a blood clot from getting bigger  Blood clots prevent the flow of blood to organs and tissues such as the heart or a leg  The two main types of blood thinners are antiplatelet medicine and anticoagulant medicine  Antiplatelet medicine prevents platelets from sticking together and forming a clot  Anticoagulant medicine prevents the blood from clotting too much  DISCHARGE INSTRUCTIONS:   Call 911 for any of the following:   · You have any of the following signs of a heart attack:      ¨ Squeezing, pressure, or pain in your chest that lasts longer than 5 minutes or returns    ¨ Discomfort or pain in your back, neck, jaw, stomach, or arm     ¨ Trouble breathing    ¨ Nausea or vomiting    ¨ Lightheadedness or a sudden cold sweat, especially with chest pain or trouble breathing    · You have any of the following signs of a stroke:      ¨ Numbness or drooping on one side of your face     ¨ Weakness in an arm or leg    ¨ Confusion or difficulty speaking    ¨ Dizziness, a severe headache, or vision loss    · You feel lightheaded, short of breath, and have chest pain  · You cough up blood  · You have trouble breathing  · You are bleeding from a wound or skin injury and it won't stop after holding pressure for 10 minutes  · You have a fall or injury to the head  Return to the emergency department immediately if:   · You have a bad stomachache or headache that does not go away  · You feel weak, faint, or dizzy  · You vomit blood  · You have a fall or injury to any part of your body other than your head  · You are pregnant or think you may be pregnant  Contact your healthcare provider if:   · Your urine is red or dark brown  · Your bowel movements are red, dark brown, or black       · You bleed more than usual during your period  · You find bruises or blood blisters on your skin  · Your skin is itchy, swollen, or you have a rash  · You have questions or concerns about your condition or care  Foods and medicines to avoid:  Do not start any new medicines, vitamins, or herbal supplements before you talk to your healthcare provider  Do not make changes to your diet without talking to your healthcare provider  There are many medicines, vitamins, and herbal supplements that may prevent blood thinners from working correctly  Ask your healthcare provider for a full list of foods and medicines that can prevent anticoagulants from working correctly  The following may cause severe bleeding, or prevent anticoagulants from working correctly:  · Alcohol  may increase your risk for bleeding when taken with anticoagulants  Do not drink alcohol unless your healthcare provider says it is okay  · Changes in your regular vitamin K intake  can prevent anticoagulants, such as warfarin, from working correctly  Anticoagulants work best if you eat the same amount of vitamin K every day  Some foods that contain vitamin K include spinach, kale, broccoli, gen lettuce, amara greens, and kiwi  Ask your healthcare provider for more information about foods that contain vitamin K      · NSAIDs  may increase your risk for bleeding  Examples include ibuprofen, aspirin, and naproxen  Do not take these medicines unless your healthcare provider says it is okay  · Some antibiotics  may increase your risk for bleeding  Talk to your healthcare provider before you take antibiotics  · Alternative medicines  such as ginkgo biloba, garlic, chamomile, and St  Jas's wort, should not be taken with anticoagulants  Some may prevent anticoagulants from working, and others may increase your risk for bleeding  Self-care:   · Do not play contact sports  This may increase your risk for bleeding if you get injured or hit   Walk, swim, or do yoga to get exercise  Ask your healthcare provider about other exercises that are safe  · Use an electric razor to shave  This may prevent cuts that could bleed  · Use a soft bristled tooth brush and wax floss  This may prevent bleeding from your gums  · Prevent falls in and out of your home  Wear non-slip shoes or slippers when you are out of bed  Keep walkways clear  Remove throw rugs and other objects that can cause you to trip and fall  Use assistive devices as directed  · Be careful with sharp objects  Wear gloves when you work outside with sharp tools or in the garden  · Use contraception to prevent a pregnancy  Anticoagulants can cause problems with your baby, and dangerous bleeding during pregnancy  · Carry your medicine with you when you travel  This will prevent you from missing a dose of medicine if your bag gets lost  Talk to your healthcare provider before you travel  · Wear or carry a medical alert bracelet or necklace at all times  This will alert healthcare providers that you take an anticoagulant if you are unconscious or need emergency medical treatment  Other important information:   · Tell all of your healthcare providers and dentist that you take a blood thinner  This will help them plan for procedures and surgeries  It will also prevent them from giving you medicine that may interact with your blood thinner  · You should not take antiplatelet medicine  if you have liver or kidney disease, a peptic ulcer, or gastrointestinal disease  You should also not take this medicine if you have a bleeding disorder, uncontrolled asthma, or uncontrolled high blood pressure  These conditions may increase your risk for bleeding  · Take anticoagulants exactly as prescribed  Do not double up on a dose if you have missed a dose  Some anticoagulants should be taken at the same time every day  · Keep appointments for blood tests  if you are taking warfarin   Blood tests will help your healthcare provider make changes to your dose of anticoagulants so that they work correctly  The blood test will measure the amount of time it takes for your blood to clot  This is called the international normalized ratio (INR)  If your INR is too high, you may be at an increased risk for bleeding  If your INR is too low, you may be at an increased risk for blood clots  Your healthcare provider may change the dose of your anticoagulant medicine depending on the results of your blood test   Follow up with your healthcare provider as directed:  Write down your questions so you remember to ask them during your visits  © 2017 [employer]  Information is for End User's use only and may not be sold, redistributed or otherwise used for commercial purposes  All illustrations and images included in CareNotes® are the copyrighted property of [employer] M [employer]  or [employer]  The above information is an  only  It is not intended as medical advice for individual conditions or treatments  Talk to your doctor, nurse or pharmacist before following any medical regimen to see if it is safe and effective for you  Contusion in Adults   WHAT YOU NEED TO KNOW:   A contusion is a bruise that appears on your skin after an injury  A bruise happens when small blood vessels tear but skin does not  When blood vessels tear, blood leaks into nearby tissue, such as soft tissue or muscle  DISCHARGE INSTRUCTIONS:   Return to the emergency department if:   · You have new trouble moving the injured area  · You have tingling or numbness in or near the injured area  · Your hand or foot below the bruise gets cold or turns pale  Contact your healthcare provider if:   · You find a new lump in the injured area  · Your symptoms do not improve with treatment after 4 to 5 days  · You have questions or concerns about your condition or care  Medicines:   You may need any of the following:  · NSAIDs  help decrease swelling and pain or fever  This medicine is available with or without a doctor's order  NSAIDs can cause stomach bleeding or kidney problems in certain people  If you take blood thinner medicine, always ask your healthcare provider if NSAIDs are safe for you  Always read the medicine label and follow directions  · Prescription pain medicine  may be given  Do not wait until the pain is severe before you take your medicine  · Take your medicine as directed  Contact your healthcare provider if you think your medicine is not helping or if you have side effects  Tell him of her if you are allergic to any medicine  Keep a list of the medicines, vitamins, and herbs you take  Include the amounts, and when and why you take them  Bring the list or the pill bottles to follow-up visits  Carry your medicine list with you in case of an emergency  Follow up with your healthcare provider as directed: You may need to return within a week to check your injury again  Write down your questions so you remember to ask them during your visits  Help a contusion heal:   · Rest the injured area  or use it less than usual  If you bruised your leg or foot, you may need crutches or a cane to help you walk  This will help you keep weight off your injured body part  · Apply ice  to decrease swelling and pain  Ice may also help prevent tissue damage  Use an ice pack, or put crushed ice in a plastic bag  Cover it with a towel and place it on your bruise for 15 to 20 minutes every hour or as directed  · Use compression  to support the area and decrease swelling  Wrap an elastic bandage around the area over the bruised muscle  Make sure the bandage is not too tight  You should be able to fit 1 finger between the bandage and your skin  · Elevate (raise) your injured body part  above the level of your heart to help decrease pain and swelling   Use pillows, blankets, or rolled towels to elevate the area as often as you can  · Do not drink alcohol  as directed  Alcohol may slow healing  · Do not stretch injured muscles  right after your injury  Ask your healthcare provider when and how you may safely stretch after your injury  Gentle stretches can help increase your flexibility  · Do not massage the area or put heating pads  on the bruise right after your injury  Heat and massage may slow healing  Your healthcare provider may tell you to apply heat after several days  At that time, heat will start to help the injury heal   Prevent another contusion:   · Stretch and warm up before you play sports or exercise  · Wear protective gear when you play sports  Examples are shin guards and padding  · If you begin a new physical activity, start slowly to give your body a chance to adjust   © 2017 [facility] Information is for End User's use only and may not be sold, redistributed or otherwise used for commercial purposes  All illustrations and images included in CareNotes® are the copyrighted property of [employer]  or [employer]  The above information is an  only  It is not intended as medical advice for individual conditions or treatments  Talk to your doctor, nurse or pharmacist before following any medical regimen to see if it is safe and effective for you

## 2017-12-29 NOTE — ED NOTES
Pt changed into clothing from home, report given to ambulance crew with verbal understanding of same     Guerita Gilman RN  12/29/17 [id number]

## 2018-01-02 DIAGNOSIS — E78.5 HYPERLIPIDEMIA: ICD-10-CM

## 2018-01-02 DIAGNOSIS — E83.40 DISORDER OF MAGNESIUM METABOLISM: ICD-10-CM

## 2018-01-02 DIAGNOSIS — R79.89 OTHER SPECIFIED ABNORMAL FINDINGS OF BLOOD CHEMISTRY: ICD-10-CM

## 2018-01-02 DIAGNOSIS — K59.00 CONSTIPATION: ICD-10-CM

## 2018-01-02 DIAGNOSIS — I70.1 ATHEROSCLEROSIS OF RENAL ARTERY (HCC): ICD-10-CM

## 2018-01-02 DIAGNOSIS — N18.30 CHRONIC KIDNEY DISEASE, STAGE III (MODERATE) (HCC): ICD-10-CM

## 2018-01-02 DIAGNOSIS — R60.9 EDEMA: ICD-10-CM

## 2018-01-02 DIAGNOSIS — I12.9 HYPERTENSIVE CHRONIC KIDNEY DISEASE WITH STAGE 1 THROUGH STAGE 4 CHRONIC KIDNEY DISEASE, OR UNSPECIFIED CHRONIC KIDNEY DISEASE: ICD-10-CM

## 2018-01-02 DIAGNOSIS — D64.9 ANEMIA: ICD-10-CM

## 2018-01-02 DIAGNOSIS — I35.0 NONRHEUMATIC AORTIC VALVE STENOSIS: ICD-10-CM

## 2018-01-02 DIAGNOSIS — N18.4 CHRONIC KIDNEY DISEASE, STAGE IV (SEVERE) (HCC): ICD-10-CM

## 2018-01-02 DIAGNOSIS — N17.9 ACUTE KIDNEY FAILURE (HCC): ICD-10-CM

## 2018-01-03 ENCOUNTER — GENERIC CONVERSION - ENCOUNTER (OUTPATIENT)
Dept: OTHER | Facility: OTHER | Age: 83
End: 2018-01-03

## 2018-01-05 ENCOUNTER — GENERIC CONVERSION - ENCOUNTER (OUTPATIENT)
Dept: OTHER | Facility: OTHER | Age: 83
End: 2018-01-05

## 2018-01-10 NOTE — PROCEDURES
Procedures by Dany Reyes MD at 2017   8:14 PM      Author:  Dany Reyes MD Service:  Neurology Author Type:  Physician    Filed:  2017  8:16 PM Date of Service:  2017  8:14 PM Status:  Signed    :  Dany Reyes MD (Physician)         Continuous Video EEG Monitoring       Patient Name:  Mellissa Mcbride  MRN: [id number]   :  1931 File #: [facility]    Age: 80 y o  Encounter #: [id number]   Start Time: 2017 08:00 End Time: 2017 08:00        Report date: 2017          Study type: Continuous video EEG    ICD 10 diagnosis: Spells/Fit NOS R56 9 and Encephalopathy, unspecified G93 40    -------------------------------------------------------------------------------------------------------------------   Patient History: This recording was observed in a 80 y o  female  to determine whether spells altered mental status are seizures  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, seen in the past by neurology for staring/unresponsive spells  Has 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, paroxysmal atrial fibrillation, and restless syndrome, presents as stroke alert after being found  unresponsive/non-verbal, withparsignificant right side hemiparesis  Medications include:   trileptal, requip, flomax, demadex, ultram  tylenol   proventil   eliquis  baclofen  lumigen  dulcolax  alphagan  rocaltrol  vitamin D3   co-enzyme  colace  xalatan  claritan  flonase  levothyroxine  magnesium oxide  melatonin  lopressor   protonix    -------------------------------------------------------------------------------------------------------------------   Description of Procedure:  32 channel digital recording with electrodes placed according to the International 10-20 system with additional T1/T2 electrodes,  EOG, EKG, and simultaneous video  A monitoring technologist supervised the continuous recording    The recording was technically satisfactory  -------------------------------------------------------------------------------------------------------------------   Results:   Manual Review:   During wakefulness, there were runs of poorly regulated, low amplitude, posteriorly dominant, symmetric  7 5 cps alpha rhythm that attenuated with eye opening  There were symmetric low amplitude, frontally dominant beta activities  With drowsiness, there were diffusely distributed theta activities and intermittent  bursts of diffuse low to medium amplitude mixed frequency theta/delta activities, often maximal in the temporal regions  There were a few left temporal sharp transients that did not interrupt ongoing background activity  With sleep, symmetric  vertex sharp waves, K complexes and infrequent poorly formed sleep spindles were present  There were occasional right temporal polymorphic theta/delta activities  There were less frequent brief left temporal polymorphic delta activities  Other findings:  Samples of the single channel ECG demonstrated a regular rhythm  Events:   No significant push buttons were activated  -------------------------------------------------------------------------------------------------------------------   Interpretation: This prolonged, continuous video-EEG recording is abnormal      Theta frequency background slowing during wakefulness suggest mild nonspecific diffuse cerebral dysfunction  Occasional right temporal more than left temporal polymorphic theta/delta slowing suggests focal cerebral dysfunction involving the right more than left temporal regions  Frederic Silence, MD [facility]    Jul 27 2017  8:17PM [place] Standard Time

## 2018-01-11 NOTE — MISCELLANEOUS
History of Present Illness  Communication  A message was left on voicemail requesting a return phone call  A call was made to the facility  The contact name and phone number is  [place] [address]  Status Check: today's weight is 160 5  Patient is experiencing the following symptoms: edema  chronic   Concerns expressed today consisted of: states patient had gained weight and cardiology office was contacted and extra diuretic given  Now weight is back down  Topics counseled included need for daily weights, importance of compliance with treatment, symptoms to report and Recommended foloow up with Dr Brett Alvarez as he has not seen her since last July  HF Additional Notes: Patient is in their HF program which includes daily weights and assessments  Attention to low sodium diet also        Signatures   Electronically signed by : Steffen Gutiérrez, ; May 18 2017  3:25PM EST                       (Author)

## 2018-01-11 NOTE — MISCELLANEOUS
History of Present Illness    The patient is being contacted for follow-up after hospitalization  Hospitalization The hospitalization was not a readmission, The patient was discharged on 7/20/17  She was discharged to a SNF for rehabilitation  The patient's status is short term  The facility name is: [facility]  The patient is on HF pathway  Treatment Questions:   HFCC Additional Notes:   I e-mailed Kit Mai to be sure patient is on their HF pathway, to call our office with any problems and [facility], our HF NP will see patient at the facility next week  Current Meds   1  Alphagan P 0 1 % Ophthalmic Solution; instill 1 drop into right eye twice daily; Therapy: (Recorded:31Oct2014) to Recorded   2  AmLODIPine Besylate 5 MG Oral Tablet; TAKE 1 TABLET DAILY; Therapy: [id number] to (Evaluate:28Mar2017) Recorded   3  Artificial Tears 1 4 % Ophthalmic Solution; INSTILL 1-2 DROPS INTO EACH EYES TWICE   DAILY; Therapy: (Recorded:31Oct2014) to Recorded   4  Baclofen 10 MG Oral Tablet; one tab at bedtime; Therapy: 07VVY6361 to (Evaluate:10Jun2016); Last Rx:11May2016 Ordered   5  Bisac-Evac 10 MG Rectal Suppository; Therapy: (Recorded:02Mar2016) to Recorded   6  Calcitriol 0 25 MCG Oral Capsule; TAKE 1 CAPSULE Monday Wednesday and Friday; Therapy: 70JPA6252 to (Marylee Purdue)  Requested for: 03Apr2017; Last   Rx:03Apr2017 Ordered   7  Coenzyme Q10 200 MG Oral Capsule; take 1 capsule daily; Therapy: 20XEQ2287 to (Krishna Feliciano)  Requested for: 85DMP1895; Last   Rx:06Mar2017 Ordered   8  DiphenhydrAMINE HCl - 25 MG Oral Capsule; Therapy: (Recorded:15Hri1433) to Recorded   9  Docusate Sodium 100 MG Oral Tablet; TAKE 1 TABLET TWICE DAILY AS DIRECTED    (COLACE); Therapy: (Recorded:13Mar2014) to Recorded   10   Eliquis 5 MG Oral Tablet; TAKE 1 TABLET ORALLY EVERY 12 HOURS (ATRIAL    FIBRILLATION) *RE-ORDER*;    Therapy: 11YAD1866 to (Evaluate:24Dec1039)  Requested for: 52Vmm8446; Last    Rx:12Apr2017 Ordered   11  Fluticasone Propionate 50 MCG/ACT Nasal Suspension; USE 1 SPRAY IN EACH    NOSTRIL ONCE DAILY; Therapy: 22LXO1924 to Recorded   12  Folic Acid 1 MG Oral Tablet; TAKE 2 TABLETS DAILY; Therapy: (Recorded:02Mar2016) to Recorded   13  Gabapentin 300 MG Oral Capsule; TAKE 1 CAP AT 2PM;    Therapy: (Recorded:02Mar2016) to Recorded   14  Hydrocodone-Acetaminophen 5-325 MG Oral Tablet; TAKE 1 TABLET EVERY 6 HOURS    AS NEEDED; Therapy: 03BBN9057 to Recorded   15  Latanoprost 0 005 % Ophthalmic Solution; Therapy: (Recorded:41Bzp3211) to Recorded   16  Levothyroxine Sodium 75 MCG Oral Tablet; take 1/2 tablet daily; Therapy: (Recorded:06Jul2017) to Recorded   17  Loperamide A-D 2 MG Oral Tablet; USE AS DIRECTED; Therapy: 14CDJ2165 to Recorded   18  Loratadine 10 MG Oral Tablet; TAKE 1 TABLET DAILY; Therapy: (Recorded:03Apr2017) to Recorded   19  Lumigan 0 01 % Ophthalmic Solution; Therapy: (Recorded:06Jul2017) to Recorded   20  Magnesium 400 MG CAPS; take 1 capsule daily; Therapy: (Recorded:06Jul2017) to Recorded   21  Melatonin 3 MG Oral Capsule; Therapy: (Recorded:06Jul2017) to Recorded   22  Metoprolol Tartrate 50 MG Oral Tablet; Take 1 tablet twice daily; Therapy: (Recorded:06Jul2017) to Recorded   23  Mupirocin 2 % External Ointment; Therapy: (Recorded:06Jul2017) to Recorded   24  Mylanta SUSP; Therapy: (Recorded:06Jul2017) to Recorded   25  Nystatin 547083 UNIT/GM External Cream; APPLY 2-3 TIMES DAILY TO AFFECTED    AREA(S); Therapy: 90QOQ8730 to Recorded   26  OXcarbazepine 150 MG Oral Tablet; TAKE 1 TABLET Bedtime; Therapy: (Recorded:50Fkp6268) to Recorded   27  ProAir  (90 Base) MCG/ACT Inhalation Aerosol Solution; 2 puffs every 6 hours as    needed; Therapy: 49PRK0274 to Recorded   28  Protonix 40 MG Oral Tablet Delayed Release (Pantoprazole Sodium); Take 1 tablet twice    daily;     Therapy: (Recorded:86Xdx4754) to Recorded 29  Red Yeast Rice CAPS; Therapy: (Recorded:48Zar3627) to Recorded   30  Robafen 100 MG/5ML Oral Syrup; Therapy: (Recorded:39Dry2877) to Recorded   31  ROPINIRole HCl - 1 MG Oral Tablet; PRN; Therapy: (Recorded:37Ehd5343) to Recorded   32  Torsemide 20 MG Oral Tablet; TAKE 1 EXTRA TABLET DAILY PRN FOR >3 LB WEIGHT    GAIN OVERNIGHT, WORSENING SOB OR LE EDEMA; Therapy: 40RSC4912 to (Evaluate:08Jun2018)  Requested for: 52HKJ0446; Last    Rx:13Jun2017 Ordered   33  Torsemide 20 MG Oral Tablet; TAKE 1 TABLET ORALLY TWICE DAILY (CONGESTIVE    HEART FAILURE); Therapy: 38FFC7726 to (Donal Fabry)  Requested for: 27EBN6556; Last    Rx:12Jul2017 Ordered   34  TraMADol HCl - 50 MG Oral Tablet; Therapy: (Recorded:35Khu8837) to Recorded   35  Vitamin D3 1000 UNIT Oral Tablet; Therapy: (Recorded:47Mrn8161) to Recorded    Allergies    1  Lipitor TABS   2  Morphine Derivatives   3  Percocet TABS   4  Spironolactone TABS   5  Statins   6  Sulfa Drugs  Denied    7  Acetaminophen TABS    Future Appointments    Date/Time Provider Specialty Site   07/31/2017 03:00 PM Donte Ventura  Cardiology [facility]   08/09/2017 02:10 PM ALLI Bianchi   Nephrology  [facility]     Signatures   Electronically signed by : Chris Powell, ; Jul 21 2017  2:26PM EST                       (Author)

## 2018-01-11 NOTE — MISCELLANEOUS
Message   Recorded as Task   Date: 03/02/2016 11:33 AM, Created By: Mariola Swanson   Task Name: Follow Up   Assigned To: Chrissy Dahl   Regarding Patient: Adonis Stanton, Status: Active   CommentElvin Mac - 02 Mar 2016 11:33 AM     TASK CREATED  1  Start atorvastatin 10 mg daily  do usual protocol labs  2   Recheck basic metabolic profile in one to 2 weeks  3   Make sure she is labs prior to my appointment as well  Make sure they send in blood pressure readings along with weights at my appointment  Recorded as Task   Date: 03/02/2016 11:36 AM, Created By: Mariola Swanson   Task Name: Follow Up   Assigned To: Chrissy Dahl   Regarding Patient: Adonis Stanton, Status: Active   CommentElvin Mac - 02 Mar 2016 11:36 AM     TASK CREATED  The patient needs an ultrasound with postvoid residual dilation now  Spoke to [facility] at the nursing home and informed about the above  Written physician order faxed to nursing home along with orders for blood work and ultrasound  PL      Active Problems    1  Abnormal heart sounds (785 3) (R01 2)   2  Acute kidney injury (584 9) (N17 9)   3  Anemia associated with chronic renal failure (285 21,585 9) (N18 9,D63 1)   4  Aortic stenosis (747 22) (Q25 3)   5  Asthma (493 90) (J45 909)   6  Atherosclerosis of artery of extremity with ulceration (440 23) (I70 299,L97 909)   7  Benign hypertension with chronic kidney disease, stage III (403 10,585 3) (I12 9,N18 3)   8  Carotid artery stenosis, asymptomatic (433 10) (I65 29)   9  Chest pain (786 50) (R07 9)   10  Chronic diastolic congestive heart failure (428 32,428 0) (I50 32)   11  Chronic kidney disease, stage 3 (585 3) (N18 3)   12  Chronic toe ulcer (707 15) (L97 509)   13  Constipation (564 00) (K59 00)   14  Diastolic murmur (116 5) (B50)   15  Dizziness (780 4) (R42)   16  Dysuria (788 1) (R30 0)   17  Edema (782 3) (R60 9)   18  Elevated serum creatinine (790 99) (R79 89)   19   Hospital discharge follow-up (V67 59) (Z09)   20  Hyperlipidemia (272 4) (E78 5)   21  Hypertension (401 9) (I10)   22  Hypothyroidism (244 9) (E03 9)   23  Left renal artery stenosis (440 1) (I70 1)   24  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)   25  Moderate mitral regurgitation (424 0) (I34 0)   26  Other chronic pain (338 29) (G89 29)   27  Pulmonary artery hypertension (416 8) (I27 2)   28  Shortness of breath (786 05) (R06 02)    Current Meds   1  Alphagan P 0 1 % Ophthalmic Solution; instill 1 drop into right eye twice daily; Therapy: (Recorded:31Oct2014) to Recorded   2  Artificial Tears 1 4 % Ophthalmic Solution; INSTILL 1-2 DROPS INTO EACH EYES   TWICE DAILY; Therapy: (Recorded:31Oct2014) to Recorded   3  Aspirin 81 MG Oral Tablet; TAKE 1 TABLET DAILY; Therapy: (Recorded:10Oct2014) to Recorded   4  Bisac-Evac 10 MG Rectal Suppository; Therapy: (Recorded:02Mar2016) to Recorded   5  Cyclobenzaprine HCl - 5 MG Oral Tablet; Therapy: (Recorded:02Mar2016) to Recorded   6  Docusate Sodium 100 MG Oral Tablet; TAKE 1 TABLET TWICE DAILY AS DIRECTED    (COLACE); Therapy: (Recorded:13Mar2014) to Recorded   7  Fluticasone Propionate 50 MCG/ACT Nasal Suspension; USE 1 SPRAY IN EACH   NOSTRIL ONCE DAILY; Therapy: 23BKS0910 to Recorded   8  Folic Acid 1 MG Oral Tablet; TAKE 2 TABLETS DAILY; Therapy: (Recorded:02Mar2016) to Recorded   9  Gabapentin 300 MG Oral Capsule; TAKE 1 CAP AT 2PM;   Therapy: (Recorded:02Mar2016) to Recorded   10  Gabapentin 600 MG Oral Tablet; TAKE 1 TABLET TWICE DAILY; Therapy: (Recorded:02Mar2016) to Recorded   11  Hydrocodone-Acetaminophen 5-325 MG Oral Tablet; TAKE 1 TABLET EVERY 6 HOURS    AS NEEDED; Therapy: 52AJB5703 to Recorded   12  Levothyroxine Sodium 75 MCG Oral Tablet; Therapy: (Recorded:02Mar2016) to Recorded   13  Loperamide A-D 2 MG Oral Tablet; USE AS DIRECTED; Therapy: 46TZP9298 to Recorded   14  Loratadine 10 MG Oral Tablet;     Therapy: Retrospective By Protocol  Authorization; Requested for:28Mar2016;    · (1) MAGNESIUM; Status:Active - Retrospective By Protocol Authorization; Requested  for:28Mar2016;   Benign hypertension with chronic kidney disease, stage III, Chronic kidney disease,  stage 3    · (1) URINE PROTEIN CREATININE RATIO; Status:Active - Retrospective By Protocol  Authorization; Requested for:28Mar2016;   Chronic kidney disease, stage 3    · US MEASURE POST VOID RESIDUAL; Status:Hold For - Scheduling,Retrospective By  Protocol Authorization; Requested for:03Mar2016;    · US RETROPERITONEAL COMPLETE; Status:Hold For - Scheduling,Retrospective By  Protocol Authorization; Requested for:03Mar2016;   Chronic kidney disease, stage 3, Hyperlipidemia    · (1) CK (CPK); Status:Active - Retrospective By Protocol Authorization; Requested  for:14Apr2016;    · (1) CK (CPK); Status:Active - Retrospective By Protocol Authorization; Requested  for:26May2016;    · (1) HEPATIC FUNCTION PANEL; Status:Active - Retrospective By Protocol Authorization; Requested for:31Eti2105;   Hyperlipidemia    · (1) HEPATIC FUNCTION PANEL; Status:Active - Retrospective By Protocol Authorization;   Requested for:14Apr2016;     Signatures   Electronically signed by : ALLI Morrison ; Mar  7 2016  4:16PM EST

## 2018-01-11 NOTE — MISCELLANEOUS
Message   Recorded as Task   Date: 10/11/2016 10:25 AM, Created By: Chris Lopes   Task Name: Follow Up   Assigned To: Stacy Kemar   Regarding Patient: Randolph Tran, Status: Active   CommentVenice Luu - 11 Oct 2016 10:25 AM     TASK CREATED  1  Repeat a CBC  2  Stool for occult blood Ã?3   Recorded as Task   Date: 10/11/2016 10:26 AM, Created By: Chris Lopes   Task Name: Follow Up   Assigned To: Stacy Hernandez   Regarding Patient: Randolph Tran, Status: Active   CommentVenice Luu - 11 Oct 2016 10:26 AM     TASK CREATED  1   GI appointment regarding anemia to make sure no blood in the stool and no requirement for further investigation such as an EGD or colonoscopy  2  Appointment with hematology? NELLY or other treatment   I spoke to Bia Srinivas at the nursing home and informed about the above  Lab slip faxed to the nursing home  PL      Active Problems    1  Abnormal heart sounds (785 3) (R01 2)   2  Acute kidney injury (584 9) (N17 9)   3  Anemia associated with chronic renal failure (285 21,585 9) (N18 9,D63 1)   4  Aortic stenosis (424 1) (I35 0)   5  Asthma (493 90) (J45 909)   6  Atherosclerosis of artery of extremity with ulceration (440 23) (I70 299,L97 909)   7  Atrial fibrillation (427 31) (I48 91)   8  Benign hypertension with chronic kidney disease, stage III (403 10,585 3) (I12 9,N18 3)   9  Carotid artery stenosis, asymptomatic (433 10) (I65 29)   10  Chest pain (786 50) (R07 9)   11  Chronic diastolic congestive heart failure (428 32,428 0) (I50 32)   12  Chronic kidney disease, stage 3 (585 3) (N18 3)   13  Chronic toe ulcer (707 15) (L97 509)   14  Constipation (564 00) (K59 00)   15  Diastolic murmur (535 8) (S96)   16  Dizziness (780 4) (R42)   17  Dysuria (788 1) (R30 0)   18  Edema (782 3) (R60 9)   19  Elevated serum creatinine (790 99) (R79 89)   20  Hospital discharge follow-up (V67 59) (Z09)   21  Hospital discharge follow-up (V61 62) (T80)   22  Hyperlipidemia (272 4) (E78 5)   23  Hypertension (401 9) (I10)   24  Hypothyroidism (244 9) (E03 9)   25  Left renal artery stenosis (440 1) (I70 1)   26  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)   27  Moderate mitral regurgitation (424 0) (I34 0)   28  Other chronic pain (338 29) (G89 29)   29  Pulmonary artery hypertension (416 8) (I27 2)   30  Shortness of breath (786 05) (R06 02)    Current Meds   1  Alphagan P 0 1 % Ophthalmic Solution; instill 1 drop into right eye twice daily; Therapy: (Recorded:31Oct2014) to Recorded   2  AmLODIPine Besylate 5 MG Oral Tablet; TAKE 1 TABLET DAILY; Therapy: 78RSV5880 to (Evaluate:28Mar2017) Recorded   3  Artificial Tears 1 4 % Ophthalmic Solution; INSTILL 1-2 DROPS INTO EACH EYES   TWICE DAILY; Therapy: (Recorded:31Oct2014) to Recorded   4  Aspirin 81 MG TABS; TAKE 1 TABLET DAILY; Therapy: (Recorded:10Oct2014) to Recorded   5  Baclofen 10 MG Oral Tablet; one tab at bedtime; Therapy: 42PPW0090 to (Evaluate:10Jun2016); Last Rx:34Lui4281 Ordered   6  Bisac-Evac 10 MG Rectal Suppository; Therapy: (Recorded:02Mar2016) to Recorded   7  Docusate Sodium 100 MG Oral Tablet; TAKE 1 TABLET TWICE DAILY AS DIRECTED    (COLACE); Therapy: (Recorded:13Mar2014) to Recorded   8  Eliquis 5 MG Oral Tablet; TAKE 1 TABLET ORALLY EVERY 12 HOURS (ATRIAL   FIBRILLATION) *RE-ORDER*;   Therapy: 43KBX9736 to (Umm Jolly)  Requested for: 87APP3689; Last   Rx:07Oct2016; Status: ACTIVE - Retrospective Authorization Ordered   9  Fluticasone Propionate 50 MCG/ACT Nasal Suspension; USE 1 SPRAY IN EACH   NOSTRIL ONCE DAILY; Therapy: 94SUB8284 to Recorded   10  Folic Acid 1 MG Oral Tablet; TAKE 2 TABLETS DAILY; Therapy: (Recorded:02Mar2016) to Recorded   11  Gabapentin 300 MG Oral Capsule; TAKE 1 CAP AT 2PM;    Therapy: (Norberto Ross) to Recorded   12  Hydrocodone-Acetaminophen 5-325 MG Oral Tablet; TAKE 1 TABLET EVERY 6 HOURS    AS NEEDED;     Therapy: 73AQB2338 to Recorded   13  Levothyroxine Sodium 25 MCG Oral Tablet; TAKE 1 TABLET DAILY; Therapy: (Recorded:01Mgg1894) to Recorded   14  Loperamide A-D 2 MG Oral Tablet; USE AS DIRECTED; Therapy: 98PCM2790 to Recorded   15  Loratadine 10 MG Oral Tablet; Therapy: (Recorded:02Mar2016) to Recorded   16  LORazepam 0 5 MG Oral Tablet; Take 1 tablet every 6 hours as needed or anxiety; Therapy: 11IUC0218 to Recorded   17  Lumigan 0 01 % Ophthalmic Solution; Therapy: (Recorded:02Mar2016) to Recorded   18  Magnesium Oxide 400 MG Oral Tablet; TAKE 1 TABLET DAILY; Therapy: 97PAF8353 to (Evaluate:27Apr2017) Recorded   19  Metoprolol Tartrate 50 MG Oral Tablet; TAKE 1 TABLET EVERY 12 HOURS; Last    Rx:95Ere5522 Ordered   20  MiraLax Oral Packet; Therapy: (Recorded:02Mar2016) to Recorded   21  Nystatin 081045 UNIT/GM External Cream; APPLY 2-3 TIMES DAILY TO AFFECTED    AREA(S); Therapy: 93HFM2172 to Recorded   22  OXcarbazepine 150 MG Oral Tablet; Therapy: (Recorded:02Mar2016) to Recorded   23  Pantoprazole Sodium 40 MG Oral Tablet Delayed Release; TAKE 1 TABLET TWICE    DAILY 30 MINUTES BEFORE BREAKFAST AND DINNER; Therapy: ([phone]) to Recorded   24  ProAir  (90 Base) MCG/ACT Inhalation Aerosol Solution; 2 puffs every 6 hours as    needed; Therapy: 45AAM3053 to Recorded   25  ROPINIRole HCl - 1 MG Oral Tablet; one tablet every 6 hours; Therapy: 12INC1703 to (Evaluate:30Nov2014); Last Rx:96Qcy6384 Ordered   26  Siltussin-DM Alcohol Free SYRP;    Therapy: (Recorded:02Mar2016) to Recorded   27  Torsemide 20 MG Oral Tablet; TAKE 1 TABLET DAILY; Therapy: 53EXR3108 to (Evaluate:20Vze5591)  Requested for: 51Psa3964; Last    Rx:42Lgh7000 Ordered   28  Vitamin D 2000 UNIT Oral Capsule; 4000iu daily; Therapy: 62RNE6956 to Recorded    Allergies    1  Lipitor TABS   2  Morphine Derivatives   3  Percocet TABS   4  Spironolactone TABS   5  Sulfa Drugs  Denied    6   Acetaminophen TABS    Signatures   Electronically signed by : ALLI Corley ; Oct 12 2016  2:45PM EST

## 2018-01-12 VITALS — BODY MASS INDEX: 31.48 KG/M2 | HEIGHT: 60 IN | WEIGHT: 160.31 LBS

## 2018-01-12 VITALS — HEART RATE: 76 BPM | HEIGHT: 60 IN | DIASTOLIC BLOOD PRESSURE: 76 MMHG | SYSTOLIC BLOOD PRESSURE: 142 MMHG

## 2018-01-12 NOTE — PROCEDURES
Procedures by Abdirahman Lu MD at 2016   5:44 PM      Author:  Abdirahman Lu MD Service:  Neurology Author Type:  Physician     Filed:  2016  5:45 PM Date of Service:  2016  5:44 PM Status:  Signed     :  Abdirahman Lu MD (Physician)            Continuous Video EEG  Long Term Monitoring    Patient Name:  Terence Prater  MRN: [id number]   :  1931 File #: [place] Sharpe    Age: 80 y o  Encounter #: [id number]   Date performed: 4/3/2016 Referring Provider: REDD Chou          Report date: 2016          Study type: Continuous video EEG, up to 24 hours    ICD 10 diagnosis: Transient alteration of awareness R40 4    Start time:  15:30  End time:  23:59    Patient History:  Patient is 80 y o  female on continuous video EEG monitoring for the assessment of seizures, characterization  of events, and effect of treatment  Patient has a history of a stroke in the right hemisphere, she has had intermittent left facial and arm twitching activity with confusional spells        Current AEDs: Oxcarbazepine  Medications include:     amLODIPine 5 mg Oral Daily   aspirin 81 mg Oral Daily   baclofen 10 mg Oral HS   brimonidine 1 drop Right Eye Q12H [address]   calcium carbonate-vitamin D 1 tablet Oral BID   cefTRIAXone 1,000 mg Intravenous Q24H   cholecalciferol 2,000 Units Oral Daily   docusate sodium 100 mg Oral Daily   folic acid 1 mg Oral Daily   gabapentin 400 mg Oral TID   heparin (porcine) 5,000 Units Subcutaneous Q8H [address]   levothyroxine 75 mcg Oral Early Morning   metoprolol tartrate 25 mg Oral Q12H JULIETTE   nystatin 1 application Topical BID   OXcarbazepine 150 mg Oral HS   pantoprazole 40 mg Oral BID AC   potassium chloride 40 mEq Oral BID   potassium chloride 20 mEq Intravenous Once   pravastatin 40 mg Oral Daily With Dinner   rOPINIRole 1 mg Oral Q6H   sucralfate 1,000 mg Oral Q6H [address]       Description of Procedure:   A 24 hours continuous video EEG was performed with electrodes applied using the International 10-20 System at least 16 channels are reviewed and formatted into longitudinal bipolar, transverse bipolar, and referential (to common reference  or calculated common reference) montages  Additional electrodes used included T1, T2, and extraocular electrodes, and ECG, along with video recording  The EEG was recorded with the patient awake, drowsy, and asleep state  The recording was technically  satisfactory  Findings:   Background Activity:   During wakefulness, the background is symmetric with disorganized low-moderate voltage theta and moderate voltage delta activity and frequent / continuous periodic generalized sharp waves  These generalized periodic sharp wave have polymorphic features, triphasic morphology, and an anterior to posterior phase lag, consistent with triphasic waves  There are poorly persistent multifocal sharp waves that may be maximal in the left or right  hemispheres  During drowsiness, the high voltage sharp triphasic waves attenuate and replaced with diffuse low-moderate voltage 5-6 Hz theta activity and vertex waves  Other findings: The single lead ECG shows an irregular sinus rhythm with frequent premature ventricular complexes  There is excessive EMG artifact over the frontal region affecting Fp1 and Fp2  Events: There are no patient push button events  Interpretation: This is an abnormal 8 5 hours continuous video EEG recording  due to background disorganization, diffuse slowing, and triphasic waves with multifocal sharp waves  These findings are etiologically nonspecific for moderate diffuse cerebral dysfunction  The presence of multifocal sharp waves and triphasic waves are more consistent with a toxic metabolic encephalopathy  These findings are consistent with moderate diffuse cerebral dysfunction, etiologically nonspecific    Triphasic waves are typically seen in the setting of toxic-metabolic encephalopathy, including medications, hepatic, uremic, or pulmonary causes  EEG interpretation is limited by the persistent frontal EMG artifact during the waking state  MD Kaley [facility]  Jolanta MADERA    Apr 6 2016  5:46PM [facility] Standard Time

## 2018-01-12 NOTE — MISCELLANEOUS
Message   Recorded as Task   Date: 08/10/2017 05:42 PM, Created By: Messi Yanez   Task Name: Follow Up   Assigned To: [facility],Team   Regarding Patient: Gloria Gaitan, Status: Active   CommentBerta Laughter - 10 Aug 2017 5:42 PM     TASK CREATED  1  Repeat CBC, basic metabolic profile and iron studies in 2 weeks  2  Stool for occult blood Ã?3 now   above orders given to Shelley Alejo at [employer] [facility] Calcitriol 0 25 mg  to daily dosing, per Dr Graciela Sloan        1 Amended By: Phong Fairchild; Aug 14 2017 2:31 PM EST    Active Problems   1  Abnormal heart sounds (785 3) (R01 2)  2  Acute kidney injury (584 9) (N17 9)  3  Aortic stenosis (424 1) (I35 0)  4  Asthma (493 90) (J45 909)  5  Atherosclerosis of artery of extremity with ulceration (440 23) (I70 299,L97 909)  6  Atrial fibrillation (427 31) (I48 91)  7  Benign hypertension with chronic kidney disease, stage III (403 10,585 3) (I12 9,N18 3)  8  Carotid artery stenosis, asymptomatic (433 10) (I65 29)  9  Chest pain (786 50) (R07 9)  10  Chronic diastolic congestive heart failure (428 32,428 0) (I50 32)  11  Chronic kidney disease, stage 3 (585 3) (N18 3)  12  Chronic toe ulcer (707 15) (L97 509)  13  Constipation (564 00) (K59 00)  14  CRD (chronic renal disease) (585 9) (N18 9)  15  Diastolic murmur (413 5) (S02)  16  Dizziness (780 4) (R42)  17  Dysuria (788 1) (R30 0)  18  Edema (782 3) (R60 9)  19  Elevated serum creatinine (790 99) (R79 89)  20  Hospital discharge follow-up (V67 59) (Z09)  21  Hospital discharge follow-up (V67 59) (Z09)  22  Hyperlipidemia (272 4) (E78 5)  23  Hypertension (401 9) (I10)  24  Hypothyroidism (244 9) (E03 9)  25  Left renal artery stenosis (440 1) (I70 1)  26  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)  27  Moderate mitral regurgitation (424 0) (I34 0)  28  Other chronic pain (338 29) (G89 29)  29  Pulmonary artery hypertension (416 8) (I27 2)  30  Shortness of breath (786 05) (R06 02)    Current Meds  1  Antacid 812-758-29 MG/5ML Oral Suspension; USE AS DIRECTED PRN;   Therapy: 39RHT5802 to Recorded  2  Artificial Tears 1 4 % Ophthalmic Solution; INSTILL 1-2 DROPS INTO EACH EYES   TWICE DAILY; Therapy: (Recorded:31Oct2014) to Recorded  3  Ascriptin 325 MG Oral Tablet; TAKE 1 TABLET Every 4 hours or PRN; Therapy: 59JYC5782 to Recorded  4  Bisac-Evac 10 MG Rectal Suppository; Therapy: (Recorded:02Mar2016) to Recorded  5  Calcitriol 0 25 MCG Oral Capsule; TAKE 1 CAPSULE Monday Wednesday and Friday; Therapy: 84HGV1692 to (Kenyatta Booker)  Requested for: 03Apr2017; Last   Rx:03Apr2017 Ordered  6  Coenzyme Q10 200 MG Oral Capsule; take 1 capsule daily; Therapy: 07ZHW3603 to (Isaias Tuttle)  Requested for: 85OXK1105; Last   Rx:06Mar2017 Ordered  7  Docusate Sodium 100 MG Oral Tablet; TAKE 1 TABLET TWICE DAILY AS DIRECTED    (COLACE); Therapy: (Recorded:13Mar2014) to Recorded  8  Eliquis 5 MG Oral Tablet; TAKE 1 TABLET ORALLY EVERY 12 HOURS (ATRIAL   FIBRILLATION) *RE-ORDER*;   Therapy: 46YCB9813 to (Evaluate:31Gag5811)  Requested for: 08Lag6199; Last   Rx:12Apr2017 Ordered  9  Fluticasone Propionate 50 MCG/ACT Nasal Suspension; USE 1 SPRAY IN EACH   NOSTRIL ONCE DAILY; Therapy: 08KTE6621 to Recorded  10  Folic Acid 1 MG Oral Tablet; TAKE 2 TABLETS DAILY; Therapy: (Recorded:02Mar2016) to Recorded  11  Gabapentin 300 MG Oral Capsule; TAKE 1 CAP AT 2PM;    Therapy: (Manoj Norris) to Recorded  12  Latanoprost 0 005 % Ophthalmic Solution; Therapy: (Recorded:06Jul2017) to Recorded  13  Laxative 10 MG Rectal Suppository; every 4th day or PRN for constipation MDD:1;    Therapy: 29YSK3368 to Recorded  14  Levothyroxine Sodium 75 MCG Oral Tablet; take 1/2 tablet daily; Therapy: (Recorded:06Jul2017) to Recorded  15  Lumigan 0 01 % Ophthalmic Solution; Therapy: (Recorded:65Lka5387) to Recorded  16   Mag-Al Plus 200-200-20 MG/5ML Oral Liquid; SWALLOW 30 ML Every 6 hours or PRN; Therapy: 50AMQ7501 to Recorded  17  Magnesium 400 MG CAPS; take 1 capsule daily; Therapy: (Recorded:88Lrv6306) to Recorded  18  Melatonin 3 MG Oral Capsule; Therapy: (Recorded:06Jul2017) to Recorded  19  Metoprolol Tartrate 50 MG Oral Tablet; Take 1 tablet twice daily; Therapy: (Recorded:06Jul2017) to Recorded  20  Mupirocin 2 % External Ointment; Therapy: (Recorded:06Jul2017) to Recorded  21  Mylanta SUSP; Therapy: (Recorded:06Jul2017) to Recorded  22  OXcarbazepine 150 MG Oral Tablet; TAKE 1 TABLET Bedtime; Therapy: (Recorded:06Jul2017) to Recorded  23  Polyethylene Glycol 3350 Oral Powder; MIX GM  PRN; Therapy: 94LQT9240 to Recorded  24  ProAir  (90 Base) MCG/ACT Inhalation Aerosol Solution; 2 puffs every 6 hours as    needed; Therapy: 26MZE5595 to Recorded  25  Protonix 40 MG Oral Tablet Delayed Release (Pantoprazole Sodium); Take 1 tablet twice    daily; Therapy: (Recorded:06Jul2017) to Recorded  26  Red Yeast Rice CAPS; Therapy: (Recorded:06Jul2017) to Recorded  27  ROPINIRole HCl - 0 5 MG Oral Tablet; take 0 5 tablet daily; Therapy: 57SBC5109 to Recorded  28  ROPINIRole HCl - 1 MG Oral Tablet; PRN; Therapy: (Recorded:28Wmn4538) to Recorded  29  Tamsulosin HCl - 0 4 MG Oral Capsule; take 1 capsule daily; Therapy: 15XSG6789 to Recorded  30  Torsemide 20 MG Oral Tablet; TAKE 1 TABLET ORALLY TWICE DAILY (CONGESTIVE    HEART FAILURE); Therapy: 56FRO9821 to (Oleta Pouch)  Requested for: 67GSQ5070; Last    Rx:27Fgk6048 Ordered  31  Ventolin  (90 Base) MCG/ACT Inhalation Aerosol Solution; INHALE 1 TO 2    PUFFS EVERY 6 HOURS AS NEEDED; Therapy: 33QWF2774 to Recorded  32  Vitamin D3 1000 UNIT Oral Tablet; Therapy: (Recorded:35Erh7793) to Recorded    Allergies   1  Lipitor TABS  2  Morphine Derivatives  3  Percocet TABS  4  Spironolactone TABS  5  Statins  6  Sulfa Drugs  Denied   7   Acetaminophen TABS    Signatures   Electronically signed by : Marcelo Sommers, ; Aug 14 2017  2:31PM EST                       (Author)

## 2018-01-12 NOTE — PROCEDURES
Procedures by Marylu Lopes MD at 2017   7:56 PM      Author:  Marylu Lopes MD Service:  Neurology Author Type:  Physician    Filed:  2017  8:18 PM Date of Service:  2017  7:56 PM Status:  Addendum    :  Mraylu Lopes MD (Physician)      Related Notes:  Original Note by Marylu Lopes MD (Physician) filed at 2017  8:06 PM        Procedure Orders:       1  EEG Video Monitoring 24 Hour [id number] ordered by Mateus Suárez MD at 17 1714                  Continuous Video EEG Monitoring       Patient Name:  Shwetha Olson  MRN: [id number]   :  1931 File [address]: [address]    Age: 80 y o  Encounter #: [id number]   Start Time: 2017 08:00 End Time: 2017 08:00        Report date: 2017          Study type: Continuous video EEG    ICD 10 diagnosis: Spells/Fit NOS R56 9 and Encephalopathy, unspecified G93 40    -------------------------------------------------------------------------------------------------------------------   Patient History: This recording was observed in a 80 y o  female to determine whether spells  altered mental status are seizures  She has a history of 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, seen in the past by neurology for staring/unresponsive spells  Has history of 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, paroxysmal atrial fibrillation, and restless syndrome, presents as stroke alert after being found unresponsive/non-verbal, withparsignificant right side hemiparesis  The study was monitored in the acute setting by a provider other than the author of this report       Medications include:   trileptal, requip, flomax, demadex, ultram  tylenol   proventil   eliquis  baclofen  lumigen  dulcolax  alphagan  rocaltrol  vitamin D3   co-enzyme  colace  xalatan  claritan  flonase  levothyroxine  magnesium oxide  melatonin  lopressor protonix    -------------------------------------------------------------------------------------------------------------------   Description of Procedure:  32 channel digital recording with electrodes placed according to the International 10-20 system with additional T1/T2 electrodes,  EOG, EKG, and simultaneous video  A monitoring technologist supervised the continuous recording  The recording was technically satisfactory  -------------------------------------------------------------------------------------------------------------------   Results:   Manual Review:   During wakefulness, there were runs of poorly regulated, low amplitude, posteriorly dominant, symmetric  7 5 cps alpha rhythm that attenuated with eye opening  There were symmetric low amplitude, frontally dominant beta activities  With drowsiness, there were diffusely distributed theta activities and intermittent  bursts of diffuse low to medium amplitude mixed frequency theta/delta activities, often maximal in the temporal regions  There were a few left temporal sharp transients that did not interrupt ongoing background activity  With sleep, symmetric  vertex sharp waves, K complexes and infrequent poorly formed sleep spindles were present  There were occasional right temporal polymorphic theta/delta activities  Other findings:  Samples of the single channel ECG demonstrated a regular rhythm  Events:   No significant push buttons were activated  -------------------------------------------------------------------------------------------------------------------   Interpretation: This prolonged, continuous video-EEG recording is abnormal      Theta frequency background slowing during wakefulness suggest mild nonspecific diffuse cerebral dysfunction  Occasional right temporal polymorphic theta/delta slowing suggests focal cerebral dysfunction involving the right temporal region       Cristina Carcamo MD   [facility] Jennifer MADERA    Jul 27 2017  8:19PM [facility] Standard Time

## 2018-01-12 NOTE — MISCELLANEOUS
Message   Recorded as Task   Date: 02/09/2016 09:08 AM, Created By: Shabnam Adan   Task Name: Follow Up   Assigned To: Virginia Joaquin   Regarding Patient: Stacey Godwin, Status: In Progress   Mesfin Bourne - 09 Feb 2016 9:08 AM     TASK CREATED  Please review the medications that the patient is on  Make sure the patient is feeling well  Have a basic metabolic profile reviewed this week and bring her in with an extender this week or next week   Radha Sandoval - 09 Feb 2016 9:59 AM     TASK IN PROGRESS   SPOKE TO VANESSA AND HER DAUGHTER ALEXANDRIA  PT STATES SHES FEELING GOOD  PT WILL HAVE BLOODWORK ON 2/15 AND WILL SEE [facility] [address] ON 2/19  LAB SLIP WILL BE FAXED TO NURSING HOME ON FRIDAY FOR REPEAT BMP  PL      Active Problems    1  Acute kidney injury (584 9) (N17 9)   2  Anemia associated with chronic renal failure (285 21,585 9) (N18 9,D63 1)   3  Aortic stenosis (424 1) (I35 0)   4  Asthma (493 90) (J45 909)   5  Atherosclerosis of artery of extremity with ulceration (440 23) (I70 299,L97 909)   6  Benign hypertension with chronic kidney disease, stage III (403 10,585 3) (I12 9,N18 3)   7  Carotid artery stenosis, asymptomatic (433 10) (I65 29)   8  Chest pain (786 50) (R07 9)   9  Chronic diastolic congestive heart failure (428 32,428 0) (I50 32)   10  Chronic kidney disease, stage 3 (585 3) (N18 3)   11  Chronic toe ulcer (707 15) (L97 509)   12  Diastolic murmur (693 2) (I94)   13  Dizziness (780 4) (R42)   14  Dysuria (788 1) (R30 0)   15  Edema (782 3) (R60 9)   16  Hospital discharge follow-up (V67 59) (Z09)   17  Hyperlipidemia (272 4) (E78 5)   18  Hypertension (401 9) (I10)   19  Hypothyroidism (244 9) (E03 9)   20  Left renal artery stenosis (440 1) (I70 1)   21  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)   22  Mitral regurgitation (424 0) (I34 0)   23  Other chronic pain (338 29) (G89 29)   24  Pulmonary artery hypertension (416 8) (I27 2)   25   Shortness of breath (786 05) (R06 02)    Current Meds   1  Alphagan P 0 1 % Ophthalmic Solution; instill 1 drop into right eye twice daily; Therapy: (Recorded:31Oct2014) to Recorded   2  AmLODIPine Besylate 2 5 MG Oral Tablet; TAKE 1 TABLET ORALLY DAILY   (HYPERTENSIVE CHRONIC KIDNEY DISEASE); Therapy: 17JUT7515 to (Evaluate:01Jun2016)  Requested for: 14FAN8089; Last   Rx:03Jan2016 Ordered   3  Artificial Tears 1 4 % Ophthalmic Solution; INSTILL 1-2 DROPS INTO EACH EYES   TWICE DAILY; Therapy: (Recorded:31Oct2014) to Recorded   4  Aspirin 81 MG Oral Tablet; TAKE 1 TABLET DAILY; Therapy: (Recorded:10Oct2014) to Recorded   5  Bisac-Evac 10 MG Rectal Suppository; Therapy: (Recorded:83Czw2901) to Recorded   6  Crestor 5 MG Oral Tablet; TAKE 1 TABLET ORALLY EVERY OTHER DAY   (HYPERLIPIDEMIA); Therapy: 69RWH3572 to (Evaluate:01Jan2016)  Requested for: 46Mjj2232; Last   Rx:70Cop8290 Ordered   7  Cyclobenzaprine HCl - 5 MG Oral Tablet; Therapy: (Recorded:63Jls2251) to Recorded   8  Docusate Sodium 100 MG Oral Tablet; TAKE 1 TABLET TWICE DAILY AS DIRECTED    (COLACE); Therapy: (Recorded:13Mar2014) to Recorded   9  Fluticasone Propionate 50 MCG/ACT Nasal Suspension; USE 1 SPRAY IN EACH   NOSTRIL ONCE DAILY; Therapy: 47NRF6915 to Recorded   10  Folic Acid 1 MG Oral Tablet; TAKE 2 TABLETS DAILY; Therapy: (Langston Snellen) to Recorded   11  Gabapentin 300 MG Oral Capsule; TAKE 1 CAP AT 2PM;    Therapy: (Recorded:31Oct2014) to Recorded   12  Gabapentin 600 MG Oral Tablet; TAKE 1 TABLET TWICE DAILY; Therapy: (Langston Snellen) to Recorded   13  Hydrocodone-Acetaminophen 5-325 MG Oral Tablet; TAKE 1 TABLET EVERY 6 HOURS    AS NEEDED; Therapy: 50XOD6653 to Recorded   14  Levothyroxine Sodium 75 MCG Oral Tablet; Therapy: (Recorded:07Qiw9263) to Recorded   15  Loperamide A-D 2 MG Oral Tablet; USE AS DIRECTED; Therapy: 64ISC6929 to Recorded   16  Loratadine 10 MG Oral Tablet; Therapy: (Recorded:61Tlb4643) to Recorded   17  LORazepam 0 5 MG Oral Tablet; Take 1 tablet every 6 hours as needed or anxiety; Therapy: 39CVU1473 to Recorded   18  Lumigan 0 01 % Ophthalmic Solution; Therapy: (Recorded:29Asn8722) to Recorded   19  Metoprolol Tartrate 25 MG Oral Tablet; Therapy: (Recorded:99Mvk6233) to Recorded   20  MiraLax Oral Packet (Polyethylene Glycol 3350); Therapy: (Recorded:97Sti8210) to Recorded   21  Nystatin 185804 UNIT/GM External Cream; APPLY 2-3 TIMES DAILY TO AFFECTED    AREA(S); Therapy: 31ARA6817 to Recorded   22  OXcarbazepine 150 MG Oral Tablet; Therapy: (Recorded:15Qry8202) to Recorded   23  Pantoprazole Sodium 40 MG Oral Tablet Delayed Release; TAKE 1 TABLET DAILY; Therapy: (Recorded:13Mar2014) to Recorded   24  ProAir  (90 Base) MCG/ACT Inhalation Aerosol Solution; 2 puffs every 6 hours as    needed; Therapy: 04DAM1139 to Recorded   25  ROPINIRole HCl - 1 MG Oral Tablet; one tablet every 6 hours; Therapy: 93YWK8166 to (Evaluate:30Nov2014); Last Rx:31Oct2014 Ordered   26  Siltussin-DM Alcohol Free SYRP;    Therapy: (Recorded:77Gkk4738) to Recorded   27  Torsemide 20 MG Oral Tablet; take 2 tabletS  bid; Therapy: 23VMP7855 to (Ailin Astorga)  Requested for: 61QVF0907 Recorded   28  Vitamin D 2000 UNIT Oral Capsule; 4000iu daily; Therapy: 64CTT5471 to Recorded    Allergies    1  Acetaminophen TABS   2  Lipitor TABS   3  Morphine Derivatives   4  Percocet TABS   5  Spironolactone TABS   6   Sulfa Drugs    Signatures   Electronically signed by : ALLI Joseph ; Feb 9 2016 11:27AM EST

## 2018-01-12 NOTE — MISCELLANEOUS
Message   Recorded as Task   Date: 09/27/2016 10:54 AM, Created By: Rony Carrasquillo   Task Name: Follow Up   Assigned To: Preston Howard   Regarding Patient: Juliana Wright, Status: Active   CommentLacey Danyelle - 27 Sep 2016 10:54 AM     TASK CREATED  1  Stop the potassium supplement immediately  2  Low potassium diet  3  Follow-up labs Thursday or Friday with a CMP   Spoke to [facility] and informed about the above  PL      Active Problems    1  Abnormal heart sounds (785 3) (R01 2)   2  Acute kidney injury (584 9) (N17 9)   3  Anemia associated with chronic renal failure (285 21,585 9) (N18 9,D63 1)   4  Aortic stenosis (424 1) (I35 0)   5  Asthma (493 90) (J45 909)   6  Atherosclerosis of artery of extremity with ulceration (440 23) (I70 299,L97 909)   7  Atrial fibrillation (427 31) (I48 91)   8  Benign hypertension with chronic kidney disease, stage III (403 10,585 3) (I12 9,N18 3)   9  Carotid artery stenosis, asymptomatic (433 10) (I65 29)   10  Chest pain (786 50) (R07 9)   11  Chronic diastolic congestive heart failure (428 32,428 0) (I50 32)   12  Chronic kidney disease, stage 3 (585 3) (N18 3)   13  Chronic toe ulcer (707 15) (L97 509)   14  Constipation (564 00) (K59 00)   15  Diastolic murmur (455 9) (C03)   16  Dizziness (780 4) (R42)   17  Dysuria (788 1) (R30 0)   18  Edema (782 3) (R60 9)   19  Elevated serum creatinine (790 99) (R79 89)   20  Hospital discharge follow-up (V67 59) (Z09)   21  Hospital discharge follow-up (V67 59) (Z09)   22  Hyperlipidemia (272 4) (E78 5)   23  Hypertension (401 9) (I10)   24  Hypothyroidism (244 9) (E03 9)   25  Left renal artery stenosis (440 1) (I70 1)   26  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)   27  Moderate mitral regurgitation (424 0) (I34 0)   28  Other chronic pain (338 29) (G89 29)   29  Pulmonary artery hypertension (416 8) (I27 2)   30  Shortness of breath (786 05) (R06 02)    Current Meds   1   Alphagan P 0 1 % Ophthalmic Solution; instill 1 drop into right eye twice daily; Therapy: (Recorded:31Oct2014) to Recorded   2  Artificial Tears 1 4 % Ophthalmic Solution; INSTILL 1-2 DROPS INTO EACH EYES   TWICE DAILY; Therapy: (Recorded:31Oct2014) to Recorded   3  Aspirin 81 MG TABS; TAKE 1 TABLET DAILY; Therapy: (Recorded:10Oct2014) to Recorded   4  Baclofen 10 MG Oral Tablet; one tab at bedtime; Therapy: 53XMH7424 to (Evaluate:10Jun2016); Last Rx:25Lcf0329 Ordered   5  Bisac-Evac 10 MG Rectal Suppository; Therapy: (Recorded:02Mar2016) to Recorded   6  Docusate Sodium 100 MG Oral Tablet; TAKE 1 TABLET TWICE DAILY AS DIRECTED    (COLACE); Therapy: (Recorded:13Mar2014) to Recorded   7  Eliquis 5 MG Oral Tablet; take one tablet twice daily; Therapy: 28EVG5432 to (Evaluate:66Zpf3322); Last Rx:52Nhc5977 Ordered   8  Fluticasone Propionate 50 MCG/ACT Nasal Suspension; USE 1 SPRAY IN EACH   NOSTRIL ONCE DAILY; Therapy: 87UZS0322 to Recorded   9  Folic Acid 1 MG Oral Tablet; TAKE 2 TABLETS DAILY; Therapy: (Recorded:02Mar2016) to Recorded   10  Gabapentin 300 MG Oral Capsule; TAKE 1 CAP AT 2PM;    Therapy: (Recorded:02Mar2016) to Recorded   11  Gabapentin 600 MG Oral Tablet; TAKE 1 TABLET TWICE DAILY; Therapy: (Recorded:02Mar2016) to Recorded   12  Hydrocodone-Acetaminophen 5-325 MG Oral Tablet; TAKE 1 TABLET EVERY 6 HOURS    AS NEEDED; Therapy: 54IHX2719 to Recorded   13  Levothyroxine Sodium 75 MCG Oral Tablet; Therapy: (Recorded:02Mar2016) to Recorded   14  Loperamide A-D 2 MG Oral Tablet; USE AS DIRECTED; Therapy: 90CKC3550 to Recorded   15  Loratadine 10 MG Oral Tablet; Therapy: (Recorded:02Mar2016) to Recorded   16  LORazepam 0 5 MG Oral Tablet; Take 1 tablet every 6 hours as needed or anxiety; Therapy: 96UIX1579 to Recorded   17  Lumigan 0 01 % Ophthalmic Solution; Therapy: (Recorded:02Mar2016) to Recorded   18  Magnesium Oxide 400 MG Oral Tablet; TAKE 1 TABLET TWICE DAILY;     Therapy: 74ANQ8157 to (Evaluate:16Mar2017); Last Rx:85Tut3784 Ordered   19  Metoprolol Tartrate 50 MG Oral Tablet; TAKE 1 TABLET EVERY 12 HOURS; Last    Rx:38Eay0847 Ordered   20  MiraLax Oral Packet (Polyethylene Glycol 3350); Therapy: (Recorded:02Mar2016) to Recorded   21  Nystatin 791128 UNIT/GM External Cream; APPLY 2-3 TIMES DAILY TO AFFECTED    AREA(S); Therapy: 69NKC5069 to Recorded   22  OXcarbazepine 150 MG Oral Tablet; Therapy: (Recorded:02Mar2016) to Recorded   23  Pantoprazole Sodium 40 MG Oral Tablet Delayed Release; TAKE 1 TABLET DAILY; Therapy: (Recorded:02Mar2016) to Recorded   24  Potassium Chloride ER 10 MEQ Oral Capsule Extended Release; TAKE 2 CAPSULES    TWICE DAILY; Therapy: 22XYJ6292 to (Evaluate:48Tio4489); Last Rx:80Toa5742 Ordered   25  ProAir  (90 Base) MCG/ACT Inhalation Aerosol Solution; 2 puffs every 6 hours as    needed; Therapy: 84OYD0676 to Recorded   26  ROPINIRole HCl - 1 MG Oral Tablet; one tablet every 6 hours; Therapy: 75IDR0619 to (Evaluate:30Nov2014); Last Rx:31Oct2014 Ordered   27  Siltussin-DM Alcohol Free SYRP;    Therapy: (Recorded:02Mar2016) to Recorded   28  Torsemide 20 MG Oral Tablet; TAKE 1 TABLET DAILY; Therapy: 12WBY3781 to (Evaluate:09Hxs3197)  Requested for: 44VHU3855; Last    Rx:80Vik3517 Ordered   29  Vitamin D 2000 UNIT Oral Capsule; 4000iu daily; Therapy: 33FKP8854 to Recorded    Allergies    1  Lipitor TABS   2  Morphine Derivatives   3  Percocet TABS   4  Spironolactone TABS   5  Sulfa Drugs  Denied    6   Acetaminophen TABS    Signatures   Electronically signed by : ALLI Chacon ; Sep 29 2016  2:31PM EST

## 2018-01-13 VITALS
OXYGEN SATURATION: 97 % | HEIGHT: 66 IN | SYSTOLIC BLOOD PRESSURE: 132 MMHG | DIASTOLIC BLOOD PRESSURE: 60 MMHG | HEART RATE: 72 BPM

## 2018-01-13 NOTE — PROCEDURES
Procedures by Iza Hutchins MD at 2016   4:55 PM      Author:  Iza Hutchins MD Service:  Neurology Author Type:  Physician     Filed:  2016  5:07 PM Date of Service:  2016  4:55 PM Status:  Signed     :  Iza Hutchins MD (Physician)            Continuous Video EEG  Long Term Monitoring    Patient Name:  Can Aldridge  MRN: [id number]   :  1931 File #: [address]    Age: 80 y o  Encounter #: [id number]   Date performed: 2016 Referring Provider: REDD Frias          Report date: 2016          Study type: Continuous video EEG, up to 24 hours    ICD 10 diagnosis: Transient alteration of awareness R40 4    Start time:  00:01  End time:  23:59    Patient History:  Patient is 80 y o  female on continuous video EEG monitoring for the assessment of seizures, characterization  of events, and effect of treatment  Patient has a history of a stroke in the right hemisphere, she has had intermittent left facial and arm twitching activity with confusional spells        Current AEDs: Oxcarbazepine  Medications include:   amLODIPine 5 mg Oral Daily   aspirin 81 mg Oral Daily   baclofen 10 mg Oral HS   brimonidine 1 drop Right Eye Q12H [address]   calcium carbonate-vitamin D 1 tablet Oral BID   cefTRIAXone 1,000 mg Intravenous Q24H   cholecalciferol 2,000 Units Oral Daily   docusate sodium 100 mg Oral Daily   folic acid 1 mg Oral Daily   gabapentin 300 mg Oral TID   heparin (porcine) 5,000 Units Subcutaneous Q8H [address]   levothyroxine 75 mcg Oral Early Morning   metoprolol tartrate 25 mg Oral Q12H JULIETTE   nystatin 1 application Topical BID   OXcarbazepine 150 mg Oral HS   pantoprazole 40 mg Oral BID AC   potassium chloride 40 mEq Oral BID   potassium chloride 20 mEq Intravenous Once   pravastatin 40 mg Oral Daily With Dinner   rOPINIRole 1 mg Oral Q6H   sucralfate 1,000 mg Oral Q6H [address]       Description of Procedure:   A 24 hours continuous video EEG was performed with electrodes applied using the International 10-20 System at least 16 channels are reviewed and formatted into longitudinal bipolar, transverse bipolar, and referential (to common reference  or calculated common reference) montages  Additional electrodes used included T1, T2, and extraocular electrodes, and ECG, along with video recording  The EEG was recorded with the patient awake, drowsy, and asleep state  The recording was technically  satisfactory  Findings:   Background Activity:   During wakefulness, the background is symmetric with disorganized low-moderate voltage theta and moderate voltage delta activity and frequent / continuous periodic generalized sharp waves  These generalized periodic sharp wave have polymorphic features, triphasic morphology, and an anterior to posterior phase lag, consistent with triphasic waves  There are poorly persistent multifocal sharp waves that may be maximal in the left or right  hemispheres  During drowsiness, the high voltage sharp triphasic waves attenuate and replaced with diffuse low-moderate voltage 5-6 Hz theta activity and vertex waves  Other findings: The single lead ECG shows an irregular sinus rhythm with frequent premature ventricular complexes  There is excessive EMG artifact over the frontal region affecting Fp1 and Fp2  Events: There are no patient push button events  Interpretation: This is an abnormal 24 hours continuous video EEG recording due to background disorganization, diffuse  slowing, and triphasic waves with multifocal sharp waves  These findings are etiologically nonspecific for moderate diffuse cerebral dysfunction  The presence of multifocal sharp waves and triphasic waves are more consistent with a toxic metabolic encephalopathy  These findings are consistent with moderate diffuse cerebral dysfunction, etiologically nonspecific    Triphasic waves are typically seen in the setting of toxic-metabolic encephalopathy, including medications, hepatic, uremic, or pulmonary causes  EEG interpretation is limited by the persistent frontal EMG artifact during the waking state  Terrilyn Curling, MD [facility]  Erica MADERA    Apr 5 2016  5:07PM [facility] Standard Time

## 2018-01-13 NOTE — PROCEDURES
Procedures by Lucy Chapa MD at 2017   8:17 PM      Author:  Lucy Chapa MD Service:  Neurology Author Type:  Physician    Filed:  2017  8:18 PM Date of Service:  2017  8:17 PM Status:  Signed    :  Lucy Chapa MD (Physician)        Procedure Orders:       1  EEG Video Monitoring 24 Hour [id number] ordered by Sabra Brady MD at 17 0138                  Continuous Video EEG Monitoring       Patient Name:  Julian Dallas  MRN: [id number]   :  1931 File #: [address]    Age: 80 y o  Encounter #: [id number]   Start Time: 2017 08:00 End Time: 2017 08:00        Report date: 2017          Study type: Continuous video EEG    ICD 10 diagnosis: Spells/Fit NOS R56 9 and Encephalopathy, unspecified G93 40    -------------------------------------------------------------------------------------------------------------------   Patient History: This recording was observed in a 80 y o   female to determine whether spells altered mental status are seizures  She  has a historyparof 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, seen in the past by neurology for staring/unresponsive spells  Has history of right  basal ganglia infarction withparresidual left hemiparesis, paroxysmal atrial fibrillation, and restless syndrome,  presentsparas stroke alert after being found unresponsive/non-verbal, with significant rightparside hemiparesis  The study was monitored in the acute setting by a provider other than the author of this report       Medications include:   trileptal, requip, flomax, demadex, ultram  tylenol   proventil   eliquis  baclofen  lumigen  dulcolax  alphagan  rocaltrol  vitamin D3   co-enzyme  colace  xalatan  claritan  flonase  levothyroxine  magnesium oxide  melatonin  lopressor   protonix    -------------------------------------------------------------------------------------------------------------------   Description of Procedure:  32 channel digital recording with electrodes placed according to the International 10-20 system with additional T1/T2 electrodes,  EOG, EKG, and simultaneous video  A monitoring technologist supervised the continuous recording  The recording was technically satisfactory  -------------------------------------------------------------------------------------------------------------------   Results:   Manual Review:   During wakefulness, there were runs of poorly regulated, low amplitude, posteriorly dominant, symmetric  7 5 cps alpha rhythm that attenuated with eye opening  There were symmetric low amplitude, frontally dominant beta activities  With drowsiness, there were diffusely distributed theta activities and intermittent  bursts of diffuse low to medium amplitude mixed frequency theta/delta activities, often maximal in the temporal regions  There were a few left temporal sharp transients that did not interrupt ongoing background activity  With sleep, symmetric  vertex sharp waves, K complexes and infrequent poorly formed sleep spindles were present  There were occasional right temporal polymorphic theta/delta activities  There were less frequent brief left temporal polymorphic delta activities  Other findings:  Samples of the single channel ECG demonstrated a regular rhythm  Events:   No significant push buttons were activated  -------------------------------------------------------------------------------------------------------------------   Interpretation: This prolonged, continuous video-EEG recording is abnormal      Theta frequency background slowing during wakefulness suggest mild nonspecific diffuse cerebral dysfunction  Occasional right temporal more than left temporal polymorphic theta/delta slowing suggests focal cerebral dysfunction involving the right more than left temporal regions       Andrzej Wilson MD   [facility]                 Received Dontrell MADERA    Jul 27 2017  8:18PM [facility] Standard Time

## 2018-01-14 VITALS
DIASTOLIC BLOOD PRESSURE: 50 MMHG | BODY MASS INDEX: 26.36 KG/M2 | HEIGHT: 66 IN | HEART RATE: 72 BPM | SYSTOLIC BLOOD PRESSURE: 120 MMHG | WEIGHT: 164 LBS

## 2018-01-14 VITALS
WEIGHT: 164.4 LBS | SYSTOLIC BLOOD PRESSURE: 120 MMHG | HEART RATE: 57 BPM | DIASTOLIC BLOOD PRESSURE: 71 MMHG | BODY MASS INDEX: 32.11 KG/M2

## 2018-01-14 NOTE — PROCEDURES
Procedures by Sabra Brady MD  at 2016  6:10 PM      Author:  Sabra Brady MD Service:  Neurology Author Type:  Physician     Filed:  2016  6:17 PM Date of Service:  2016  6:10 PM Status:  Signed     :  Sabra Brady MD (Physician)            ELECTROENCEPHALOGRAM (EEG)      Patient Name:  Julian Dallas  MRN: [id number]   :  1931 File #: 16-5   Age: 80 y o  Encounter #: [id number]   Date performed: 2016           Report date: 2016          Study type: Routine EEG    ICD 10 diagnosis: Encephalopathy, unspecified G93 40    Start time: 09:03:44 End time: 09:51:56     -------------------------------------------------------------------------------------------------------------------   Patient History:  80year old female with an acute episode of encephalopathy persisting approximately 6-8 hours without causative  or provacative factor other than increased creatinine  This episode, as with others, has resolved spontaneously  Encephalopathy seems to  have improved slowly  Past medical history of encephalopathy, stroke, hypertension, cerebrovascular accident with residual deficit, and acute renal failure      Medications include:   aspirin 81 mg Oral Daily   baclofen 10 mg Oral HS   bimatoprost 1 drop Both Eyes HS   gabapentin 300 mg Oral Daily   gabapentin 600 mg Oral BID   heparin (porcine) 5,000 Units Subcutaneous Q8H [address]   levothyroxine 75 mcg Oral Early Morning   lidocaine 1 patch Transdermal Daily   metoprolol tartrate 25 mg Oral BID   OXcarbazepine 150 mg Oral Q12H JULIETTE   pantoprazole 40 mg Oral BID AC   polyvinyl alcohol 1 drop Both Eyes TID   rOPINIRole 1 mg Oral Q6H [address]   torsemide 10 mg Oral Daily       -------------------------------------------------------------------------------------------------------------------   Description of Procedure:  ·  32 channel digital recording with electrodes placed according to the International 10-20 system with additional  T1/T2 electrodes, EOG, EKG, and simultaneous  video  The recording was technically satisfactory  -------------------------------------------------------------------------------------------------------------------   Results:  Background Activity:  The recording was performed with the patient awake, drowsy, and asleep  During wakefulness, there were long runs of irregular, low to mid amplitude, posteriorly  dominant, symmetric 5-7 Hz theta slowing activity that  did attenuate with eye opening  Drowsiness is characterized by attenuation and irregularity of the alpha rhythm, and the development of irregular intermixed theta slowing    -------------------------------------------------------------------------------------------------------------------   Activation Procedures:  Hyperventilation was not performed for 3 -4 minutes and did not produce any changes  Stepped photic stimulation between 1-20 cps was performed and did not induce  any changes  -------------------------------------------------------------------------------------------------------------------   Abnormal Findings:  See Background Activity  No focal abnormalities or interictal epileptiform discharges were noted  No electrographic seizures were observed during this recording     -------------------------------------------------------------------------------------------------------------------  Other Findings: The single lead ECG demonstrated frequent premature heartbeats  -------------------------------------------------------------------------------------------------------------------  Events:  No patient push button events  -------------------------------------------------------------------------------------------------------------------   EEG Interpretation:  Moderately abnormal 30 minute EEG, due to continuous background irregular theta slowing   No focal abnormalities nor interictal epileptiform discharges were noted  No electrographic seizures occurred during  the recording  Scribe Attestation:  Documented by Pablito Mitchell, acting as a scribe for Dr Benjamín Flores on 7/19/16 at 6:17  PM     Physician Attestation:  All documentation recorded by the scribe accurately reflects the service I personally performed and the decisions made by me  I was present during the time the encounter was recorded and have reviewed all the documentation and confirm that it is all accurate  and representative of the encounter  Joss Arias MD   Medical Director  [address] [place] [address] [facility]  Pager # Harshal MADERA    Jul 19 2016  6:15PM [facility] Standard Time

## 2018-01-14 NOTE — MISCELLANEOUS
History of Present Illness    The patient is being contacted for follow-up after hospitalization  Hospitalization The hospitalization was not a readmission, The patient was discharged on 4/17/17  She has a high risk for readmission  She was discharged to a SNF for rehabilitation  The patient's status is short term  The facility name is: [facility]  Phone number: [phone]  I spoke with Yanni Graham RN manager Cardiac Unit  The patient is on HF pathway  The patient's weight today is 162 2  Wights stable  Patient is experiencing the following symptoms: edema   no acute  [place] Issues include: none  Treatment Questions: Diet ordered is 2 Gm sodium, the fluid restriction is being followed, staff is aware of symptoms to report, discharge medications were reconciled with the facility provider/PCP, the patient/family is compliant with diet, daily weights are being done and a plan is in place for who to call for worsening symptoms  The patient has the following appointments:    has been seen by HF NP, Arden Coronel  HFCC Additional Notes:   No discharge plans yet to go back to her Assisted Living at [address] [place] [address]  Current Meds   1  Alphagan P 0 1 % Ophthalmic Solution; instill 1 drop into right eye twice daily; Therapy: (Recorded:31Oct2014) to Recorded   2  AmLODIPine Besylate 5 MG Oral Tablet; TAKE 1 TABLET DAILY; Therapy: 68RFQ6883 to (Evaluate:28Mar2017) Recorded   3  Artificial Tears 1 4 % Ophthalmic Solution; INSTILL 1-2 DROPS INTO EACH EYES TWICE   DAILY; Therapy: (Recorded:31Oct2014) to Recorded   4  Baclofen 10 MG Oral Tablet; one tab at bedtime; Therapy: 60ANH9743 to (Evaluate:10Jun2016); Last Rx:11May2016 Ordered   5  Bisac-Evac 10 MG Rectal Suppository; Therapy: (Recorded:02Mar2016) to Recorded   6  Calcitriol 0 25 MCG Oral Capsule; TAKE 1 CAPSULE Monday Wednesday and Friday;    Therapy: 54HBQ6360 to (Patricia Gonzales)  Requested for: 03Apr2017; Last   Rx:03Apr2017 Ordered   7  Coenzyme Q10 200 MG Oral Capsule; take 1 capsule daily; Therapy: 97VTZ0794 to (Shelly Perez)  Requested for: 31IBL1393; Last   Rx:06Mar2017 Ordered   8  Docusate Sodium 100 MG Oral Tablet; TAKE 1 TABLET TWICE DAILY AS DIRECTED    (COLACE); Therapy: (Recorded:13Mar2014) to Recorded   9  Eliquis 5 MG Oral Tablet; TAKE 1 TABLET ORALLY EVERY 12 HOURS (ATRIAL   FIBRILLATION) *RE-ORDER*;   Therapy: 72AZJ5204 to (Evaluate:30Qax5792)  Requested for: 39Wom3238; Last   Rx:12Apr2017 Ordered   10  Fluticasone Propionate 50 MCG/ACT Nasal Suspension; USE 1 SPRAY IN EACH    NOSTRIL ONCE DAILY; Therapy: 78NVH4208 to Recorded   11  Folic Acid 1 MG Oral Tablet; TAKE 2 TABLETS DAILY; Therapy: (Recorded:02Mar2016) to Recorded   12  Gabapentin 300 MG Oral Capsule; TAKE 1 CAP AT 2PM;    Therapy: (Recorded:02Mar2016) to Recorded   13  Hydrocodone-Acetaminophen 5-325 MG Oral Tablet; TAKE 1 TABLET EVERY 6 HOURS    AS NEEDED; Therapy: 43QQM9640 to Recorded   14  Levothyroxine Sodium 75 MCG Oral Tablet; TAKE 1 TABLET DAILY; Therapy: (Recorded:03Apr2017) to Recorded   15  Loperamide A-D 2 MG Oral Tablet; USE AS DIRECTED; Therapy: 76FBD9161 to Recorded   16  Loratadine 10 MG Oral Tablet; TAKE 1 TABLET DAILY; Therapy: (Recorded:03Apr2017) to Recorded   17  LORazepam 0 5 MG Oral Tablet; Take 1 tablet every 6 hours as needed or anxiety; Therapy: 46NXG1099 to Recorded   18  Lumigan 0 01 % Ophthalmic Solution; Therapy: (Recorded:02Mar2016) to Recorded   19  Magnesium Oxide 400 MG Oral Tablet; TAKE 1 TABLET DAILY; Therapy: 47WJQ0820 to (Evaluate:27Apr2017) Recorded   20  Melatonin 3 MG Oral Tablet Dispersible; 3mg at bedtime; Therapy: 68WXS9786 to (Evaluate:19Nov2016); Last Rx:20Oct2016 Ordered   21  Metoprolol Tartrate 50 MG Oral Tablet; TAKE 1 TABLET EVERY 12 HOURS; Last    Rx:06Rmh7310 Ordered   22  MiraLax Oral Packet (Polyethylene Glycol 3350);     Therapy: (Recorded:02Mar2016) to Recorded 23  Nystatin 383927 UNIT/GM External Cream; APPLY 2-3 TIMES DAILY TO AFFECTED    AREA(S); Therapy: 79ELQ7465 to Recorded   24  OXcarbazepine 150 MG Oral Tablet; Therapy: (Recorded:02Mar2016) to Recorded   25  Pantoprazole Sodium 40 MG Oral Tablet Delayed Release; TAKE 1 TABLET TWICE DAILY    30 MINUTES BEFORE BREAKFAST AND DINNER; Therapy: ([phone]) to Recorded   26  ProAir  (90 Base) MCG/ACT Inhalation Aerosol Solution; 2 puffs every 6 hours as    needed; Therapy: 89QCX8282 to Recorded   27  Red Yeast Rice Extract 600 MG Oral Capsule; Take 1 capsule twice daily; Therapy: 27FDN1415 to (Chilo Sadler)  Requested for: 64PLO6363; Last    Rx:06Mar2017 Ordered   28  ROPINIRole HCl - 1 MG Oral Tablet; one tablet every 6 hours; Therapy: 27UNL9640 to (Evaluate:84Wxm1187); Last Rx:31Oct2014 Ordered   29  Siltussin-DM Alcohol Free SYRP;    Therapy: (Recorded:02Mar2016) to Recorded   30  Torsemide 20 MG Oral Tablet; one extra tab daily PRN weight gain 3 pounds in one day,    worsening shortness of breath or increasing lowe leg swelling; Therapy: 27Apr2017 to (Evaluate:38Qyc0197); Last Rx:27Apr2017 Ordered   31  Torsemide 20 MG Oral Tablet; TAKE 1 TABLET DAILY  and PRN extra dose daily for    worsening shortness of breath or weight gain 3 pounds in one day; Therapy: 72WRP5753 to (Constantine Tierney)  Requested for: 60NQD4010; Last    Rx:20Oct2016 Ordered   32  Vitamin D 2000 UNIT Oral Capsule; 4000iu daily; Therapy: 16KZT3273 to Recorded    Allergies    1  Lipitor TABS   2  Morphine Derivatives   3  Percocet TABS   4  Spironolactone TABS   5  Statins   6  Sulfa Drugs  Denied    7   Acetaminophen TABS    Signatures   Electronically signed by : Delphine Isidro, ; May  2 2017  3:01PM EST                       (Author)

## 2018-01-15 NOTE — PROCEDURES
Procedures by Tiara De Jesus MD  at 2017  2:27 PM      Author:  Tiara De Jesus MD Service:  Neurology Author Type:  Physician    Filed:  2017  2:49 PM Date of Service:  2017  2:27 PM Status:  Signed    :  Tiara De Jesus MD (Physician)         Continuous Video EEG Monitoring       Patient Name:  Luz Vazquez  MRN: [id number]   :  1931 File #: LTM 16- 1   Age: 80 y o  Encounter #: [id number]   Date Performed: 2017 to 2017    Report date: 2017          Study type: Continuous video EEG    ICD 10 diagnosis: Spells/Fit NOS R56 9    Start time: 9664 on  End time: 0800 on      -------------------------------------------------------------------------------------------------------------------   Patient History:  80year old female with a history of 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, seen in the past by neurology for staring/unresponsive spells  Has history of 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  Paroxysmal atrial fibrillation, and restless syndrome, presents as stroke alert after being found unresponsive/non-verbal, with significant right side hemiparesis  Upon arrival, pt was alert but unresponsive  with right  facial droop  Patient currently alert and talkative, but she cannot recall what happened  She was eating breakfast at her facility and the next thing she remembers is waking up in her ED room       Medications include:   apixaban 2 5 mg Oral BID   baclofen 10 mg Oral HS   bimatoprost 1 drop Both Eyes HS   brimonidine 1 drop Both Eyes BID   [START ON 2017] calcitriol 0 25 mcg Oral Once per day on    cholecalciferol 1,000 Units Oral Daily   docusate sodium 100 mg Oral BID   fluticasone 1 spray Nasal Daily   gabapentin 300 mg Oral BID   latanoprost 1 drop Both Eyes HS   levothyroxine 37 5 mcg Oral Early Morning   loratadine 10 mg Oral Daily   magnesium oxide 400 mg Oral Daily   melatonin 3 mg Oral HS metoprolol tartrate 50 mg Oral Q12H JULIETTE   OXcarbazepine 75 mg Oral HS   pantoprazole 40 mg Oral Early Morning   rOPINIRole 1 mg Oral Q6H   tamsulosin 0 4 mg Oral Daily With Dinner   torsemide 20 mg Oral Daily       -------------------------------------------------------------------------------------------------------------------   Description of Procedure:  ·  32 channel digital recording with electrodes placed according to the International 10-20 system with additional  T1/T2 electrodes, EOG, EKG, and simultaneous  video  A monitoring technologist supervised the continuous recording  The recording was technically satisfactory  -------------------------------------------------------------------------------------------------------------------   Results:   Background Activity:   During wakefulness, background activity consists of continuous, diffuse well-formed,  normal voltage, posteriorly dominant, symmetric, reactive  9-10 Hz alpha  activity with AP gradient present  During drowsiness and light sleep, background activity attenuated and was replaced by diffusely distributed theta activity  Activation Procedures:   Neither hyperventilation nor photic stimulation was performed  Abnormal Findings:      Occasional intermixed theta slowing was noted in the right temporal lobe  No interictal epileptiform discharges were noted  No electrographic seizures occurred during this recording  Other findings: The single lead EKG demonstrated a regular rhythm with occasional premature beats   Events:   No push button events occurred during this study  -------------------------------------------------------------------------------------------------------------------   Interpretation:   Mildly abnormal 13  hour continuous video-EEG recording, due to occasional  intermixed theta slowing in the right temporal lobe  No interictal epileptiform discharges were noted   No electrographic seizures occurred during the recording  Scott Mcnulty MD   Medical Director  [address] [facility]  Pager # Juline MADERA    Jul 22 2017  2:49PM [facility] Standard Time

## 2018-01-15 NOTE — MISCELLANEOUS
Message   Recorded as Task   Date: 08/09/2017 03:28 PM, Created By: Rachel Dan   Task Name: Follow Up   Assigned To: Sadaf Mathew   Regarding Patient: Anurag Jennings, Status: In Progress   Comment:    Rachel Dan - 09 Aug 2017 3:28 PM     TASK CREATED  please obtain a trough gabapentin level in the morning prior to taking the morning dose now   Gregg Felipe - 09 Aug 2017 3:33 PM     TASK REASSIGNED: Previously Assigned To [facility],Team   Sadaf Mathew - 10 Aug 2017 11:15 AM     TASK IN PROGRESS   Spoke with WIN Berg at the [facility]) [phone] she is aware of the above  Lab Requisition was sent to [phone] as per Otis's request  CARRION      Active Problems    1  Abnormal heart sounds (785 3) (R01 2)   2  Acute kidney injury (584 9) (N17 9)   3  Aortic stenosis (424 1) (I35 0)   4  Asthma (493 90) (J45 909)   5  Atherosclerosis of artery of extremity with ulceration (440 23) (I70 299,L97 909)   6  Atrial fibrillation (427 31) (I48 91)   7  Benign hypertension with chronic kidney disease, stage III (403 10,585 3) (I12 9,N18 3)   8  Carotid artery stenosis, asymptomatic (433 10) (I65 29)   9  Chest pain (786 50) (R07 9)   10  Chronic diastolic congestive heart failure (428 32,428 0) (I50 32)   11  Chronic kidney disease, stage 3 (585 3) (N18 3)   12  Chronic toe ulcer (707 15) (L97 509)   13  Constipation (564 00) (K59 00)   14  CRD (chronic renal disease) (585 9) (N18 9)   15  Diastolic murmur (290 6) (V27)   16  Dizziness (780 4) (R42)   17  Dysuria (788 1) (R30 0)   18  Edema (782 3) (R60 9)   19  Elevated serum creatinine (790 99) (R79 89)   20  Hospital discharge follow-up (V67 59) (Z09)   21  Hospital discharge follow-up (V67 59) (Z09)   22  Hyperlipidemia (272 4) (E78 5)   23  Hypertension (401 9) (I10)   24  Hypothyroidism (244 9) (E03 9)   25  Left renal artery stenosis (440 1) (I70 1)   26  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)   27  Moderate mitral regurgitation (424 0) (I34 0)   28  Other chronic pain (338 29) (G89 29)   29  Pulmonary artery hypertension (416 8) (I27 2)   30  Shortness of breath (786 05) (R06 02)    Current Meds   1  Antacid 180-125-07 MG/5ML Oral Suspension; USE AS DIRECTED PRN;   Therapy: 04BPQ1883 to Recorded   2  Artificial Tears 1 4 % Ophthalmic Solution; INSTILL 1-2 DROPS INTO EACH EYES   TWICE DAILY; Therapy: (Recorded:31Oct2014) to Recorded   3  Ascriptin 325 MG Oral Tablet; TAKE 1 TABLET Every 4 hours or PRN; Therapy: 68CNA9641 to Recorded   4  Bisac-Evac 10 MG Rectal Suppository; Therapy: (Recorded:02Mar2016) to Recorded   5  Calcitriol 0 25 MCG Oral Capsule; TAKE 1 CAPSULE Monday Wednesday and Friday; Therapy: 39KJP3280 to (Percell Closs)  Requested for: 03Apr2017; Last   Rx:03Apr2017 Ordered   6  Coenzyme Q10 200 MG Oral Capsule; take 1 capsule daily; Therapy: 32PBY6183 to (Mono Arango)  Requested for: 79DXS5500; Last   Rx:06Mar2017 Ordered   7  Docusate Sodium 100 MG Oral Tablet; TAKE 1 TABLET TWICE DAILY AS DIRECTED    (COLACE); Therapy: (Recorded:13Mar2014) to Recorded   8  Eliquis 5 MG Oral Tablet; TAKE 1 TABLET ORALLY EVERY 12 HOURS (ATRIAL   FIBRILLATION) *RE-ORDER*;   Therapy: 30CST4560 to (Evaluate:78Drn3180)  Requested for: 95Pwg7477; Last   Rx:12Apr2017 Ordered   9  Fluticasone Propionate 50 MCG/ACT Nasal Suspension; USE 1 SPRAY IN EACH   NOSTRIL ONCE DAILY; Therapy: 68SRT6523 to Recorded   10  Folic Acid 1 MG Oral Tablet; TAKE 2 TABLETS DAILY; Therapy: (Recorded:02Mar2016) to Recorded   11  Gabapentin 300 MG Oral Capsule; TAKE 1 CAP AT 2PM;    Therapy: (0650 314 95 44) to Recorded   12  Latanoprost 0 005 % Ophthalmic Solution; Therapy: (Recorded:48Ujb8344) to Recorded   13  Laxative 10 MG Rectal Suppository; every 4th day or PRN for constipation MDD:1;    Therapy: 85NAM6124 to Recorded   14  Levothyroxine Sodium 75 MCG Oral Tablet; take 1/2 tablet daily;     Therapy: (Recorded:79Ozs0439) to Recorded   15  Lumigan 0 01 % Ophthalmic Solution; Therapy: (Recorded:82Uqc6729) to Recorded   16  Mag-Al Plus 200-200-20 MG/5ML Oral Liquid; SWALLOW 30 ML Every 6 hours or PRN; Therapy: 49QTK5531 to Recorded   17  Magnesium 400 MG CAPS; take 1 capsule daily; Therapy: (Recorded:67Jlp2841) to Recorded   18  Melatonin 3 MG Oral Capsule; Therapy: (Recorded:06Jul2017) to Recorded   19  Metoprolol Tartrate 50 MG Oral Tablet; Take 1 tablet twice daily; Therapy: (Recorded:06Jul2017) to Recorded   20  Mupirocin 2 % External Ointment; Therapy: (Recorded:06Jul2017) to Recorded   21  Mylanta SUSP; Therapy: (Recorded:06Jul2017) to Recorded   22  OXcarbazepine 150 MG Oral Tablet; TAKE 1 TABLET Bedtime; Therapy: (Recorded:06Jul2017) to Recorded   23  Polyethylene Glycol 3350 Oral Powder; MIX GM  PRN; Therapy: 94MFZ7797 to Recorded   24  ProAir  (90 Base) MCG/ACT Inhalation Aerosol Solution; 2 puffs every 6 hours as    needed; Therapy: 66FRC7433 to Recorded   25  Protonix 40 MG Oral Tablet Delayed Release (Pantoprazole Sodium); Take 1 tablet twice    daily; Therapy: (Recorded:06Jul2017) to Recorded   26  Red Yeast Rice CAPS; Therapy: (Recorded:06Jul2017) to Recorded   27  ROPINIRole HCl - 0 5 MG Oral Tablet; take 0 5 tablet daily; Therapy: 52PHC9437 to Recorded   28  ROPINIRole HCl - 1 MG Oral Tablet; PRN; Therapy: (Recorded:70Lvr3805) to Recorded   29  Tamsulosin HCl - 0 4 MG Oral Capsule; take 1 capsule daily; Therapy: 20KYO2365 to Recorded   30  Torsemide 20 MG Oral Tablet; TAKE 1 TABLET ORALLY TWICE DAILY (CONGESTIVE    HEART FAILURE); Therapy: 28WDN4477 to (Steven Salinas)  Requested for: 32XTI5905; Last    Rx:21Dit0044 Ordered   31  Ventolin  (90 Base) MCG/ACT Inhalation Aerosol Solution; INHALE 1 TO 2    PUFFS EVERY 6 HOURS AS NEEDED; Therapy: 54GDU2263 to Recorded   32  Vitamin D3 1000 UNIT Oral Tablet;     Therapy: (Recorded:82Mvo2212) to Recorded    Allergies    1  Lipitor TABS   2  Morphine Derivatives   3  Percocet TABS   4  Spironolactone TABS   5  Statins   6  Sulfa Drugs  Denied    7   Acetaminophen TABS    Signatures   Electronically signed by : Denys Muniz, ; Aug 11 2017 11:04AM EST                       (Author)

## 2018-01-16 ENCOUNTER — ALLSCRIPTS OFFICE VISIT (OUTPATIENT)
Dept: OTHER | Facility: OTHER | Age: 83
End: 2018-01-16

## 2018-01-16 NOTE — PROGRESS NOTES
Assessment    1  Benign hypertension with chronic kidney disease, stage III (403 10,585 3) (I12 9,N18 3)   2  Edema (782 3) (R60 9)   3  Chronic diastolic congestive heart failure (428 32,428 0) (I50 32)   4  Shortness of breath (786 05) (R06 02)    Plan  Benign hypertension with chronic kidney disease, stage III    · (1) BASIC METABOLIC PROFILE; Status:Active; Requested [id number];    Perform:[facility]; EJZ:30KJW2027; Ordered; For:Benign hypertension with chronic kidney disease, stage III; Ordered By:Marlena Pak;    #1  Follow-up labs and appointment in December with Dr Valdemar Burden  #2  Increase torsemide to 40 mg every Monday, , Friday  #3  Repeat labs in 7-10 days  #4  Have nursing facility fax one week's worth of blood pressure and weights the week of      Discussion/Summary    #1  Chronic kidney disease stage III: Baseline creatinine 1 3-1 95: 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, arteriolar nephrosclerosis, cardiorenal syndrome, renal artery disease  Creatinine has been quite stable  Patient was hospitalized this summer and creatinine was 1 5 at discharge  Follow-up labs were obtained on  which show a creatinine of 1 3  -Renal function remained stable  Creatinine may be slightly lower due to increased volume    #2  CHF/volume status: EF 94%, grade 2 diastolic dysfunction, mild valvular dysfunction  Follows with Dr Zoila Alonzo for management of cardiac issues   -Currently on torsemide 20 mg daily  -Appears to have had a increase in weight  Patient states that her weight when subtracted from the weight of the wheelchair this morning was approximately 178 pounds  This is above her usual weight and she does have fairly significant lower extremity edema  She is wearing support stockings at this time   -We will increase torsemide to 40 mg every Monday, Wednesday, Friday and continue 20 mg the other days of the week   Tomorrow I will give her an extra dose of torsemide for a total of 20 mg since tomorrow is Saturday and she seems to have some extra fluid on board   -I requested the nursing facility to send in a week's worth of weights and blood pressure readings for the week of October 2    #3  Hypertension/renal artery disease: Prior intervention  Blood pressure acceptable  Unable to assess orthostatic change in readings since patient is essentially wheelchair-bound  She is currently on metoprolol 50 mg twice a day and tamsulosin 0 4 at bedtime    #4  Electrolytes: Potassium 4 2  -Will check labs in eight weeks to ten days due to increase in diuretic dosing    #5  CKD MBD: Secondary 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  Plan was to increase calcitriol to 0 25 mg daily  On review of patient's medications at the nursing facility and appears that she is still on every Monday Wednesday Friday therefore I am changing her to daily dosing   -It appears from last chart entry that is vitamin D level was supposed to be checked but is not on labs done on 9/11  -Patient has orders for to be checked in December    Other: History of MGUS follows with Dr Sondra Gonzales- last hemoglobin 10 2  History of CVA, restless leg syndrome, elevated liver function tests secondary to biliary sludge      The patient, patient's caretaker was counseled regarding instructions for management, risk factor reductions, patient and family education, importance of compliance with treatment  total time of encounter was 40 minutes and 15 minutes was spent counseling  The patient has the current Goals: Nonspecific goals  Patient would like to be free of pain  Patient is unable to Self-Care:   Possible side effects of new medications were reviewed with the patient/guardian today  The treatment plan was reviewed with the patient/guardian  The patient/guardian understands and agrees with the treatment plan     Indication for Services: CKD Stage 3   CKD Teaching includes hypertension management, Avoid nephrotoxic medication, sodium restriction and fluid management  Reason For Visit  Follow-up      History of Present Illness  It was a pleasure seeing Belle Townsend today in the nephrology clinic  She was last seen by Dr Can Molina on August 9  She was hospitalized this past summer with confusion and change in mental status  Workup was essentially negative  Tramadol was discontinued and Requip was started  PVR was increased during hospitalization and she required straight catheter but denies any urinary complaints at this time  Her chief complaint is trouble with her legs  She is essentially wheelchair-bound  She has increasing lower extremity edema  It appears that her weight is up between five and 10 pounds at this time (This is information based on her weight at the nursing facility this morning and physical exam)  She reports shortness of breath with activity such as getting dressed in the morning and moving too quickly  On her appetite waxes and wanes  She appears to be back to baseline mental functioning  She remains quite sharp and viable except she falls asleep very quickly when undisturbed  She states that she is very tired all the time  She had recently had skin cancer removed from the right side of her face and reports that she believes squamous cell carcinoma and she may need some additional surgery  Review of Systems    Constitutional: fatigue, but no fever, no chills, as noted in HPI and no anorexia  Integumentary: no rashes  Gastrointestinal: no abdominal pain, no constipation, no nausea, no diarrhea and no vomiting  Respiratory: shortness of breath, but as noted in HPI and no cough  Cardiovascular: lower extremity edema, but no orthopnea, as noted in HPI, no chest pain and no palpitations  Musculoskeletal: joint pain, but as noted in HPI  Neurological: No complaints of headache, no lightheadedness or dizziness     Genitourinary: No dysuria, no hematuria, no nocturia, no urinary frequency, no incomplete emptying of bladder, no foamy urine  ENT: no complaints of hearing loss, no nasal discharge  Psychiatric: Not suicidal, no sleep disturbance, no anxiety or depression, no change in personality, no emotional problems  Past Medical History    The active problems and past medical history were reviewed and updated today  Surgical History    The surgical history was reviewed and updated today  Family History    The family history was reviewed and updated today  Social History  The social history was reviewed and updated today  The social history was reviewed and is unchanged  Current Meds   1  Acetaminophen 500 MG Oral Tablet; TAKE 1 TABLET EVERY 4 DAILY ; Therapy: 43LNK2373 to Recorded   2  Antacid 200-200-20 MG/5ML Oral Suspension; SWALLOW 30 ML Every 6 hours AS   NEEDED FOR GI UPSET; Therapy: 68UKD3522 to Recorded   3  Artificial Tears 1 4 % Ophthalmic Solution; INSTILL 1-2 DROPS INTO EACH EYES TWICE   DAILY; Therapy: (Recorded:31Oct2014) to Recorded   4  Ascriptin 325 MG Oral Tablet; TAKE 1 TABLET Every 4 hours or PRN; Therapy: 78EGJ2833 to Recorded   5  Bisac-Evac 10 MG Rectal Suppository; Therapy: (Recorded:02Mar2016) to Recorded   6  Calcitriol 0 25 MCG Oral Capsule; take 1 capsule MONDAY, [address]; Therapy: 81RTF2250 to (Phyllis Silva)  Requested for: 12VDS9955 Recorded   7  Co Q-10 200 MG Oral Capsule; TAKE 1 CAPSULE ORALLY DAILY (VITAMIN   DEFICIENCY); Therapy: 07Sep2017 to (Evaluate:46Eqv3595)  Requested for: 53Kyd3451; Last   Rx:07Sep2017 Ordered   8  Docusate Sodium 100 MG Oral Tablet; TAKE 1 TABLET TWICE DAILY AS DIRECTED    (COLACE); Therapy: (Recorded:13Mar2014) to Recorded   9  Eliquis 5 MG Oral Tablet; TAKE 1 TABLET ORALLY EVERY 12 HOURS (ATRIAL   FIBRILLATION) *RE-ORDER*;   Therapy: 48OTQ3698 to (Evaluate:70Hjs1878)  Requested for: 08Mvp4109; Last   Rx:12Apr2017 Ordered   10   Fluticasone Propionate 50 MCG/ACT Nasal Suspension; USE 1 SPRAY IN [facility] NOSTRIL ONCE DAILY; Therapy: 02KOV5849 to Recorded   11  Gabapentin 300 MG Oral Capsule; TAKE 1 CAPSULE TWICE DAILY FOR PAIN;    Therapy: (94 31 11) to Recorded   12  Gabapentin 600 MG Oral Tablet; TAKE 2 TABLET Bedtime; Therapy: 81TEV6764 to Recorded   13  Latanoprost 0 005 % Ophthalmic Solution; Therapy: (Recorded:38Apc8278) to Recorded   14  Laxative 10 MG Rectal Suppository; every 4th day or PRN for constipation MDD:1;    Therapy: 55XAA8877 to Recorded   15  Levothyroxine Sodium 88 MCG Oral Tablet; TAKE 1 TABLET DAILY; Therapy: (94 31 11) to Recorded   16  Loratadine 10 MG Oral Tablet; TAKE 1 TABLET DAILY; Therapy: 71DXG4906 to Recorded   17  Lumigan 0 01 % Ophthalmic Solution; Therapy: (Recorded:02Ksi5754) to Recorded   18  Magnesium 400 MG CAPS; take 1 capsule daily; Therapy: (Recorded:69Xui6572) to Recorded   19  Melatonin 3 MG Oral Capsule; Therapy: (Recorded:92Ogr6967) to Recorded   20  Metoprolol Tartrate 50 MG Oral Tablet; Take 1 tablet twice daily; Therapy: (Recorded:41Duj9830) to Recorded   21  OXcarbazepine 150 MG Oral Tablet; TAKE 1 TABLET Bedtime; Therapy: (Recorded:87Zbu7225) to Recorded   22  Polyethylene Glycol 3350 Oral Powder; MIX GM  PRN; Therapy: 52EZZ1743 to Recorded   23  ProAir  (90 Base) MCG/ACT Inhalation Aerosol Solution; 2 puffs every 6 hours as    needed; Therapy: 95KUQ6624 to Recorded   24  Protonix 40 MG Oral Tablet Delayed Release; Take 1 tablet twice daily; Therapy: (Recorded:88Nwz6707) to Recorded   25  Red Yeast Rice Extract 600 MG Oral Capsule; TAKE 1 CAPSULE ORALLY TWICE DAILY    (VITAMIN DEFICIENCY); Therapy: 22ECS5213 to (Evaluate:63Wmh8248)  Requested for: 07Sep2017; Last    Rx:07Sep2017 Ordered   26  ROPINIRole HCl - 0 5 MG Oral Tablet; take 0 5 tablet daily; Therapy: 48KXB3412 to Recorded   27  ROPINIRole HCl - 1 MG Oral Tablet; PRN; Therapy: (Recorded:75Shl2160) to Recorded   28   Tamsulosin HCl - 0 4 MG Oral Capsule; take 1 capsule daily; Therapy: 70HDN0537 to Recorded   29  TraMADol HCl - 50 MG Oral Tablet; TAKE 1 TABLET Every 6 hours PRN; Therapy: 86TYZ0245 to Recorded   30  Ventolin  (90 Base) MCG/ACT Inhalation Aerosol Solution; INHALE 1 TO 2 PUFFS    EVERY 6 HOURS AS NEEDED; Therapy: 77EGX5432 to Recorded   31  Vitamin D3 1000 UNIT Oral Tablet; Therapy: (Recorded:71Bil7827) to Recorded    The medication list was reviewed and updated today  Allergies    1  Lipitor TABS   2  Morphine Derivatives   3  Percocet TABS   4  Spironolactone TABS   5  Statins   6  Sulfa Drugs  Denied    7  Acetaminophen TABS    Vitals  Vital Signs    Recorded: 42GLN9912 10:46AM Recorded: 45WSD6917 10:44AM   Heart Rate  66, Apical   Pulse Quality  Regular, Apical   Systolic 355, LUE, Sitting 112, RUE, Sitting   Diastolic 58, LUE, Sitting 62, RUE, Sitting   Height Unobtainable  Yes   Weight Unobtainable  Yes     Physical Exam    Constitutional: General appearance: Abnormal   overweight, appears tired and appearance reflects stated age  ENT: External ears and nose appear normal      Eyes: Anicteric sclerae  Neck: No bruit heard over either carotid  JVD:  No JVD present  Pulmonary: Respiratory effort: No increased work of breathing or signs of respiratory distress  Auscultation of lungs: Abnormal   Auscultation of the lungs revealed bibasilar rales/crackles  no rhonchi  no wheezing  Cardiovascular: Auscultation of heart: Abnormal   The heart rate was normal  The rhythm was regular  Heart sounds: normal S1, normal S2 and no S3  A grade 3 systolic murmur was heard at the LUSB  Abdomen: Abnormal   The abdomen was rounded  Bowel sounds were normal and No bruit heard  The abdomen was soft and nontender  Extremities: Extremities are abnormal   Extremities: bilateral extremities have 2+ pitting edema  Rash: No rash present     Neurologic: Non Focal      Psychiatric: Orientation to person, place, and time: Normal   and Mood and affect: Normal     Back: No CVA tenderness  Results/Data  (1) WOUND CULTURE 75Kgb5697 06:03PM EPIC, Provider   Test ordered by: Alexia Mendoza     Test Name Result Flag Reference   CLINICAL REPORT (Report)     Test:        Wound culture and Gram stain  Specimen Type:    Wound  Specimen Date:   9/13/2017 6:03 PM  Result Date:    9/15/2017 10:06 AM  Result Status:   Final result  Resulting Lab:   [place] [address]            Tel: [phone]      CULTURE                                       ------------------                                   4+ Growth of Mixed Skin Kaitlyn      STAIN                                        ------------------                                   No polys seen    3+ Gram Positive Rods Resembling Diphtheroids    1+ Gram positive cocci in clusters   Source; nose     Nephrology Flowsheet 28Dbk7051 12:00AM Patt Nick     Test Name Result Flag Reference   Sodium 143     Potassium 4 2     Chloride 109     Carbon Dioxide 25     Calcium 8 8     GLUCOSE 93     BUN 43 A    Serum Creatinine 1 31 A    GFR, NON- 37 A    TSH 6 46 A        Future Appointments    Date/Time Provider Specialty Site   10/20/2017 01:20 PM Abhi Escobedo MD Cardiology  [facility]     Signatures   Electronically signed by : SHYANN Reyes P ; Sep 22 2017  3:19PM EST                       (Author)

## 2018-01-16 NOTE — PROCEDURES
Procedures by Pricilla Hernandez MD at 2016   5:29 PM      Author:  Pricilla Hernandez MD Service:  Neurology Author Type:  Physician     Filed:  2016  5:40 PM Date of Service:  2016  5:29 PM Status:  Signed     :  Pricilla Hernandez MD (Physician)            Continuous Video EEG  Long Term Monitoring    Patient Name:  Barbara Burton  MRN: [id number]   :  1931 File #: Shahla Noble    Age: 80 y o  Encounter #: [id number]   Date performed: 2016 Referring Provider: REDD Rome          Report date: 2016          Study type: Continuous video EEG, up to 24 hours    ICD 10 diagnosis: Transient alteration of awareness R40 4 and Encephalopathy, unspecified G93 40    Start time:  00:01  End time:  13:18    Patient History:  Patient is 80 y o  female on continuous video EEG monitoring for the assessment of seizures, characterization  of events, and effect of treatment  Patient has a history of a stroke in the right hemisphere, she has had intermittent left facial and arm twitching activity with confusional spells        Current AEDs: Oxcarbazepine  Medications include:     amLODIPine 5 mg Oral Daily   aspirin 81 mg Oral Daily   baclofen 10 mg Oral HS   brimonidine 1 drop Right Eye Q12H [address]   calcium carbonate-vitamin D 1 tablet Oral BID   cefTRIAXone 1,000 mg Intravenous Q24H   cholecalciferol 2,000 Units Oral Daily   docusate sodium 100 mg Oral Daily   folic acid 1 mg Oral Daily   gabapentin 300 mg Oral TID   heparin (porcine) 5,000 Units Subcutaneous Q8H [address]   levothyroxine 75 mcg Oral Early Morning   metoprolol tartrate 25 mg Oral Q12H JULIETTE   nystatin 1 application Topical BID   OXcarbazepine 150 mg Oral HS   pantoprazole 40 mg Oral BID AC   potassium chloride 40 mEq Oral BID   potassium chloride 20 mEq Intravenous Once   pravastatin 40 mg Oral Daily With Dinner   rOPINIRole 1 mg Oral Q6H   sucralfate 1,000 mg Oral Q6H [address]       Description of Procedure:   A 24 hours continuous video EEG was performed with electrodes applied using the International 10-20 System at least 16 channels are reviewed and formatted into longitudinal bipolar, transverse bipolar, and referential (to common reference  or calculated common reference) montages  Additional electrodes used included T1, T2, and extraocular electrodes, and ECG, along with video recording  The EEG was recorded with the patient awake, drowsy, and asleep state  The recording was technically  satisfactory  Findings:   Background Activity:   During wakefulness, the background is symmetric with disorganized low-moderate voltage theta and moderate voltage delta activity and frequent / continuous periodic generalized sharp waves  These generalized periodic sharp wave have polymorphic features, triphasic morphology, and an anterior to posterior phase lag, consistent with triphasic waves  There are poorly persistent multifocal sharp waves that may be maximal in the left or right  hemispheres  During drowsiness, the high voltage sharp triphasic waves attenuate and replaced with diffuse low-moderate voltage 5-6 Hz theta activity and vertex waves  Around 09:00, the generalized triphasic waves are less prominent and less frequent  By 12:00, the background has better organization with a posterior rhythm of 6-7 Hz and anterior alpha activity (but obscured by EMG artifact)  Other findings: The single lead ECG shows an irregular sinus rhythm with frequent premature ventricular complexes  There is excessive EMG artifact over the frontal region affecting Fp1 and Fp2  Events: There are no patient push button events  Interpretation: This is an abnormal 13 hours continuous video EEG recording  due to background disorganization, diffuse slowing, and triphasic waves with multifocal sharp waves  These findings are etiologically nonspecific for moderate diffuse cerebral dysfunction    The presence of multifocal sharp waves and triphasic waves are more consistent with a toxic metabolic encephalopathy  These findings are consistent with moderate diffuse cerebral dysfunction, etiologically nonspecific  Triphasic waves are typically seen in the setting of toxic-metabolic encephalopathy, including medications, hepatic, uremic, or pulmonary causes  There is improvement of the background by the end of the study with less frequent triphasic waves and better organization  Jimmy Ceron MD  [address] [place] [address] [facility]  Kobe MADERA    Apr 5 2016  5:40PM [facility] Standard Time

## 2018-01-16 NOTE — MISCELLANEOUS
Message   Recorded as Task   Date: 01/18/2016 04:05 PM, Created By: Hong Monterroso   Task Name: Follow Up   Assigned To: Herlinda Cook   Regarding Patient: Nanci Thompson, Status: Active   CommentHosofia Edwards - 18 Jan 2016 4:05 PM     TASK CREATED  have the NH call with weights and edema check now, and set up to see rosana or libia this or next week   SPOKE TO KAY AT [place] [address]  APPT SCHEDULED WITH LIBIA ON FRIDAY 1/22  PL      Active Problems    1  Acute kidney injury (584 9) (N17 9)   2  Anemia associated with chronic renal failure (285 21,585 9) (N18 9,D63 1)   3  Aortic stenosis (424 1) (I35 0)   4  Asthma (493 90) (J45 909)   5  Atherosclerosis of artery of extremity with ulceration (440 23) (I70 299,L97 909)   6  Benign hypertension with chronic kidney disease, stage III (403 10,585 3) (I12 9,N18 3)   7  Carotid artery stenosis, asymptomatic (433 10) (I65 29)   8  Chest pain (786 50) (R07 9)   9  Chronic diastolic congestive heart failure (428 32,428 0) (I50 32)   10  Chronic kidney disease, stage 3 (585 3) (N18 3)   11  Chronic toe ulcer (707 15) (L97 509)   12  Diastolic murmur (888 2) (H44)   13  Dizziness (780 4) (R42)   14  Dysuria (788 1) (R30 0)   15  Edema (782 3) (R60 9)   16  Hyperlipidemia (272 4) (E78 5)   17  Hypertension (401 9) (I10)   18  Hypothyroidism (244 9) (E03 9)   19  Left renal artery stenosis (440 1) (I70 1)   20  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)   21  Mitral regurgitation (424 0) (I34 0)   22  Other chronic pain (338 29) (G89 29)   23  Pulmonary artery hypertension (416 8) (I27 2)   24  Shortness of breath (786 05) (R06 02)    Current Meds   1  Acetaminophen 500 MG Oral Capsule; Therapy: (Recorded:41Vtm0846) to Recorded   2  Alphagan P 0 1 % Ophthalmic Solution; instill 1 drop into right eye twice daily; Therapy: (Recorded:46Ywk1429) to Recorded   3   AmLODIPine Besylate 2 5 MG Oral Tablet; TAKE 1 TABLET ORALLY DAILY   (HYPERTENSIVE CHRONIC KIDNEY DISEASE); Therapy: 96JQY2538 to (Evaluate:01Jun2016)  Requested for: 60CBS0637; Last   LX:86SUP3847 Ordered   4  Artificial Tears 1 4 % Ophthalmic Solution; INSTILL 1-2 DROPS INTO EACH EYES   TWICE DAILY; Therapy: (Recorded:31Oct2014) to Recorded   5  Aspirin 81 MG Oral Tablet; TAKE 1 TABLET DAILY; Therapy: (Recorded:10Oct2014) to Recorded   6  Bisac-Evac 10 MG Rectal Suppository; Therapy: (Recorded:47Xqc5727) to Recorded   7  Crestor 5 MG Oral Tablet; TAKE 1 TABLET ORALLY EVERY OTHER DAY   (HYPERLIPIDEMIA); Therapy: 48GSF8400 to (Evaluate:01Jan2016)  Requested for: 88Vpw3822; Last   Rx:77Ndu7251 Ordered   8  Cyclobenzaprine HCl - 5 MG Oral Tablet; Therapy: (Recorded:96Hol8316) to Recorded   9  Docusate Sodium 100 MG Oral Tablet; TAKE 1 TABLET TWICE DAILY AS DIRECTED    (COLACE); Therapy: (Recorded:13Mar2014) to Recorded   10  Fluticasone Propionate 50 MCG/ACT Nasal Suspension; USE 1 SPRAY IN EACH    NOSTRIL ONCE DAILY; Therapy: 56TCO1158 to Recorded   11  Folic Acid 1 MG Oral Tablet; TAKE 2 TABLETS DAILY; Therapy: (Mihir Truong) to Recorded   12  Gabapentin 300 MG Oral Capsule; TAKE 1 CAP AT 2PM;    Therapy: (Recorded:31Oct2014) to Recorded   13  Gabapentin 600 MG Oral Tablet; TAKE 1 TABLET TWICE DAILY; Therapy: (Mihir Truong) to Recorded   14  Hydrocodone-Acetaminophen 5-325 MG Oral Tablet; TAKE 1 TABLET EVERY 6 HOURS    AS NEEDED; Therapy: 66ZVV6925 to Recorded   15  Levothyroxine Sodium 75 MCG Oral Tablet; Therapy: (Recorded:26Xog5387) to Recorded   16  Loperamide A-D 2 MG Oral Tablet; USE AS DIRECTED; Therapy: 68OAK7173 to Recorded   17  Loratadine 10 MG Oral Tablet; Therapy: (Recorded:26Ebc9128) to Recorded   18  LORazepam 0 5 MG Oral Tablet; Take 1 tablet every 6 hours as needed or anxiety; Therapy: 67OXU6213 to Recorded   19  Lumigan 0 01 % Ophthalmic Solution; Therapy: (Recorded:36Rhe6909) to Recorded   20   Metoprolol Tartrate 25 MG Oral Tablet; Therapy: (Recorded:70Mhf8455) to Recorded   21  MiraLax Oral Packet; Therapy: (Recorded:81Mtg4982) to Recorded   22  Nystatin 615976 UNIT/GM External Cream; APPLY 2-3 TIMES DAILY TO AFFECTED    AREA(S); Therapy: 38MTN5227 to Recorded   23  OXcarbazepine 150 MG Oral Tablet; Therapy: (Recorded:22Ntx7890) to Recorded   24  Pantoprazole Sodium 40 MG Oral Tablet Delayed Release; TAKE 1 TABLET DAILY; Therapy: (Recorded:13Mar2014) to Recorded   25  ProAir  (90 Base) MCG/ACT Inhalation Aerosol Solution; 2 puffs every 6 hours as    needed; Therapy: 66LYE0108 to Recorded   26  ROPINIRole HCl - 1 MG Oral Tablet; one tablet every 6 hours; Therapy: 36AHX7659 to (Evaluate:30Nov2014); Last Rx:89Syg4252 Ordered   27  Siltussin-DM Alcohol Free SYRP;    Therapy: (Recorded:72Kqb3801) to Recorded   28  Torsemide 20 MG Oral Tablet; TAKE ONE (1) TABLET(S) TWICE DAILY; Therapy: 91LVK0666 to (Evaluate:11Nov2015)  Requested for: 42PUM9653; Last    Rx:47Zow1942 Ordered   29  Vitamin D 2000 UNIT Oral Capsule; 4000iu daily; Therapy: 44IFD3157 to Recorded    Allergies    1  Acetaminophen TABS   2  Lipitor TABS   3  Morphine Derivatives   4  Percocet TABS   5  Spironolactone TABS   6   Sulfa Drugs    Signatures   Electronically signed by : ALLI العلي ; Jan 18 2016  4:22PM EST                       (Author)

## 2018-01-17 NOTE — PROGRESS NOTES
Assessment   1  Benign hypertension with chronic kidney disease, stage III (403 10,585 3) (I12 9,N18 3)  2  Chronic kidney disease, stage 3 (585 3) (N18 3)  3  Edema (782 3) (R60 9)    Discussion/Summary      Chronic Kidney Disease stage III- Creatinine elevated but this appears to be the patientâs new baseline now  Electrolytes within normal limits  Avoid NSAIDs  Antihypertensive regimen includes amlodipine 5mg at bedtime, metoprolol 50mg twice a day, and torsemide 20mg twice a day  Avoid salt in your diet  Her blood pressure today is acceptable  / Volume Status- She takes torsemide 20mg twice a day  Increase to 40mg in the morning and 20mg at night if your weight increases by 2-3 pounds in 1-2 days  Continue to avoid salt in your diet  up with Dr Surya Wyman in March  Please call the office with any questions or concerns  send in daily weights to our office for the past month  send in daily blood pressures for the past month  to 40mg in the morning and 20mg at night if your weight increases by 2-3 pounds in 1-2 days  fax us the blood work from last week  weights and blood pressures reviewed once sent from nursing facility  Weight has decreased from 180 pounds on 12/17 to 174 pounds on 1/15  Weekly blood pressures were around 130/60-70s  1     The patient was counseled regarding diagnostic results,-- instructions for management  Patient is able to Self-Care  Possible side effects of new medications were reviewed with the patient/guardian today  The treatment plan was reviewed with the patient/guardian  The patient/guardian understands and agrees with the treatment plan       1 Amended By: Baldo To; Jan 16 2018 12:22 PM EST      Reason For Visit   follow up      History of Present Illness   Tian Ferraro presents to the office for a blood pressure check  She states she has lost a couple pounds but feels she has more swelling in her ankles   Her breathing is so so depending on her body position or time of day  She states she doesn't sleep at all  She states she sleeps in the chair because she cannot lay flat  She states she does not think increasing the water pill will help her breathing  I am unsure why she thinks this  She does not know what her blood pressures are at the nursing home  fell two weeks ago on her arm and still has pain in her arm due to a bruise  She did go to the hospital after her fall  Review of Systems        Constitutional: recent 2 lb weight loss, but-- no fever,-- no chills,-- no anorexia-- and-- no recent weight gain  Integumentary: no rashes  Gastrointestinal: no nausea,-- no diarrhea-- and-- no vomiting  Respiratory: no shortness of breath  Cardiovascular: lower extremity edema, but-- no chest pain  Musculoskeletal: no joint pain-- and-- no joint swelling  Neurological: no headache,-- no lightheadedness-- and-- no dizziness  Genitourinary: incomplete emptying of bladder, but-- no dysuria,-- no hematuria-- and-- no foamy urine  ENT: no hearing loss  Past Medical History      The active problems and past medical history were reviewed and updated today  Surgical History      The surgical history was reviewed and updated today  Family History      The family history was reviewed and updated today  Social History   The social history was reviewed and updated today  The social history was reviewed and is unchanged  Current Meds   1  Acetaminophen 500 MG Oral Tablet; TAKE 1 TABLET EVERY 4 DAILY ; Therapy: 00HWL8280 to Recorded  2  AmLODIPine Besylate 5 MG Oral Tablet; TAKE 1 TABLET ORALLY AT BEDTIME ( BLOOD     PRESSURE ) **WATCH FOR SWELLING*;     Therapy: 43JTY6695 to (Evaluate:94Meh0531)  Requested for: 78FSU4063; Last     Rx:08Epz0346 Ordered  3  Antacid 200-200-20 MG/5ML Oral Suspension; SWALLOW 30 ML Every 6 hours AS     NEEDED FOR GI UPSET; Therapy: 30CPU9298 to Recorded  4   Artificial Tears 1 4 % Ophthalmic Solution; INSTILL 1-2 DROPS INTO EACH EYES TWICE     DAILY; Therapy: (Recorded:31Oct2014) to Recorded  5  Ascriptin 325 MG Oral Tablet; TAKE 1 TABLET Every 4 hours or PRN; Therapy: 50ZIF4401 to Recorded  6  Bisac-Evac 10 MG Rectal Suppository; Therapy: (Recorded:02Mar2016) to Recorded  7  Calcitriol 0 25 MCG Oral Capsule; TAKE 1 CAPSULE ORALLY DAILY (VITAMIN     DEFICIENCY); Therapy: 34KHB8804 to ([phone])  Requested for: 56TJM8720; Last     Rx:[id number] Ordered  8  Co Q-10 200 MG Oral Capsule; TAKE 1 CAPSULE ORALLY DAILY (VITAMIN     DEFICIENCY); Therapy: 86Hob2606 to (Evaluate:23Vvv1135)  Requested for: 47Ewg2054; Last     Rx:65Lin5446 Ordered  9  Eliquis 2 5 MG Oral Tablet; Take 1 tablet twice daily; Therapy: 59CAH0826 to (Evaluate:15Oct2018)  Requested for: 50EQV9946 Recorded  10  Fluticasone Propionate 50 MCG/ACT Nasal Suspension; USE 1 SPRAY IN EACH      NOSTRIL ONCE DAILY; Therapy: 95HXY7735 to Recorded  11  Gabapentin 100 MG Oral Capsule; TAKE 1 CAPSULE 3 times daily; Therapy: 39LLE4897 to Recorded  12  Gabapentin 100 MG Oral Capsule; Take 1 capsule in the morning and afternoon; Therapy: 56TQE8864 to (Last Rx:90Rqs8510) Ordered  13  Gabapentin 300 MG Oral Capsule; Take 2 capsule at bedtime; Therapy: 05MSS5226 to (Evaluate:05Xpn2690) Recorded  14  Inspra 25 MG Oral Tablet; Take 1 tablet daily; Therapy: 81CQH4825 to Recorded  15  Latanoprost 0 005 % Ophthalmic Solution; Therapy: (Recorded:84Bkq0702) to Recorded  16  Laxative 10 MG Rectal Suppository; every 4th day or PRN for constipation MDD:1;      Therapy: 58LPM1773 to Recorded  17  Levothyroxine Sodium 88 MCG Oral Tablet; TAKE 1 TABLET DAILY; Therapy: (Recorded:16Jan2018) to Recorded  18  Loratadine 10 MG Oral Tablet; TAKE 1 TABLET DAILY; Therapy: 01SHL0916 to Recorded  19  Lumigan 0 01 % Ophthalmic Solution; Therapy: (Recorded:70Xxf4784) to Recorded  20  Melatonin 3 MG Oral Capsule; TAKE 2 CAPSULE Bedtime; Therapy: (Recorded:16Jan2018) to Recorded  21  Metoprolol Tartrate 50 MG Oral Tablet; Take 1 tablet twice daily; Therapy: (Recorded:06Jul2017) to Recorded  22  OXcarbazepine 150 MG Oral Tablet; TAKE 1 TABLET Bedtime; Therapy: (Recorded:06Jul2017) to Recorded  23  Polyethylene Glycol 3350 Oral Powder; MIX GM  PRN; Therapy: 75TUY8389 to Recorded  24  ProAir  (90 Base) MCG/ACT Inhalation Aerosol Solution; 2 puffs every 6 hours as      needed; Therapy: 40CLM4118 to Recorded  25  Protonix 40 MG Oral Tablet Delayed Release; Take 1 tablet twice daily; Therapy: (Recorded:06Jul2017) to Recorded  26  Red Yeast Rice Extract 600 MG Oral Capsule; TAKE 1 CAPSULE ORALLY TWICE DAILY      (VITAMIN DEFICIENCY); Therapy: 73VZB7870 to (Evaluate:48Tkz7050)  Requested for: 09Aro9118; Last      Rx:49Rwn9461 Ordered  27  ROPINIRole HCl - 1 MG Oral Tablet; TAKE 1 TABLET Every 6 hours; Therapy: (Recorded:16Jan2018) to Recorded  28  Tamsulosin HCl - 0 4 MG Oral Capsule; take 1 capsule daily; Therapy: 76MVD3167 to Recorded  29  Torsemide 20 MG Oral Tablet; TAKE 1 TABLET ORALLY TWICE DAILY (CONGESTIVE      HEART FAILURE); Therapy: 43RBW0040 to (Cecilia Amado)  Requested for: 35Bud4727; Last      Rx:54Owr2611 Ordered  30  TraMADol HCl - 50 MG Oral Tablet; TAKE 1 TABLET Every 6 hours PRN; Therapy: 15SWY4011 to Recorded  31  Ventolin  (90 Base) MCG/ACT Inhalation Aerosol Solution; INHALE 1 TO 2 PUFFS      EVERY 6 HOURS AS NEEDED; Therapy: 09NMC3499 to Recorded  32  Vitamin D3 1000 UNIT Oral Tablet; Therapy: (Recorded:74Hpg4049) to Recorded     The medication list was reviewed and updated today  Allergies   1  Lipitor TABS  2  Morphine Derivatives  3  Percocet TABS  4  Spironolactone TABS  5  Statins  6  Sulfa Drugs  Denied   7   Acetaminophen TABS    Vitals   Vital Signs    Recorded: 99ZKL7347 10:57AM Recorded: 73MXA5536 95:74HJ   Systolic 826, RUE, Sitting    Diastolic 68, RUE, Sitting    Height Unobtainable  Yes   Weight Unobtainable  Yes     Physical Exam        Constitutional: General appearance: No acute distress, well appearing and well nourished  ENT: External ears and nose appear normal          Eyes: Anicteric sclerae  Neck: No bruit heard over either carotid  JVD:  No JVD present  Pulmonary: Respiratory effort: No increased work of breathing or signs of respiratory distress  -- Auscultation of lungs: Clear to auscultation  Cardiovascular: Auscultation of heart: Normal rate and rhythm, normal S1 and S2, without murmurs  Abdomen: Non-tender, no masses  Extremities: Extremities are abnormal--   bilateral edema to knees  Rash: No rash present  Neurologic: Non Focal          Psychiatric: Orientation to person, place, and time: Normal  -- and-- Mood and affect: Normal        Results/Data   Nephrology Flowsheet 51HKJ3110 12:00AM [facility]      Test Name Result Flag Reference   Sodium 138     Potassium 3 9     Chloride 101     Carbon Dioxide 29     Calcium 8 9     GLUCOSE 103     BUN 55     Serum Creatinine 1 83     GFR,  29     GFR, NON- 25        Nephrology Flowsheet 88WFZ4344 12:00AM       Test Name Result Flag Reference   WBC 6 4     Hemoglobin 9 2     Hematocrit 26 2     Platelets 963          Thank you very much for allowing me to participate in the care of this patient  If you have any questions, please do not hesitate to contact me        Signatures    Electronically signed by : JAQUELINE Mejía; Jan 16 2018 11:08AM EST                       (Author)     Electronically signed by : ALLI Castillo ; Jan 16 2018 11:10AM EST                       (Author)     Electronically signed by : Rony Fofana, [facility]; Jan 16 2018 12:23PM EST                       (Author)     Electronically signed by : JAQUELINE Mejía; Jan 16 2018  3:42PM EST                       (Author)     Electronically signed by : ALLI Dale ; Jan 16 2018  3:42PM EST                       (Author)

## 2018-01-17 NOTE — MISCELLANEOUS
History of Present Illness  A call was made to the facility  The contact name and phone number is  Donya Benton at [address] [place] [address] [phone]  Status Check: today's weight is 163 and steady   Patient is experiencing the following symptoms: edema and shortness of breath with moderate activity   The patient is not experiencing signs of heart failure  Stable symptoms  Leg edema is noticeably improved and mild   Concerns expressed today consisted of: none  Counseling provided to patient's caretaker  Topics counseled included diet, need for daily weights, importance of compliance with treatment and symptoms to report  HFCC Additional Notes: States torsemide had been further increased last week by renal doctor and there are parameters to call with 5 # weight gain or decrease  SO far her weight has improved and holding steady  Future Appointments    Date/Time Provider Specialty Site   04/05/2016 02:40 PM ALLI Sibley   Nephrology [facility]   03/02/2016 09:20 AM Bailee Hollins MD Cardiology [address]     Signatures   Electronically signed by : Jesús Kimbrough, ; Feb 4 2016  3:23PM EST                       (Author)

## 2018-01-18 NOTE — MISCELLANEOUS
History of Present Illness    The patient is being contacted for follow-up after hospitalization  Hospitalization The hospitalization was not a readmission, The patient was discharged on 1/13/16  She has a moderate risk for readmission  [facility] was [facility]  The patient's status is permanent resident  The [facility] name is[facility]  Phone number: [phone]  The best contact is Nurse  Floor/Location: [facility]  I spoke with Bertha Villatoro  The patient is on HF pathway  Dr Letty Russell sees patient on site  the HF program phone number was verified  The patient's discharge weight is 162 4 at facility, hospital states 158  4 The patient's weight today is 162 4- this is 4# lighter than when patient had been sent to the hospital     Patient is experiencing the following symptoms:  The patient is currently asymptomatic  [facility] Issues include: none  Treatment Questions: Diet ordered is Patient encouraged tos choose low sodium, staff is aware of symptoms to report, discharge medications were reconciled with the facility provider/PCP, the patient/family is compliant with diet, daily weights are being done and a plan is in place for who to call for worsening symptoms  The patient has the following appointments:    Yes, Dr Blanca Carranza next week  Staff re-education topics include diet, need for daily weights, medications, symptoms to report, patient and family education, importance of compliance with treatment and HF program # and when to call  HFCC Additional Notes:   I had met with patient yesterday at the [facility] before discharge  Very engaged with HF management  Bertha Villatoro the nurse states patient had been on torsemide twice daily and now is only once daily  Instructed to call with weight gain or s/s worsening HF  Current Meds   1  Acetaminophen 500 MG Oral Capsule; Therapy: (Recorded:45Xyj7028) to Recorded   2   Alphagan P 0 1 % Ophthalmic Solution; instill 1 drop into right eye twice daily; Therapy: (Recorded:31Oct2014) to Recorded   3  AmLODIPine Besylate 2 5 MG Oral Tablet; TAKE 1 TABLET ORALLY DAILY   (HYPERTENSIVE CHRONIC KIDNEY DISEASE); Therapy: 59POS2893 to (Evaluate:01Jun2016)  Requested for: 04VYR2067; Last   FW:77EGP4748 Ordered   4  Artificial Tears 1 4 % Ophthalmic Solution; INSTILL 1-2 DROPS INTO EACH EYES TWICE   DAILY; Therapy: (Recorded:31Oct2014) to Recorded   5  Aspirin 81 MG Oral Tablet; TAKE 1 TABLET DAILY; Therapy: (Recorded:10Oct2014) to Recorded   6  Bisac-Evac 10 MG Rectal Suppository; Therapy: (Recorded:47Sya7847) to Recorded   7  Crestor 5 MG Oral Tablet; TAKE 1 TABLET ORALLY EVERY OTHER DAY   (HYPERLIPIDEMIA); Therapy: 84FKL0263 to (Evaluate:01Jan2016)  Requested for: 39Pxq8864; Last   Rx:15Yww6305 Ordered   8  Cyclobenzaprine HCl - 5 MG Oral Tablet; Therapy: (Recorded:42Yoj2097) to Recorded   9  Docusate Sodium 100 MG Oral Tablet; TAKE 1 TABLET TWICE DAILY AS DIRECTED    (COLACE); Therapy: (Recorded:13Mar2014) to Recorded   10  Fluticasone Propionate 50 MCG/ACT Nasal Suspension; USE 1 SPRAY IN EACH    NOSTRIL ONCE DAILY; Therapy: 88KNR1075 to Recorded   11  Folic Acid 1 MG Oral Tablet; TAKE 2 TABLETS DAILY; Therapy: ([phone]) to Recorded   12  Gabapentin 300 MG Oral Capsule; TAKE 1 CAP AT 2PM;    Therapy: (Recorded:31Oct2014) to Recorded   13  Gabapentin 600 MG Oral Tablet; TAKE 1 TABLET TWICE DAILY; Therapy: [phone]) to Recorded   14  Hydrocodone-Acetaminophen 5-325 MG Oral Tablet; TAKE 1 TABLET EVERY 6 HOURS    AS NEEDED; Therapy: 63CVL9188 to Recorded   15  Levothyroxine Sodium 75 MCG Oral Tablet; Therapy: (Recorded:36Igh8506) to Recorded   16  Loperamide A-D 2 MG Oral Tablet; USE AS DIRECTED; Therapy: 12EHR1034 to Recorded   17  Loratadine 10 MG Oral Tablet; Therapy: (Recorded:67Uch9260) to Recorded   18  LORazepam 0 5 MG Oral Tablet; Take 1 tablet every 6 hours as needed or anxiety;     Therapy: 51IIZ7954 to Recorded   19  Lumigan 0 01 % Ophthalmic Solution; Therapy: (Recorded:70Rta4982) to Recorded   20  Metoprolol Tartrate 25 MG Oral Tablet; Therapy: (Recorded:71Lzy8717) to Recorded   21  MiraLax Oral Packet (Polyethylene Glycol 3350); Therapy: (Recorded:82Jpe9799) to Recorded   22  Nystatin 258357 UNIT/GM External Cream; APPLY 2-3 TIMES DAILY TO AFFECTED    AREA(S); Therapy: 70EUE6829 to Recorded   23  OXcarbazepine 150 MG Oral Tablet; Therapy: (Recorded:30Gul0394) to Recorded   24  Pantoprazole Sodium 40 MG Oral Tablet Delayed Release; TAKE 1 TABLET DAILY; Therapy: (Recorded:13Mar2014) to Recorded   25  ProAir  (90 Base) MCG/ACT Inhalation Aerosol Solution; 2 puffs every 6 hours as    needed; Therapy: 73QNX8961 to Recorded   26  ROPINIRole HCl - 1 MG Oral Tablet; one tablet every 6 hours; Therapy: 55LXE3301 to (Evaluate:30Nov2014); Last Rx:64Mpl6821 Ordered   27  Siltussin-DM Alcohol Free SYRP;    Therapy: (Recorded:78Kpc0919) to Recorded   28  Torsemide 20 MG Oral Tablet; TAKE ONE (1) TABLET(S) TWICE DAILY; Therapy: 45GRX5150 to (Evaluate:11Nov2015)  Requested for: 47AQO6877; Last    Rx:00Uml3326 Ordered   29  Vitamin D 2000 UNIT Oral Capsule; 4000iu daily; Therapy: 82DXC7258 to Recorded    Allergies    1  Acetaminophen TABS   2  Lipitor TABS   3  Morphine Derivatives   4  Percocet TABS   5  Spironolactone TABS   6   Sulfa Drugs    Future Appointments    Date/Time Provider Specialty Site   01/19/2016 09:20 AM Abdelrahman Santacruz MD Cardiology  [facility]     Signatures   Electronically signed by : Genoveva Houston, ; Jan 14 2016  1:27PM EST                       (Author)

## 2018-01-18 NOTE — MISCELLANEOUS
History of Present Illness    The patient is being contacted for follow-up after hospitalization  Hospitalization The hospitalization was a readmission, The patient was discharged on 7/20/17 first admission with HF diagnosis  She was readmitted with syncope, unresponsivess- r/o seizure and CVA on 7/21-7/29/17 , no CVA or seizure identified  Patient back to [address] [place] [address] to continue rehabilitaion for "physical deconditioning " Geriatrician did d/c some medication and neurology did make some adjustments  She has a high risk for readmission  She was discharged to a SNF for rehabilitation  The facility name is: [address] [place] [address]  I spoke with [facility]  The patient is on HF pathway  The patient's discharge weight is 164 at facility on 7/30/17 The patient's weight today is 165 4  Patient is experiencing the following symptoms:  The patient is currently asymptomatic  [facility] Issues include: none, doing better now with therapy  Treatment Questions: Diet ordered is 2 gm NA, the fluid restriction is being followed, staff is aware of symptoms to report, discharge medications were reconciled with the facility provider/PCP, the patient/family is compliant with diet, daily weights are being done and a plan is in place for who to call for worsening symptoms  Staff re-education topics include patient and family education  Current Meds   1  Alphagan P 0 1 % Ophthalmic Solution; instill 1 drop into right eye twice daily; Therapy: (Recorded:31Oct2014) to Recorded   2  AmLODIPine Besylate 5 MG Oral Tablet; TAKE 1 TABLET DAILY; Therapy: 21WQG0623 to (Evaluate:28Mar2017) Recorded   3  Artificial Tears 1 4 % Ophthalmic Solution; INSTILL 1-2 DROPS INTO EACH EYES TWICE   DAILY; Therapy: (Recorded:31Oct2014) to Recorded   4  Baclofen 10 MG Oral Tablet; one tab at bedtime; Therapy: 58LYU8749 to (Evaluate:10Jun2016); Last Rx:11May2016 Ordered   5  Bisac-Evac 10 MG Rectal Suppository;    Therapy: (Recorded:02Mar2016) to Recorded   6  Calcitriol 0 25 MCG Oral Capsule; TAKE 1 CAPSULE Monday Wednesday and Friday; Therapy: 04DGA9882 to (Hero Poll)  Requested for: 03Apr2017; Last   Rx:03Apr2017 Ordered   7  Coenzyme Q10 200 MG Oral Capsule; take 1 capsule daily; Therapy: 73BOE6069 to (Reinaramin Salinas)  Requested for: 27PHS6120; Last   Rx:06Mar2017 Ordered   8  DiphenhydrAMINE HCl - 25 MG Oral Capsule; Therapy: (Recorded:06Jul2017) to Recorded   9  Docusate Sodium 100 MG Oral Tablet; TAKE 1 TABLET TWICE DAILY AS DIRECTED    (COLACE); Therapy: (Recorded:13Mar2014) to Recorded   10  Eliquis 5 MG Oral Tablet; TAKE 1 TABLET ORALLY EVERY 12 HOURS (ATRIAL    FIBRILLATION) *RE-ORDER*;    Therapy: 10ZXB2892 to (Evaluate:73Oht1434)  Requested for: 39Xiq3695; Last    Rx:12Apr2017 Ordered   11  Fluticasone Propionate 50 MCG/ACT Nasal Suspension; USE 1 SPRAY IN EACH    NOSTRIL ONCE DAILY; Therapy: 99PAE8508 to Recorded   12  Folic Acid 1 MG Oral Tablet; TAKE 2 TABLETS DAILY; Therapy: (Recorded:02Mar2016) to Recorded   13  Gabapentin 300 MG Oral Capsule; TAKE 1 CAP AT 2PM;    Therapy: (Recorded:02Mar2016) to Recorded   14  Hydrocodone-Acetaminophen 5-325 MG Oral Tablet; TAKE 1 TABLET EVERY 6 HOURS    AS NEEDED; Therapy: 24SWP0457 to Recorded   15  Latanoprost 0 005 % Ophthalmic Solution; Therapy: (Recorded:06Jul2017) to Recorded   16  Levothyroxine Sodium 75 MCG Oral Tablet; take 1/2 tablet daily; Therapy: (Recorded:06Jul2017) to Recorded   17  Loperamide A-D 2 MG Oral Tablet; USE AS DIRECTED; Therapy: 63RQO0756 to Recorded   18  Loratadine 10 MG Oral Tablet; TAKE 1 TABLET DAILY; Therapy: (Recorded:03Apr2017) to Recorded   19  Lumigan 0 01 % Ophthalmic Solution; Therapy: (Recorded:86Ace5532) to Recorded   20  Magnesium 400 MG CAPS; take 1 capsule daily; Therapy: (Recorded:06Jul2017) to Recorded   21  Melatonin 3 MG Oral Capsule; Therapy: (Recorded:06Jul2017) to Recorded   22  Metoprolol Tartrate 50 MG Oral Tablet; Take 1 tablet twice daily; Therapy: (Recorded:94Tnq9520) to Recorded   23  Mupirocin 2 % External Ointment; Therapy: (Recorded:06Jul2017) to Recorded   24  Mylanta SUSP; Therapy: (Recorded:06Jul2017) to Recorded   25  Nystatin 974698 UNIT/GM External Cream; APPLY 2-3 TIMES DAILY TO AFFECTED    AREA(S); Therapy: 19NUV9452 to Recorded   26  OXcarbazepine 150 MG Oral Tablet; TAKE 1 TABLET Bedtime; Therapy: (Recorded:06Jul2017) to Recorded   27  ProAir  (90 Base) MCG/ACT Inhalation Aerosol Solution; 2 puffs every 6 hours as    needed; Therapy: 60YMO2456 to Recorded   28  Protonix 40 MG Oral Tablet Delayed Release (Pantoprazole Sodium); Take 1 tablet twice    daily; Therapy: (Recorded:69Mdp9306) to Recorded   29  Red Yeast Rice CAPS; Therapy: (Recorded:06Jul2017) to Recorded   30  Robafen 100 MG/5ML Oral Syrup; Therapy: (Recorded:06Jul2017) to Recorded   31  ROPINIRole HCl - 1 MG Oral Tablet; PRN; Therapy: (Recorded:06Jul2017) to Recorded   32  Torsemide 20 MG Oral Tablet; TAKE 1 EXTRA TABLET DAILY PRN FOR >3 LB WEIGHT    GAIN OVERNIGHT, WORSENING SOB OR LE EDEMA; Therapy: 26IIR7718 to (Evaluate:08Jun2018)  Requested for: 58GVE2486; Last    Rx:13Jun2017 Ordered   33  Torsemide 20 MG Oral Tablet; TAKE 1 TABLET ORALLY TWICE DAILY (CONGESTIVE    HEART FAILURE); Therapy: 51ZIJ5029 to (Jie Tinoco)  Requested for: 08AGX1702; Last    Rx:06Sjk5446 Ordered   34  TraMADol HCl - 50 MG Oral Tablet; Therapy: (Recorded:33Ebk3523) to Recorded   35  Vitamin D3 1000 UNIT Oral Tablet; Therapy: (Recorded:39Xhn0155) to Recorded    Allergies    1  Lipitor TABS   2  Morphine Derivatives   3  Percocet TABS   4  Spironolactone TABS   5  Statins   6  Sulfa Drugs  Denied    7   Acetaminophen TABS    Future Appointments    Date/Time Provider Specialty Site   [facility]29[facility]2017 02:[facility] 08/09/2017 02:10 PM ALLI Rodriguez   Nephrology [address]     Signatures   Electronically signed by : Molina Raya, ; Aug  4 2017  5:12PM EST                       (Author)

## 2018-01-18 NOTE — MISCELLANEOUS
Message   Recorded as Task   Date: 03/02/2017 08:25 AM, Created By: Rashid Sandoval   Task Name: Follow Up   Assigned To: Rosaura Sutton   Regarding Patient: Kaleigh Gao, Status: In Progress   Comment:    Jax Ocasio - 02 Mar 2017 8:25 AM     TASK CREATED  1  calcitriol 0 25mcg q M/W/F  2  crestor 5mg daily and watch for joint pains and muscle aches  3  protocol for crestor (lfts, ck at 6 weeks and 12 weeks)   Radha Lorenzo - 02 Mar 2017 9:49 AM     TASK IN PROGRESS   I spoke to nurse at [facility] and informed about the above  Written physician order was faxed to the nursing home along with lab scripts  Medication was sent to pharmacy  PL      Active Problems    1  Abnormal heart sounds (785 3) (R01 2)   2  Acute kidney injury (584 9) (N17 9)   3  Aortic stenosis (424 1) (I35 0)   4  Asthma (493 90) (J45 909)   5  Atherosclerosis of artery of extremity with ulceration (440 23) (I70 299,L97 909)   6  Atrial fibrillation (427 31) (I48 91)   7  Benign hypertension with chronic kidney disease, stage III (403 10,585 3) (I12 9,N18 3)   8  Carotid artery stenosis, asymptomatic (433 10) (I65 29)   9  Chest pain (786 50) (R07 9)   10  Chronic diastolic congestive heart failure (428 32,428 0) (I50 32)   11  Chronic kidney disease, stage 3 (585 3) (N18 3)   12  Chronic toe ulcer (707 15) (L97 509)   13  Constipation (564 00) (K59 00)   14  CRD (chronic renal disease) (585 9) (N18 9)   15  Diastolic murmur (330 9) (X45)   16  Dizziness (780 4) (R42)   17  Dysuria (788 1) (R30 0)   18  Edema (782 3) (R60 9)   19  Elevated serum creatinine (790 99) (R79 89)   20  Hospital discharge follow-up (V67 59) (Z09)   21  Hospital discharge follow-up (V67 59) (Z09)   22  Hyperlipidemia (272 4) (E78 5)   23  Hypertension (401 9) (I10)   24  Hypothyroidism (244 9) (E03 9)   25  Left renal artery stenosis (440 1) (I70 1)   26  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)   27   Moderate mitral regurgitation (424 0) (I34 0)   28  Other chronic pain (338 29) (G89 29)   29  Pulmonary artery hypertension (416 8) (I27 2)   30  Shortness of breath (786 05) (R06 02)    Current Meds   1  Alphagan P 0 1 % Ophthalmic Solution; instill 1 drop into right eye twice daily; Therapy: (Recorded:31Oct2014) to Recorded   2  AmLODIPine Besylate 5 MG Oral Tablet; TAKE 1 TABLET DAILY; Therapy: 24TJR9962 to (Evaluate:28Mar2017) Recorded   3  Artificial Tears 1 4 % Ophthalmic Solution; INSTILL 1-2 DROPS INTO EACH EYES   TWICE DAILY; Therapy: (Recorded:31Oct2014) to Recorded   4  Baclofen 10 MG Oral Tablet; one tab at bedtime; Therapy: 41CTJ2939 to (Evaluate:10Jun2016); Last Rx:11May2016 Ordered   5  Bisac-Evac 10 MG Rectal Suppository; Therapy: (Recorded:02Mar2016) to Recorded   6  Calcitriol 0 25 MCG Oral Capsule; TAKE 1 CAPSULE Monday Wednesday and Friday; Therapy: 26RKY8609 to (Evaluate:68Qjf5896)  Requested for: 53XNP1916; Last   Rx:02Mar2017 Ordered   7  Docusate Sodium 100 MG Oral Tablet; TAKE 1 TABLET TWICE DAILY AS DIRECTED    (COLACE); Therapy: (Recorded:13Mar2014) to Recorded   8  Eliquis 5 MG Oral Tablet; TAKE 1 TABLET ORALLY EVERY 12 HOURS (ATRIAL   FIBRILLATION) *RE-ORDER*;   Therapy: 09JIS5466 to (Evaluate:06Mar2017)  Requested for: 32ZGH4344; Last   Rx:07Oct2016 Ordered   9  Fluticasone Propionate 50 MCG/ACT Nasal Suspension; USE 1 SPRAY IN EACH   NOSTRIL ONCE DAILY; Therapy: 99FIV2284 to Recorded   10  Folic Acid 1 MG Oral Tablet; TAKE 2 TABLETS DAILY; Therapy: (Recorded:02Mar2016) to Recorded   11  Gabapentin 300 MG Oral Capsule; TAKE 1 CAP AT 2PM;    Therapy: (Phyllis Ridley) to Recorded   12  Hydrocodone-Acetaminophen 5-325 MG Oral Tablet; TAKE 1 TABLET EVERY 6 HOURS    AS NEEDED; Therapy: 30VZL9709 to Recorded   13  Levothyroxine Sodium 75 MCG Oral Tablet; 0 5 tab daily; Last Rx:20Oct2016 Ordered   14  Loperamide A-D 2 MG Oral Tablet; USE AS DIRECTED; Therapy: 22CTK9695 to Recorded   15   Loratadine 10 MG Oral Tablet; Therapy: (Recorded:02Mar2016) to Recorded   16  LORazepam 0 5 MG Oral Tablet; Take 1 tablet every 6 hours as needed or anxiety; Therapy: 19WFC0541 to Recorded   17  Lumigan 0 01 % Ophthalmic Solution; Therapy: (Recorded:02Mar2016) to Recorded   18  Magnesium Oxide 400 MG Oral Tablet; TAKE 1 TABLET DAILY; Therapy: 10WEW1681 to (Evaluate:27Apr2017) Recorded   19  Melatonin 3 MG Oral Tablet Dispersible; 3mg at bedtime; Therapy: 41QSZ2095 to (Evaluate:19Nov2016); Last Rx:20Oct2016 Ordered   20  Metoprolol Tartrate 50 MG Oral Tablet; TAKE 1 TABLET EVERY 12 HOURS; Last    Rx:41Cmr9067 Ordered   21  MiraLax Oral Packet (Polyethylene Glycol 3350); Therapy: (Recorded:02Mar2016) to Recorded   22  Nystatin 643099 UNIT/GM External Cream; APPLY 2-3 TIMES DAILY TO AFFECTED    AREA(S); Therapy: 10UHN8547 to Recorded   23  OXcarbazepine 150 MG Oral Tablet; Therapy: (Recorded:02Mar2016) to Recorded   24  Pantoprazole Sodium 40 MG Oral Tablet Delayed Release; TAKE 1 TABLET TWICE    DAILY 30 MINUTES BEFORE BREAKFAST AND DINNER; Therapy: (Mckenna Challenger) to Recorded   25  ProAir  (90 Base) MCG/ACT Inhalation Aerosol Solution; 2 puffs every 6 hours as    needed; Therapy: 92KDE5072 to Recorded   26  ROPINIRole HCl - 1 MG Oral Tablet; one tablet every 6 hours; Therapy: 66KSM3522 to (Evaluate:30Nov2014); Last Rx:31Oct2014 Ordered   27  Rosuvastatin Calcium 5 MG Oral Tablet (Crestor); TAKE 1 TABLET DAILY; Therapy: 05FTF4436 to (Evaluate:94Fbp3193)  Requested for: 49CKO0575; Last    Rx:02Mar2017 Ordered   28  Siltussin-DM Alcohol Free SYRP;    Therapy: (Recorded:02Mar2016) to Recorded   29  Torsemide 20 MG Oral Tablet; TAKE 1 TABLET DAILY  and PRN extra dose daily for    worsening shortness of breath or weight gain 3 pounds in one day; Therapy: 50TJM0015 to [phone])  Requested for: 04SOJ0045; Last    Rx:20Oct2016 Ordered   30   Vitamin D 2000 UNIT Oral Capsule; 4000iu daily; Therapy: 43DBU0504 to Recorded    Allergies    1  Lipitor TABS   2  Morphine Derivatives   3  Percocet TABS   4  Spironolactone TABS   5  Sulfa Drugs  Denied    6   Acetaminophen TABS    Signatures   Electronically signed by : ALLI English ; Mar  2 2017 10:25AM EST

## 2018-01-22 ENCOUNTER — APPOINTMENT (EMERGENCY)
Dept: RADIOLOGY | Facility: HOSPITAL | Age: 83
DRG: 392 | End: 2018-01-22
Payer: MEDICARE

## 2018-01-22 ENCOUNTER — HOSPITAL ENCOUNTER (INPATIENT)
Facility: HOSPITAL | Age: 83
LOS: 7 days | Discharge: NURSING, REHAB OR LONG-TERM CARE | DRG: 392 | End: 2018-01-30
Attending: EMERGENCY MEDICINE | Admitting: INTERNAL MEDICINE
Payer: MEDICARE

## 2018-01-22 VITALS — DIASTOLIC BLOOD PRESSURE: 58 MMHG | SYSTOLIC BLOOD PRESSURE: 124 MMHG

## 2018-01-22 DIAGNOSIS — R11.2 NAUSEA AND VOMITING: ICD-10-CM

## 2018-01-22 DIAGNOSIS — I50.32 CHRONIC DIASTOLIC CHF (CONGESTIVE HEART FAILURE) (HCC): ICD-10-CM

## 2018-01-22 DIAGNOSIS — R11.10 ABDOMINAL PAIN, VOMITING, AND DIARRHEA: ICD-10-CM

## 2018-01-22 DIAGNOSIS — R19.7 ABDOMINAL PAIN, VOMITING, AND DIARRHEA: ICD-10-CM

## 2018-01-22 DIAGNOSIS — R19.7 INTRACTABLE DIARRHEA: ICD-10-CM

## 2018-01-22 DIAGNOSIS — R10.9 ABDOMINAL PAIN, VOMITING, AND DIARRHEA: ICD-10-CM

## 2018-01-22 DIAGNOSIS — E86.0 DEHYDRATION: Primary | ICD-10-CM

## 2018-01-22 DIAGNOSIS — G25.81 RESTLESS LEG: ICD-10-CM

## 2018-01-22 PROBLEM — R21 FACIAL RASH: Status: ACTIVE | Noted: 2018-01-22

## 2018-01-22 LAB
ABO GROUP BLD: NORMAL
ALBUMIN SERPL BCP-MCNC: 3.9 G/DL (ref 3.5–5)
ALP SERPL-CCNC: 75 U/L (ref 46–116)
ALT SERPL W P-5'-P-CCNC: 11 U/L (ref 12–78)
ANION GAP SERPL CALCULATED.3IONS-SCNC: 5 MMOL/L (ref 4–13)
APTT PPP: 40 SECONDS (ref 23–35)
AST SERPL W P-5'-P-CCNC: 16 U/L (ref 5–45)
ATRIAL RATE: 75 BPM
BASOPHILS # BLD AUTO: 0.01 THOUSANDS/ΜL (ref 0–0.1)
BASOPHILS NFR BLD AUTO: 0 % (ref 0–1)
BILIRUB SERPL-MCNC: 0.34 MG/DL (ref 0.2–1)
BILIRUB UR QL STRIP: NEGATIVE
BLD GP AB SCN SERPL QL: NEGATIVE
BUN SERPL-MCNC: 48 MG/DL (ref 5–25)
CALCIUM SERPL-MCNC: 9 MG/DL (ref 8.3–10.1)
CHLORIDE SERPL-SCNC: 105 MMOL/L (ref 100–108)
CLARITY UR: CLEAR
CO2 SERPL-SCNC: 28 MMOL/L (ref 21–32)
COLOR UR: YELLOW
COLOR, POC: NORMAL
CREAT SERPL-MCNC: 1.71 MG/DL (ref 0.6–1.3)
EOSINOPHIL # BLD AUTO: 0.25 THOUSAND/ΜL (ref 0–0.61)
EOSINOPHIL NFR BLD AUTO: 2 % (ref 0–6)
ERYTHROCYTE [DISTWIDTH] IN BLOOD BY AUTOMATED COUNT: 14.8 % (ref 11.6–15.1)
GFR SERPL CREATININE-BSD FRML MDRD: 27 ML/MIN/1.73SQ M
GLUCOSE SERPL-MCNC: 108 MG/DL (ref 65–140)
GLUCOSE UR STRIP-MCNC: NEGATIVE MG/DL
HCT VFR BLD AUTO: 33.3 % (ref 34.8–46.1)
HGB BLD-MCNC: 11.1 G/DL (ref 11.5–15.4)
HGB UR QL STRIP.AUTO: NEGATIVE
INR PPP: 1.21 (ref 0.86–1.16)
KETONES UR STRIP-MCNC: NEGATIVE MG/DL
LACTATE SERPL-SCNC: 1.8 MMOL/L (ref 0.5–2)
LEUKOCYTE ESTERASE UR QL STRIP: NEGATIVE
LIPASE SERPL-CCNC: 229 U/L (ref 73–393)
LYMPHOCYTES # BLD AUTO: 0.62 THOUSANDS/ΜL (ref 0.6–4.47)
LYMPHOCYTES NFR BLD AUTO: 6 % (ref 14–44)
MCH RBC QN AUTO: 29.2 PG (ref 26.8–34.3)
MCHC RBC AUTO-ENTMCNC: 33.3 G/DL (ref 31.4–37.4)
MCV RBC AUTO: 88 FL (ref 82–98)
MONOCYTES # BLD AUTO: 0.55 THOUSAND/ΜL (ref 0.17–1.22)
MONOCYTES NFR BLD AUTO: 5 % (ref 4–12)
NEUTROPHILS # BLD AUTO: 9.76 THOUSANDS/ΜL (ref 1.85–7.62)
NEUTS SEG NFR BLD AUTO: 87 % (ref 43–75)
NITRITE UR QL STRIP: NEGATIVE
NRBC BLD AUTO-RTO: 0 /100 WBCS
PH UR STRIP.AUTO: 7 [PH] (ref 4.5–8)
PLATELET # BLD AUTO: 264 THOUSANDS/UL (ref 149–390)
PMV BLD AUTO: 10.2 FL (ref 8.9–12.7)
POTASSIUM SERPL-SCNC: 4.1 MMOL/L (ref 3.5–5.3)
PROT SERPL-MCNC: 7.9 G/DL (ref 6.4–8.2)
PROT UR STRIP-MCNC: NEGATIVE MG/DL
PROTHROMBIN TIME: 15.4 SECONDS (ref 12.1–14.4)
QRS AXIS: 14 DEGREES
QRSD INTERVAL: 90 MS
QT INTERVAL: 430 MS
QTC INTERVAL: 450 MS
RBC # BLD AUTO: 3.8 MILLION/UL (ref 3.81–5.12)
RH BLD: NEGATIVE
SODIUM SERPL-SCNC: 138 MMOL/L (ref 136–145)
SP GR UR STRIP.AUTO: 1.01 (ref 1–1.03)
SPECIMEN EXPIRATION DATE: NORMAL
T WAVE AXIS: 66 DEGREES
TROPONIN I SERPL-MCNC: <0.02 NG/ML
UROBILINOGEN UR QL STRIP.AUTO: 0.2 E.U./DL
VENTRICULAR RATE: 66 BPM
WBC # BLD AUTO: 11.27 THOUSAND/UL (ref 4.31–10.16)

## 2018-01-22 PROCEDURE — 93005 ELECTROCARDIOGRAM TRACING: CPT

## 2018-01-22 PROCEDURE — 36415 COLL VENOUS BLD VENIPUNCTURE: CPT | Performed by: EMERGENCY MEDICINE

## 2018-01-22 PROCEDURE — 85610 PROTHROMBIN TIME: CPT | Performed by: EMERGENCY MEDICINE

## 2018-01-22 PROCEDURE — 96375 TX/PRO/DX INJ NEW DRUG ADDON: CPT

## 2018-01-22 PROCEDURE — 99285 EMERGENCY DEPT VISIT HI MDM: CPT

## 2018-01-22 PROCEDURE — 86901 BLOOD TYPING SEROLOGIC RH(D): CPT | Performed by: EMERGENCY MEDICINE

## 2018-01-22 PROCEDURE — 96374 THER/PROPH/DIAG INJ IV PUSH: CPT

## 2018-01-22 PROCEDURE — 86900 BLOOD TYPING SEROLOGIC ABO: CPT | Performed by: EMERGENCY MEDICINE

## 2018-01-22 PROCEDURE — 93005 ELECTROCARDIOGRAM TRACING: CPT | Performed by: EMERGENCY MEDICINE

## 2018-01-22 PROCEDURE — 86850 RBC ANTIBODY SCREEN: CPT | Performed by: EMERGENCY MEDICINE

## 2018-01-22 PROCEDURE — 87493 C DIFF AMPLIFIED PROBE: CPT | Performed by: EMERGENCY MEDICINE

## 2018-01-22 PROCEDURE — 81003 URINALYSIS AUTO W/O SCOPE: CPT

## 2018-01-22 PROCEDURE — 71045 X-RAY EXAM CHEST 1 VIEW: CPT

## 2018-01-22 PROCEDURE — 74176 CT ABD & PELVIS W/O CONTRAST: CPT

## 2018-01-22 PROCEDURE — 85025 COMPLETE CBC W/AUTO DIFF WBC: CPT | Performed by: EMERGENCY MEDICINE

## 2018-01-22 PROCEDURE — 83605 ASSAY OF LACTIC ACID: CPT | Performed by: EMERGENCY MEDICINE

## 2018-01-22 PROCEDURE — 96361 HYDRATE IV INFUSION ADD-ON: CPT

## 2018-01-22 PROCEDURE — 80053 COMPREHEN METABOLIC PANEL: CPT | Performed by: EMERGENCY MEDICINE

## 2018-01-22 PROCEDURE — 85730 THROMBOPLASTIN TIME PARTIAL: CPT | Performed by: EMERGENCY MEDICINE

## 2018-01-22 PROCEDURE — 84484 ASSAY OF TROPONIN QUANT: CPT | Performed by: EMERGENCY MEDICINE

## 2018-01-22 PROCEDURE — 83690 ASSAY OF LIPASE: CPT | Performed by: EMERGENCY MEDICINE

## 2018-01-22 PROCEDURE — 81002 URINALYSIS NONAUTO W/O SCOPE: CPT | Performed by: EMERGENCY MEDICINE

## 2018-01-22 PROCEDURE — C9113 INJ PANTOPRAZOLE SODIUM, VIA: HCPCS | Performed by: INTERNAL MEDICINE

## 2018-01-22 RX ORDER — MELATONIN
1000 DAILY
Status: DISCONTINUED | OUTPATIENT
Start: 2018-01-22 | End: 2018-01-30 | Stop reason: HOSPADM

## 2018-01-22 RX ORDER — TAMSULOSIN HYDROCHLORIDE 0.4 MG/1
0.4 CAPSULE ORAL
Status: DISCONTINUED | OUTPATIENT
Start: 2018-01-22 | End: 2018-01-30 | Stop reason: HOSPADM

## 2018-01-22 RX ORDER — MINERAL OIL 100 G/100G
1 OIL RECTAL ONCE
COMMUNITY
End: 2018-04-16 | Stop reason: HOSPADM

## 2018-01-22 RX ORDER — EPLERENONE 25 MG/1
25 TABLET, FILM COATED ORAL DAILY
Status: DISCONTINUED | OUTPATIENT
Start: 2018-01-22 | End: 2018-01-30 | Stop reason: HOSPADM

## 2018-01-22 RX ORDER — LOPERAMIDE HYDROCHLORIDE 2 MG/1
2 CAPSULE ORAL AS NEEDED
COMMUNITY
End: 2018-04-16 | Stop reason: HOSPADM

## 2018-01-22 RX ORDER — ROPINIROLE 1 MG/1
1 TABLET, FILM COATED ORAL EVERY 8 HOURS SCHEDULED
Status: DISCONTINUED | OUTPATIENT
Start: 2018-01-22 | End: 2018-01-30 | Stop reason: HOSPADM

## 2018-01-22 RX ORDER — LEVOTHYROXINE SODIUM 88 UG/1
88 TABLET ORAL
Status: DISCONTINUED | OUTPATIENT
Start: 2018-01-23 | End: 2018-01-30 | Stop reason: HOSPADM

## 2018-01-22 RX ORDER — PANTOPRAZOLE SODIUM 40 MG/1
40 TABLET, DELAYED RELEASE ORAL
Status: DISCONTINUED | OUTPATIENT
Start: 2018-01-22 | End: 2018-01-22 | Stop reason: SDUPTHER

## 2018-01-22 RX ORDER — LANOLIN ALCOHOL/MO/W.PET/CERES
6 CREAM (GRAM) TOPICAL
Status: DISCONTINUED | OUTPATIENT
Start: 2018-01-22 | End: 2018-01-30 | Stop reason: HOSPADM

## 2018-01-22 RX ORDER — DIPHENHYDRAMINE HCL 25 MG
25 TABLET ORAL
Status: DISCONTINUED | OUTPATIENT
Start: 2018-01-22 | End: 2018-01-30 | Stop reason: HOSPADM

## 2018-01-22 RX ORDER — GABAPENTIN 300 MG/1
300 CAPSULE ORAL
Status: DISCONTINUED | OUTPATIENT
Start: 2018-01-22 | End: 2018-01-30 | Stop reason: HOSPADM

## 2018-01-22 RX ORDER — LATANOPROST 50 UG/ML
1 SOLUTION/ DROPS OPHTHALMIC
Status: DISCONTINUED | OUTPATIENT
Start: 2018-01-22 | End: 2018-01-30 | Stop reason: HOSPADM

## 2018-01-22 RX ORDER — FLUTICASONE PROPIONATE 50 MCG
1 SPRAY, SUSPENSION (ML) NASAL DAILY
Status: DISCONTINUED | OUTPATIENT
Start: 2018-01-22 | End: 2018-01-30 | Stop reason: HOSPADM

## 2018-01-22 RX ORDER — METOPROLOL TARTRATE 50 MG/1
50 TABLET, FILM COATED ORAL 2 TIMES DAILY
Status: DISCONTINUED | OUTPATIENT
Start: 2018-01-22 | End: 2018-01-30 | Stop reason: HOSPADM

## 2018-01-22 RX ORDER — TORSEMIDE 20 MG/1
20 TABLET ORAL
Status: DISCONTINUED | OUTPATIENT
Start: 2018-01-22 | End: 2018-01-30 | Stop reason: HOSPADM

## 2018-01-22 RX ORDER — OXCARBAZEPINE 150 MG/1
150 TABLET, FILM COATED ORAL
Status: DISCONTINUED | OUTPATIENT
Start: 2018-01-22 | End: 2018-01-30 | Stop reason: HOSPADM

## 2018-01-22 RX ORDER — DIPHENHYDRAMINE HCL 25 MG
25 CAPSULE ORAL
COMMUNITY
End: 2018-04-16 | Stop reason: HOSPADM

## 2018-01-22 RX ORDER — FENTANYL CITRATE 50 UG/ML
50 INJECTION, SOLUTION INTRAMUSCULAR; INTRAVENOUS ONCE
Status: COMPLETED | OUTPATIENT
Start: 2018-01-22 | End: 2018-01-22

## 2018-01-22 RX ORDER — CALCITRIOL 0.25 UG/1
0.25 CAPSULE, LIQUID FILLED ORAL DAILY
Status: DISCONTINUED | OUTPATIENT
Start: 2018-01-22 | End: 2018-01-30 | Stop reason: HOSPADM

## 2018-01-22 RX ORDER — AMLODIPINE BESYLATE 5 MG/1
5 TABLET ORAL DAILY
Status: DISCONTINUED | OUTPATIENT
Start: 2018-01-22 | End: 2018-01-30 | Stop reason: HOSPADM

## 2018-01-22 RX ORDER — SODIUM CHLORIDE 9 MG/ML
75 INJECTION, SOLUTION INTRAVENOUS ONCE
Status: COMPLETED | OUTPATIENT
Start: 2018-01-22 | End: 2018-01-23

## 2018-01-22 RX ORDER — ONDANSETRON 2 MG/ML
4 INJECTION INTRAMUSCULAR; INTRAVENOUS EVERY 6 HOURS PRN
Status: DISCONTINUED | OUTPATIENT
Start: 2018-01-22 | End: 2018-01-28

## 2018-01-22 RX ORDER — LORATADINE 10 MG/1
10 TABLET ORAL DAILY
Status: DISCONTINUED | OUTPATIENT
Start: 2018-01-22 | End: 2018-01-30 | Stop reason: HOSPADM

## 2018-01-22 RX ORDER — ONDANSETRON 2 MG/ML
4 INJECTION INTRAMUSCULAR; INTRAVENOUS ONCE
Status: COMPLETED | OUTPATIENT
Start: 2018-01-22 | End: 2018-01-22

## 2018-01-22 RX ORDER — TRAMADOL HYDROCHLORIDE 50 MG/1
50 TABLET ORAL EVERY 6 HOURS PRN
Status: DISCONTINUED | OUTPATIENT
Start: 2018-01-22 | End: 2018-01-30 | Stop reason: HOSPADM

## 2018-01-22 RX ORDER — PANTOPRAZOLE SODIUM 40 MG/1
40 INJECTION, POWDER, FOR SOLUTION INTRAVENOUS
Status: DISCONTINUED | OUTPATIENT
Start: 2018-01-22 | End: 2018-01-26

## 2018-01-22 RX ORDER — ACETAMINOPHEN 325 MG/1
650 TABLET ORAL EVERY 6 HOURS PRN
Status: DISCONTINUED | OUTPATIENT
Start: 2018-01-22 | End: 2018-01-30 | Stop reason: HOSPADM

## 2018-01-22 RX ORDER — BRIMONIDINE TARTRATE 0.15 %
1 DROPS OPHTHALMIC (EYE) 2 TIMES DAILY
Status: DISCONTINUED | OUTPATIENT
Start: 2018-01-22 | End: 2018-01-30 | Stop reason: HOSPADM

## 2018-01-22 RX ORDER — GABAPENTIN 100 MG/1
100 CAPSULE ORAL 2 TIMES DAILY
Status: DISCONTINUED | OUTPATIENT
Start: 2018-01-22 | End: 2018-01-30 | Stop reason: HOSPADM

## 2018-01-22 RX ORDER — ACETAMINOPHEN 325 MG/1
500 TABLET ORAL EVERY 12 HOURS
Status: DISCONTINUED | OUTPATIENT
Start: 2018-01-22 | End: 2018-01-30 | Stop reason: HOSPADM

## 2018-01-22 RX ADMIN — BRIMONIDINE TARTRATE 1 DROP: 1.5 SOLUTION OPHTHALMIC at 22:42

## 2018-01-22 RX ADMIN — FENTANYL CITRATE 50 MCG: 50 INJECTION, SOLUTION INTRAMUSCULAR; INTRAVENOUS at 09:47

## 2018-01-22 RX ADMIN — BIMATOPROST 1 DROP: 0.1 SOLUTION/ DROPS OPHTHALMIC at 23:36

## 2018-01-22 RX ADMIN — TRAMADOL HYDROCHLORIDE 50 MG: 50 TABLET, FILM COATED ORAL at 22:26

## 2018-01-22 RX ADMIN — MELATONIN TAB 3 MG 6 MG: 3 TAB at 22:26

## 2018-01-22 RX ADMIN — VITAMIN D, TAB 1000IU (100/BT) 1000 UNITS: 25 TAB at 22:40

## 2018-01-22 RX ADMIN — ROPINIROLE 1 MG: 1 TABLET, FILM COATED ORAL at 22:28

## 2018-01-22 RX ADMIN — SODIUM CHLORIDE 75 ML/HR: 0.9 INJECTION, SOLUTION INTRAVENOUS at 16:37

## 2018-01-22 RX ADMIN — TORSEMIDE 20 MG: 20 TABLET ORAL at 22:41

## 2018-01-22 RX ADMIN — METOPROLOL TARTRATE 50 MG: 50 TABLET ORAL at 22:44

## 2018-01-22 RX ADMIN — PANTOPRAZOLE SODIUM 40 MG: 40 INJECTION, POWDER, FOR SOLUTION INTRAVENOUS at 22:39

## 2018-01-22 RX ADMIN — OXCARBAZEPINE 150 MG: 150 TABLET ORAL at 22:47

## 2018-01-22 RX ADMIN — APIXABAN 2.5 MG: 2.5 TABLET, FILM COATED ORAL at 22:41

## 2018-01-22 RX ADMIN — ACETAMINOPHEN 488 MG: 325 TABLET, FILM COATED ORAL at 22:39

## 2018-01-22 RX ADMIN — LATANOPROST 1 DROP: 50 SOLUTION OPHTHALMIC at 22:43

## 2018-01-22 RX ADMIN — GABAPENTIN 300 MG: 300 CAPSULE ORAL at 22:28

## 2018-01-22 RX ADMIN — SODIUM CHLORIDE 1000 ML: 0.9 INJECTION, SOLUTION INTRAVENOUS at 11:01

## 2018-01-22 RX ADMIN — AMLODIPINE BESYLATE 5 MG: 5 TABLET ORAL at 23:40

## 2018-01-22 RX ADMIN — FLUTICASONE PROPIONATE 1 SPRAY: 50 SPRAY, METERED NASAL at 22:47

## 2018-01-22 RX ADMIN — LORATADINE 10 MG: 10 TABLET ORAL at 22:40

## 2018-01-22 RX ADMIN — TAMSULOSIN HYDROCHLORIDE 0.4 MG: 0.4 CAPSULE ORAL at 22:40

## 2018-01-22 RX ADMIN — CALCITRIOL 0.25 MCG: 0.25 CAPSULE, LIQUID FILLED ORAL at 22:42

## 2018-01-22 RX ADMIN — SODIUM CHLORIDE 500 ML: 0.9 INJECTION, SOLUTION INTRAVENOUS at 10:50

## 2018-01-22 RX ADMIN — EPLERENONE 25 MG: 25 TABLET, FILM COATED ORAL at 22:42

## 2018-01-22 RX ADMIN — ONDANSETRON 4 MG: 2 INJECTION INTRAMUSCULAR; INTRAVENOUS at 09:46

## 2018-01-22 NOTE — ASSESSMENT & PLAN NOTE
Acute onset, intractable vomiting and diarrhea since this morning  CT abdomen unremarkable  Normal lactate level  Rule out infectious  Pending stool study  ? Viral   Denies recent antibiotics exposure  Supportive care  Clear liquid for now  Advance diet as tolerated  It appears that her right lower quadrant abdominal pain has been off and on last 6 months    Recommend outpatient GI evaluation and possible colonoscopy

## 2018-01-22 NOTE — H&P
H&P- Daphne Case 6/14/1931, 80 y o  female MRN: [id number]    Unit/Bed#: ED 13 Encounter: [id number]    Primary Care Provider: Jillian Paul MD   Date and time admitted to hospital: 1/22/2018  7:33 AM        * Abdominal pain, vomiting, and diarrhea   Assessment & Plan    Acute onset, intractable vomiting and diarrhea since this morning  CT abdomen unremarkable  Normal lactate level  Rule out infectious  Pending stool study  ? Viral   Denies recent antibiotics exposure  Supportive care  Clear liquid for now  Advance diet as tolerated  It appears that her right lower quadrant abdominal pain has been off and on last 6 months  Recommend outpatient GI evaluation and possible colonoscopy        Atrial fibrillation [facility])   Assessment & Plan    Rate controlled  Continue Eliquis        CKD (chronic kidney disease) stage 3, GFR 30-59 ml/min   Assessment & Plan    Cr 1 71  Baseline appears around 1 5  Gently hydrate        Chronic diastolic CHF (congestive heart failure) ([facility])   Assessment & Plan    Compensated  She does have chronic leg edema that felt stable        Hypertension   Assessment & Plan    Continue home med        Facial rash   Assessment & Plan    Followed by her dermatologist  Uses topical cream the name of which she can't recall              VTE Prophylaxis: Apixaban (Eliquis)  / sequential compression device   Code Status: Full code  POLST: POLST form is not discussed and not completed at this time  Anticipated Length of Stay:  Patient will be admitted on an Observation basis with an anticipated length of stay of  < 2 midnights  Justification for Hospital Stay: Above    Total Time for Visit, including Counseling / Coordination of Care: 30 minutes  Greater than 50% of this total time spent on direct patient counseling and coordination of care      Chief Complaint:   [place]/diarrhea and abdominal pain    History of Present Illness:    Daphne Case is a 80 y o  female with multiple past medical history including chronic diastolic CHF , AFib on Eliquis , chronic kidney disease stage 3, prior CVA and hypertension who presents with vomiting diarrhea and abdominal pain  She was in her usual state of health until this morning around 4 o'clock when she started having intractable vomiting about 8 times followed by diarrhea  She has had close to 20 times of watery stools so far  She also described right lower quadrant pain that she has had last 6 months  Her abdominal pain is worse to 10/10 today  She denies coffee-ground emesis or bloody stool  She felt chills but her temperature here so far is normal   She denies recent exposure to antibiotics  Review of Systems:    Review of Systems   Constitutional: Positive for chills and fatigue  Negative for diaphoresis and fever  Respiratory: Negative for cough, choking and chest tightness  Cardiovascular: Negative for palpitations and leg swelling  All other systems reviewed and are negative        Past Medical and Surgical History:     Past Medical History:   Diagnosis Date    A-fib ([facility] )     Anemia     Anemia     Anxiety     Asthma     CAD (coronary artery disease)     [facility]est[facility]     CHF (congestive heart failure) ([facility])     CKD (chronic kidney disease) stage 3, GFR 30-59 ml/min     ckd3    Constipation     CVA (cerebral vascular accident) ([facility] )     left hemiparesis    Diastolic CHF, chronic ([facility])     Disease of thyroid gland     Dry eye     First degree AV block     Gastritis     Gastritis     GERD (gastroesophageal reflux disease)     Glaucoma     Glaucoma     Hemiparesis ([facility])     left side    Hemiparesis affecting left side as late effect of stroke ([facility])     Hyperlipidemia     Hypertension     Impetigo     Insomnia     Knee pain     Neuropathy     Osteoarthritis     Parkinson disease ([facility] )     Pulmonary HTN     PVD (peripheral vascular disease) ([facility])     Restless leg     Vitamin D deficiency        Past Surgical History:   Procedure Laterality Date    APPENDECTOMY      BLADDER SURGERY      CAROTID ENDARTERECTOMY Left     ESOPHAGOGASTRODUODENOSCOPY N/A 4/8/2016    Procedure: ESOPHAGOGASTRODUODENOSCOPY (EGD); Surgeon: Joseph Frye MD;  Location:  GI LAB; Service:     RENAL ARTERY STENT         Meds/Allergies:    Prior to Admission medications    Medication Sig Start Date End Date Taking? Authorizing Provider   acetaminophen (TYLENOL) 500 mg tablet Take 1 tablet by mouth every 12 (twelve) hours 7/29/17  Yes Danny Rascon MD   bisacodyl (DULCOLAX) 5 mg EC tablet Take 10 mg by mouth as needed for constipation Every 4th day as needed for constipation   Yes Historical Provider, MD   cholecalciferol (VITAMIN D3) 1,000 units tablet Take 1,000 Units by mouth daily   Yes Historical Provider, MD   diphenhydrAMINE (BENADRYL) 25 mg capsule Take 25 mg by mouth daily at bedtime as needed for itching   Yes Historical Provider, MD   guaiFENesin (ROBITUSSIN) 100 MG/5ML oral liquid Take 10 mL by mouth 4 (four) times a day as needed for cough   Yes Historical Provider, MD   loperamide (IMODIUM) 2 mg capsule Take 2 mg by mouth as needed for diarrhea After each loose stool   Yes Historical Provider, MD   mineral oil enema Insert 1 enema into the rectum once Insert in rectum every 4th day as needed for constipation  Yes Historical Provider, MD   polyethylene glycol (MIRALAX) 17 g packet Take 17 g by mouth as needed     Yes Historical Provider, MD   torsemide (DEMADEX) 20 mg tablet Take 1 tablet by mouth daily  Patient taking differently: Take 20 mg by mouth 2 (two) times a day   7/20/17  Yes Belle Perez MD   traMADol (ULTRAM) 50 mg tablet Take 50 mg by mouth every 6 (six) hours as needed for moderate pain   Yes Historical Provider, MD   albuterol (PROVENTIL HFA,VENTOLIN HFA) 90 mcg/act inhaler Inhale 2 puffs every 6 (six) hours as needed for wheezing  1/22/18 Yes Historical Provider, MD   Alum & Mag Hydroxide-Simeth (ANTACID ANTI-GAS PO) Take 30 mL by mouth every 6 (six) hours as needed      Historical Provider, MD   amLODIPine (NORVASC) 5 mg tablet Take 5 mg by mouth daily    Historical Provider, MD   apixaban (ELIQUIS) 2 5 mg Take 1 tablet by mouth 2 (two) times a day 7/29/17   Elena Guardado MD   bimatoprost (LUMIGAN) 0 01 % ophthalmic drops Administer 1 drop to both eyes daily at bedtime      Historical Provider, MD   brimonidine (ALPHAGAN P) 0 1 % Administer 1 drop to both eyes 2 (two) times a day      Historical Provider, MD   calcitriol (ROCALTROL) 0 25 mcg capsule Take 0 25 mcg by mouth daily      Historical Provider, MD   CO-ENZYME Q-10 PO Take 200 mg by mouth daily    Historical Provider, MD   docusate sodium (COLACE) 100 mg capsule Take 100 mg by mouth 2 (two) times a day    Historical Provider, MD   eplerenone (INSPRA) 25 mg tablet Take 25 mg by mouth daily    Historical Provider, MD   fluticasone (FLONASE) 50 mcg/act nasal spray 1 spray into each nostril daily  Historical Provider, MD   gabapentin (NEURONTIN) 100 mg capsule Take 100 mg by mouth 2 (two) times a day    Historical Provider, MD   gabapentin (NEURONTIN) 300 mg capsule Take 2 capsules by mouth daily at bedtime  Patient taking differently: Take 300 mg by mouth daily at bedtime   7/29/17   Elena Guardado MD   latanoprost (XALATAN) 0 005 % ophthalmic solution Administer 1 drop to both eyes daily at bedtime      Historical Provider, MD   levothyroxine 75 mcg tablet Take 1 tablet by mouth daily in the early morning  Patient taking differently: Take 88 mcg by mouth daily in the early morning   7/29/17   Elena Guardado MD   loratadine (CLARITIN) 10 mg tablet Take 10 mg by mouth daily    Historical Provider, MD   melatonin 3 mg Take 2 tablets by mouth daily at bedtime 7/29/17   Elena Guardado MD   metoprolol tartrate (LOPRESSOR) 50 mg tablet Take 50 mg by mouth 2 (two) times a day    Historical Provider, MD OXcarbazepine (TRILEPTAL) 150 mg tablet Take 1 tablet by mouth daily at bedtime 7/29/17   Mckay Yañez MD   pantoprazole (PROTONIX) 40 mg tablet Take 40 mg by mouth daily      Historical Provider, MD   Red Yeast Rice 600 MG CAPS Take 1 capsule by mouth 2 (two) times a day    Historical Provider, MD   rOPINIRole (REQUIP) 0 5 mg tablet Take 1 tablet by mouth every 6 (six) hours  Patient taking differently: Take 1 mg by mouth every 8 (eight) hours   7/29/17   Mckay Yañez MD   tamsulosin [facility]) 0 4 mg Take 1 capsule by mouth daily with dinner 7/20/17   Xiao Cabrera MD         Allergies: Allergies   Allergen Reactions    Lipitor [Atorvastatin]     Morphine     Percocet [Oxycodone-Acetaminophen] GI Intolerance    Spironolactone     Sulfa Antibiotics        Social History:     Marital Status:    Patient Pre-hospital Living Situation:Home  Patient Pre-hospital Level of Mobility:  Ambulatory    History   Alcohol Use No     History   Smoking Status    Never Smoker   Smokeless Tobacco    Never Used     History   Drug Use No       Family History:    Family History   Problem Relation Age of Onset    Stroke Father     Cancer Brother     No Known Problems Mother        Physical Exam:     Vitals:   Blood Pressure: 146/66 (01/22/18 1325)  Pulse: 73 (01/22/18 1325)  Temperature: 97 5 °F (36 4 °C) (01/22/18 0745)  Temp Source: Oral (01/22/18 0745)  Respirations: 15 (01/22/18 1325)  Height: 5' (152 4 cm) (01/22/18 0745)  Weight - Scale: 72 6 kg (160 lb) (01/22/18 0745)  SpO2: 94 % (01/22/18 1325)    Physical Exam   Constitutional: She is oriented to person, place, and time  No distress  Eyes: Conjunctivae and EOM are normal  Pupils are equal, round, and reactive to light  Neck: Normal range of motion  Neck supple  No tracheal deviation present  Cardiovascular: Normal rate  Pulmonary/Chest: Effort normal and breath sounds normal  No respiratory distress  She has no wheezes     Abdominal: Soft  There is no rebound and no guarding  Mildly distended abdomen  Right lower quadrant tenderness   Musculoskeletal:   Chronic leg edema   Neurological: She is alert and oriented to person, place, and time  Skin: Skin is warm  She is not diaphoretic  Erythematous rash on her face   Psychiatric: She has a normal mood and affect  (Additional Data:     Lab Results:       Results from last 7 days  Lab Units 01/22/18  0923   WBC Thousand/uL 11 27*   HEMOGLOBIN g/dL 11 1*   HEMATOCRIT % 33 3*   PLATELETS Thousands/uL 264   NEUTROS PCT % 87*   LYMPHS PCT % 6*   MONOS PCT % 5   EOS PCT % 2       Results from last 7 days  Lab Units 01/22/18  0923   SODIUM mmol/L 138   POTASSIUM mmol/L 4 1   CHLORIDE mmol/L 105   CO2 mmol/L 28   BUN mg/dL 48*   CREATININE mg/dL 1 71*   CALCIUM mg/dL 9 0   TOTAL PROTEIN g/dL 7 9   BILIRUBIN TOTAL mg/dL 0 34   ALK PHOS U/L 75   ALT U/L 11*   AST U/L 16   GLUCOSE RANDOM mg/dL 108       Results from last 7 days  Lab Units 01/22/18  0923   INR  1 21*       Imaging:     CT abdomen pelvis wo contrast   Final Result by Sami Phillips MD (01/22 1101)      No acute pathology  Colonic diverticulosis without acute diverticulitis  Fluid throughout the colon, consistent with history of diarrhea  Workstation performed: [id number]         XR chest 1 view portable   ED Interpretation by Jackie Pierce DO (01/22 5004)   No free air      Final Result by Popeye Hartmann MD (01/22 1100)      Stable cardiomegaly  No acute findings  No obvious gross free air on this semierect exam   Follow-up with dedicated abdominal series or CT as warranted  Workstation performed: [id number]               ** Please Note: This note has been constructed using a voice recognition system   **

## 2018-01-22 NOTE — ED NOTES
Fluids running  Pt on 2L/nc for comfort while lying flat  Dr Pace President aware of very loose, watery stool         Felix Flynn RN  72/00/98 [id number]

## 2018-01-22 NOTE — ED ATTENDING ATTESTATION
Diego Esparza MD, saw and evaluated the patient  I have discussed the patient with the resident/non-physician practitioner and agree with the resident's/non-physician practitioner's findings, Plan of Care, and MDM as documented in the resident's/non-physician practitioner's note, except where noted  All available labs and Radiology studies were reviewed  At this point I agree with the current assessment done in the Emergency Department  I have conducted an independent evaluation of this patient a history and physical is as follows:   the patient presents with complaints of abdominal pain that started approximately 4:00 a m   It is in the right lower abdomen does not radiate several episodes of vomiting the last episode of vomiting he had some blood streaks in the patient also had 1 loose stool with no blood patient has a history of  Left renal artery stent she has a history of diverticulosis   a recent CT scan in July of 2017 showed no evidence aortic aneurysm    No travel no recent antibiotic use no urinary symptoms   exam:   Mildly distressed HEENT is clear anicteric neck no JVD lungs clear heart regular no murmur abdomen soft positive bowel sounds tenderness is noted in the right lower abdomen no rebound she is guarding   impression: Abdominal pain perforation versus diverticulitis versus appendicitis versus kidney stone versus viral gastroenteritis    Critical Care Time  CritCare Time    Procedures

## 2018-01-22 NOTE — ED PROVIDER NOTES
History  Chief Complaint   Patient presents with    Abdominal Pain     Patient presents to ER with Lower Right Quadrant abdominal pain, nausea, vomiting and a headache  51-year-old female who presents for evaluation of abdominal pain and vomiting  Symptoms work the patient up from sleep around 4:00 a m  this morning with focal right lower quadrant abdominal pain that is sharp and nonradiating and constant  Associated nausea x4 initially nonbloody but then the final episode had mild streaks of blood  Reports persistent nausea and abdominal pain  Had a bowel movement described as loose this morning  She has had no previous abdominal surgeries  She denies any vaginal bleeding  No urinary symptoms  No shortness of breath no headache no focal numbness tingling weakness  Patient does have a baseline left-sided weakness secondary to a previous stroke  She is on Eliquis secondary to AFib  On exam patient is well-appearing in no acute distress with normal vital signs  Focal right lower quadrant abdominal pain without rebound or guarding  Mildly distended  Prior to Admission Medications   Prescriptions Last Dose Informant Patient Reported? Taking?    Alum & Mag Hydroxide-Simeth (ANTACID ANTI-GAS PO)   Yes No   Sig: Take 30 mL by mouth every 6 (six) hours as needed     CO-ENZYME Q-10 PO   Yes No   Sig: Take 200 mg by mouth daily   OXcarbazepine (TRILEPTAL) 150 mg tablet   No No   Sig: Take 1 tablet by mouth daily at bedtime   Red Yeast Rice 600 MG CAPS   Yes No   Sig: Take 1 capsule by mouth 2 (two) times a day   acetaminophen (TYLENOL) 500 mg tablet   No Yes   Sig: Take 1 tablet by mouth every 12 (twelve) hours   amLODIPine (NORVASC) 5 mg tablet   Yes No   Sig: Take 5 mg by mouth daily   apixaban (ELIQUIS) 2 5 mg   No No   Sig: Take 1 tablet by mouth 2 (two) times a day   bimatoprost (LUMIGAN) 0 01 % ophthalmic drops   Yes No   Sig: Administer 1 drop to both eyes daily at bedtime     bisacodyl (DULCOLAX) 5 mg EC tablet   Yes Yes   Sig: Take 10 mg by mouth as needed for constipation Every 4th day as needed for constipation   brimonidine (ALPHAGAN P) 0 1 %   Yes No   Sig: Administer 1 drop to both eyes 2 (two) times a day     calcitriol (ROCALTROL) 0 25 mcg capsule   Yes No   Sig: Take 0 25 mcg by mouth daily     cholecalciferol (VITAMIN D3) 1,000 units tablet   Yes Yes   Sig: Take 1,000 Units by mouth daily   diphenhydrAMINE (BENADRYL) 25 mg capsule   Yes Yes   Sig: Take 25 mg by mouth daily at bedtime as needed for itching   docusate sodium (COLACE) 100 mg capsule   Yes No   Sig: Take 100 mg by mouth 2 (two) times a day   eplerenone (INSPRA) 25 mg tablet   Yes No   Sig: Take 25 mg by mouth daily   fluticasone (FLONASE) 50 mcg/act nasal spray   Yes No   Si spray into each nostril daily  gabapentin (NEURONTIN) 100 mg capsule   Yes No   Sig: Take 100 mg by mouth 2 (two) times a day   gabapentin (NEURONTIN) 300 mg capsule   No No   Sig: Take 2 capsules by mouth daily at bedtime   Patient taking differently: Take 300 mg by mouth daily at bedtime     guaiFENesin (ROBITUSSIN) 100 MG/5ML oral liquid   Yes Yes   Sig: Take 10 mL by mouth 4 (four) times a day as needed for cough   latanoprost (XALATAN) 0 005 % ophthalmic solution   Yes No   Sig: Administer 1 drop to both eyes daily at bedtime     levothyroxine 75 mcg tablet   No No   Sig: Take 1 tablet by mouth daily in the early morning   Patient taking differently: Take 88 mcg by mouth daily in the early morning     loperamide (IMODIUM) 2 mg capsule   Yes Yes   Sig: Take 2 mg by mouth as needed for diarrhea After each loose stool   loratadine (CLARITIN) 10 mg tablet   Yes No   Sig: Take 10 mg by mouth daily   melatonin 3 mg   No No   Sig: Take 2 tablets by mouth daily at bedtime   metoprolol tartrate (LOPRESSOR) 50 mg tablet   Yes No   Sig: Take 50 mg by mouth 2 (two) times a day   mineral oil enema   Yes Yes   Sig: Insert 1 enema into the rectum once Insert in rectum every 4th day as needed for constipation  pantoprazole (PROTONIX) 40 mg tablet   Yes No   Sig: Take 40 mg by mouth daily     polyethylene glycol (MIRALAX) 17 g packet   Yes Yes   Sig: Take 17 g by mouth as needed     rOPINIRole (REQUIP) 0 5 mg tablet   No No   Sig: Take 1 tablet by mouth every 6 (six) hours   Patient taking differently: Take 1 mg by mouth every 8 (eight) hours     tamsulosin (FLOMAX) 0 4 mg   No No   Sig: Take 1 capsule by mouth daily with dinner   torsemide (DEMADEX) 20 mg tablet   No Yes   Sig: Take 1 tablet by mouth daily   Patient taking differently: Take 20 mg by mouth 2 (two) times a day     traMADol (ULTRAM) 50 mg tablet   Yes Yes   Sig: Take 50 mg by mouth every 6 (six) hours as needed for moderate pain      Facility-Administered Medications: None       Past Medical History:   Diagnosis Date    A-fib ([place] [id number] )     Anemia     Anemia     Anxiety     Asthma     CAD (coronary artery disease)     [facility]     CHF (congestive heart failure) [facility]     CKD (chronic kidney disease) stage 3, GFR 30-59 ml/min     ckd3    Constipation     [facility] vascular accident[facility] )     left hemiparesis    Diastolic CHF, chronic ([facility])     Disease of thyroid gland     Dry eye     First degree AV block     Gastritis     Gastritis     GERD (gastroesophageal reflux disease)     Glaucoma     Glaucoma     Hemiparesis ([facility])     left side    Hemiparesis affecting left side as late effect of stroke ([facility])     Hyperlipidemia     Hypertension     Impetigo     Insomnia     Knee pain     Neuropathy     Osteoarthritis     Parkinson disease ([facility] )     Pulmonary HTN     PVD (peripheral vascular disease) ([facility])     Restless leg     Vitamin D deficiency        Past Surgical History:   Procedure Laterality Date    APPENDECTOMY      BLADDER SURGERY      CAROTID ENDARTERECTOMY Left     ESOPHAGOGASTRODUODENOSCOPY N/A 4/8/2016    Procedure: ESOPHAGOGASTRODUODENOSCOPY (EGD); Surgeon: Cee Silverio MD;  Location: [facility]; Service:     RENAL ARTERY STENT         Family History   Problem Relation Age of Onset    Stroke Father     Cancer Brother     No Known Problems Mother      I have reviewed and agree with the history as documented  Social History   Substance Use Topics    Smoking status: Never Smoker    Smokeless tobacco: Never Used    Alcohol use No        Review of Systems   Constitutional: Negative for chills, fatigue and fever  HENT: Negative for congestion  Eyes: Negative for visual disturbance  Respiratory: Negative for chest tightness and shortness of breath  Cardiovascular: Negative for chest pain and palpitations  Gastrointestinal: Positive for abdominal pain, nausea and vomiting  Negative for abdominal distention, blood in stool, constipation, diarrhea and rectal pain  Genitourinary: Negative for dysuria, flank pain, hematuria and vaginal bleeding  Musculoskeletal: Negative for back pain and gait problem  Skin: Negative for rash  Neurological: Negative for dizziness, syncope, weakness, light-headedness, numbness and headaches  Physical Exam  ED Triage Vitals   Temperature Pulse Respirations Blood Pressure SpO2   01/22/18 0745 01/22/18 0745 01/22/18 0745 01/22/18 0745 01/22/18 0745   97 5 °F (36 4 °C) 72 18 144/61 98 %      Temp Source Heart Rate Source Patient Position - Orthostatic VS BP Location FiO2 (%)   01/22/18 0745 01/22/18 1022 01/22/18 0745 01/22/18 0745 --   Oral Left;Brachial Lying Left arm       Pain Score       01/22/18 0745       5           Orthostatic Vital Signs  Vitals:    01/22/18 1022 01/22/18 1122 01/22/18 1225 01/22/18 1325   BP: 159/67 131/73 132/59 146/66   Pulse: 72 72 65 73   Patient Position - Orthostatic VS: Lying Lying Lying Lying       Physical Exam   Constitutional: She is oriented to person, place, and time   Vital signs are normal  She appears well-developed and well-nourished  She does not appear ill  No distress  HENT:   Head: Normocephalic and atraumatic  Head is without abrasion and without contusion  Right Ear: Tympanic membrane normal    Left Ear: Tympanic membrane normal    Nose: Nose normal    Mouth/Throat: Uvula is midline, oropharynx is clear and moist and mucous membranes are normal    Eyes: Conjunctivae and EOM are normal  Pupils are equal, round, and reactive to light  Neck: Trachea normal, normal range of motion, full passive range of motion without pain and phonation normal  Neck supple  No JVD present  No spinous process tenderness and no muscular tenderness present  Carotid bruit is not present  Normal range of motion present  No thyromegaly present  Cardiovascular: Normal rate, regular rhythm and intact distal pulses  Exam reveals no friction rub  No murmur heard  Pulmonary/Chest: Effort normal and breath sounds normal  No accessory muscle usage or stridor  No tachypnea  No respiratory distress  She has no decreased breath sounds  She has no wheezes  She has no rhonchi  She has no rales  She exhibits no tenderness, no crepitus, no edema and no retraction  Abdominal: Soft  Normal appearance and bowel sounds are normal  She exhibits no distension and no ascites  There is no hepatosplenomegaly  There is tenderness in the right lower quadrant  There is no rigidity, no rebound, no guarding and no CVA tenderness  No hernia  Genitourinary: There is no rash on the right labia  There is no rash on the left labia  Musculoskeletal: Normal range of motion  Left-sided weakness from previous CVA  Bilateral lower extremity 2+ pitting edema that is equal both lower extremity  Chronic venous stasis changes present  B/l  Lymphadenopathy:     She has no cervical adenopathy  Neurological: She is alert and oriented to person, place, and time  She has normal strength  No sensory deficit  GCS eye subscore is 4  GCS verbal subscore is 5   GCS motor subscore is 6  Skin: Skin is warm, dry and intact  No rash noted  She is not diaphoretic  Psychiatric: She has a normal mood and affect  Nursing note and vitals reviewed        ED Medications  Medications    EMS REPLENISHMENT MED (not administered)   schraggers paste 1 application (not administered)   acetaminophen (TYLENOL) tablet 488 mg (not administered)   amLODIPine (NORVASC) tablet 5 mg (not administered)   apixaban (ELIQUIS) tablet 2 5 mg (not administered)   bimatoprost (LUMIGAN) 0 01 % ophthalmic solution 1 drop (not administered)   brimonidine (ALPHAGAN P) 0 15 % ophthalmic solution 1 drop (not administered)   calcitriol (ROCALTROL) capsule 0 25 mcg (not administered)   cholecalciferol (VITAMIN D3) tablet 1,000 Units (not administered)   diphenhydrAMINE (BENADRYL) tablet 25 mg (not administered)   eplerenone (INSPRA) tablet 25 mg (not administered)   fluticasone (FLONASE) 50 mcg/act nasal spray 1 spray (not administered)   gabapentin (NEURONTIN) capsule 100 mg (not administered)   gabapentin (NEURONTIN) capsule 300 mg (not administered)   latanoprost (XALATAN) 0 005 % ophthalmic solution 1 drop (not administered)   levothyroxine tablet 88 mcg (not administered)   loratadine (CLARITIN) tablet 10 mg (not administered)   melatonin tablet 6 mg (not administered)   metoprolol tartrate (LOPRESSOR) tablet 50 mg (not administered)   OXcarbazepine (TRILEPTAL) tablet 150 mg (not administered)   rOPINIRole (REQUIP) tablet 1 mg (not administered)   tamsulosin (FLOMAX) capsule 0 4 mg (not administered)   torsemide (DEMADEX) tablet 20 mg (not administered)   traMADol (ULTRAM) tablet 50 mg (not administered)   sodium chloride 0 9 % infusion (not administered)   ondansetron (ZOFRAN) injection 4 mg (not administered)   pantoprazole (PROTONIX) injection 40 mg (not administered)   HYDROmorphone (DILAUDID) injection 0 2 mg (not administered)   acetaminophen (TYLENOL) tablet 650 mg (not administered)   ondansetron (ZOFRAN) injection 4 mg (4 mg Intravenous Given 1/22/18 0946)   fentanyl citrate (PF) 100 MCG/2ML 50 mcg (50 mcg Intravenous Given 1/22/18 0947)   sodium chloride 0 9 % bolus 500 mL (500 mL Intravenous New Bag 1/22/18 1050)   sodium chloride 0 9 % bolus 1,000 mL (1,000 mL Intravenous New Bag 1/22/18 1101)       Diagnostic Studies  Results Reviewed     Procedure Component Value Units Date/Time    Stool Enteric Bacterial Panel by PCR [[id number]]     Lab Status:  No result Specimen:  Stool     Clostridium difficile toxin by PCR [[id number]] Collected:  01/22/18 1229    Lab Status: In process Specimen:  Stool from Per Rectum Updated:  01/22/18 1233    POCT urinalysis dipstick [[id number]]  (Normal) Resulted:  01/22/18 1147    Lab Status:  Final result Updated:  01/22/18 1147     Color, UA clear;yellow    ED Urine Macroscopic [[id number]]  (Normal) Collected:  01/22/18 1149    Lab Status:  Final result Specimen:  Urine Updated:  01/22/18 1146     Color, UA Yellow     Clarity, UA Clear     pH, UA 7 0     Leukocytes, UA Negative     Nitrite, UA Negative     Protein, UA Negative mg/dl      Glucose, UA Negative mg/dl      Ketones, UA Negative mg/dl      Urobilinogen, UA 0 2 E U /dl      Bilirubin, UA Negative     Blood, UA Negative     Specific Gravity, UA 1 015    Narrative:       CLINITEK RESULT    Troponin I [[id number]]  (Normal) Collected:  01/22/18 2948    Lab Status:  Final result Specimen:  Blood from Arm, Right Updated:  01/22/18 0955     Troponin I <0 02 ng/mL     Narrative:         [employer] Chemistry analyzer 99% cutoff is > 0 04 ng/mL in network labs    o cTnI 99% cutoff is useful only when applied to patients in the clinical setting of myocardial ischemia  o cTnI 99% cutoff should be interpreted in the context of clinical history, ECG findings and possibly cardiac imaging to establish correct diagnosis    o cTnI 99% cutoff may be suggestive but clearly not indicative of a coronary event without the clinical setting of myocardial ischemia  Lipase [[id number]]  (Normal) Collected:  01/22/18 0923    Lab Status:  Final result Specimen:  Blood from Arm, Right Updated:  01/22/18 0954     Lipase 229 u/L     Lactic acid x2 Q2H [[id number]]  (Normal) Collected:  01/22/18 0923    Lab Status:  Final result Specimen:  Blood from Arm, Right Updated:  01/22/18 0954     LACTIC ACID 1 8 mmol/L     Narrative:         Result may be elevated if tourniquet was used during collection  Comprehensive metabolic panel [[id number]]  (Abnormal) Collected:  01/22/18 0923    Lab Status:  Final result Specimen:  Blood from Arm, Right Updated:  01/22/18 0954     Sodium 138 mmol/L      Potassium 4 1 mmol/L      Chloride 105 mmol/L      CO2 28 mmol/L      Anion Gap 5 mmol/L      BUN 48 (H) mg/dL      Creatinine 1 71 (H) mg/dL      Glucose 108 mg/dL      Calcium 9 0 mg/dL      AST 16 U/L      ALT 11 (L) U/L      Alkaline Phosphatase 75 U/L      Total Protein 7 9 g/dL      Albumin 3 9 g/dL      Total Bilirubin 0 34 mg/dL      eGFR 27 ml/min/1 73sq m     Narrative:         National Kidney Disease Education Program recommendations are as follows:  GFR calculation is accurate only with a steady state creatinine  Chronic Kidney disease less than 60 ml/min/1 73 sq  meters  Kidney failure less than 15 ml/min/1 73 sq  meters  Protime-INR [[id number]]  (Abnormal) Collected:  01/22/18 0923    Lab Status:  Final result Specimen:  Blood from Arm, Right Updated:  01/22/18 0947     Protime 15 4 (H) seconds      INR 1 21 (H)    APTT [[id number]]  (Abnormal) Collected:  01/22/18 0923    Lab Status:  Final result Specimen:  Blood from Arm, Right Updated:  01/22/18 0947     PTT 40 (H) seconds     Narrative:          Therapeutic Heparin Range = 60-90 seconds    CBC and differential [[id number]]  (Abnormal) Collected:  01/22/18 0923    Lab Status:  Final result Specimen:  Blood from Arm, Right Updated:  01/22/18 0937     WBC 11 27 (H) Thousand/uL      RBC 3 80 (L) Million/uL      Hemoglobin 11 1 (L) g/dL      Hematocrit 33 3 (L) %      MCV 88 fL      MCH 29 2 pg      MCHC 33 3 g/dL      RDW 14 8 %      MPV 10 2 fL      Platelets 675 Thousands/uL      nRBC 0 /100 WBCs      Neutrophils Relative 87 (H) %      Lymphocytes Relative 6 (L) %      Monocytes Relative 5 %      Eosinophils Relative 2 %      Basophils Relative 0 %      Neutrophils Absolute 9 76 (H) Thousands/µL      Lymphocytes Absolute 0 62 Thousands/µL      Monocytes Absolute 0 55 Thousand/µL      Eosinophils Absolute 0 25 Thousand/µL      Basophils Absolute 0 01 Thousands/µL                  CT abdomen pelvis wo contrast   Final Result by Madiha Mabry MD (01/22 1101)      No acute pathology  Colonic diverticulosis without acute diverticulitis  Fluid throughout the colon, consistent with history of diarrhea  Workstation performed: [id number]         XR chest 1 view portable   ED Interpretation by Blanche Lopez DO (01/22 9919)   No free air      Final Result by Jeanie Leung MD (01/22 1100)      Stable cardiomegaly  No acute findings  No obvious gross free air on this semierect exam   Follow-up with dedicated abdominal series or CT as warranted  Workstation performed: [id number]               Procedures  Procedures      Phone Consults  ED Phone Contact    ED Course  ED Course as of Jan 22 1556   Mon Jan 22, 2018   9309 EKG:  Normal sinus rhythm, rate 66  Prolonged p r  interval   Nonspecific ST-T changes  1000 Due to GFR will do CT wo contrast    1006 Medication review of:  Patient takes metoprolol 25 mg b i d     1020 Two episodes of diarrhea since arrival    1117 LACTIC ACID: 1 8        persistent nausea, and unable to tolerate oral fluids, multiple episodes of nonbloody diarrhea since arrival   Will admit for observation and IV fluid hydration                          MDM  CritCare Time    Disposition  Final diagnoses:   Dehydration   Intractable diarrhea   Nausea and vomiting     Time reflects when diagnosis was documented in both MDM as applicable and the Disposition within this note     Time User Action Codes Description Comment    1/22/2018 12:12 PM Leonardo Mccollum Add [E86 0] Dehydration     1/22/2018 12:12 PM Bashor, Ramey Burkitt Add [R19 7] Intractable diarrhea     1/22/2018 12:12 PM Bashor, Ramey Burkitt Add [R11 2] Nausea and vomiting       ED Disposition     ED Disposition Condition Comment    Admit  Case was discussed with JOVI and the patient's admission status was agreed to be Admission Status: observation status to the service of Dr Stephan Contreras   Follow-up Information    None       Current Discharge Medication List      CONTINUE these medications which have NOT CHANGED    Details   acetaminophen (TYLENOL) 500 mg tablet Take 1 tablet by mouth every 12 (twelve) hours  Qty: 30 tablet, Refills: 0      bisacodyl (DULCOLAX) 5 mg EC tablet Take 10 mg by mouth as needed for constipation Every 4th day as needed for constipation      cholecalciferol (VITAMIN D3) 1,000 units tablet Take 1,000 Units by mouth daily      diphenhydrAMINE (BENADRYL) 25 mg capsule Take 25 mg by mouth daily at bedtime as needed for itching      guaiFENesin (ROBITUSSIN) 100 MG/5ML oral liquid Take 10 mL by mouth 4 (four) times a day as needed for cough      loperamide (IMODIUM) 2 mg capsule Take 2 mg by mouth as needed for diarrhea After each loose stool      mineral oil enema Insert 1 enema into the rectum once Insert in rectum every 4th day as needed for constipation  polyethylene glycol (MIRALAX) 17 g packet Take 17 g by mouth as needed        torsemide (DEMADEX) 20 mg tablet Take 1 tablet by mouth daily  Qty: 30 tablet, Refills: 0      traMADol (ULTRAM) 50 mg tablet Take 50 mg by mouth every 6 (six) hours as needed for moderate pain      Alum & Mag Hydroxide-Simeth (ANTACID ANTI-GAS PO) Take 30 mL by mouth every 6 (six) hours as needed        amLODIPine (NORVASC) 5 mg tablet Take 5 mg by mouth daily      apixaban (ELIQUIS) 2 5 mg Take 1 tablet by mouth 2 (two) times a day  Qty: 30 tablet, Refills: 0      bimatoprost (LUMIGAN) 0 01 % ophthalmic drops Administer 1 drop to both eyes daily at bedtime        brimonidine (ALPHAGAN P) 0 1 % Administer 1 drop to both eyes 2 (two) times a day        calcitriol (ROCALTROL) 0 25 mcg capsule Take 0 25 mcg by mouth daily        CO-ENZYME Q-10 PO Take 200 mg by mouth daily      docusate sodium (COLACE) 100 mg capsule Take 100 mg by mouth 2 (two) times a day      eplerenone (INSPRA) 25 mg tablet Take 25 mg by mouth daily      fluticasone (FLONASE) 50 mcg/act nasal spray 1 spray into each nostril daily  !! gabapentin (NEURONTIN) 100 mg capsule Take 100 mg by mouth 2 (two) times a day      !! gabapentin (NEURONTIN) 300 mg capsule Take 2 capsules by mouth daily at bedtime  Qty: 30 capsule, Refills: 0      latanoprost (XALATAN) 0 005 % ophthalmic solution Administer 1 drop to both eyes daily at bedtime  levothyroxine 75 mcg tablet Take 1 tablet by mouth daily in the early morning  Qty: 30 tablet, Refills: 0      loratadine (CLARITIN) 10 mg tablet Take 10 mg by mouth daily      melatonin 3 mg Take 2 tablets by mouth daily at bedtime  Qty: 30 tablet, Refills: 0      metoprolol tartrate (LOPRESSOR) 50 mg tablet Take 50 mg by mouth 2 (two) times a day      OXcarbazepine (TRILEPTAL) 150 mg tablet Take 1 tablet by mouth daily at bedtime  Qty: 30 tablet, Refills: 0      pantoprazole (PROTONIX) 40 mg tablet Take 40 mg by mouth daily        Red Yeast Rice 600 MG CAPS Take 1 capsule by mouth 2 (two) times a day      rOPINIRole (REQUIP) 0 5 mg tablet Take 1 tablet by mouth every 6 (six) hours  Qty: 30 tablet, Refills: 0      tamsulosin (FLOMAX) 0 4 mg Take 1 capsule by mouth daily with dinner  Qty: 7 capsule, Refills: 0       !! - Potential duplicate medications found  Please discuss with provider  No discharge procedures on file  ED Provider  Attending physically available and evaluated Kiara Vyas   I managed the patient along with the ED Attending      Electronically Signed by         Italia Mcgraw,   01/22/18 8762

## 2018-01-23 VITALS — DIASTOLIC BLOOD PRESSURE: 68 MMHG | SYSTOLIC BLOOD PRESSURE: 128 MMHG

## 2018-01-23 PROBLEM — I34.0 MODERATE MITRAL REGURGITATION: Status: ACTIVE | Noted: 2018-01-23

## 2018-01-23 PROBLEM — D64.9 ANEMIA: Status: ACTIVE | Noted: 2018-01-23

## 2018-01-23 LAB
ANION GAP SERPL CALCULATED.3IONS-SCNC: 9 MMOL/L (ref 4–13)
ATRIAL RATE: 84 BPM
BUN SERPL-MCNC: 29 MG/DL (ref 5–25)
C DIFF TOX GENS STL QL NAA+PROBE: NORMAL
CALCIUM SERPL-MCNC: 8 MG/DL (ref 8.3–10.1)
CHLORIDE SERPL-SCNC: 113 MMOL/L (ref 100–108)
CO2 SERPL-SCNC: 23 MMOL/L (ref 21–32)
CREAT SERPL-MCNC: 1.39 MG/DL (ref 0.6–1.3)
ERYTHROCYTE [DISTWIDTH] IN BLOOD BY AUTOMATED COUNT: 15.1 % (ref 11.6–15.1)
GFR SERPL CREATININE-BSD FRML MDRD: 34 ML/MIN/1.73SQ M
GLUCOSE SERPL-MCNC: 94 MG/DL (ref 65–140)
HCT VFR BLD AUTO: 28.6 % (ref 34.8–46.1)
HCT VFR BLD AUTO: 31.7 % (ref 34.8–46.1)
HGB BLD-MCNC: 10.6 G/DL (ref 11.5–15.4)
HGB BLD-MCNC: 9.4 G/DL (ref 11.5–15.4)
MCH RBC QN AUTO: 29.1 PG (ref 26.8–34.3)
MCHC RBC AUTO-ENTMCNC: 32.9 G/DL (ref 31.4–37.4)
MCV RBC AUTO: 89 FL (ref 82–98)
P AXIS: 71 DEGREES
PLATELET # BLD AUTO: 225 THOUSANDS/UL (ref 149–390)
PMV BLD AUTO: 10.2 FL (ref 8.9–12.7)
POTASSIUM SERPL-SCNC: 3.5 MMOL/L (ref 3.5–5.3)
PR INTERVAL: 240 MS
QRS AXIS: 13 DEGREES
QRSD INTERVAL: 82 MS
QT INTERVAL: 386 MS
QTC INTERVAL: 456 MS
RBC # BLD AUTO: 3.23 MILLION/UL (ref 3.81–5.12)
SODIUM SERPL-SCNC: 145 MMOL/L (ref 136–145)
T WAVE AXIS: 57 DEGREES
VENTRICULAR RATE: 84 BPM
WBC # BLD AUTO: 5.67 THOUSAND/UL (ref 4.31–10.16)

## 2018-01-23 PROCEDURE — C9113 INJ PANTOPRAZOLE SODIUM, VIA: HCPCS | Performed by: INTERNAL MEDICINE

## 2018-01-23 PROCEDURE — 85027 COMPLETE CBC AUTOMATED: CPT | Performed by: INTERNAL MEDICINE

## 2018-01-23 PROCEDURE — 85014 HEMATOCRIT: CPT | Performed by: PHYSICIAN ASSISTANT

## 2018-01-23 PROCEDURE — 80048 BASIC METABOLIC PNL TOTAL CA: CPT | Performed by: INTERNAL MEDICINE

## 2018-01-23 PROCEDURE — 85018 HEMOGLOBIN: CPT | Performed by: PHYSICIAN ASSISTANT

## 2018-01-23 RX ADMIN — GABAPENTIN 100 MG: 100 CAPSULE ORAL at 17:11

## 2018-01-23 RX ADMIN — BRIMONIDINE TARTRATE 1 DROP: 1.5 SOLUTION OPHTHALMIC at 11:20

## 2018-01-23 RX ADMIN — TAMSULOSIN HYDROCHLORIDE 0.4 MG: 0.4 CAPSULE ORAL at 17:11

## 2018-01-23 RX ADMIN — ROPINIROLE 1 MG: 1 TABLET, FILM COATED ORAL at 17:11

## 2018-01-23 RX ADMIN — FLUTICASONE PROPIONATE 1 SPRAY: 50 SPRAY, METERED NASAL at 11:20

## 2018-01-23 RX ADMIN — LEVOTHYROXINE SODIUM 88 MCG: 88 TABLET ORAL at 05:04

## 2018-01-23 RX ADMIN — METOPROLOL TARTRATE 50 MG: 50 TABLET ORAL at 11:21

## 2018-01-23 RX ADMIN — CALCITRIOL 0.25 MCG: 0.25 CAPSULE, LIQUID FILLED ORAL at 17:11

## 2018-01-23 RX ADMIN — AMLODIPINE BESYLATE 5 MG: 5 TABLET ORAL at 11:21

## 2018-01-23 RX ADMIN — ACETAMINOPHEN 488 MG: 325 TABLET, FILM COATED ORAL at 05:05

## 2018-01-23 RX ADMIN — BIMATOPROST 1 DROP: 0.1 SOLUTION/ DROPS OPHTHALMIC at 22:23

## 2018-01-23 RX ADMIN — MELATONIN TAB 3 MG 6 MG: 3 TAB at 22:23

## 2018-01-23 RX ADMIN — BRIMONIDINE TARTRATE 1 DROP: 1.5 SOLUTION OPHTHALMIC at 17:14

## 2018-01-23 RX ADMIN — METOPROLOL TARTRATE 50 MG: 50 TABLET ORAL at 17:12

## 2018-01-23 RX ADMIN — TORSEMIDE 20 MG: 20 TABLET ORAL at 17:12

## 2018-01-23 RX ADMIN — TORSEMIDE 20 MG: 20 TABLET ORAL at 11:22

## 2018-01-23 RX ADMIN — GABAPENTIN 300 MG: 300 CAPSULE ORAL at 22:23

## 2018-01-23 RX ADMIN — LORATADINE 10 MG: 10 TABLET ORAL at 11:22

## 2018-01-23 RX ADMIN — HYDROMORPHONE HYDROCHLORIDE 0.2 MG: 1 INJECTION, SOLUTION INTRAMUSCULAR; INTRAVENOUS; SUBCUTANEOUS at 07:40

## 2018-01-23 RX ADMIN — GABAPENTIN 100 MG: 100 CAPSULE ORAL at 11:22

## 2018-01-23 RX ADMIN — VITAMIN D, TAB 1000IU (100/BT) 1000 UNITS: 25 TAB at 11:22

## 2018-01-23 RX ADMIN — APIXABAN 2.5 MG: 2.5 TABLET, FILM COATED ORAL at 11:22

## 2018-01-23 RX ADMIN — APIXABAN 2.5 MG: 2.5 TABLET, FILM COATED ORAL at 17:12

## 2018-01-23 RX ADMIN — EPLERENONE 25 MG: 25 TABLET, FILM COATED ORAL at 11:22

## 2018-01-23 RX ADMIN — OXCARBAZEPINE 150 MG: 150 TABLET ORAL at 22:23

## 2018-01-23 RX ADMIN — PANTOPRAZOLE SODIUM 40 MG: 40 INJECTION, POWDER, FOR SOLUTION INTRAVENOUS at 11:20

## 2018-01-23 RX ADMIN — LATANOPROST 1 DROP: 50 SOLUTION OPHTHALMIC at 22:23

## 2018-01-23 RX ADMIN — ACETAMINOPHEN 488 MG: 325 TABLET, FILM COATED ORAL at 17:12

## 2018-01-23 RX ADMIN — ROPINIROLE 1 MG: 1 TABLET, FILM COATED ORAL at 22:23

## 2018-01-23 RX ADMIN — TRAMADOL HYDROCHLORIDE 50 MG: 50 TABLET, FILM COATED ORAL at 05:04

## 2018-01-23 NOTE — MISCELLANEOUS
Message   Recorded as Task   Date: 12/20/2017 02:10 PM, Created By: Kendra Ny   Task Name: Follow Up   Assigned To: Kendra Ny   Regarding Patient: David Bright, Status: Active   Comment:    Kenrda Ny - 20 Dec 2017 2:10 PM     Alessandro Brinkandra called  You had increased her Torsemide 20 mg  to BID x3 days  Nurse states weight is slowly coming down  She is now back to 1-OD  Nurse feels she does better on the BID dosing; not as S OB  with the increase in Torsemide  Asking if they can go back to Torsemide 20 mg  BID    ??   Siria Peter - 21 Dec 2017 2:50 PM     TASK REPLIED TO: Previously Assigned To Siria Peter  trial it  Do weights weekly and send them in   bmp 1 week after the med change     talked with nurse at [facility]  60    Electronically signed by : Joesph Main, ; Dec 21 2017  5:15PM EST                       (Author)    Electronically signed by : ALLI Jj ; Dec 22 2017 11:41AM EST

## 2018-01-23 NOTE — PROGRESS NOTES
Progress Note - Kiara Vyas 6/14/1931, 80 y o  female MRN: [id number]    Unit/Bed#: [facility] 082-83 Encounter: [id number]    Primary Care Provider: Germán Harris MD   Date and time admitted to hospital: 1/22/2018  7:33 AM        * Abdominal pain, vomiting, and diarrhea   Assessment & Plan    · Unclear etiology  · CT abdomen with no acute pathology  Normal lactate level on admission  · C  Diff negative  · Enteric stool panel pending  · Continue Supportive care and clear liquid diet for now - advance as tolerating          Anemia   Assessment & Plan    · Baseline hemoglobin appears to be in the 10s  Hemoglobin on admission was 11 1  This morning's hemoglobin dropped to 9 4  Unclear etiology  Consider dilutional as the patient received 2 5 L of fluids in the emergency department  We will monitor H&H Q8hr and check FOBT  Consider GI consult        Atrial fibrillation ([address] )   Assessment & Plan    · Rate controlled  Continue Eliquis  · Known to Dr Deonte Francis        Chronic diastolic CHF (congestive heart failure) (HCC)   Assessment & Plan    · Monitor fluid status closely given that she was given 2 5L fluids in ED  · Continue torsemide 20mg BID        CKD (chronic kidney disease) stage 3, GFR 30-59 ml/min   Assessment & Plan    · Baseline creatinine appears around 1 5   She is known to Dr Jaime Ring  · Creatinine was 1 71 on admission, and improved to 1 39 today status post 2 5 L fluids  · BMP in AM        Hemiparesis affecting left side as late effect of stroke [facility])   Assessment & Plan    · Noted on prior admissions        Hypertension   Assessment & Plan    · Continue home meds  · Follow up with nephrology        Restless leg   Assessment & Plan    · Continue home meds        Facial rash   Assessment & Plan    · Followed by her dermatologist  Uses topical cream the name of which she can't recall        Moderate mitral regurgitation   Assessment & Plan    · Follow up with primary cardiology, Dr Deonte Francis VTE Pharmacologic Prophylaxis:   Pharmacologic: Apixaban (Eliquis)  Mechanical VTE Prophylaxis in Place: Yes    Patient Centered Rounds: I have performed bedside rounds with nursing staff today  2018 [facility] with Specialists or Other Care Team Provider: Nursing    Education and Discussions with Family / Patient: patient and her daughter, Murray León who lives in [place], for an update    Time Spent for Care: 30 minutes  More than 50% of total time spent on counseling and coordination of care as described above  Current Length of Stay: 0 day(s)    Current Patient Status: Observation   Certification Statement: The patient, admitted on an observation basis, will now require > 2 midnight hospital stay due to need to monitor hemoglobin, stol culture pending, needs to tolerate diet    Discharge Plan: Pending clinical course    Code Status: Level 1 - Full Code      Subjective:   Ms Meghana Wilkinson reports that she continues to have abdominal pain  She states that this is located in the right lower quadrant  She states that she has had her appendix removed  She reports continued diarrhea  She denies any episodes of vomiting since yesterday  Objective:     Vitals:   Temp (24hrs), Av 3 °F (36 8 °C), Min:97 8 °F (36 6 °C), Max:99 4 °F (37 4 °C)    HR:  [] 60  Resp:  [15-20] 20  BP: (100-150)/(45-79) 118/62  SpO2:  [94 %-96 %] 96 %  Body mass index is 29 63 kg/m²  Input and Output Summary (last 24 hours): Intake/Output Summary (Last 24 hours) at 18 1317  Last data filed at 18 0940   Gross per 24 hour   Intake              320 ml   Output              250 ml   Net               70 ml       Physical Exam:     Physical Exam   Constitutional: She is oriented to person, place, and time  Patient seen lying on her back in bed resting   Cardiovascular: Normal rate and regular rhythm  Pulmonary/Chest:   Poor effort but lungs appear clear bilaterally   Abdominal: Soft   Bowel sounds are normal  There is no tenderness  Neurological: She is alert and oriented to person, place, and time  Left-sided weakness   Skin: There is erythema (face)  Vitals reviewed  Additional Data:     Labs:      Results from last 7 days  Lab Units 01/23/18  0739 01/22/18  0923   WBC Thousand/uL 5 67 11 27*   HEMOGLOBIN g/dL 9 4* 11 1*   HEMATOCRIT % 28 6* 33 3*   PLATELETS Thousands/uL 225 264   NEUTROS PCT %  --  87*   LYMPHS PCT %  --  6*   MONOS PCT %  --  5   EOS PCT %  --  2       Results from last 7 days  Lab Units 01/23/18  0739 01/22/18  0923   SODIUM mmol/L 145 138   POTASSIUM mmol/L 3 5 4 1   CHLORIDE mmol/L 113* 105   CO2 mmol/L 23 28   BUN mg/dL 29* 48*   CREATININE mg/dL 1 39* 1 71*   CALCIUM mg/dL 8 0* 9 0   TOTAL PROTEIN g/dL  --  7 9   BILIRUBIN TOTAL mg/dL  --  0 34   ALK PHOS U/L  --  75   ALT U/L  --  11*   AST U/L  --  16   GLUCOSE RANDOM mg/dL 94 108       Results from last 7 days  Lab Units 01/22/18  0923   INR  1 21*       * I Have Reviewed All Lab Data Listed Above  * Additional Pertinent Lab Tests Reviewed: All Labs Within Last 24 Hours Reviewed    Imaging:    Imaging Reports Reviewed Today Include:  CT abdomen and pelvis without contrast  Imaging Personally Reviewed by Myself Includes:  None    Recent Cultures (last 7 days):       Results from last 7 days  Lab Units 01/22/18  1229   C DIFF TOXIN B  NEGATIVE for C difficle toxin by PCR          Last 24 Hours Medication List:     EMS replenish medication  Does not apply Once   acetaminophen 488 mg Oral Q12H   amLODIPine 5 mg Oral Daily   apixaban 2 5 mg Oral BID   bimatoprost 1 drop Both Eyes HS   brimonidine 1 drop Both Eyes BID   calcitriol 0 25 mcg Oral Daily   cholecalciferol 1,000 Units Oral Daily   eplerenone 25 mg Oral Daily   fluticasone 1 spray Nasal Daily   gabapentin 100 mg Oral BID   gabapentin 300 mg Oral HS   latanoprost 1 drop Both Eyes HS   levothyroxine 88 mcg Oral Early Morning   loratadine 10 mg Oral Daily   melatonin 6 mg Oral HS   metoprolol tartrate 50 mg Oral BID   OXcarbazepine 150 mg Oral HS   pantoprazole 40 mg Intravenous Q24H JULIETTE   rOPINIRole 1 mg Oral Q8H [facility]   schraggers 1 application Topical Once   tamsulosin 0 4 mg Oral Daily With Dinner   torsemide 20 mg Oral BID (diuretic)        Today, Patient Was Seen By: Joselin Hopkins PA-C    ** Please Note: Dictation voice to text software may have been used in the creation of this document   **

## 2018-01-23 NOTE — PLAN OF CARE
Problem: DISCHARGE PLANNING - CARE MANAGEMENT  Goal: Discharge to post-acute care or home with appropriate resources  INTERVENTIONS:  - Conduct assessment to determine patient/family and health care team treatment goals, and need for post-acute services based on payer coverage, community resources, and patient preferences, and barriers to discharge  - Address psychosocial, clinical, and financial barriers to discharge as identified in assessment in conjunction with the patient/family and health care team  - Arrange appropriate level of post-acute services according to patient's   needs and preference and payer coverage in collaboration with the physician and health care team  - Communicate with and update the patient/family, physician, and health care team regarding progress on the discharge plan  - Arrange appropriate transportation to post-acute venues  -Anticipate return to [facility] once medically stable    Outcome: Progressing

## 2018-01-23 NOTE — CASE MANAGEMENT
Initial Clinical Review  1/22/2018    &     1/23/2018    Admission: Date/Time/Statement: 01/22/2018 @ 1217  Orders Placed This Encounter   Procedures    Place in Observation (expected length of stay for this patient is less than two midnights)     Standing Status:   Standing     Number of Occurrences:   1     Order Specific Question:   Admitting Physician     Answer:   Roberto Newberry     Order Specific Question:   Level of Care     Answer:   Med Surg [16]       ED: Date/Time/Mode of Arrival:   ED Arrival Information     Expected Arrival Acuity Means of Arrival Escorted By Service Admission Type    - 1/22/2018 07:31 Urgent Ambulance [facility] General Medicine Urgent    Arrival Complaint    nausea/vomiting          Chief Complaint:   Chief Complaint   Patient presents with    Abdominal Pain     Patient presents to ER with Lower Right Quadrant abdominal pain, nausea, vomiting and a headache  History of Illness:   Shanna Ocampo is a 80 y o  female with multiple past medical history including chronic diastolic CHF , AFib on Eliquis , chronic kidney disease stage 3, prior CVA and hypertension who presents with vomiting diarrhea and abdominal pain  She was in her usual state of health until this morning around 4 o'clock when she started having intractable vomiting about 8 times followed by diarrhea  She has had close to 20 times of watery stools so far  She also described right lower quadrant pain that she has had last 6 months  Her abdominal pain is worse to 10/10 today  She denies coffee-ground emesis or bloody stool  She felt chills but her temperature here so far is normal   She denies recent exposure to antibiotics       ED Vital Signs:   ED Triage Vitals   Temperature Pulse Respirations Blood Pressure SpO2   01/22/18 0745 01/22/18 0745 01/22/18 0745 01/22/18 0745 01/22/18 0745   97 5 °F (36 4 °C) 72 18 144/61 98 %      Temp Source Heart Rate Source Patient Position - Orthostatic VS BP Location FiO2 (%)   01/22/18 0745 01/22/18 1022 01/22/18 0745 01/22/18 0745 --   Oral Left;Brachial Lying Left arm       Pain Score       01/22/18 0745       5        Wt Readings from Last 1 Encounters:   01/22/18 78 3 kg (172 lb 9 9 oz)     Abnormal Labs/Diagnostic Test Results:   WBC Thousand/uL 11 27*   HEMOGLOBIN g/dL 11 1*   HEMATOCRIT % 33 3*   PLATELETS Thousands/uL 264     SODIUM mmol/L 138   POTASSIUM mmol/L 4 1   CHLORIDE mmol/L 105   CO2 mmol/L 28   BUN mg/dL 48*   CREATININE mg/dL 1 71*   CALCIUM mg/dL 9 0   TOTAL PROTEIN g/dL 7 9   BILIRUBIN TOTAL mg/dL 0 34   ALK PHOS U/L 75   ALT U/L 11*   AST U/L 16   GLUCOSE RANDOM mg/dL 108      Color, UA Yellow    Clarity, UA Clear    pH, UA 7 0 4 5 - 8 0     Leukocytes, UA Negative Negative     Nitrite, UA Negative Negative     Protein, UA Negative Negative mg/dl     Glucose, UA Negative Negative mg/dl     Ketones, UA Negative Negative mg/dl     Urobilinogen, UA 0 2 0 2, 1 0 E U /dl E U /dl     Bilirubin, UA Negative Negative     Blood, UA Negative Negative     Specific Gravity, UA 1 015 1 003 - 1 030            ED Treatment:   Medication Administration from 01/22/2018 0731 to 01/22/2018 1521    Date/Time Order Dose Route Action Action by Comments   01/22/2018 0946 ondansetron (ZOFRAN) injection 4 mg 4 mg Intravenous Given Adryan Amezquita RN    01/22/2018 0947 fentanyl citrate (PF) 100 MCG/2ML 50 mcg 50 mcg Intravenous Given Adryan Amezquita RN    01/22/2018 1050 sodium chloride 0 9 % bolus 500 mL 500 mL Intravenous Given Dana Field RN    01/18/7582 1101 sodium chloride 0 9 % bolus 1,000 mL 1,000 mL Intravenous Given Adryan Amezquita RN           Past Medical/Surgical History:    Active Ambulatory Problems     Diagnosis Date Noted    Hypothyroidism 01/09/2016    Pulmonary hypertension 01/09/2016    Transaminitis 01/09/2016    CAD (coronary artery disease)     Chronic diastolic [facility]est[facility]) [facility]     Hypertension     Hyperlipidemia     Restless leg     Hemiparesis affecting left side as late effect of stroke ([facility])     GERD (gastroesophageal reflux disease)     Vitamin D deficiency     PVD (peripheral vascular disease)     Neuropathy     CVA (cerebral vascular accident) ([facility] )     Anxiety     History of stroke 07/17/2016    Atrial fibrillation ([facility] ) 07/20/2016    CKD (chronic kidney disease) stage 3, GFR 30-59 ml/min     First degree AV block     Glaucoma     Transaminitis 10/12/2016    Hyperparathyroidism ([facility] ) 07/24/2017    Polypharmacy 07/27/2017    Physical deconditioning 07/27/2017    Hypoalbuminemia due to protein-calorie malnutrition ([facility] ) 07/27/2017    Visual impairment 07/27/2017     Resolved Ambulatory Problems     Diagnosis Date Noted    Acute on chronic diastolic heart failure (HCC) 01/09/2016    Generalized weakness 01/09/2016    Ambulatory dysfunction 01/09/2016    Glaucoma     Insomnia     Asthma     Gastritis     Acute kidney injury 04/01/2016    UTI (urinary tract infection) 04/01/2016    Acute metabolic encephalopathy 08/89/6882    Encephalopathy 04/11/2016    Encephalopathy acute 07/17/2016    ARF (acute renal failure) (formerly [facility]) 07/17/2016    Fever 07/30/2016    Leukocytosis 07/30/2016    Intractable nausea and vomiting 08/01/2016    Bacteremia 08/01/2016    Aspiration pneumonia ([facility] ) 08/04/2016    A-fib ([place] [address] )     Gastritis     Fall 10/12/2016    Elevated lipase 10/12/2016    SOB (shortness of breath) 03/22/2017    Bronchitis, acute, with bronchospasm 03/22/2017    Chest pain 04/14/2017    Cellulitis 07/14/2017    Pedal edema 07/15/2017    Acute renal failure superimposed on chronic kidney disease ([facility] ) 07/17/2017    Hypoxia 07/17/2017    Altered mental status 07/22/2017    Unresponsive episode 07/27/2017     Past Medical History:   Diagnosis Date    A-fib ([place] [address] )     Anemia     Anemia     Anxiety     Asthma     CAD (coronary artery disease)  [facility]) [facility]     CHF (congestive heart failure) ([facility])     CKD (chronic kidney disease) stage 3, GFR 30-59 ml/min     Constipation     CVA (cerebral vascular accident) ([facility] )     Diastolic CHF, chronic ([facility])     Disease of thyroid gland     Dry eye     First degree AV block     Gastritis     Gastritis     GERD (gastroesophageal reflux disease)     Glaucoma     Glaucoma     Hemiparesis ([facility] )     Hemiparesis affecting left side as late effect of stroke ([facility])     Hyperlipidemia     Hypertension     Impetigo     Insomnia     Knee pain     Neuropathy     Osteoarthritis     Parkinson disease ([facility] )     Pulmonary HTN     PVD (peripheral vascular disease) ([facility])     Restless leg     Vitamin D deficiency        Admitting Diagnosis: Dehydration [E86 0]  Nausea and vomiting [R11 2]  Intractable diarrhea [R19 7]    Age/Sex: 80 y o  female    Assessment/Plan:   * Abdominal pain, vomiting, and diarrhea   Assessment & Plan     Acute onset, intractable vomiting and diarrhea since this morning  CT abdomen unremarkable  Normal lactate level  Rule out infectious  Pending stool study  ? Viral   Denies recent antibiotics exposure  Supportive care  Clear liquid for now  Advance diet as tolerated  It appears that her right lower quadrant abdominal pain has been off and on last 6 months  Recommend outpatient GI evaluation and possible colonoscopy          Atrial fibrillation [facility])   Assessment & Plan     Rate controlled  Continue Eliquis          CKD (chronic kidney disease) stage 3, GFR 30-59 ml/min   Assessment & Plan     Cr 1 71  Baseline appears around 1 5  Gently hydrate          Chronic diastolic CHF (congestive heart failure) ([facility])   Assessment & Plan     Compensated    She does have chronic leg edema that felt stable          Hypertension   Assessment & Plan     Continue home med          Facial rash   Assessment & Plan     Followed by her dermatologist  Uses topical cream the name of which she can't recall                   VTE Prophylaxis: Apixaban (Eliquis)  / sd sequential compression device   Code Status: Full code  Anticipated Length of Stay:  Patient will be admitted on an Observation basis with an anticipated length of stay of  < 2 midnights     Justification for Hospital Stay: Above      Admission Orders:  Scheduled Meds:   EMS replenish medication  Does not apply Once   acetaminophen 488 mg Oral Q12H   amLODIPine 5 mg Oral Daily   apixaban 2 5 mg Oral BID   bimatoprost 1 drop Both Eyes HS   brimonidine 1 drop Both Eyes BID   calcitriol 0 25 mcg Oral Daily   cholecalciferol 1,000 Units Oral Daily   eplerenone 25 mg Oral Daily   fluticasone 1 spray Nasal Daily   gabapentin 100 mg Oral BID   gabapentin 300 mg Oral HS   latanoprost 1 drop Both Eyes HS   levothyroxine 88 mcg Oral Early Morning   loratadine 10 mg Oral Daily   melatonin 6 mg Oral HS   metoprolol tartrate 50 mg Oral BID   OXcarbazepine 150 mg Oral HS   pantoprazole 40 mg Intravenous Q24H JULIETTE   rOPINIRole 1 mg Oral Q8H [address]   schraggers 1 application Topical Once   tamsulosin 0 4 mg Oral Daily With Dinner   torsemide 20 mg Oral BID (diuretic)     Continuous Infusions:    PRN Meds: acetaminophen    diphenhydrAMINE    HYDROmorphone    ondansetron    traMADol    Clear liquid diet  Up with assistance  H&H q8h  Contact isolation r/o c diff  Consult PT  Consult Wd Care:  Intractable diarrhea    ======================================================================    Continued Stay Review    Date: 01/23/2018    Vital Signs: /62   Pulse 60   Temp 97 8 °F (36 6 °C) (Oral)   Resp 20   Ht 5' 4" (1 626 m)   Wt 78 3 kg (172 lb 9 9 oz)   LMP  (LMP Unknown)   SpO2 96%   BMI 29 63 kg/m²     Medications:   Scheduled Meds:   EMS replenish medication  Does not apply Once   acetaminophen 488 mg Oral Q12H   amLODIPine 5 mg Oral Daily   apixaban 2 5 mg Oral BID   bimatoprost 1 drop Both Eyes HS   brimonidine 1 drop Both Eyes BID   calcitriol 0 25 mcg Oral Daily   cholecalciferol 1,000 Units Oral Daily   eplerenone 25 mg Oral Daily   fluticasone 1 spray Nasal Daily   gabapentin 100 mg Oral BID   gabapentin 300 mg Oral HS   latanoprost 1 drop Both Eyes HS   levothyroxine 88 mcg Oral Early Morning   loratadine 10 mg Oral Daily   melatonin 6 mg Oral HS   metoprolol tartrate 50 mg Oral BID   OXcarbazepine 150 mg Oral HS   pantoprazole 40 mg Intravenous Q24H JULIETTE   rOPINIRole 1 mg Oral Q8H [address]   schraggers 1 application Topical Once   tamsulosin 0 4 mg Oral Daily With Dinner   torsemide 20 mg Oral BID (diuretic)     Continuous Infusions:    PRN Meds: acetaminophen    diphenhydrAMINE    HYDROmorphone    ondansetron    traMADol    Abnormal Labs/Diagnostic Results:     Age/Sex: 80 y o  female     Assessment/Plan: 1110, 1316  * Abdominal pain, vomiting, and diarrhea   Assessment & Plan     · Unclear etiology  · CT abdomen with no acute pathology  Normal lactate level on admission  · C  Diff negative  · Enteric stool panel pending  · Continue Supportive care and clear liquid diet for now - advance as tolerating             Anemia   Assessment & Plan     · Baseline hemoglobin appears to be in the 10s  Hemoglobin on admission was 11 1  This morning's hemoglobin dropped to 9 4  Unclear etiology  Consider dilutional as the patient received 2 5 L of fluids in the emergency department  We will monitor H&H Q8hr and check FOBT  Consider GI consult          Atrial fibrillation (HCC)   Assessment & Plan     · Rate controlled  Continue Eliquis  · Known to Dr Marc Molina          Chronic diastolic CHF (congestive heart failure) ([facility])   Assessment & Plan     · Monitor fluid status closely given that she was given 2 5L fluids in ED  · Continue torsemide 20mg BID          CKD (chronic kidney disease) stage 3, GFR 30-59 ml/min   Assessment & Plan     · Baseline creatinine appears around 1 5   She is known to Dr Jaci Jones  · Creatinine was 1 71 on admission, and improved to 1 39 today status post 2 5 L fluids  · BMP in AM          Hemiparesis affecting left side as late effect of stroke ([facility] [address] )   Assessment & Plan     · Noted on prior admissions          Hypertension   Assessment & Plan     · Continue home meds  · Follow up with nephrology          Restless leg   Assessment & Plan     · Continue home meds          Facial rash   Assessment & Plan     · Followed by her dermatologist  Uses topical cream the name of which she can't recall          Moderate mitral regurgitation   Assessment & Plan     · Follow up with primary cardiology, Dr Rob Bridges             VTE Pharmacologic Prophylaxis:   Pharmacologic: Apixaban (Eliquis)  Mechanical VTE Prophylaxis in Place:  Yes       Current Length of Stay: 0 day(s)     Current Patient Status: Observation   Certification Statement: The patient, admitted on an observation basis, will now require > 2 midnight hospital stay due to need to monitor hemoglobin, stol culture pending, needs to tolerate diet     Discharge Plan: Pending clinical course

## 2018-01-23 NOTE — ASSESSMENT & PLAN NOTE
· Unclear etiology  · CT abdomen with no acute pathology  Normal lactate level on admission  · C   Diff negative  · Enteric stool panel pending  · Continue Supportive care and clear liquid diet for now - advance as tolerating

## 2018-01-23 NOTE — ASSESSMENT & PLAN NOTE
· Baseline creatinine appears around 1 5   She is known to Dr Rivera Earing  · Creatinine was 1 71 on admission, and improved to 1 39 today status post 2 5 L fluids  · BMP in AM

## 2018-01-23 NOTE — ASSESSMENT & PLAN NOTE
· Baseline hemoglobin appears to be in the 10s  Hemoglobin on admission was 11 1  This morning's hemoglobin dropped to 9 4  Unclear etiology  Consider dilutional as the patient received 2 5 L of fluids in the emergency department  We will monitor H&H Q8hr and check FOBT   Consider GI consult

## 2018-01-23 NOTE — ASSESSMENT & PLAN NOTE
· Monitor fluid status closely given that she was given 2 5L fluids in ED  · Continue torsemide 20mg BID

## 2018-01-23 NOTE — MISCELLANEOUS
Message   Recorded as Task   Date: 01/03/2018 11:17 AM, Created By: Erick Barrera   Task Name: Follow Up   Assigned To: Regina Nguyễn   Regarding Patient: More Pires, Status: In Progress   Remyehsan Latham - 03 Jan 2018 11:17 AM     TASK CREATED  1  Please have them send in recent weights  2  She needs repeat CBC/iron studies/stool for occult blood x3  Make sure she has no overt GI bleeding symptoms such as blood in the stool or dark black tarry stool  3  I would do repeat basic metabolic profile along with a CBC and iron studies  4  Please review current diuretic regimen   Jennifer Pan - 03 Jan 2018 3:14 PM     TASK IN PROGRESS   Spoke with Shayy from [facility], all labs have ly ordered and faxed  Pt denies any overt GI sx such as bloody stool/ dark tary stool  Current diuretic regimen consist of Torsemide 20 mg bid  [facility] 1/5/2018      Active Problems    1  Abnormal heart sounds (785 3) (R01 2)   2  Acute kidney injury (584 9) (N17 9)   3  Anemia associated with stage 4 chronic renal failure (285 21,585 4) (N18 4,D63 1)   4  Aortic stenosis (424 1) (I35 0)   5  Asthma (493 90) (J45 909)   6  Atherosclerosis of artery of extremity with ulceration (440 23) (I70 299,L97 909)   7  Atrial fibrillation (427 31) (I48 91)   8  Benign hypertension with chronic kidney disease, stage III (403 10,585 3) (I12 9,N18 3)   9  Carotid artery stenosis, asymptomatic (433 10) (I65 29)   10  Chest pain (786 50) (R07 9)   11  Chronic diastolic congestive heart failure (428 32,428 0) (I50 32)   12  Chronic kidney disease, stage 3 (585 3) (N18 3)   13  Chronic toe ulcer (707 15) (L97 509)   14  Constipation (564 00) (K59 00)   15  CRD (chronic renal disease) (585 9) (N18 9)   16  Diastolic murmur (060 8) (F57)   17  Dizziness (780 4) (R42)   18  Dysuria (788 1) (R30 0)   19  Edema (782 3) (R60 9)   20  Elevated serum creatinine (790 99) (R79 89)   21  Hospital discharge follow-up (V65 70) (Y30)   22  Hospital discharge follow-up (V67 59) (Z09)   23  Hyperlipidemia (272 4) (E78 5)   24  Hypertension (401 9) (I10)   25  Hypothyroidism (244 9) (E03 9)   26  Left renal artery stenosis (440 1) (I70 1)   27  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)   28  Moderate mitral regurgitation (424 0) (I34 0)   29  Other chronic pain (338 29) (G89 29)   30  Pulmonary artery hypertension (416 8) (I27 21)   31  Restless leg syndrome (333 94) (G25 81)   32  Shortness of breath (786 05) (R06 02)    Current Meds   1  Acetaminophen 500 MG Oral Tablet; TAKE 1 TABLET EVERY 4 DAILY ; Therapy: 45GXW1557 to Recorded   2  AmLODIPine Besylate 5 MG Oral Tablet; TAKE 1 TABLET ORALLY AT BEDTIME (   BLOOD PRESSURE ) **WATCH FOR SWELLING*;   Therapy: 19XFX7547 to (Evaluate:65Hox8814)  Requested for: 04TEB8277; Last   Rx:84Spy2874 Ordered   3  Antacid 200-200-20 MG/5ML Oral Suspension; SWALLOW 30 ML Every 6 hours AS   NEEDED FOR GI UPSET; Therapy: 66PRB7845 to Recorded   4  Artificial Tears 1 4 % Ophthalmic Solution; INSTILL 1-2 DROPS INTO EACH EYES   TWICE DAILY; Therapy: (Recorded:31Oct2014) to Recorded   5  Ascriptin 325 MG Oral Tablet; TAKE 1 TABLET Every 4 hours or PRN; Therapy: 09DTC8622 to Recorded   6  Bisac-Evac 10 MG Rectal Suppository; Therapy: (Recorded:02Mar2016) to Recorded   7  Calcitriol 0 25 MCG Oral Capsule; TAKE 1 CAPSULE ORALLY DAILY (VITAMIN   DEFICIENCY); Therapy: 74TOZ4664 to (26 146242)  Requested for: 77FRJ5795; Last   Rx:39Thi7428 Ordered   8  Co Q-10 200 MG Oral Capsule; TAKE 1 CAPSULE ORALLY DAILY (VITAMIN   DEFICIENCY); Therapy: 32Ply4575 to (Evaluate:83Pvo2399)  Requested for: 93Zqk2489; Last   Rx:07Sep2017 Ordered   9  Docusate Sodium 100 MG Oral Tablet; TAKE 1 TABLET TWICE DAILY AS DIRECTED    (COLACE); Therapy: (Recorded:13Mar2014) to Recorded   10  Eliquis 2 5 MG Oral Tablet; Take 1 tablet twice daily;     Therapy: 20LET2335 to (Evaluate:09Nlu1689)  Requested for: 34IHD6081 Recorded   11  Fluticasone Propionate 50 MCG/ACT Nasal Suspension; USE 1 SPRAY IN EACH    NOSTRIL ONCE DAILY; Therapy: 21EXZ7712 to Recorded   12  Gabapentin 100 MG Oral Capsule; TAKE 1 CAPSULE 3 times daily; Therapy: 98UFO7417 to Recorded   13  Gabapentin 100 MG Oral Capsule; Take 1 capsule in the morning and afternoon; Therapy: 88PQB8505 to (Last Rx:69Ghb3070) Ordered   14  Gabapentin 300 MG Oral Capsule; TAKE 1 CAPSULE AT BEDTIME; Therapy: 88FWZ9263 to (Evaluate:13Wap1813); Last Rx:62Kpb8016 Ordered   15  Inspra 25 MG Oral Tablet (Eplerenone); Take 1 tablet daily; Therapy: 88CHK6609 to Recorded   16  Latanoprost 0 005 % Ophthalmic Solution; Therapy: (Recorded:72Rtb8259) to Recorded   17  Laxative 10 MG Rectal Suppository; every 4th day or PRN for constipation MDD:1;    Therapy: 99RCN9632 to Recorded   18  Levothyroxine Sodium 75 MCG Oral Tablet; TAKE 1 TABLET DAILY; Therapy: (Recorded:83Bxc8724) to Recorded   19  Loratadine 10 MG Oral Tablet; TAKE 1 TABLET DAILY; Therapy: 95CEH6402 to Recorded   20  Lumigan 0 01 % Ophthalmic Solution; Therapy: (Recorded:32Sbz2337) to Recorded   21  Melatonin 3 MG Oral Capsule; Therapy: (Recorded:52Ikg2243) to Recorded   22  Metoprolol Tartrate 50 MG Oral Tablet; Take 1 tablet twice daily; Therapy: (Recorded:44Cti2242) to Recorded   23  OXcarbazepine 150 MG Oral Tablet; TAKE 1 TABLET Bedtime; Therapy: (Recorded:91Xwj7007) to Recorded   24  Polyethylene Glycol 3350 Oral Powder; MIX GM  PRN; Therapy: 71TRQ5080 to Recorded   25  ProAir  (90 Base) MCG/ACT Inhalation Aerosol Solution; 2 puffs every 6 hours as    needed; Therapy: 50VYP1527 to Recorded   26  Protonix 40 MG Oral Tablet Delayed Release (Pantoprazole Sodium); Take 1 tablet twice    daily; Therapy: (Recorded:57Doz4042) to Recorded   27  Red Yeast Rice Extract 600 MG Oral Capsule; TAKE 1 CAPSULE ORALLY TWICE DAILY    (VITAMIN DEFICIENCY);     Therapy: 22ZDJ5795 to (Evaluate:49Fqj8275)  Requested for: 47Pan4221; Last    TE:92OSZ5844 Ordered   28  ROPINIRole HCl - 1 MG Oral Tablet; TAKE 1 TABLET Every 8 hours; Last Rx:49Mkz5844    Ordered   29  Tamsulosin HCl - 0 4 MG Oral Capsule; take 1 capsule daily; Therapy: 42YTO4431 to Recorded   30  Torsemide 20 MG Oral Tablet; TAKE 1 TABLET ORALLY TWICE DAILY (CONGESTIVE    HEART FAILURE); Therapy: 66XCJ3227 to (Raheelrosette Robinson)  Requested for: 49Fjb2410; Last    Rx:38Jvz3320 Ordered   31  TraMADol HCl - 50 MG Oral Tablet; TAKE 1 TABLET Every 6 hours PRN; Therapy: 30TKY0690 to Recorded   32  Ventolin  (90 Base) MCG/ACT Inhalation Aerosol Solution; INHALE 1 TO 2    PUFFS EVERY 6 HOURS AS NEEDED; Therapy: 87ENR9039 to Recorded   33  Vitamin D3 1000 UNIT Oral Tablet; Therapy: (Recorded:95Awh7906) to Recorded    Allergies    1  Lipitor TABS   2  Morphine Derivatives   3  Percocet TABS   4  Spironolactone TABS   5  Statins   6  Sulfa Drugs  Denied    7  Acetaminophen TABS    Plan  Acute kidney injury, Anemia associated with stage 4 chronic renal failure    · (Q) IRON, TIBC AND FERRITIN PANEL; Status:Active - Retrospective By Protocol  Authorization; Requested MVP:[id number]; Acute kidney injury, Anemia associated with stage 4 chronic renal failure, Aortic stenosis    · (1) CBC/ PLT (NO DIFF); Status:Active - Retrospective By Protocol Authorization; Requested FOREST:[id number]; Acute kidney injury, Chronic kidney disease, stage 3, Edema, Elevated serum creatinine    · (1) BASIC METABOLIC PROFILE; Status:Active - Retrospective By Protocol  Authorization; Requested QSI:[id number]; Anemia associated with stage 4 chronic renal failure, Constipation    · (1) OCCULT BLOOD, FECAL IMMUNOCHEMICAL TEST; Status:Active - Retrospective By  Protocol Authorization;  Requested SGQ:[id number];     Signatures   Electronically signed by : ALLI Le ; Jan 5 2018 12:10PM EST

## 2018-01-23 NOTE — MISCELLANEOUS
Message   Recorded as Task   Date: 12/07/2017 12:13 PM, Created By: Hina Brown   Task Name: Follow Up   Assigned To: Milli Arndt   Regarding Patient: Thao Polk, Status: Active   CommentPlacido Artur - 07 Dec 2017 12:13 PM     TASK CREATED  Add spironolactone 25 mg once a day for borderline low potassium  Repeat a basic metabolic profile/magnesium 1-2 weeks after making the above medication change  Wait 1-2 weeks and have the nursing home facility take 1 week a blood pressure readings morning and evening  I believe M[facility] Millard will be seeing the patient in a month she can bring the readings and at that time  Gregg Felipe - 07 Dec 2017 2:46 PM     TASK REPLIED TO: Previously Assigned To Milli Arndt  She has  had problems with Spironolactone in the past   It is listed as an allergy  [address] Dec 2017 5:53 PM     TASK REPLIED TO: Previously Assigned To Hina Brown  See if the patient can get Inspira 25 mg a day given her difficulty with spironolactone this should be acceptable through the pharmacy   talked with nursing home  They will start Inspra 25 mg  OD  Pt has labs scheduled for January 1st/lr      Active Problems    1  Abnormal heart sounds (785 3) (R01 2)   2  Acute kidney injury (584 9) (N17 9)   3  Anemia associated with stage 4 chronic renal failure (285 21,585 4) (N18 4,D63 1)   4  Aortic stenosis (424 1) (I35 0)   5  Asthma (493 90) (J45 909)   6  Atherosclerosis of artery of extremity with ulceration (440 23) (I70 299,L97 909)   7  Atrial fibrillation (427 31) (I48 91)   8  Benign hypertension with chronic kidney disease, stage III (403 10,585 3) (I12 9,N18 3)   9  Carotid artery stenosis, asymptomatic (433 10) (I65 29)   10  Chest pain (786 50) (R07 9)   11  Chronic diastolic congestive heart failure (428 32,428 0) (I50 32)   12  Chronic kidney disease, stage 3 (585 3) (N18 3)   13  Chronic toe ulcer (707 15) (L97 509)   14  Constipation (564 00) (K59 00)   15   CRD (chronic renal disease) (585 9) (N18 9)   16  Diastolic murmur (548 6) (L24)   17  Dizziness (780 4) (R42)   18  Dysuria (788 1) (R30 0)   19  Edema (782 3) (R60 9)   20  Elevated serum creatinine (790 99) (R79 89)   21  Hospital discharge follow-up (V67 59) (Z09)   22  Hospital discharge follow-up (V67 59) (Z09)   23  Hyperlipidemia (272 4) (E78 5)   24  Hypertension (401 9) (I10)   25  Hypothyroidism (244 9) (E03 9)   26  Left renal artery stenosis (440 1) (I70 1)   27  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)   28  Moderate mitral regurgitation (424 0) (I34 0)   29  Other chronic pain (338 29) (G89 29)   30  Pulmonary artery hypertension (416 8) (I27 21)   31  Restless leg syndrome (333 94) (G25 81)   32  Shortness of breath (786 05) (R06 02)    Current Meds   1  Acetaminophen 500 MG Oral Tablet; TAKE 1 TABLET EVERY 4 DAILY ; Therapy: 15WIY5071 to Recorded   2  AmLODIPine Besylate 5 MG Oral Tablet; Take 1 tablet daily; Therapy: 10QUF2259 to (Evaluate:05Jun2018) Recorded   3  Antacid 200-200-20 MG/5ML Oral Suspension; SWALLOW 30 ML Every 6 hours AS   NEEDED FOR GI UPSET; Therapy: 29LIK3687 to Recorded   4  Artificial Tears 1 4 % Ophthalmic Solution; INSTILL 1-2 DROPS INTO EACH EYES   TWICE DAILY; Therapy: (Recorded:31Oct2014) to Recorded   5  Ascriptin 325 MG Oral Tablet; TAKE 1 TABLET Every 4 hours or PRN; Therapy: 17IWK1791 to Recorded   6  Bisac-Evac 10 MG Rectal Suppository; Therapy: (Recorded:02Mar2016) to Recorded   7  Calcitriol 0 25 MCG Oral Capsule; TAKE 1 CAPSULE ORALLY DAILY (VITAMIN   DEFICIENCY); Therapy: 76YTQ6414 to ([place])  Requested for: 41VAI2017; Last   Rx:43Yfm8709 Ordered   8  Co Q-10 200 MG Oral Capsule; TAKE 1 CAPSULE ORALLY DAILY (VITAMIN   DEFICIENCY); Therapy: 49Gpe6367 to (Evaluate:99Mbc3016)  Requested for: 80Vva2571; Last   Rx:80Qhu0669 Ordered   9  Docusate Sodium 100 MG Oral Tablet; TAKE 1 TABLET TWICE DAILY AS DIRECTED    (COLACE);    Therapy: (Recorded:13Mar2014) to Recorded   10  Eliquis 2 5 MG Oral Tablet; Take 1 tablet twice daily; Therapy: 90DUO6419 to (Evaluate:15Oct2018)  Requested for: 81QYP4601 Recorded   11  Fluticasone Propionate 50 MCG/ACT Nasal Suspension; USE 1 SPRAY IN EACH    NOSTRIL ONCE DAILY; Therapy: 69VDP8697 to Recorded   12  Gabapentin 100 MG Oral Capsule; TAKE 1 CAPSULE 3 times daily; Therapy: 92GKE6670 to Recorded   13  Gabapentin 100 MG Oral Capsule; Take 1 capsule in the morning and afternoon; Therapy: 52CPP0069 to (Last Rx:63Byn4080) Ordered   14  Gabapentin 300 MG Oral Capsule; TAKE 1 CAPSULE AT BEDTIME; Therapy: 71QJM0407 to (Evaluate:06Jan2018); Last Rx:85Sco9608 Ordered   15  Latanoprost 0 005 % Ophthalmic Solution; Therapy: (Recorded:01Lab6486) to Recorded   16  Laxative 10 MG Rectal Suppository; every 4th day or PRN for constipation MDD:1;    Therapy: 70JMS7873 to Recorded   17  Levothyroxine Sodium 75 MCG Oral Tablet; TAKE 1 TABLET DAILY; Therapy: (Recorded:54Hax3567) to Recorded   18  Loratadine 10 MG Oral Tablet; TAKE 1 TABLET DAILY; Therapy: 76QLK4166 to Recorded   19  Lumigan 0 01 % Ophthalmic Solution; Therapy: (Recorded:02Mcg5330) to Recorded   20  Melatonin 3 MG Oral Capsule; Therapy: (Recorded:00Qfn0864) to Recorded   21  Metoprolol Tartrate 50 MG Oral Tablet; Take 1 tablet twice daily; Therapy: (Recorded:55Qsp4178) to Recorded   22  OXcarbazepine 150 MG Oral Tablet; TAKE 1 TABLET Bedtime; Therapy: (Recorded:39Tio8665) to Recorded   23  Polyethylene Glycol 3350 Oral Powder; MIX GM  PRN; Therapy: 43UQR2412 to Recorded   24  ProAir  (90 Base) MCG/ACT Inhalation Aerosol Solution; 2 puffs every 6 hours as    needed; Therapy: 95KZV3283 to Recorded   25  Protonix 40 MG Oral Tablet Delayed Release (Pantoprazole Sodium); Take 1 tablet twice    daily; Therapy: (Recorded:16Uwp8581) to Recorded   26   Red Yeast Rice Extract 600 MG Oral Capsule; TAKE 1 CAPSULE ORALLY TWICE DAILY    (VITAMIN DEFICIENCY); Therapy: 86QCF8773 to (Evaluate:41Qjv9844)  Requested for: 28Gtv6795; Last    Rx:41Pxy1620 Ordered   27  ROPINIRole HCl - 1 MG Oral Tablet; TAKE 1 TABLET Every 8 hours; Last Rx:57Zhp6451    Ordered   28  Tamsulosin HCl - 0 4 MG Oral Capsule; take 1 capsule daily; Therapy: 79YBZ7169 to Recorded   29  Torsemide 20 MG Oral Tablet; Take 1 tablet twice daily; Therapy: 64VNO1419 to Recorded   30  TraMADol HCl - 50 MG Oral Tablet; TAKE 1 TABLET Every 6 hours PRN; Therapy: 03RMU2200 to Recorded   31  Ventolin  (90 Base) MCG/ACT Inhalation Aerosol Solution; INHALE 1 TO 2    PUFFS EVERY 6 HOURS AS NEEDED; Therapy: 10PFE5999 to Recorded   32  Vitamin D3 1000 UNIT Oral Tablet; Therapy: (Recorded:38Wim0005) to Recorded    Allergies    1  Lipitor TABS   2  Morphine Derivatives   3  Percocet TABS   4  Spironolactone TABS   5  Statins   6  Sulfa Drugs  Denied    7   Acetaminophen TABS    Signatures   Electronically signed by : Conor Tang, ; Dec 13 2017 11:47AM EST                       (Author)

## 2018-01-23 NOTE — CONSULTS
Consultation - [address] 80 y o  female MRN: [id number]  [facility] Encounter: [id number]    Assessment/Plan   Assessment:  History of Present Illness   HPI:  Camron Meza is a 80 y o  female who presents presented with complaints of vomiting, diarrhea and abdominal pain with multiple episodes or loose stool  Wound care received consult for questionable pressure injury to sacrum, perineum  However on assessment noted perineum and perirectal are with significant denudation of skin  Secondary to chemical burn from fecal incontinence  Patient with fair mobility in bed, she was ambulatory at home however still having episodes of diarrhea  Shragger's paste is already ordered, primary RN made aware we will add stoma powder to treatment plan  Heels as well as buttocks noted, pink, intact with issues  Plan:  1-Clean sacro-buttock area with soap and water, dust denuded area with stoma powder, apply schraggers paste TID and PRN  2-Turn/reposition q2h for pressure re-distribution on skin  3-Elevate heels to offload pressure  4-Moisturize skin daily with skin nourishing  5-Soft care cushion when out of bed      Consults    Review of Systems    Historical Information   Past Medical History:   Diagnosis Date    [facility]     Anemia     Anemia     Anxiety     Asthma     CAD (coronary artery disease)     [facility]est[facility]) [facility]     CHF (congestive heart failure) ([facility])     CKD (chronic kidney disease) stage 3, GFR 30-59 ml/min     ckd3    Constipation     CVA [facility]cerebral vascular accident) [facility] )     left hemiparesis    Diastolic CHF, chronic (HCC)     Disease of thyroid gland     Dry eye     First degree AV block     Gastritis     Gastritis     GERD (gastroesophageal reflux disease)     Glaucoma     Glaucoma     Hemiparesis (HCC)     left side    Hemiparesis affecting left side as late effect of stroke ([facility])     Hyperlipidemia     Hypertension     Impetigo  Insomnia     Knee pain     Neuropathy     Osteoarthritis     Parkinson disease (HCC)     Pulmonary HTN     PVD (peripheral vascular disease) (HCC)     Restless leg     Vitamin D deficiency      Past Surgical History:   Procedure Laterality Date    APPENDECTOMY      BLADDER SURGERY      CAROTID ENDARTERECTOMY Left     ESOPHAGOGASTRODUODENOSCOPY N/A 4/8/2016    Procedure: ESOPHAGOGASTRODUODENOSCOPY (EGD); Surgeon: Kvng Vaz MD;  Location: [facility]; Service:     RENAL ARTERY STENT       Social History   History   Alcohol Use No     History   Drug Use No     History   Smoking Status    Never Smoker   Smokeless Tobacco    Never Used       Meds/Allergies   Allergies   Allergen Reactions    Lipitor [Atorvastatin]     Morphine     Percocet [Oxycodone-Acetaminophen] GI Intolerance    Spironolactone     Sulfa Antibiotics        Objective   Vitals: Blood pressure 120/79, pulse 60, temperature 97 9 °F (36 6 °C), temperature source Oral, resp  rate 20, height 5' 4" (1 626 m), weight 78 3 kg (172 lb 9 9 oz), SpO2 100 %  Wounds:   Wound 01/23/18 Other (Comment) Perineum Mid      Mid perineum/perirectal area with IAD (inc  assoc  dermatitis) (Active)   Wound Description Beefy red 1/23/2018 11:22 AM   Era-wound Assessment Maceration 1/23/2018 11:22 AM   Wound Length (cm) 7 cm 1/23/2018 11:22 AM   Wound Width (cm) 3 cm 1/23/2018 11:22 AM   Wound Depth (cm) 0 1 1/23/2018 11:22 AM   Calculated Wound Volume (cm^3) 2 1 cm^3 1/23/2018 11:22 AM   Non-staged Wound Description Partial thickness 1/23/2018 11:22 AM   Treatments Cleansed;Site care 1/23/2018 11:22 AM   Patient Tolerance Tolerated well 1/23/2018 11:22 AM         Physical Exam      Code Status: Level 1 - Full Code    Counseling / Coordination of Care  Total floor / unit time spent today 30 minutes  Greater than 50% of total time was spent with the patient assessing skin, provided incontinence care and completing skin treatment   Wound care to continue following weekly, please call ext [phone] with questions or concerns, our recommendations are placed as order        Ismael Young RN, BSN, [employer]

## 2018-01-23 NOTE — SOCIAL WORK
CM spoke with pt's dtr Mariela Vázquez ([phone]) to complete CM assessment  Pt lives at [facility]  Pt's dtr reports pt is alert and oriented at baseline  Pt is an assist x1 for bathing/dressing  Staff administer meds  Pt uses melania-walker and w/c  Pt does not have hx HHC  Pt does not have hx MH/D&A tx      CM discussed with pt's dtr in anticipation of pt needing rehab upon d/c, pt's dtr would want referral to  SNF for rehab if needed

## 2018-01-24 VITALS — DIASTOLIC BLOOD PRESSURE: 68 MMHG | SYSTOLIC BLOOD PRESSURE: 142 MMHG

## 2018-01-24 VITALS — SYSTOLIC BLOOD PRESSURE: 144 MMHG | HEART RATE: 64 BPM | DIASTOLIC BLOOD PRESSURE: 66 MMHG

## 2018-01-24 PROBLEM — E87.6 HYPOKALEMIA: Status: ACTIVE | Noted: 2018-01-24

## 2018-01-24 LAB
ANION GAP SERPL CALCULATED.3IONS-SCNC: 8 MMOL/L (ref 4–13)
BUN SERPL-MCNC: 23 MG/DL (ref 5–25)
CALCIUM SERPL-MCNC: 8.5 MG/DL (ref 8.3–10.1)
CHLORIDE SERPL-SCNC: 107 MMOL/L (ref 100–108)
CO2 SERPL-SCNC: 25 MMOL/L (ref 21–32)
CREAT SERPL-MCNC: 1.18 MG/DL (ref 0.6–1.3)
ERYTHROCYTE [DISTWIDTH] IN BLOOD BY AUTOMATED COUNT: 14.9 % (ref 11.6–15.1)
GFR SERPL CREATININE-BSD FRML MDRD: 42 ML/MIN/1.73SQ M
GLUCOSE SERPL-MCNC: 89 MG/DL (ref 65–140)
HCT VFR BLD AUTO: 30.7 % (ref 34.8–46.1)
HCT VFR BLD AUTO: 31.3 % (ref 34.8–46.1)
HCT VFR BLD AUTO: 34.1 % (ref 34.8–46.1)
HEMOCCULT STL QL: NEGATIVE
HGB BLD-MCNC: 10.2 G/DL (ref 11.5–15.4)
HGB BLD-MCNC: 10.2 G/DL (ref 11.5–15.4)
HGB BLD-MCNC: 11.4 G/DL (ref 11.5–15.4)
MCH RBC QN AUTO: 29.1 PG (ref 26.8–34.3)
MCHC RBC AUTO-ENTMCNC: 33.2 G/DL (ref 31.4–37.4)
MCV RBC AUTO: 88 FL (ref 82–98)
PLATELET # BLD AUTO: 233 THOUSANDS/UL (ref 149–390)
PMV BLD AUTO: 10.6 FL (ref 8.9–12.7)
POTASSIUM SERPL-SCNC: 3.3 MMOL/L (ref 3.5–5.3)
RBC # BLD AUTO: 3.51 MILLION/UL (ref 3.81–5.12)
SODIUM SERPL-SCNC: 140 MMOL/L (ref 136–145)
WBC # BLD AUTO: 5.74 THOUSAND/UL (ref 4.31–10.16)

## 2018-01-24 PROCEDURE — 85018 HEMOGLOBIN: CPT | Performed by: PHYSICIAN ASSISTANT

## 2018-01-24 PROCEDURE — 99232 SBSQ HOSP IP/OBS MODERATE 35: CPT | Performed by: INTERNAL MEDICINE

## 2018-01-24 PROCEDURE — 85027 COMPLETE CBC AUTOMATED: CPT | Performed by: PHYSICIAN ASSISTANT

## 2018-01-24 PROCEDURE — 85014 HEMATOCRIT: CPT | Performed by: PHYSICIAN ASSISTANT

## 2018-01-24 PROCEDURE — 82272 OCCULT BLD FECES 1-3 TESTS: CPT | Performed by: PHYSICIAN ASSISTANT

## 2018-01-24 PROCEDURE — 80048 BASIC METABOLIC PNL TOTAL CA: CPT | Performed by: PHYSICIAN ASSISTANT

## 2018-01-24 PROCEDURE — C9113 INJ PANTOPRAZOLE SODIUM, VIA: HCPCS | Performed by: INTERNAL MEDICINE

## 2018-01-24 RX ADMIN — OXCARBAZEPINE 150 MG: 150 TABLET ORAL at 21:20

## 2018-01-24 RX ADMIN — TRAMADOL HYDROCHLORIDE 50 MG: 50 TABLET, FILM COATED ORAL at 16:34

## 2018-01-24 RX ADMIN — ROPINIROLE 1 MG: 1 TABLET, FILM COATED ORAL at 05:35

## 2018-01-24 RX ADMIN — LEVOTHYROXINE SODIUM 88 MCG: 88 TABLET ORAL at 05:35

## 2018-01-24 RX ADMIN — APIXABAN 2.5 MG: 2.5 TABLET, FILM COATED ORAL at 17:31

## 2018-01-24 RX ADMIN — TORSEMIDE 20 MG: 20 TABLET ORAL at 16:34

## 2018-01-24 RX ADMIN — BRIMONIDINE TARTRATE 1 DROP: 1.5 SOLUTION OPHTHALMIC at 10:46

## 2018-01-24 RX ADMIN — FLUTICASONE PROPIONATE 1 SPRAY: 50 SPRAY, METERED NASAL at 10:46

## 2018-01-24 RX ADMIN — TRAMADOL HYDROCHLORIDE 50 MG: 50 TABLET, FILM COATED ORAL at 05:35

## 2018-01-24 RX ADMIN — LATANOPROST 1 DROP: 50 SOLUTION OPHTHALMIC at 21:20

## 2018-01-24 RX ADMIN — METOPROLOL TARTRATE 50 MG: 50 TABLET ORAL at 17:31

## 2018-01-24 RX ADMIN — LORATADINE 10 MG: 10 TABLET ORAL at 10:45

## 2018-01-24 RX ADMIN — AMLODIPINE BESYLATE 5 MG: 5 TABLET ORAL at 10:45

## 2018-01-24 RX ADMIN — ACETAMINOPHEN 488 MG: 325 TABLET, FILM COATED ORAL at 16:33

## 2018-01-24 RX ADMIN — VITAMIN D, TAB 1000IU (100/BT) 1000 UNITS: 25 TAB at 10:45

## 2018-01-24 RX ADMIN — CALCITRIOL 0.25 MCG: 0.25 CAPSULE, LIQUID FILLED ORAL at 10:45

## 2018-01-24 RX ADMIN — GABAPENTIN 300 MG: 300 CAPSULE ORAL at 21:20

## 2018-01-24 RX ADMIN — APIXABAN 2.5 MG: 2.5 TABLET, FILM COATED ORAL at 10:45

## 2018-01-24 RX ADMIN — HYDROMORPHONE HYDROCHLORIDE 0.2 MG: 1 INJECTION, SOLUTION INTRAMUSCULAR; INTRAVENOUS; SUBCUTANEOUS at 04:12

## 2018-01-24 RX ADMIN — ACETAMINOPHEN 488 MG: 325 TABLET, FILM COATED ORAL at 05:35

## 2018-01-24 RX ADMIN — BRIMONIDINE TARTRATE 1 DROP: 1.5 SOLUTION OPHTHALMIC at 17:31

## 2018-01-24 RX ADMIN — TAMSULOSIN HYDROCHLORIDE 0.4 MG: 0.4 CAPSULE ORAL at 16:35

## 2018-01-24 RX ADMIN — TORSEMIDE 20 MG: 20 TABLET ORAL at 10:45

## 2018-01-24 RX ADMIN — BIMATOPROST 1 DROP: 0.1 SOLUTION/ DROPS OPHTHALMIC at 21:20

## 2018-01-24 RX ADMIN — METOPROLOL TARTRATE 50 MG: 50 TABLET ORAL at 10:45

## 2018-01-24 RX ADMIN — GABAPENTIN 100 MG: 100 CAPSULE ORAL at 17:31

## 2018-01-24 RX ADMIN — MELATONIN TAB 3 MG 6 MG: 3 TAB at 21:19

## 2018-01-24 RX ADMIN — PANTOPRAZOLE SODIUM 40 MG: 40 INJECTION, POWDER, FOR SOLUTION INTRAVENOUS at 10:46

## 2018-01-24 RX ADMIN — ROPINIROLE 1 MG: 1 TABLET, FILM COATED ORAL at 21:19

## 2018-01-24 RX ADMIN — GABAPENTIN 100 MG: 100 CAPSULE ORAL at 10:45

## 2018-01-24 RX ADMIN — EPLERENONE 25 MG: 25 TABLET, FILM COATED ORAL at 10:45

## 2018-01-24 RX ADMIN — ROPINIROLE 1 MG: 1 TABLET, FILM COATED ORAL at 15:32

## 2018-01-24 RX ADMIN — HYDROMORPHONE HYDROCHLORIDE 0.2 MG: 1 INJECTION, SOLUTION INTRAMUSCULAR; INTRAVENOUS; SUBCUTANEOUS at 10:46

## 2018-01-24 NOTE — ASSESSMENT & PLAN NOTE
· Monitor fluid status closely given that she was given 2 5L fluids in ED  · Continue torsemide 20mg BID  · Had an episode of shortness of breath air earlier today according to the patient's nurse  But oxygen saturations were normal   Thus I instructed the nurse to wean off from the oxygen  Will do further workup and management if shortness of breath recur

## 2018-01-24 NOTE — PHYSICIAN ADVISOR
This patient is a 80 y o  y/o female who is admitted to the hospital for Abdominal Pain (Patient presents to ER with Lower Right Quadrant abdominal pain, nausea, vomiting and a headache )   The patient presented to the ED on 1/22/18 at [phone] and was admitted to the hospital on 1/22/2018 0733  History of Present Illness includes: the patient presented with vomiting, diarrhea, and abdominal pain  The patient stated she had 20 episodes of watery diarrhea  Vital signs in the ER are as follows   ED Triage Vitals   Temperature Pulse Respirations Blood Pressure SpO2   01/22/18 0745 01/22/18 0745 01/22/18 0745 01/22/18 0745 01/22/18 0745   97 5 °F (36 4 °C) 72 18 144/61 98 %      Temp Source Heart Rate Source Patient Position - Orthostatic VS BP Location FiO2 (%)   01/22/18 0745 01/22/18 1022 01/22/18 0745 01/22/18 0745 --   Oral Left;Brachial Lying Left arm       Pain Score       01/22/18 0745       5         On exam the abdomen is mildly distended  There is chronic leg edema  The patient has an erythematous rash on her face  WBC is 11 2 and Hgb is 11 1  Cr is 1 71  The patient is being admitted with intractable vomiting s and diarrhea, abdominal pain, mild PETE on CKD  The plan of care includes stool studies, clear liquid diet, IVF, repeat labs and supportive care  The patient was seen the following hospital day and complained of continued abdominal pain and diarrhea  This patient is appropriate for INPATIENT admission as her length of stay is expected to be at least 2 midnights

## 2018-01-24 NOTE — ASSESSMENT & PLAN NOTE
· Unclear etiology  · CT abdomen with no acute pathology  Normal lactate level on admission  · C  Diff negative  · Enteric stool panel pending  · Continue Supportive care and clear liquid diet for now - advance as tolerating  · Presently, patient is still having these symptoms  Thus I will consult GI

## 2018-01-24 NOTE — RESTORATIVE TECHNICIAN NOTE
Restorative Specialist Mobility Note                      Repositioned: Other (Comment) (Rep /sat pt upright in bed  Bed alarm on   Pt callbell, phone/tray within reach )       Freeman BLAIR, Restorative Technician, [employer]

## 2018-01-24 NOTE — ASSESSMENT & PLAN NOTE
· Baseline creatinine appears around 1 5   She is known to Dr Thor Aase  · Creatinine was 1 71 on admission, and improved to 1 39 status post 2 5 L fluids  · BMP in AM

## 2018-01-24 NOTE — PROGRESS NOTES
Progress Note - Magdalena Jalloh 6/14/1931, 80 y o  female MRN: [id number]    Unit/Bed#: [facility] 468-98 Encounter: [id number]    Primary Care Provider: Francisco Javier Paul MD   Date and time admitted to hospital: 1/22/2018  7:33 AM        Hypokalemia   Assessment & Plan    · Replace patient's potassium  Anemia   Assessment & Plan    · Stable at this point  · No active bleeding  · Likely component of hemodilution from IV fluids  · Likely anemia of chronic kidney disease  Moderate mitral regurgitation   Assessment & Plan    · Follow up with primary cardiology, Dr Neeraj Conroy        Facial rash   Assessment & Plan    · Followed by her dermatologist  Uses topical cream the name of which she can't recall        CKD (chronic kidney disease) stage 3, GFR 30-59 ml/min   Assessment & Plan    · Baseline creatinine appears around 1 5  She is known to Dr Nahomi Collins  · Creatinine was 1 71 on admission, and improved to 1 39 status post 2 5 L fluids  · BMP in AM        Atrial fibrillation ([facility] [address] )   Assessment & Plan    · Rate controlled  Continue Eliquis  · Known to Dr Neeraj Conroy        Hemiparesis affecting left side as late effect of stroke [facility])   Assessment & Plan    · Noted on prior admissions        Restless leg   Assessment & Plan    · Continue home meds        Hypertension   Assessment & Plan    · Continue home meds  · Follow up with nephrology        Chronic diastolic CHF (congest[facility] failure) ([facility] )   Assessment & Plan    · Monitor fluid status closely given that she was given 2 5L fluids in ED  · Continue torsemide 20mg BID  · Had an episode of shortness of breath air earlier today according to the patient's nurse  But oxygen saturations were normal   Thus I instructed the nurse to wean off from the oxygen  Will do further workup and management if shortness of breath recur  Hypothyroidism   Assessment & Plan    Check TSH  Continue Synthroid          * Abdominal pain, vomiting, and diarrhea   Assessment & Plan    · Unclear etiology  · CT abdomen with no acute pathology  Normal lactate level on admission  · C  Diff negative  · Enteric stool panel pending  · Continue Supportive care and clear liquid diet for now - advance as tolerating  · Presently, patient is still having these symptoms  Thus I will consult GI  VTE Pharmacologic Prophylaxis:   Pharmacologic: Apixaban (Eliquis)  Mechanical VTE Prophylaxis in Place: Yes    Patient Centered Rounds: I have performed bedside rounds with nursing staff today  Discussions with Specialists or Other Care Team Provider:  Case management  Education and Discussions with Family / Patient:  Patient  I spoke to the patient's daughter at bedside and discussed with her our findings and plans  Time Spent for Care: 30 minutes  More than 50% of total time spent on counseling and coordination of care as described above  Current Length of Stay: 1 day(s)    Current Patient Status: Inpatient   Certification Statement: The patient will continue to require additional inpatient hospital stay due to Above findings and plans  Discharge Plan:  None yet  Code Status: Level 1 - Full Code      Subjective:   Patient still having diarrhea, nausea and vomiting as well as abdominal pains  Otherwise no other complaints at this point  Patient had an episode of shortness of breath earlier today but this has resolved already  Patient's oxygen saturations have been normal   No other pains  No other complaints  Objective:     Vitals:   Temp (24hrs), Av 9 °F (36 6 °C), Min:97 8 °F (36 6 °C), Max:98 1 °F (36 7 °C)    HR:  [68-69] 68  Resp:  [18-20] 20  BP: (122-145)/(53-67) 136/53  SpO2:  [98 %-100 %] 100 %  Body mass index is 29 63 kg/m²  Input and Output Summary (last 24 hours):        Intake/Output Summary (Last 24 hours) at 18 1732  Last data filed at 18 1706   Gross per 24 hour   Intake              560 ml   Output             2150 ml   Net -1590 ml       Physical Exam:     Physical Exam   Constitutional: She is oriented to person, place, and time  No distress  HENT:   Head: Normocephalic and atraumatic  Eyes: Right eye exhibits no discharge  Left eye exhibits no discharge  No scleral icterus  Neck: No JVD present  No tracheal deviation present  Cardiovascular: Normal rate, regular rhythm and normal heart sounds  Exam reveals no gallop and no friction rub  No murmur heard  Pulmonary/Chest: Effort normal and breath sounds normal  No stridor  No respiratory distress  She has no wheezes  She has no rales  Abdominal: Soft  She exhibits no distension  There is tenderness  There is no rebound and no guarding  Musculoskeletal: She exhibits no edema or tenderness  Neurological: She is alert and oriented to person, place, and time  No cranial nerve deficit  Positive for baseline left-sided weakness  Skin: Skin is warm  She is not diaphoretic  There is erythema  No pallor  Positive for baseline facial erythema/rash  Psychiatric: She has a normal mood and affect  Her behavior is normal  Thought content normal    Vitals reviewed  Additional Data:     Labs:      Results from last 7 days  Lab Units 01/24/18  0636  01/22/18  0923   WBC Thousand/uL 5 74  < > 11 27*   HEMOGLOBIN g/dL 10 2*  < > 11 1*   HEMATOCRIT % 30 7*  < > 33 3*   PLATELETS Thousands/uL 233  < > 264   NEUTROS PCT %  --   --  87*   LYMPHS PCT %  --   --  6*   MONOS PCT %  --   --  5   EOS PCT %  --   --  2   < > = values in this interval not displayed      Results from last 7 days  Lab Units 01/24/18  0636  01/22/18  0923   SODIUM mmol/L 140  < > 138   POTASSIUM mmol/L 3 3*  < > 4 1   CHLORIDE mmol/L 107  < > 105   CO2 mmol/L 25  < > 28   BUN mg/dL 23  < > 48*   CREATININE mg/dL 1 18  < > 1 71*   CALCIUM mg/dL 8 5  < > 9 0   TOTAL PROTEIN g/dL  --   --  7 9   BILIRUBIN TOTAL mg/dL  --   --  0 34   ALK PHOS U/L  --   --  75   ALT U/L  --   --  11*   AST U/L  -- --  16   GLUCOSE RANDOM mg/dL 89  < > 108   < > = values in this interval not displayed  Results from last 7 days  Lab Units 01/22/18  0923   INR  1 21*       * I Have Reviewed All Lab Data Listed Above  * Additional Pertinent Lab Tests Reviewed: Deepika 66 Admission Reviewed    Imaging:    Imaging Reports Reviewed Today Include:  Diagnostic imaging studies that were done on this admission  Imaging Personally Reviewed by Myself Includes:  None  Recent Cultures (last 7 days):       Results from last 7 days  Lab Units 01/22/18  1229   C DIFF TOXIN B  NEGATIVE for C difficle toxin by PCR  Last 24 Hours Medication List:     EMS replenish medication  Does not apply Once   acetaminophen 488 mg Oral Q12H   amLODIPine 5 mg Oral Daily   apixaban 2 5 mg Oral BID   bimatoprost 1 drop Both Eyes HS   brimonidine 1 drop Both Eyes BID   calcitriol 0 25 mcg Oral Daily   cholecalciferol 1,000 Units Oral Daily   eplerenone 25 mg Oral Daily   fluticasone 1 spray Nasal Daily   gabapentin 100 mg Oral BID   gabapentin 300 mg Oral HS   latanoprost 1 drop Both Eyes HS   levothyroxine 88 mcg Oral Early Morning   loratadine 10 mg Oral Daily   melatonin 6 mg Oral HS   metoprolol tartrate 50 mg Oral BID   OXcarbazepine 150 mg Oral HS   pantoprazole 40 mg Intravenous Q24H JULIETTE   rOPINIRole 1 mg Oral Q8H [facility]   schraggers 1 application Topical Once   tamsulosin 0 4 mg Oral Daily With Dinner   torsemide 20 mg Oral BID (diuretic)        Today, Patient Was Seen By: Lydia Almanzar MD    ** Please Note: Dragon 360 Dictation voice to text software may have been used in the creation of this document   **

## 2018-01-24 NOTE — PLAN OF CARE
DISCHARGE PLANNING - CARE MANAGEMENT     Discharge to post-acute care or home with appropriate resources Progressing        GASTROINTESTINAL - ADULT     Minimal or absence of nausea and/or vomiting Progressing     Maintains or returns to baseline bowel function Progressing     Maintains adequate nutritional intake Progressing        Potential for Falls     Patient will remain free of falls Progressing        Prexisting or High Potential for Compromised Skin Integrity     Skin integrity is maintained or improved Progressing

## 2018-01-25 LAB
ANION GAP SERPL CALCULATED.3IONS-SCNC: 9 MMOL/L (ref 4–13)
BUN SERPL-MCNC: 23 MG/DL (ref 5–25)
CALCIUM SERPL-MCNC: 8.7 MG/DL (ref 8.3–10.1)
CHLORIDE SERPL-SCNC: 107 MMOL/L (ref 100–108)
CO2 SERPL-SCNC: 26 MMOL/L (ref 21–32)
CREAT SERPL-MCNC: 1.26 MG/DL (ref 0.6–1.3)
ERYTHROCYTE [DISTWIDTH] IN BLOOD BY AUTOMATED COUNT: 14.6 % (ref 11.6–15.1)
GFR SERPL CREATININE-BSD FRML MDRD: 39 ML/MIN/1.73SQ M
GLUCOSE SERPL-MCNC: 82 MG/DL (ref 65–140)
HCT VFR BLD AUTO: 31.8 % (ref 34.8–46.1)
HGB BLD-MCNC: 10.6 G/DL (ref 11.5–15.4)
MCH RBC QN AUTO: 29.1 PG (ref 26.8–34.3)
MCHC RBC AUTO-ENTMCNC: 33.3 G/DL (ref 31.4–37.4)
MCV RBC AUTO: 87 FL (ref 82–98)
PLATELET # BLD AUTO: 223 THOUSANDS/UL (ref 149–390)
PMV BLD AUTO: 10.7 FL (ref 8.9–12.7)
POTASSIUM SERPL-SCNC: 3.1 MMOL/L (ref 3.5–5.3)
RBC # BLD AUTO: 3.64 MILLION/UL (ref 3.81–5.12)
SODIUM SERPL-SCNC: 142 MMOL/L (ref 136–145)
TSH SERPL DL<=0.05 MIU/L-ACNC: 1.16 UIU/ML (ref 0.36–3.74)
WBC # BLD AUTO: 6.1 THOUSAND/UL (ref 4.31–10.16)

## 2018-01-25 PROCEDURE — 97162 PT EVAL MOD COMPLEX 30 MIN: CPT

## 2018-01-25 PROCEDURE — 99232 SBSQ HOSP IP/OBS MODERATE 35: CPT | Performed by: INTERNAL MEDICINE

## 2018-01-25 PROCEDURE — 84443 ASSAY THYROID STIM HORMONE: CPT | Performed by: INTERNAL MEDICINE

## 2018-01-25 PROCEDURE — 85027 COMPLETE CBC AUTOMATED: CPT | Performed by: INTERNAL MEDICINE

## 2018-01-25 PROCEDURE — G8979 MOBILITY GOAL STATUS: HCPCS

## 2018-01-25 PROCEDURE — 87505 NFCT AGENT DETECTION GI: CPT | Performed by: INTERNAL MEDICINE

## 2018-01-25 PROCEDURE — G8978 MOBILITY CURRENT STATUS: HCPCS

## 2018-01-25 PROCEDURE — 99222 1ST HOSP IP/OBS MODERATE 55: CPT | Performed by: INTERNAL MEDICINE

## 2018-01-25 PROCEDURE — 80048 BASIC METABOLIC PNL TOTAL CA: CPT | Performed by: INTERNAL MEDICINE

## 2018-01-25 PROCEDURE — C9113 INJ PANTOPRAZOLE SODIUM, VIA: HCPCS | Performed by: INTERNAL MEDICINE

## 2018-01-25 RX ORDER — LOPERAMIDE HYDROCHLORIDE 2 MG/1
2 CAPSULE ORAL 3 TIMES DAILY PRN
Status: DISCONTINUED | OUTPATIENT
Start: 2018-01-25 | End: 2018-01-25

## 2018-01-25 RX ORDER — POTASSIUM CHLORIDE 20 MEQ/1
40 TABLET, EXTENDED RELEASE ORAL 2 TIMES DAILY
Status: DISCONTINUED | OUTPATIENT
Start: 2018-01-25 | End: 2018-01-30 | Stop reason: HOSPADM

## 2018-01-25 RX ORDER — LOPERAMIDE HYDROCHLORIDE 2 MG/1
2 CAPSULE ORAL ONCE
Status: COMPLETED | OUTPATIENT
Start: 2018-01-25 | End: 2018-01-25

## 2018-01-25 RX ORDER — LOPERAMIDE HYDROCHLORIDE 2 MG/1
2 CAPSULE ORAL 3 TIMES DAILY PRN
Status: DISCONTINUED | OUTPATIENT
Start: 2018-01-25 | End: 2018-01-30 | Stop reason: HOSPADM

## 2018-01-25 RX ADMIN — CALCITRIOL 0.25 MCG: 0.25 CAPSULE, LIQUID FILLED ORAL at 14:36

## 2018-01-25 RX ADMIN — METOPROLOL TARTRATE 50 MG: 50 TABLET ORAL at 17:25

## 2018-01-25 RX ADMIN — POTASSIUM CHLORIDE 40 MEQ: 1500 TABLET, EXTENDED RELEASE ORAL at 19:44

## 2018-01-25 RX ADMIN — BIMATOPROST 1 DROP: 0.1 SOLUTION/ DROPS OPHTHALMIC at 22:22

## 2018-01-25 RX ADMIN — BRIMONIDINE TARTRATE 1 DROP: 1.5 SOLUTION OPHTHALMIC at 10:44

## 2018-01-25 RX ADMIN — TORSEMIDE 20 MG: 20 TABLET ORAL at 16:38

## 2018-01-25 RX ADMIN — TORSEMIDE 20 MG: 20 TABLET ORAL at 10:46

## 2018-01-25 RX ADMIN — GABAPENTIN 100 MG: 100 CAPSULE ORAL at 17:26

## 2018-01-25 RX ADMIN — MELATONIN TAB 3 MG 6 MG: 3 TAB at 22:22

## 2018-01-25 RX ADMIN — ROPINIROLE 1 MG: 1 TABLET, FILM COATED ORAL at 05:21

## 2018-01-25 RX ADMIN — ACETAMINOPHEN 488 MG: 325 TABLET, FILM COATED ORAL at 16:39

## 2018-01-25 RX ADMIN — LATANOPROST 1 DROP: 50 SOLUTION OPHTHALMIC at 22:22

## 2018-01-25 RX ADMIN — ACETAMINOPHEN 488 MG: 325 TABLET, FILM COATED ORAL at 05:20

## 2018-01-25 RX ADMIN — FLUTICASONE PROPIONATE 1 SPRAY: 50 SPRAY, METERED NASAL at 10:43

## 2018-01-25 RX ADMIN — GABAPENTIN 300 MG: 300 CAPSULE ORAL at 22:22

## 2018-01-25 RX ADMIN — ROPINIROLE 1 MG: 1 TABLET, FILM COATED ORAL at 16:38

## 2018-01-25 RX ADMIN — GABAPENTIN 100 MG: 100 CAPSULE ORAL at 10:46

## 2018-01-25 RX ADMIN — LOPERAMIDE HYDROCHLORIDE 2 MG: 2 CAPSULE ORAL at 14:38

## 2018-01-25 RX ADMIN — PANTOPRAZOLE SODIUM 40 MG: 40 INJECTION, POWDER, FOR SOLUTION INTRAVENOUS at 10:45

## 2018-01-25 RX ADMIN — METOPROLOL TARTRATE 50 MG: 50 TABLET ORAL at 10:46

## 2018-01-25 RX ADMIN — EPLERENONE 25 MG: 25 TABLET, FILM COATED ORAL at 10:45

## 2018-01-25 RX ADMIN — VITAMIN D, TAB 1000IU (100/BT) 1000 UNITS: 25 TAB at 10:45

## 2018-01-25 RX ADMIN — OXCARBAZEPINE 150 MG: 150 TABLET ORAL at 22:22

## 2018-01-25 RX ADMIN — APIXABAN 2.5 MG: 2.5 TABLET, FILM COATED ORAL at 17:25

## 2018-01-25 RX ADMIN — APIXABAN 2.5 MG: 2.5 TABLET, FILM COATED ORAL at 10:46

## 2018-01-25 RX ADMIN — LEVOTHYROXINE SODIUM 88 MCG: 88 TABLET ORAL at 05:19

## 2018-01-25 RX ADMIN — AMLODIPINE BESYLATE 5 MG: 5 TABLET ORAL at 10:46

## 2018-01-25 RX ADMIN — BRIMONIDINE TARTRATE 1 DROP: 1.5 SOLUTION OPHTHALMIC at 17:29

## 2018-01-25 RX ADMIN — ROPINIROLE 1 MG: 1 TABLET, FILM COATED ORAL at 22:22

## 2018-01-25 RX ADMIN — TAMSULOSIN HYDROCHLORIDE 0.4 MG: 0.4 CAPSULE ORAL at 16:38

## 2018-01-25 RX ADMIN — LORATADINE 10 MG: 10 TABLET ORAL at 10:45

## 2018-01-25 RX ADMIN — TRAMADOL HYDROCHLORIDE 50 MG: 50 TABLET, FILM COATED ORAL at 10:47

## 2018-01-25 NOTE — SOCIAL WORK
Pt recommended for inpt rehab upon d/c  VM left for pt's dtr Brigida Sorenson to advise referral made to  SNF per her request if inpt rehab would be recommended for pt  Referral made to  SNF via [address] [place] [address] Drive

## 2018-01-25 NOTE — PLAN OF CARE
Problem: PHYSICAL THERAPY ADULT  Goal: Performs mobility at highest level of function for planned discharge setting  See evaluation for individualized goals  Treatment/Interventions: ADL retraining, Functional transfer training, LE strengthening/ROM, Therapeutic exercise, Endurance training, Bed mobility, Gait training  Equipment Recommended: Boom Tadeo (melania-walker)       See flowsheet documentation for full assessment, interventions and recommendations  Prognosis: Fair  Problem List: Decreased strength, Decreased range of motion, Decreased endurance, Impaired balance, Decreased mobility, Decreased coordination, Pain, Decreased cognition  Assessment: pt seen for moderate complexity PT evaluation  pt is a 79 y/o female w/ a history of CAD, CHF, hypothyroidism, HTN, and CVA, who is now admitted for abdominal pain, vomitting, and diarrhea  PT consulted to assess transfers, ambulation, and overall mobility  pt presents with increase pain in lower back and BLE, decrease activity tolerance, decrease strength in BUE and BLE, decrease sitting balance  pt reports that she wears a leg brace on her LLE and does not have ROM in LUE from past CVA in 2011  pt will benefit from skilled PT to evaluate ambulation and standing balance when LLE brace is present, improve sitting balance, increase strength in BLE, and improve endurance  Recommend rehab at d/c        Recommendation: Post acute IP rehab, Short-term skilled PT          See flowsheet documentation for full assessment

## 2018-01-25 NOTE — ASSESSMENT & PLAN NOTE
· Likely acute viral gastroenteritis according to GI  · CT abdomen with no acute pathology  Normal lactate level on admission  · C  Diff negative  · Enteric stool panel pending  · As per GI, advance diet today  · Presently, no more diarrhea  Had an episode of nausea but no actual vomiting episodes    · May have Imodium

## 2018-01-25 NOTE — PHYSICAL THERAPY NOTE
PT EVALUATION      01/25/18 1030   Note Type   Note type Eval only   Pain Assessment   Pain Assessment 0-10   Pain Score Worst Possible Pain   Pain Type Chronic pain   Pain Location Back; Sacrum;Leg   Pain Orientation Bilateral   Hospital Pain Intervention(s) Repositioned   Home Living   Type of [address] [place]   Additional Comments Lives at [facility] with L LE brace  Prior Function   Level of [place] Needs assistance with IADLs; Needs assistance with ADLs and functional mobility   Lives With Alone   Receives Help From Personal care attendant   ADL Assistance Needs assistance   IADLs Needs assistance   Vocational Retired   Restrictions/Precautions   Braces or Orthoses AFO  (pt reports that her son will bring it tomorrow)   Other Precautions Pain; Fall Risk   Cognition   Overall Cognitive Status WFL   Arousal/Participation Alert   Following Commands Follows one step commands without difficulty   RLE Assessment   RLE Assessment X  (strength 3/5 assessed with transfers)   LLE Assessment   LLE Assessment X  (strength 3/5 assessed during transfers)   Bed Mobility   Supine to Sit 3  Moderate assistance   Additional items Assist x 2   Sit to Supine 3  Moderate assistance   Additional items Assist x 2   Additional Comments pt reports that she could not stand or ambulate today bc she did not have her L brace with her  pt reports that she has been wearing that brace since 2011  pt sat at edge of bed for 3 min, she then complained of pain where she was positioned back lying down on the bed  Balance   Static Sitting Poor +   Dynamic Sitting Poor   Endurance Deficit   Endurance Deficit Yes  (pain and fatigue )   Activity Tolerance   Activity Tolerance Patient limited by fatigue;Patient limited by pain   Assessment   Prognosis Fair   Problem List Decreased strength;Decreased range of motion;Decreased endurance; Impaired balance;Decreased mobility; Decreased coordination;Pain;Decreased cognition   Assessment pt seen for moderate complexity PT evaluation  pt is a 79 y/o female w/ a history of CAD, CHF, hypothyroidism, HTN, and CVA, who is now admitted for abdominal pain, vomitting, and diarrhea  PT consulted to assess transfers, ambulation, and overall mobility  pt presents with increase pain in lower back and BLE, decrease activity tolerance, decrease strength in BUE and BLE, decrease sitting balance  pt reports that she wears a leg brace on her LLE and does not have ROM in LUE from past CVA in 2011  pt will benefit from skilled PT to evaluate ambulation and standing balance when LLE brace is present, improve sitting balance, increase strength in BLE, and improve endurance  Recommend rehab at d/c   Goals   Patient Goals none stated   STG Expiration Date 02/08/18   Short Term Goal #1 1-2wk: improve bed mobility to supervision level, improve sitting balance to good  PT needs to assess transfers, ambulation and standing balance  Treatment Day 0   Plan   Treatment/Interventions ADL retraining;Functional transfer training;LE strengthening/ROM; Therapeutic exercise; Endurance training;Bed mobility;Gait training   PT Frequency 5x/wk   Recommendation   Recommendation Post acute IP rehab;Short-term skilled PT   Equipment Recommended Walker  (melania-walker)   Modified [place] Scale   Modified [place] Scale 4   Barthel Index   Feeding 5   Bathing 0   Grooming Score 0   Dressing Score 5   Bladder Score 5   Bowels Score 5   Toilet Use Score 5   Transfers (Bed/Chair) Score 5   Mobility (Level Surface) Score 0   Stairs Score 0   Barthel Index Score 30

## 2018-01-25 NOTE — ASSESSMENT & PLAN NOTE
· Stable and better  · Baseline creatinine appears around 1 5   She is known to Dr Lemus Harvard  · BMP in AM

## 2018-01-25 NOTE — ASSESSMENT & PLAN NOTE
· Monitor fluid status closely given that she was given 2 5L fluids in ED  · Continue torsemide 20mg BID  · No shortness of breath anymore

## 2018-01-25 NOTE — CONSULTS
Consultation - [facility]  [facility] 80 y o  female MRN: [id number]  Unit/Bed#: [facility] 829-83 Encounter: [id number]         Reason for Consult / Principal Problem: Nausea, vomiting and non-bloody diarrhea    HPI: Neri To is a 80year old female with a history of chronic diastolic heart failure with an EF of 65%, atrial fibrillation on Eliquis, CVA with residual left hemiparesis, HTN, hypothyroidism and CKD  Patient was admitted on 1/22/18 from her nursing home for right lower quadrant abdominal pain associated with nausea and vomiting  She reports she woke up at 0400 with sharp abdominal pain in the right lower quadrant  It was associated with emesis  She reports one episode of small amounts of bright red blood in the emesis  She also developed nonbloody diarrhea that was watery about 4 times  When asked about contact with others who are sick, she reports there was a viral illness in the nursing home building next door  On admission, a CT of the abdomen pelvis was obtained  It did not reveal any acute abdominal pathology, except for fluid throughout the colon without thickening  C  Diff PCR, lipase and lactic acid all negative  Patient presented with mild leukocytosis of 11k  FOBT negative  Stool enteric panel still pending  Per nursing, the sample was contaminated with urine each time they attempted  After speaking to the patient and her daughter over telephone, they report a history of frequent bowel movements that were never loose  She had one bout of C diff in November 2017, which she was treated with Flagyl  She cannot recall the last time she had a colonoscopy  None on record  She did have an EGD in 2016 that was absolutely normal  She denies family history of CRC  Patient was seen at the bedside today  She had one episode of nonbloody emesis yesterday  She has mild nausea  She reports at least 4 bowel movements daily since admission   The abdominal pain is still present, however it is random in nature  She reports it feels more like a cramping now  She denies fevers, unintentional weight loss, melena, or hematochezia  Review of Systems:    CONSTITUTIONAL: Denies any fever, chills, or rigors  Good appetite, and no recent weight loss  HEENT: No earache or tinnitus  Denies hearing loss or visual disturbances  CARDIOVASCULAR: No chest pain or palpitations  RESPIRATORY: Denies any cough, hemoptysis, shortness of breath or dyspnea on exertion  GASTROINTESTINAL: As noted in the History of Present Illness  GENITOURINARY: No problems with urination  Denies any hematuria or dysuria  NEUROLOGIC: No dizziness or vertigo, denies headaches  MUSCULOSKELETAL: Denies any muscle or joint pain  SKIN: Denies skin rashes or itching  ENDOCRINE: Denies excessive thirst  Denies intolerance to heat or cold  PSYCHOSOCIAL: Denies depression or anxiety  Denies any recent memory loss         Historical Information   Past Medical History:   Diagnosis Date    A-fib ([facility] )     Anemia     Anemia     Anxiety     Asthma     CAD (coronary artery disease)     [facility]     CHF (congestive heart failure) ([facility])     CKD (chronic kidney disease) stage 3, GFR 30-59 ml/min     ckd3    Constipation     CVA (cerebral vascular accident) ([facility] )     left hemiparesis    Diastolic CHF, chronic (HCC)     Disease of thyroid gland     Dry eye     First degree AV block     Gastritis     Gastritis     GERD (gastroesophageal reflux disease)     Glaucoma     Glaucoma     Hemiparesis (HCC)     left side    Hemiparesis affecting left side as late effect of stroke (HCC)     Hyperlipidemia     Hypertension     Impetigo     Insomnia     Knee pain     Neuropathy     Osteoarthritis     Parkinson disease [facility] )     Pulmonary HTN     PVD (peripheral vascular disease) ([facility])     Restless leg     Vitamin D deficiency      Past Surgical History:   Procedure Laterality Date    APPENDECTOMY      BLADDER SURGERY      CAROTID ENDARTERECTOMY Left     ESOPHAGOGASTRODUODENOSCOPY N/A 4/8/2016    Procedure: ESOPHAGOGASTRODUODENOSCOPY (EGD); Surgeon: Erendira Chi MD;  Location: [facility];   Service:     RENAL ARTERY STENT       Social History   History   Alcohol Use No     History   Drug Use No     History   Smoking Status    Never Smoker   Smokeless Tobacco    Never Used     Family History   Problem Relation Age of Onset    Stroke Father     Cancer Brother     No Known Problems Mother         Meds/Allergies     Current Facility-Administered Medications   Medication Dose Route Frequency     EMS REPLENISHMENT MED   Does not apply Once    acetaminophen (TYLENOL) tablet 488 mg  488 mg Oral Q12H    acetaminophen (TYLENOL) tablet 650 mg  650 mg Oral Q6H PRN    amLODIPine (NORVASC) tablet 5 mg  5 mg Oral Daily    apixaban (ELIQUIS) tablet 2 5 mg  2 5 mg Oral BID    bimatoprost (LUMIGAN) 0 01 % ophthalmic solution 1 drop  1 drop Both Eyes HS    brimonidine (ALPHAGAN P) 0 15 % ophthalmic solution 1 drop  1 drop Both Eyes BID    calcitriol (ROCALTROL) capsule 0 25 mcg  0 25 mcg Oral Daily    cholecalciferol (VITAMIN D3) tablet 1,000 Units  1,000 Units Oral Daily    diphenhydrAMINE (BENADRYL) tablet 25 mg  25 mg Oral HS PRN    eplerenone (INSPRA) tablet 25 mg  25 mg Oral Daily    fluticasone (FLONASE) 50 mcg/act nasal spray 1 spray  1 spray Nasal Daily    gabapentin (NEURONTIN) capsule 100 mg  100 mg Oral BID    gabapentin (NEURONTIN) capsule 300 mg  300 mg Oral HS    HYDROmorphone (DILAUDID) injection 0 2 mg  0 2 mg Intravenous Q6H PRN    latanoprost (XALATAN) 0 005 % ophthalmic solution 1 drop  1 drop Both Eyes HS    levothyroxine tablet 88 mcg  88 mcg Oral Early Morning    loratadine (CLARITIN) tablet 10 mg  10 mg Oral Daily    melatonin tablet 6 mg  6 mg Oral HS    metoprolol tartrate (LOPRESSOR) tablet 50 mg  50 mg Oral BID    ondansetron (ZOFRAN) injection 4 mg  4 mg Intravenous Q6H PRN    OXcarbazepine (TRILEPTAL) tablet 150 mg  150 mg Oral HS    pantoprazole (PROTONIX) injection 40 mg  40 mg Intravenous Q24H JULIETTE    rOPINIRole (REQUIP) tablet 1 mg  1 mg Oral Q8H [address]    schraggers paste 1 application  1 application Topical Once    tamsulosin (FLOMAX) capsule 0 4 mg  0 4 mg Oral Daily With Dinner    torsemide (DEMADEX) tablet 20 mg  20 mg Oral BID (diuretic)    traMADol (ULTRAM) tablet 50 mg  50 mg Oral Q6H PRN       Allergies   Allergen Reactions    Lipitor [Atorvastatin]     Morphine     Percocet [Oxycodone-Acetaminophen] GI Intolerance    Spironolactone     Sulfa Antibiotics          Objective     Blood pressure 123/76, pulse 77, temperature (!) 97 4 °F (36 3 °C), temperature source Oral, resp  rate 20, height 5' 4" (1 626 m), weight 78 3 kg (172 lb 9 9 oz), SpO2 96 %  Intake/Output Summary (Last 24 hours) at 01/25/18 2615  Last data filed at 01/25/18 0300   Gross per 24 hour   Intake              480 ml   Output             1500 ml   Net            -1020 ml         PHYSICAL EXAM:      General Appearance:   Alert and oriented x 3  Cooperative, and in no respiratory distress   HEENT:   Normocephalic, atraumatic, anicteric      Neck:  Supple, symmetrical, trachea midline   Lungs:   Clear to auscultation bilaterally   Heart[de-identified]   Regular rate and rhythm     Abdomen:   Soft, mild RLQ tenderness without guarding or rigidity, non-distended; normal bowel sounds; no masses, no organomegaly    Genitalia:   Deferred    Rectal:   Deferred    Extremities:  Trace lower extremity edema   Pulses:  2+ and symmetric all extremities    Skin:  Bilateral erythematous plaque like facial rash on cheeks     Lymph nodes:  No palpable cervical or supraclavicular lymphadenopathy        Lab Results:     Results from last 7 days  Lab Units 01/25/18  0640  01/22/18  0923   WBC Thousand/uL 6 10  < > 11 27*   HEMOGLOBIN g/dL 10 6*  < > 11 1*   HEMATOCRIT % 31 8*  < > 33 3*   PLATELETS Thousands/uL 223  < > 264   NEUTROS PCT %  --   --  87*   LYMPHS PCT %  --   --  6*   MONOS PCT %  --   --  5   EOS PCT %  --   --  2   < > = values in this interval not displayed  Results from last 7 days  Lab Units 01/25/18  0640  01/22/18  0923   SODIUM mmol/L 142  < > 138   POTASSIUM mmol/L 3 1*  < > 4 1   CHLORIDE mmol/L 107  < > 105   CO2 mmol/L 26  < > 28   BUN mg/dL 23  < > 48*   CREATININE mg/dL 1 26  < > 1 71*   CALCIUM mg/dL 8 7  < > 9 0   TOTAL PROTEIN g/dL  --   --  7 9   BILIRUBIN TOTAL mg/dL  --   --  0 34   ALK PHOS U/L  --   --  75   ALT U/L  --   --  11*   AST U/L  --   --  16   GLUCOSE RANDOM mg/dL 82  < > 108   < > = values in this interval not displayed  Results from last 7 days  Lab Units 01/22/18  0923   INR  1 21*       Results from last 7 days  Lab Units 01/22/18  0923   LIPASE u/L 229       Imaging Studies: I have personally reviewed pertinent imaging studies  Ct Abdomen Pelvis Wo Contrast    Result Date: 1/22/2018  Impression: No acute pathology  Colonic diverticulosis without acute diverticulitis  Fluid throughout the colon, consistent with history of diarrhea  Workstation performed: [id number]     Xr Chest 1 View Portable    Result Date: 1/22/2018  Impression: Stable cardiomegaly  No acute findings  No obvious gross free air on this semierect exam   Follow-up with dedicated abdominal series or CT as warranted  Workstation performed: [id number]       ASSESSMENT and PLAN:    Principal Problem:    Abdominal pain, vomiting, and diarrhea  Active Problems:    Hypothyroidism    Chronic diastolic CHF (congestive heart failure) (HCC)    Hypertension    Restless leg    Hemiparesis affecting left side as late effect of stroke (HCC)    Atrial fibrillation (HCC)    CKD (chronic kidney disease) stage 3, GFR 30-59 ml/min    Facial rash    Moderate mitral regurgitation    Anemia    Hypokalemia    1) Nonbloody diarrhea - C diff and FOBT negative  WBC count is now WNL   CT showed no acute pathology  Stool enteric panel could not be obtained secondary to urine contamination  Hgb is stable  No melena or hematochezia reported by patient or nursing  Patient is afebrile  No leukocytosis  Likely this is a viral gastroenteritis  Patient has not had a colonoscopy in 10+ years, but is refusing at this time despite risk of undiagnosed CRC  I discussed this with her daughter   - We will treat her symptomatically with imodium   - Advance from clear liquid diet  - Continue to monitor for leukocytosis or worsening of symptoms      2) Nausea and vomiting - Patient had one episode of nonbloody emesis yesterday  She states she has some nausea, but seems somewhat improved  Likely related to above  She states that she feels ready to eat solid food  She has been on clear liquids since admission   - Zofran PRN    3) Single episode of hematemesis - Denies further episodes since admission  Likely this was a Nicole Breeding tear secondary to multiple episodes of vomiting and retching  Patient's last EGD was in 2016, which was normal  Despite risk of possible malignancy, she is refusing EGD  Fortunately her Hgb is stable  I discussed this with her daughter   - Continue PPI QD  - Monitor for active bleeding    4) Anemia - Patient Hgb ranges between 9 5-11  Currently hgb is 10 6  Likely a combination of anemia of chronic disease and hemodilution from fluid resuscitation   - Continue to monitor for overt bleeding  - EGD warranted if patient develops overt bleeding   - Continue to monitor hgb      The patient was seen and examined by Dr Tianna Ivey, all key medical decisions were made with Dr Tianna Ivye  Thank you for allowing us to participate in the care of this pleasant patient  We will follow up with you closely

## 2018-01-25 NOTE — ASSESSMENT & PLAN NOTE
· With decrease in patient's hemoglobin today but still at acceptable levels  · No active bleeding  · Likely component of hemodilution from IV fluids  · Likely anemia of chronic kidney disease

## 2018-01-25 NOTE — RESTORATIVE TECHNICIAN NOTE
Restorative Specialist Mobility Note                      Repositioned: Other (Comment) (Rep /sat pt upright in bed  Bed alarm on   Pt callbell, phone/tray within reach  )       [employer] BS, Restorative Technician,

## 2018-01-25 NOTE — PROGRESS NOTES
Progress Note - Liliana Dickson 6/14/1931, 80 y o  female MRN: [id number]    Unit/Bed#: [facility] 747-20 Encounter: [id number]    Primary Care Provider: Tiara Winter MD   Date and time admitted to hospital: 1/22/2018  7:33 AM        Hypokalemia   Assessment & Plan    · Replace patient's potassium  Anemia   Assessment & Plan    · With decrease in patient's hemoglobin today but still at acceptable levels  · No active bleeding  · Likely component of hemodilution from IV fluids  · Likely anemia of chronic kidney disease  Moderate mitral regurgitation   Assessment & Plan    · Follow up with primary cardiology, Dr Teddy Epley        Facial rash   Assessment & Plan    · Followed by her dermatologist  Uses topical cream the name of which she can't recall        CKD (chronic kidney disease) stage 3, GFR 30-59 ml/min   Assessment & Plan    · Stable and better  · Baseline creatinine appears around 1 5  She is known to Dr Thor Aase  · BMP in AM        Atrial fibrillation [facility])   Assessment & Plan    · Rate controlled  Continue Eliquis  · Known to Dr Teddy Epley        Hemiparesis affecting left side as late effect of stroke [facility])   Assessment & Plan    · Noted on prior admissions        Restless leg   Assessment & Plan    · Continue home meds        Hypertension   Assessment & Plan    · Continue home meds  · Follow up with nephrology        Chronic diastolic CHF (congestive heart failure) ([facility])   Assessment & Plan    · Monitor fluid status closely given that she was given 2 5L fluids in ED  · Continue torsemide 20mg BID  · No shortness of breath anymore  Hypothyroidism   Assessment & Plan    Normal TSH  Continue Synthroid  * Abdominal pain, vomiting, and diarrhea   Assessment & Plan    · Likely acute viral gastroenteritis according to GI  · CT abdomen with no acute pathology  Normal lactate level on admission  · C   Diff negative  · Enteric stool panel pending  · As per GI, advance diet today   · Presently, no more diarrhea  Had an episode of nausea but no actual vomiting episodes  · May have Imodium            VTE Pharmacologic Prophylaxis:   Pharmacologic: Apixaban (Eliquis)  Mechanical VTE Prophylaxis in Place: Yes    Patient Centered Rounds: I have performed bedside rounds with nursing staff today  Discussions with Specialists or Other Care Team Provider:  Case management  Advance practitioner for GI  Education and Discussions with Family / Patient:  Patient  I offered to call patient's family but patient declined  Time Spent for Care: 30 minutes  More than 50% of total time spent on counseling and coordination of care as described above  Current Length of Stay: 2 day(s)    Current Patient Status: Inpatient   Certification Statement: The patient will continue to require additional inpatient hospital stay due to Above findings and plans  Discharge Plan:  None yet today  Likely tomorrow if patient continues to be better  Code Status: Level 1 - Full Code      Subjective:   Presently, patient feels fine and a lot better  Patient denies any more diarrhea  Patient admits to occasional nausea but no more vomiting episodes  No abdominal pains  No other pains  No shortness of breath  Objective:     Vitals:   Temp (24hrs), Av 2 °F (36 8 °C), Min:97 4 °F (36 3 °C), Max:98 6 °F (37 °C)    HR:  [64-77] 64  Resp:  [16-22] 16  BP: (123-160)/(62-76) 160/62  SpO2:  [94 %-99 %] 94 %  Body mass index is 29 63 kg/m²  Input and Output Summary (last 24 hours): Intake/Output Summary (Last 24 hours) at 18 1813  Last data filed at 18 1732   Gross per 24 hour   Intake              480 ml   Output             1475 ml   Net             -995 ml       Physical Exam:     Physical Exam   Constitutional: She is oriented to person, place, and time  No distress  HENT:   Head: Normocephalic and atraumatic  Eyes: Right eye exhibits no discharge   Left eye exhibits no discharge  No scleral icterus  Neck: No JVD present  No tracheal deviation present  Cardiovascular: Normal rate, regular rhythm and normal heart sounds  Exam reveals no gallop and no friction rub  No murmur heard  Pulmonary/Chest: Effort normal and breath sounds normal  No stridor  No respiratory distress  She has no wheezes  She has no rales  Abdominal: Soft  Bowel sounds are normal  She exhibits no distension  There is no tenderness  There is no rebound and no guarding  Neurological: She is alert and oriented to person, place, and time  No cranial nerve deficit  Chronic/baseline left-sided weakness  Skin: Rash noted  She is not diaphoretic  There is erythema  No pallor  Chronic facial erythema and rash  Psychiatric: She has a normal mood and affect  Her behavior is normal  Thought content normal    Patient is pleasant  Vitals reviewed  Additional Data:     Labs:      Results from last 7 days  Lab Units 01/25/18 0640  01/22/18  0923   WBC Thousand/uL 6 10  < > 11 27*   HEMOGLOBIN g/dL 10 6*  < > 11 1*   HEMATOCRIT % 31 8*  < > 33 3*   PLATELETS Thousands/uL 223  < > 264   NEUTROS PCT %  --   --  87*   LYMPHS PCT %  --   --  6*   MONOS PCT %  --   --  5   EOS PCT %  --   --  2   < > = values in this interval not displayed  Results from last 7 days  Lab Units 01/25/18 0640  01/22/18  0923   SODIUM mmol/L 142  < > 138   POTASSIUM mmol/L 3 1*  < > 4 1   CHLORIDE mmol/L 107  < > 105   CO2 mmol/L 26  < > 28   BUN mg/dL 23  < > 48*   CREATININE mg/dL 1 26  < > 1 71*   CALCIUM mg/dL 8 7  < > 9 0   TOTAL PROTEIN g/dL  --   --  7 9   BILIRUBIN TOTAL mg/dL  --   --  0 34   ALK PHOS U/L  --   --  75   ALT U/L  --   --  11*   AST U/L  --   --  16   GLUCOSE RANDOM mg/dL 82  < > 108   < > = values in this interval not displayed  Results from last 7 days  Lab Units 01/22/18  0923   INR  1 21*       * I Have Reviewed All Lab Data Listed Above  * Additional Pertinent Lab Tests Reviewed:  All Labs For Current Hospital Admission Reviewed    Imaging:    Imaging Reports Reviewed Today Include:  Diagnostic imaging studies that were done on this admission  Imaging Personally Reviewed by Myself Includes:  None  Recent Cultures (last 7 days):       Results from last 7 days  Lab Units 01/22/18  1229   C DIFF TOXIN B  NEGATIVE for C difficle toxin by PCR  Last 24 Hours Medication List:     EMS replenish medication  Does not apply Once   acetaminophen 488 mg Oral Q12H   amLODIPine 5 mg Oral Daily   apixaban 2 5 mg Oral BID   bimatoprost 1 drop Both Eyes HS   brimonidine 1 drop Both Eyes BID   calcitriol 0 25 mcg Oral Daily   cholecalciferol 1,000 Units Oral Daily   eplerenone 25 mg Oral Daily   fluticasone 1 spray Nasal Daily   gabapentin 100 mg Oral BID   gabapentin 300 mg Oral HS   latanoprost 1 drop Both Eyes HS   levothyroxine 88 mcg Oral Early Morning   loratadine 10 mg Oral Daily   melatonin 6 mg Oral HS   metoprolol tartrate 50 mg Oral BID   OXcarbazepine 150 mg Oral HS   pantoprazole 40 mg Intravenous Q24H JULIETTE   rOPINIRole 1 mg Oral Q8H [address]   schraggers 1 application Topical Once   tamsulosin 0 4 mg Oral Daily With Dinner   torsemide 20 mg Oral BID (diuretic)        Today, Patient Was Seen By: Tremayne Weiss MD    ** Please Note: Dragon 360 Dictation voice to text software may have been used in the creation of this document   **

## 2018-01-26 LAB
ANION GAP SERPL CALCULATED.3IONS-SCNC: 8 MMOL/L (ref 4–13)
BUN SERPL-MCNC: 23 MG/DL (ref 5–25)
CALCIUM SERPL-MCNC: 8.7 MG/DL (ref 8.3–10.1)
CAMPYLOBACTER DNA SPEC NAA+PROBE: NORMAL
CHLORIDE SERPL-SCNC: 105 MMOL/L (ref 100–108)
CO2 SERPL-SCNC: 26 MMOL/L (ref 21–32)
CREAT SERPL-MCNC: 1.24 MG/DL (ref 0.6–1.3)
ERYTHROCYTE [DISTWIDTH] IN BLOOD BY AUTOMATED COUNT: 14.3 % (ref 11.6–15.1)
GFR SERPL CREATININE-BSD FRML MDRD: 39 ML/MIN/1.73SQ M
GLUCOSE SERPL-MCNC: 90 MG/DL (ref 65–140)
HCT VFR BLD AUTO: 32 % (ref 34.8–46.1)
HGB BLD-MCNC: 10.7 G/DL (ref 11.5–15.4)
MCH RBC QN AUTO: 28.9 PG (ref 26.8–34.3)
MCHC RBC AUTO-ENTMCNC: 33.4 G/DL (ref 31.4–37.4)
MCV RBC AUTO: 87 FL (ref 82–98)
PLATELET # BLD AUTO: 241 THOUSANDS/UL (ref 149–390)
PMV BLD AUTO: 10.4 FL (ref 8.9–12.7)
POTASSIUM SERPL-SCNC: 3.6 MMOL/L (ref 3.5–5.3)
RBC # BLD AUTO: 3.7 MILLION/UL (ref 3.81–5.12)
SALMONELLA DNA SPEC QL NAA+PROBE: NORMAL
SHIGA TOXIN STX GENE SPEC NAA+PROBE: NORMAL
SHIGELLA DNA SPEC QL NAA+PROBE: NORMAL
SODIUM SERPL-SCNC: 139 MMOL/L (ref 136–145)
WBC # BLD AUTO: 7.62 THOUSAND/UL (ref 4.31–10.16)

## 2018-01-26 PROCEDURE — 99232 SBSQ HOSP IP/OBS MODERATE 35: CPT | Performed by: INTERNAL MEDICINE

## 2018-01-26 PROCEDURE — 85027 COMPLETE CBC AUTOMATED: CPT | Performed by: INTERNAL MEDICINE

## 2018-01-26 PROCEDURE — 97530 THERAPEUTIC ACTIVITIES: CPT

## 2018-01-26 PROCEDURE — 97110 THERAPEUTIC EXERCISES: CPT

## 2018-01-26 PROCEDURE — 97116 GAIT TRAINING THERAPY: CPT

## 2018-01-26 PROCEDURE — 80048 BASIC METABOLIC PNL TOTAL CA: CPT | Performed by: INTERNAL MEDICINE

## 2018-01-26 PROCEDURE — C9113 INJ PANTOPRAZOLE SODIUM, VIA: HCPCS | Performed by: INTERNAL MEDICINE

## 2018-01-26 RX ORDER — PANTOPRAZOLE SODIUM 40 MG/1
40 TABLET, DELAYED RELEASE ORAL
Status: DISCONTINUED | OUTPATIENT
Start: 2018-01-26 | End: 2018-01-30 | Stop reason: HOSPADM

## 2018-01-26 RX ADMIN — ACETAMINOPHEN 488 MG: 325 TABLET, FILM COATED ORAL at 16:02

## 2018-01-26 RX ADMIN — CALCITRIOL 0.25 MCG: 0.25 CAPSULE, LIQUID FILLED ORAL at 09:34

## 2018-01-26 RX ADMIN — ROPINIROLE 1 MG: 1 TABLET, FILM COATED ORAL at 16:02

## 2018-01-26 RX ADMIN — GABAPENTIN 100 MG: 100 CAPSULE ORAL at 17:48

## 2018-01-26 RX ADMIN — TAMSULOSIN HYDROCHLORIDE 0.4 MG: 0.4 CAPSULE ORAL at 16:02

## 2018-01-26 RX ADMIN — OXCARBAZEPINE 150 MG: 150 TABLET ORAL at 21:53

## 2018-01-26 RX ADMIN — APIXABAN 2.5 MG: 2.5 TABLET, FILM COATED ORAL at 09:34

## 2018-01-26 RX ADMIN — TRAMADOL HYDROCHLORIDE 50 MG: 50 TABLET, FILM COATED ORAL at 23:44

## 2018-01-26 RX ADMIN — MELATONIN TAB 3 MG 6 MG: 3 TAB at 21:52

## 2018-01-26 RX ADMIN — BRIMONIDINE TARTRATE 1 DROP: 1.5 SOLUTION OPHTHALMIC at 17:48

## 2018-01-26 RX ADMIN — TORSEMIDE 20 MG: 20 TABLET ORAL at 09:35

## 2018-01-26 RX ADMIN — TORSEMIDE 20 MG: 20 TABLET ORAL at 16:02

## 2018-01-26 RX ADMIN — BIMATOPROST 1 DROP: 0.1 SOLUTION/ DROPS OPHTHALMIC at 21:53

## 2018-01-26 RX ADMIN — FLUTICASONE PROPIONATE 1 SPRAY: 50 SPRAY, METERED NASAL at 09:32

## 2018-01-26 RX ADMIN — BRIMONIDINE TARTRATE 1 DROP: 1.5 SOLUTION OPHTHALMIC at 09:32

## 2018-01-26 RX ADMIN — ACETAMINOPHEN 488 MG: 325 TABLET, FILM COATED ORAL at 05:48

## 2018-01-26 RX ADMIN — LORATADINE 10 MG: 10 TABLET ORAL at 09:34

## 2018-01-26 RX ADMIN — GABAPENTIN 100 MG: 100 CAPSULE ORAL at 09:34

## 2018-01-26 RX ADMIN — POTASSIUM CHLORIDE 40 MEQ: 1500 TABLET, EXTENDED RELEASE ORAL at 09:34

## 2018-01-26 RX ADMIN — GABAPENTIN 300 MG: 300 CAPSULE ORAL at 21:52

## 2018-01-26 RX ADMIN — VITAMIN D, TAB 1000IU (100/BT) 1000 UNITS: 25 TAB at 09:34

## 2018-01-26 RX ADMIN — APIXABAN 2.5 MG: 2.5 TABLET, FILM COATED ORAL at 17:48

## 2018-01-26 RX ADMIN — PANTOPRAZOLE SODIUM 40 MG: 40 INJECTION, POWDER, FOR SOLUTION INTRAVENOUS at 09:35

## 2018-01-26 RX ADMIN — ROPINIROLE 1 MG: 1 TABLET, FILM COATED ORAL at 21:53

## 2018-01-26 RX ADMIN — EPLERENONE 25 MG: 25 TABLET, FILM COATED ORAL at 09:34

## 2018-01-26 RX ADMIN — METOPROLOL TARTRATE 50 MG: 50 TABLET ORAL at 09:35

## 2018-01-26 RX ADMIN — LEVOTHYROXINE SODIUM 88 MCG: 88 TABLET ORAL at 05:48

## 2018-01-26 RX ADMIN — AMLODIPINE BESYLATE 5 MG: 5 TABLET ORAL at 09:35

## 2018-01-26 RX ADMIN — ROPINIROLE 1 MG: 1 TABLET, FILM COATED ORAL at 05:48

## 2018-01-26 RX ADMIN — LATANOPROST 1 DROP: 50 SOLUTION OPHTHALMIC at 21:53

## 2018-01-26 RX ADMIN — METOPROLOL TARTRATE 50 MG: 50 TABLET ORAL at 17:48

## 2018-01-26 RX ADMIN — POTASSIUM CHLORIDE 40 MEQ: 1500 TABLET, EXTENDED RELEASE ORAL at 17:48

## 2018-01-26 NOTE — PLAN OF CARE
Problem: PHYSICAL THERAPY ADULT  Goal: Performs mobility at highest level of function for planned discharge setting  See evaluation for individualized goals  Treatment/Interventions: ADL retraining, Functional transfer training, LE strengthening/ROM, Therapeutic exercise, Endurance training, Bed mobility, Gait training  Equipment Recommended: Svetlana Hubbard (melania-walker)       See flowsheet documentation for full assessment, interventions and recommendations  Outcome: Progressing  Prognosis: Fair  Problem List: Decreased strength, Decreased range of motion, Decreased endurance, Impaired balance, Decreased mobility, Decreased coordination, Impaired judgement, Decreased safety awareness, Impaired sensation, Impaired tone, Obesity, Pain, Decreased skin integrity  Assessment: Pt seen for skilled PT tx session and progression of mobility/ updated goals as pt's MAFO arrived from facility; pt required modA for rolling as well as supine<>sit transition to EOB; DEP to jason MAFO and soft diabetic shoes for mobilization- pt requiring varied level of assist for sitting EOB 2* fatigue deconditioning and weakness  Pt performed sit<>stands from EOB (elevasted surfaces w/ modA); functional transfers via SPV w/ melania walker w/ modA for balance; and ambulation w/ melania walker 3'x2  Pt refused to sit OOB to chair 2* buttock pain she associates w/ frequent recent bouts of diarrhea- pt was repositioned w/ pillows for offloading of buttock on return to bed  Pt was significantly fatigued by completion of session and from a PT standpoint would continue to recommend inpt rehab on d/c vs return to F w/ increased level of assist and home PT if facility willing/ able to provide modA x1 for all mobility including bed skills; transfers and gait  Will cont to follow        Barriers to Discharge Comments: questional level of assit [facility] provides- pt will require rehab if not modA x1 for all mobiltiy  Recommendation: Post acute IP rehab (vs back to [facility] w/ increased A and home care)     PT - OK to Discharge: Yes (to inpt rehab )    See flowsheet documentation for full assessment

## 2018-01-26 NOTE — SOCIAL WORK
CM informed pt medically clear for d/c today  CM informed by [facility], they are unable to accept new pt's until at least Monday next week due to flu outbreak  CM informed pt's dtr Keily Rosado of same  Pt's dtr requesting pt to return to [facility] if possible  CM obtained updated PT notes and faxed to [facility] [phone]  CM advised pt's dtr additional SNF choices would need to be made if pt unable to return to [facility]

## 2018-01-26 NOTE — RESTORATIVE TECHNICIAN NOTE
Restorative Specialist Mobility Note                      Repositioned: Other (Comment) (Rep /sat pt upright in bed  Bed alarm on   Pt callbell, phone/tray within reach )          Bandar BLAIR, Restorative Technician, [employer]

## 2018-01-26 NOTE — PHYSICAL THERAPY NOTE
PHYSICAL THERAPY TREATMENT     01/26/18 6039   Pain Assessment   Pain Assessment 0-10   Pain Score 4   Pain Type Acute pain   Pain Location Buttocks   Pain Orientation Bilateral   Pain Frequency Intermittent  (in sitting )   Clinical Progression Not changed   Patient's Stated Pain Goal No pain   Hospital Pain Intervention(s) Ambulation/increased activity;Repositioned  (pressure reliefs- offloaded at end of session)   Response to Interventions t olerated adn improved    Restrictions/Precautions   Weight Bearing Precautions Per Order No   Braces or Orthoses MAFO  (LLE w/ specialty sof t shoes)   Other Precautions Pain; Fall Risk;Multiple lines   General   Chart Reviewed Yes   Response to Previous Treatment Patient reporting fatigue but able to participate  Family/Caregiver Present No   Cognition   Overall Cognitive Status WFL   Arousal/Participation Alert   Attention Within functional limits   Following Commands Follows one step commands without difficulty   Comments pleasnat and cooperative- makes needs known   Subjective   Subjective "I need my walker to move"- pt verbalizing that she utilizes a hemiwalker at baseline- hemiwalker obtained for mobiltity by PT- pt refusing to sit OOB post session 2* buttock/ rectal mary n that she associates w/ frequent diarrhea and  recnet illness   Bed Mobility   Rolling R 3  Moderate assistance   Additional items Assist x 1   Rolling L 2  Maximal assistance   Additional items Assist x 1   Supine to Sit 2  Maximal assistance   Additional items Assist x 1   Sit to Supine 3  Moderate assistance   Additional items Assist x 1   Additional Comments sit EOB x 15 min total for therex as well as dep donning of MAFO and shoes/ socks    Transfers   Sit to Stand 3  Moderate assistance   Additional items Assist x 1; Increased time required;Verbal cues  (elevated bed surface )   Stand to Sit 3  Moderate assistance   Additional items Assist x 1; Increased time required;Verbal cues   Stand pivot 3  Moderate assistance  (pt refused sittin in chair post xfer 2* buttocks pain)   Additional items Assist x 1; Increased time required;Verbal cues  (via stand pivot w/ hemiwalker- max cues)   Additional Comments pt refused OOB to chair 2* buttock pain- cushion offered and encouragement/ education given and pt cont to refuse   Ambulation/Elevation   Gait pattern L Hemiparesis; Decreased foot clearance;Decreased L stance; Improper Weight shift; Step to; Foward flexed   Gait Assistance 3  Moderate assist   Additional items Assist x 1  (recommend A of another present and chair follow for progress)   Assistive Device Jeffry-walker  (R)   Distance 3' bed<>chair (x2) - further distnaces limited by reported LE weakness ; fatigue and SOB    Balance   Static Sitting Fair  (EOB)   Dynamic Sitting Poor +   Static Standing Poor +   Dynamic Standing Poor   Ambulatory Poor -   Endurance Deficit   Endurance Deficit Yes   Endurance Deficit Description deconditioning weakness and fatigue    Activity Tolerance   Activity Tolerance Patient limited by fatigue  (SOB/ [place])   Exercises   Hip Flexion Sitting;10 reps;Right;Left  (x2)   Knee AROM Long Arc Quad Sitting;10 reps;Right;Left  (x2)   Heel Cord Stretch 5 reps;Right;Left   Neuro re-ed seated static and dynamic balance- trunk rotation; flexion/ extension in seated position- requiring min/ modA for balacne at times- increasing w/ fatigue level   Balance training  sit<>stands from bed level x 4 reps modA and cues    Assessment   Prognosis Fair   Problem List Decreased strength;Decreased range of motion;Decreased endurance; Impaired balance;Decreased mobility; Decreased coordination; Impaired judgement;Decreased safety awareness; Impaired sensation; Impaired tone;Obesity;Pain;Decreased skin integrity   Assessment Pt seen for skilled PT tx session and progression of mobility/ updated goals as pt's MAFO arrived from facility; pt required modA for rolling as well as supine<>sit transition to EOB; DEP to jason MAFO and soft diabetic shoes for mobilization- pt requiring varied level of assist for sitting EOB 2* fatigue deconditioning and weakness  Pt performed sit<>stands from EOB (elevasted surfaces w/ modA); functional transfers via SPV w/ melania walker w/ modA for balance; and ambulation w/ melania walker 3'x2  Pt refused to sit OOB to chair 2* buttock pain she associates w/ frequent recent bouts of diarrhea- pt was repositioned w/ pillows for offloading of buttock on return to bed  Pt was significantly fatigued by completion of session and from a PT standpoint would continue to recommend inpt rehab on d/c vs return to PCF w/ increased level of assist and home PT if facility willing/ able to provide modA x1 for all mobility including bed skills; transfers and gait  Will cont to follow  Barriers to Discharge Comments questional level of assit [facility] provides- pt will require rehab if not modA x1 for all mobiltiy   Goals   Patient Goals feel better    [facility] Expiration Date 02/08/18   Short Term Goal #2 in same time frame pt will 1) perform sit<>stands w/ SBA; functional xfers w/ SBA using hemiwalker and ambualte 15' x2 w/ hemiwalker; w/c management and mobiltiy w/ SBA   Treatment Day 1   Plan   Treatment/Interventions Functional transfer training;LE strengthening/ROM; Therapeutic exercise; Endurance training;Patient/family training;Equipment eval/education; Bed mobility;Gait training;Spoke to nursing;Spoke to case management   Progress Slow progress, decreased activity tolerance   PT Frequency 5x/wk   Recommendation   Recommendation Post acute IP rehab  (vs back to PCF w/ increased A and home care)   Equipment Recommended (has melania walker )   PT - OK to Discharge Yes  (to inpt rehab )     Oliva Benson, PT

## 2018-01-27 PROCEDURE — 99232 SBSQ HOSP IP/OBS MODERATE 35: CPT | Performed by: INTERNAL MEDICINE

## 2018-01-27 RX ORDER — HYDROMORPHONE HYDROCHLORIDE 2 MG/1
1 TABLET ORAL EVERY 6 HOURS PRN
Status: DISCONTINUED | OUTPATIENT
Start: 2018-01-27 | End: 2018-01-28

## 2018-01-27 RX ADMIN — GABAPENTIN 300 MG: 300 CAPSULE ORAL at 21:19

## 2018-01-27 RX ADMIN — TRAMADOL HYDROCHLORIDE 50 MG: 50 TABLET, FILM COATED ORAL at 09:02

## 2018-01-27 RX ADMIN — MELATONIN TAB 3 MG 6 MG: 3 TAB at 21:18

## 2018-01-27 RX ADMIN — PANTOPRAZOLE SODIUM 40 MG: 40 TABLET, DELAYED RELEASE ORAL at 06:42

## 2018-01-27 RX ADMIN — LATANOPROST 1 DROP: 50 SOLUTION OPHTHALMIC at 21:19

## 2018-01-27 RX ADMIN — TORSEMIDE 20 MG: 20 TABLET ORAL at 09:02

## 2018-01-27 RX ADMIN — ROPINIROLE 1 MG: 1 TABLET, FILM COATED ORAL at 06:42

## 2018-01-27 RX ADMIN — BRIMONIDINE TARTRATE 1 DROP: 1.5 SOLUTION OPHTHALMIC at 17:19

## 2018-01-27 RX ADMIN — BIMATOPROST 1 DROP: 0.1 SOLUTION/ DROPS OPHTHALMIC at 21:19

## 2018-01-27 RX ADMIN — TORSEMIDE 20 MG: 20 TABLET ORAL at 15:39

## 2018-01-27 RX ADMIN — CALCITRIOL 0.25 MCG: 0.25 CAPSULE, LIQUID FILLED ORAL at 09:01

## 2018-01-27 RX ADMIN — GABAPENTIN 100 MG: 100 CAPSULE ORAL at 17:20

## 2018-01-27 RX ADMIN — OXCARBAZEPINE 150 MG: 150 TABLET ORAL at 21:19

## 2018-01-27 RX ADMIN — FLUTICASONE PROPIONATE 1 SPRAY: 50 SPRAY, METERED NASAL at 09:02

## 2018-01-27 RX ADMIN — APIXABAN 2.5 MG: 2.5 TABLET, FILM COATED ORAL at 09:02

## 2018-01-27 RX ADMIN — APIXABAN 2.5 MG: 2.5 TABLET, FILM COATED ORAL at 17:19

## 2018-01-27 RX ADMIN — TRAMADOL HYDROCHLORIDE 50 MG: 50 TABLET, FILM COATED ORAL at 20:14

## 2018-01-27 RX ADMIN — LORATADINE 10 MG: 10 TABLET ORAL at 09:01

## 2018-01-27 RX ADMIN — GABAPENTIN 100 MG: 100 CAPSULE ORAL at 09:02

## 2018-01-27 RX ADMIN — HYDROMORPHONE HYDROCHLORIDE 1 MG: 2 TABLET ORAL at 21:23

## 2018-01-27 RX ADMIN — TAMSULOSIN HYDROCHLORIDE 0.4 MG: 0.4 CAPSULE ORAL at 15:39

## 2018-01-27 RX ADMIN — EPLERENONE 25 MG: 25 TABLET, FILM COATED ORAL at 09:01

## 2018-01-27 RX ADMIN — BRIMONIDINE TARTRATE 1 DROP: 1.5 SOLUTION OPHTHALMIC at 09:02

## 2018-01-27 RX ADMIN — POTASSIUM CHLORIDE 40 MEQ: 1500 TABLET, EXTENDED RELEASE ORAL at 09:02

## 2018-01-27 RX ADMIN — METOPROLOL TARTRATE 50 MG: 50 TABLET ORAL at 09:02

## 2018-01-27 RX ADMIN — ROPINIROLE 1 MG: 1 TABLET, FILM COATED ORAL at 21:19

## 2018-01-27 RX ADMIN — ACETAMINOPHEN 488 MG: 325 TABLET, FILM COATED ORAL at 15:39

## 2018-01-27 RX ADMIN — ROPINIROLE 1 MG: 1 TABLET, FILM COATED ORAL at 13:08

## 2018-01-27 RX ADMIN — LEVOTHYROXINE SODIUM 88 MCG: 88 TABLET ORAL at 06:42

## 2018-01-27 RX ADMIN — ACETAMINOPHEN 488 MG: 325 TABLET, FILM COATED ORAL at 03:06

## 2018-01-27 RX ADMIN — AMLODIPINE BESYLATE 5 MG: 5 TABLET ORAL at 09:02

## 2018-01-27 RX ADMIN — VITAMIN D, TAB 1000IU (100/BT) 1000 UNITS: 25 TAB at 09:01

## 2018-01-27 NOTE — PLAN OF CARE
GASTROINTESTINAL - ADULT     Minimal or absence of nausea and/or vomiting Completed     Maintains or returns to baseline bowel function Completed          DISCHARGE PLANNING - CARE MANAGEMENT     Discharge to post-acute care or home with appropriate resources Progressing        GASTROINTESTINAL - ADULT     Maintains adequate nutritional intake Progressing        Potential for Falls     Patient will remain free of falls Progressing        Prexisting or High Potential for Compromised Skin Integrity     Skin integrity is maintained or improved Progressing

## 2018-01-27 NOTE — ASSESSMENT & PLAN NOTE
· All resolved  · Likely acute viral gastroenteritis according to GI  · CT abdomen with no acute pathology  Normal lactate level on admission  · C  Diff negative  · Enteric stool panel pending  · Continue present diet  Tolerating well     · May have Imodium

## 2018-01-27 NOTE — PROGRESS NOTES
Progress Note - Augustine Duarte 6/14/1931, 80 y o  female MRN: [id number]    Unit/Bed#: [facility] 218-30 Encounter: [id number]    Primary Care Provider: Pablo Mckenna MD   Date and time admitted to hospital: 1/22/2018  7:33 AM        Hypokalemia   Assessment & Plan    · Resolved  Anemia   Assessment & Plan    · Stable  · No active bleeding  · Likely component of hemodilution from IV fluids  · Likely anemia of chronic kidney disease  · Recheck in the future  Moderate mitral regurgitation   Assessment & Plan    · Follow up with primary cardiology, Dr Ayana Marti        Facial rash   Assessment & Plan    · Followed by her dermatologist  Uses topical cream the name of which she can't recall        CKD (chronic kidney disease) stage 3, GFR 30-59 ml/min   Assessment & Plan    · Stable and better  · Baseline creatinine appears around 1 5  She is known to Dr Carl Andrews  · Recheck BMP in the future  Atrial fibrillation ([address] )   Assessment & Plan    · Rate controlled  Continue Eliquis  · Known to Dr Ayana Marti        Hemiparesis affecting left side as late effect of stroke [facility])   Assessment & Plan    · Noted on prior admissions        Restless leg   Assessment & Plan    · Continue home meds        Hypertension   Assessment & Plan    · Continue home meds  · Follow up with nephrology        Chronic diastolic CHF (congestive heart failure) ([facility])   Assessment & Plan    · Monitor fluid status closely given that she was given 2 5L fluids in ED  · Continue torsemide 20mg BID  · No shortness of breath anymore  Hypothyroidism   Assessment & Plan    Normal TSH  Continue Synthroid  * Abdominal pain, vomiting, and diarrhea   Assessment & Plan    · All resolved  · Likely acute viral gastroenteritis according to GI  · CT abdomen with no acute pathology  Normal lactate level on admission  · C  Diff negative  · Enteric stool panel pending  · Continue present diet  Tolerating well     · May have Imodium            VTE Pharmacologic Prophylaxis:   Pharmacologic: Apixaban (Eliquis)  Mechanical VTE Prophylaxis in Place: Yes    Patient Centered Rounds: I have performed bedside rounds with nursing staff today  Discussions with Specialists or Other Care Team Provider:  Case management  Education and Discussions with Family / Patient:  Patient  I spoke to patient's son at bedside and discussed with him our findings and plans  I answered questions and concerns  Time Spent for Care: 30 minutes  More than 50% of total time spent on counseling and coordination of care as described above  Current Length of Stay: 4 day(s)    Current Patient Status: Inpatient   Certification Statement: The patient will continue to require additional inpatient hospital stay due to Above findings and plans  Discharge Plan:  Possible discharge, however according to case management, no placement yet  Patient was not accepted to [facility]  Thus according to case management, patient's discharge may happen on Monday  Code Status: Level 1 - Full Code      Subjective:   Patient is doing fine  Patient denies any nausea, vomiting or diarrhea or any abdominal pains anymore  No other pains  No shortness of breath  No complaints  Objective:     Vitals:   Temp (24hrs), Av 8 °F (36 6 °C), Min:97 6 °F (36 4 °C), Max:97 9 °F (36 6 °C)    HR:  [76-83] 76  Resp:  [18-20] 20  BP: (103-140)/(48-60) 103/48  SpO2:  [95 %-96 %] 96 %  Body mass index is 29 63 kg/m²  Input and Output Summary (last 24 hours): Intake/Output Summary (Last 24 hours) at 18 1605  Last data filed at 18 1500   Gross per 24 hour   Intake              300 ml   Output              850 ml   Net             -550 ml       Physical Exam:     Physical Exam   Constitutional: She is oriented to person, place, and time  No distress  HENT:   Head: Normocephalic and atraumatic  Eyes: Right eye exhibits no discharge   Left eye exhibits no discharge  No scleral icterus  Neck: No JVD present  No tracheal deviation present  Cardiovascular: Normal rate  Exam reveals no gallop and no friction rub  No murmur heard  Irregularly irregular heart rhythm  Pulmonary/Chest: Effort normal and breath sounds normal  No stridor  No respiratory distress  She has no wheezes  She has no rales  Abdominal: Soft  Bowel sounds are normal  She exhibits no distension  There is no tenderness  There is no rebound and no guarding  Musculoskeletal: She exhibits no edema, tenderness or deformity  Neurological: She is alert and oriented to person, place, and time  No cranial nerve deficit  Positive for chronic left-sided weakness  (from previous stroke)  Skin: Skin is warm  She is not diaphoretic  No pallor  Positive for chronic facial rash (subsiding as compared to my examination the previous days)  Psychiatric: She has a normal mood and affect  Her behavior is normal  Thought content normal    Patient is pleasant  Vitals reviewed  Additional Data:     Labs:      Results from last 7 days  Lab Units 01/26/18  0630  01/22/18  0923   WBC Thousand/uL 7 62  < > 11 27*   HEMOGLOBIN g/dL 10 7*  < > 11 1*   HEMATOCRIT % 32 0*  < > 33 3*   PLATELETS Thousands/uL 241  < > 264   NEUTROS PCT %  --   --  87*   LYMPHS PCT %  --   --  6*   MONOS PCT %  --   --  5   EOS PCT %  --   --  2   < > = values in this interval not displayed  Results from last 7 days  Lab Units 01/26/18  0630  01/22/18  0923   SODIUM mmol/L 139  < > 138   POTASSIUM mmol/L 3 6  < > 4 1   CHLORIDE mmol/L 105  < > 105   CO2 mmol/L 26  < > 28   BUN mg/dL 23  < > 48*   CREATININE mg/dL 1 24  < > 1 71*   CALCIUM mg/dL 8 7  < > 9 0   TOTAL PROTEIN g/dL  --   --  7 9   BILIRUBIN TOTAL mg/dL  --   --  0 34   ALK PHOS U/L  --   --  75   ALT U/L  --   --  11*   AST U/L  --   --  16   GLUCOSE RANDOM mg/dL 90  < > 108   < > = values in this interval not displayed      Results from last 7 days  Lab Units 01/22/18  0923   INR  1 21*       * I Have Reviewed All Lab Data Listed Above  * Additional Pertinent Lab Tests Reviewed: [address] [address] Admission Reviewed    Imaging:    Imaging Reports Reviewed Today Include:  Diagnostic imaging studies that were done on this admission  Imaging Personally Reviewed by Myself Includes:  None  Recent Cultures (last 7 days):       Results from last 7 days  Lab Units 01/22/18  1229   C DIFF TOXIN B  NEGATIVE for C difficle toxin by PCR  Last 24 Hours Medication List:     EMS replenish medication  Does not apply Once   acetaminophen 488 mg Oral Q12H   amLODIPine 5 mg Oral Daily   apixaban 2 5 mg Oral BID   bimatoprost 1 drop Both Eyes HS   brimonidine 1 drop Both Eyes BID   calcitriol 0 25 mcg Oral Daily   cholecalciferol 1,000 Units Oral Daily   eplerenone 25 mg Oral Daily   fluticasone 1 spray Nasal Daily   gabapentin 100 mg Oral BID   gabapentin 300 mg Oral HS   latanoprost 1 drop Both Eyes HS   levothyroxine 88 mcg Oral Early Morning   loratadine 10 mg Oral Daily   melatonin 6 mg Oral HS   metoprolol tartrate 50 mg Oral BID   OXcarbazepine 150 mg Oral HS   pantoprazole 40 mg Oral Early Morning   potassium chloride 40 mEq Oral BID   rOPINIRole 1 mg Oral Q8H [address]   schraggers 1 application Topical Once   tamsulosin 0 4 mg Oral Daily With Dinner   torsemide 20 mg Oral BID (diuretic)        Today, Patient Was Seen By: Edilberto Griffin MD    ** Please Note: Dragon 360 Dictation voice to text software may have been used in the creation of this document   **

## 2018-01-27 NOTE — PROGRESS NOTES
[facility] Internal Medicine Progress Note  Patient: Sri Bustos 80 y o  female   MRN: [id number]  PCP: Letty Blanchard MD  Unit/Bed#: [facility] 615-01 Encounter: [id number]  Date Of Visit: 01/27/18    Assessment:    Principal Problem:    Abdominal pain, vomiting, and diarrhea  Active Problems:    Hypothyroidism    Chronic diastolic CHF (congestive heart failure) (HCC)    Hypertension    Restless leg    Hemiparesis affecting left side as late effect of stroke (HCC)    Atrial fibrillation (HCC)    CKD (chronic kidney disease) stage 3, GFR 30-59 ml/min    Facial rash    Moderate mitral regurgitation    Anemia    Hypokalemia      Plan:    1  ABDOMINAL PAIN, VOMITING AND DIARRHEA, LIKELY DUE TO ACUTE VIRAL GASTROENTERITIS, RESOLVED:  As per discussion with [facility] for discharge  Outpatient follow-up with them in the office  They will follow up the results of the stool exams  2   Hypothyroidism:  Continue Synthroid  3   Chronic diastolic congestive heart failure:  Continue cardiovascular heart failure medications  4   Hypertension:  Continue home medications  5   Restless leg syndrome:  Continue home medications  6   Hemiparesis, affecting the left side as a late effect of stroke: This was noted on previous admissions  7   Atrial fibrillation, rate controlled:  Continue Eliquis  Outpatient follow-up with Cardiology  8   Chronic kidney disease stage III, stable and better:  Monitor patient's input and output  9   Anemia, stable, likely anemia of chronic kidney disease and hemodilution from previous IV fluids:  Recommend CBC in the future/in the outpatient  10   Hypokalemia, resolved    Note:  Patient was seen yesterday morning, 1/26/2018  She was supposed to be discharge however, there are no beds at the skilled nursing facility  Due to this, I forgot to write the notes yesterday as I was anticipating to do a discharge note for this patient    Thus that is the reason why the notes was written today, 2008        VTE Pharmacologic Prophylaxis:   Pharmacologic: Apixaban (Eliquis)  Mechanical: Mechanical VTE prophylaxis in place  Discussions with Specialists or Other Care Team Provider:  Patient's nurse  Case management  Advance practitioner for GI  Education and Discussions with Family / Patient:  Patient  I called patient's daughter, José Miguel Salguero, and left a voicemail message for call back  Time Spent for Care: 30 minutes  More than 50% of total time spent on counseling and coordination of care as described above  Current Length of Stay: 4 day(s)    Current Patient Status: Inpatient   Certification Statement: The patient will continue to require additional inpatient hospital stay due to Above findings and plans  Discharge Plan: For discharge to a skilled nursing facility  Code Status: Level 1 - Full Code      Subjective:   Patient's doing fine  Patient denied any more nausea, vomiting and diarrhea or any abdominal pains  No complaints  Objective:     Vitals:   Temp (24hrs), Av 7 °F (36 5 °C), Min:97 6 °F (36 4 °C), Max:97 9 °F (36 6 °C)    HR:  [69-83] 83  Resp:  [18-20] 20  BP: (116-140)/(48-62) 116/60  SpO2:  [95 %-96 %] 96 %  Body mass index is 29 63 kg/m²  Input and Output Summary (last 24 hours): Intake/Output Summary (Last 24 hours) at 18 0816  Last data filed at 18 0301   Gross per 24 hour   Intake              420 ml   Output             1650 ml   Net            -1230 ml       Physical Exam:     Physical Exam   Constitutional: She is oriented to person, place, and time  No distress  HENT:   Head: Normocephalic and atraumatic  Eyes: Right eye exhibits no discharge  Left eye exhibits no discharge  No scleral icterus  Neck: No JVD present  No tracheal deviation present  Cardiovascular: Normal rate  Irregularly irregular heart rhythm  Pulmonary/Chest: Effort normal and breath sounds normal  No stridor   No respiratory distress  She has no wheezes  She has no rales  Abdominal: Soft  Bowel sounds are normal  She exhibits no distension  There is no tenderness  There is no rebound and no guarding  Musculoskeletal: She exhibits no edema or tenderness  Neurological: She is alert and oriented to person, place, and time  No cranial nerve deficit  Positive for chronic left sided weakness from previous stroke  Skin: Skin is warm  Rash noted  She is not diaphoretic  There is erythema  No pallor  Chronic facial rash/erythema  Psychiatric: She has a normal mood and affect  Her behavior is normal  Thought content normal    Patient was pleasant  Vitals reviewed  Additional Data:     Labs:      Results from last 7 days  Lab Units 01/26/18 0630 01/22/18  0923   WBC Thousand/uL 7 62  < > 11 27*   HEMOGLOBIN g/dL 10 7*  < > 11 1*   HEMATOCRIT % 32 0*  < > 33 3*   PLATELETS Thousands/uL 241  < > 264   NEUTROS PCT %  --   --  87*   LYMPHS PCT %  --   --  6*   MONOS PCT %  --   --  5   EOS PCT %  --   --  2   < > = values in this interval not displayed  Results from last 7 days  Lab Units 01/26/18 0630 01/22/18  0923   SODIUM mmol/L 139  < > 138   POTASSIUM mmol/L 3 6  < > 4 1   CHLORIDE mmol/L 105  < > 105   CO2 mmol/L 26  < > 28   BUN mg/dL 23  < > 48*   CREATININE mg/dL 1 24  < > 1 71*   CALCIUM mg/dL 8 7  < > 9 0   TOTAL PROTEIN g/dL  --   --  7 9   BILIRUBIN TOTAL mg/dL  --   --  0 34   ALK PHOS U/L  --   --  75   ALT U/L  --   --  11*   AST U/L  --   --  16   GLUCOSE RANDOM mg/dL 90  < > 108   < > = values in this interval not displayed  Results from last 7 days  Lab Units 01/22/18  0923   INR  1 21*       * I Have Reviewed All Lab Data Listed Above  * Additional Pertinent Lab Tests Reviewed:  Deepika [address] Admission Reviewed    Imaging:    Imaging Reports Reviewed Today Include:  Diagnostic imaging studies that were done this admission  Imaging Personally Reviewed by Myself Includes: None     Cultures:   Blood Culture:   Lab Results   Component Value Date    BLOODCX No Growth After 5 Days  12/11/2017    BLOODCX No Growth After 5 Days  12/11/2017    BLOODCX No Growth After 5 Days  11/20/2017    BLOODCX No Growth After 5 Days  11/20/2017    BLOODCX No Growth After 5 Days  07/19/2017    BLOODCX No Growth After 5 Days  07/19/2017    BLOODCX No Growth After 5 Days   07/17/2017     Urine Culture:   Lab Results   Component Value Date    URINECX 50,000-59,000 cfu/ml Mixed Contaminants X5 09/30/2016    URINECX No Growth <1000 cfu/mL 07/30/2016    URINECX No Growth <1000 cfu/mL 07/17/2016    URINECX 30,000-39,000 cfu/ml Klebsiella pneumoniae 04/01/2016     Sputum Culture: No components found for: SPUTUMCX  Wound Culture:   Lab Results   Component Value Date    WOUNDCULT 4+ Growth of Mixed Skin Kaitlyn 09/13/2017    WOUNDCULT (A) 05/16/2017     2+ Growth of Methicillin Resistant Staphylococcus aureus    WOUNDCULT 2+ Growth of Staphylococcus coagulase negative 05/16/2017    WOUNDCULT 4+ Growth of Diphtheroids 05/16/2017    WOUNDCULT 2+ Growth of - other Mixed Skin Kaitlyn 05/16/2017    WOUNDCULT (A) 05/16/2017     Few Colonies of Methicillin Resistant Staphylococcus aureus    WOUNDCULT (A) 03/24/2017     1+ Growth of Methicillin Resistant Staphylococcus aureus    WOUNDCULT Few Colonies of Mixed Skin Kaitlyn 03/24/2017       Last 24 Hours Medication List:     EMS replenish medication  Does not apply Once   acetaminophen 488 mg Oral Q12H   amLODIPine 5 mg Oral Daily   apixaban 2 5 mg Oral BID   bimatoprost 1 drop Both Eyes HS   brimonidine 1 drop Both Eyes BID   calcitriol 0 25 mcg Oral Daily   cholecalciferol 1,000 Units Oral Daily   eplerenone 25 mg Oral Daily   fluticasone 1 spray Nasal Daily   gabapentin 100 mg Oral BID   gabapentin 300 mg Oral HS   latanoprost 1 drop Both Eyes HS   levothyroxine 88 mcg Oral Early Morning   loratadine 10 mg Oral Daily   melatonin 6 mg Oral HS   metoprolol tartrate 50 mg Oral BID OXcarbazepine 150 mg Oral HS   pantoprazole 40 mg Oral Early Morning   potassium chloride 40 mEq Oral BID   rOPINIRole 1 mg Oral Q8H [address]   schraggers 1 application Topical Once   tamsulosin 0 4 mg Oral Daily With Dinner   torsemide 20 mg Oral BID (diuretic)        Today, Patient Was Seen By: Jami Moreland MD    ** Please Note: Dragon 360 Dictation voice to text software may have been used in the creation of this document   **

## 2018-01-27 NOTE — PROGRESS NOTES
Pt in bed after receiving nightly medications  C/O pain rr/t arthritis and low back pain 7/10  Given PRN pain medication

## 2018-01-27 NOTE — SOCIAL WORK
Pt medically stable to d/c  CM phoned April @ [facility] re: whether facility can accept pt back for resumption of care  April is per teodora nurse unfamiliar with pt  Stated she will phone supervisor and call CM back re: whether [facility] can accept pt back today for resumption of care

## 2018-01-27 NOTE — SOCIAL WORK
Per April @ [facility] cannot accept pt due to mobility concerns as [facility] is not skilled nursing facility  Discussed w/pt and son Silvino Martinez @ bedside  Provided SNF list for additional choices  CM left for pt's daughter David Mcneal re: same

## 2018-01-27 NOTE — ASSESSMENT & PLAN NOTE
· Stable and better  · Baseline creatinine appears around 1 5  She is known to Dr Suzie Duran  · Recheck BMP in the future

## 2018-01-27 NOTE — ASSESSMENT & PLAN NOTE
· Stable  · No active bleeding  · Likely component of hemodilution from IV fluids  · Likely anemia of chronic kidney disease  · Recheck in the future

## 2018-01-28 PROCEDURE — 99232 SBSQ HOSP IP/OBS MODERATE 35: CPT | Performed by: INTERNAL MEDICINE

## 2018-01-28 RX ORDER — ONDANSETRON 4 MG/1
4 TABLET, ORALLY DISINTEGRATING ORAL EVERY 8 HOURS PRN
Status: DISCONTINUED | OUTPATIENT
Start: 2018-01-28 | End: 2018-01-30 | Stop reason: HOSPADM

## 2018-01-28 RX ADMIN — LATANOPROST 1 DROP: 50 SOLUTION OPHTHALMIC at 20:56

## 2018-01-28 RX ADMIN — AMLODIPINE BESYLATE 5 MG: 5 TABLET ORAL at 14:48

## 2018-01-28 RX ADMIN — ROPINIROLE 1 MG: 1 TABLET, FILM COATED ORAL at 14:47

## 2018-01-28 RX ADMIN — FLUTICASONE PROPIONATE 1 SPRAY: 50 SPRAY, METERED NASAL at 09:46

## 2018-01-28 RX ADMIN — TORSEMIDE 20 MG: 20 TABLET ORAL at 14:49

## 2018-01-28 RX ADMIN — ROPINIROLE 1 MG: 1 TABLET, FILM COATED ORAL at 06:38

## 2018-01-28 RX ADMIN — MELATONIN TAB 3 MG 6 MG: 3 TAB at 20:54

## 2018-01-28 RX ADMIN — TAMSULOSIN HYDROCHLORIDE 0.4 MG: 0.4 CAPSULE ORAL at 18:33

## 2018-01-28 RX ADMIN — METOPROLOL TARTRATE 50 MG: 50 TABLET ORAL at 20:53

## 2018-01-28 RX ADMIN — ACETAMINOPHEN 488 MG: 325 TABLET, FILM COATED ORAL at 14:49

## 2018-01-28 RX ADMIN — APIXABAN 2.5 MG: 2.5 TABLET, FILM COATED ORAL at 14:49

## 2018-01-28 RX ADMIN — POTASSIUM CHLORIDE 40 MEQ: 1500 TABLET, EXTENDED RELEASE ORAL at 14:47

## 2018-01-28 RX ADMIN — ROPINIROLE 1 MG: 1 TABLET, FILM COATED ORAL at 20:56

## 2018-01-28 RX ADMIN — VITAMIN D, TAB 1000IU (100/BT) 1000 UNITS: 25 TAB at 14:49

## 2018-01-28 RX ADMIN — APIXABAN 2.5 MG: 2.5 TABLET, FILM COATED ORAL at 20:53

## 2018-01-28 RX ADMIN — METOPROLOL TARTRATE 50 MG: 50 TABLET ORAL at 14:49

## 2018-01-28 RX ADMIN — TORSEMIDE 20 MG: 20 TABLET ORAL at 18:33

## 2018-01-28 RX ADMIN — GABAPENTIN 100 MG: 100 CAPSULE ORAL at 14:48

## 2018-01-28 RX ADMIN — CALCITRIOL 0.25 MCG: 0.25 CAPSULE, LIQUID FILLED ORAL at 14:47

## 2018-01-28 RX ADMIN — BIMATOPROST 1 DROP: 0.1 SOLUTION/ DROPS OPHTHALMIC at 20:57

## 2018-01-28 RX ADMIN — LORATADINE 10 MG: 10 TABLET ORAL at 14:49

## 2018-01-28 RX ADMIN — GABAPENTIN 300 MG: 300 CAPSULE ORAL at 20:54

## 2018-01-28 RX ADMIN — EPLERENONE 25 MG: 25 TABLET, FILM COATED ORAL at 14:47

## 2018-01-28 RX ADMIN — LEVOTHYROXINE SODIUM 88 MCG: 88 TABLET ORAL at 06:38

## 2018-01-28 RX ADMIN — OXCARBAZEPINE 150 MG: 150 TABLET ORAL at 20:55

## 2018-01-28 RX ADMIN — POTASSIUM CHLORIDE 40 MEQ: 1500 TABLET, EXTENDED RELEASE ORAL at 20:53

## 2018-01-28 RX ADMIN — ACETAMINOPHEN 488 MG: 325 TABLET, FILM COATED ORAL at 06:37

## 2018-01-28 RX ADMIN — PANTOPRAZOLE SODIUM 40 MG: 40 TABLET, DELAYED RELEASE ORAL at 06:38

## 2018-01-28 RX ADMIN — BRIMONIDINE TARTRATE 1 DROP: 1.5 SOLUTION OPHTHALMIC at 20:56

## 2018-01-28 RX ADMIN — BRIMONIDINE TARTRATE 1 DROP: 1.5 SOLUTION OPHTHALMIC at 09:46

## 2018-01-28 NOTE — ASSESSMENT & PLAN NOTE
· Stable and better  · Baseline creatinine appears around 1 5  She is known to Dr Lemus Moore  · Recheck BMP in the future

## 2018-01-28 NOTE — ASSESSMENT & PLAN NOTE
· No more diarrhea  However, patient had an episode of nausea and vomiting earlier today  · Likely acute viral gastroenteritis according to GI  · CT abdomen with no acute pathology  Normal lactate level on admission  · C  Diff negative  · Enteric stool panel negative  · Continue present diet  Tolerating well     · May have Imodium  · Antiemetic as needed  I explained this to the patient

## 2018-01-28 NOTE — ASSESSMENT & PLAN NOTE
· Improving   · Followed by her dermatologist  Uses topical cream the name of which she can't recall

## 2018-01-28 NOTE — PROGRESS NOTES
Progress Note - Chely Reyes 6/14/1931, 80 y o  female MRN: [id number]    Unit/Bed#: [facility] 811-06 Encounter: [id number]    Primary Care Provider: Ethan Amador MD   Date and time admitted to hospital: 1/22/2018  7:33 AM        Hypokalemia   Assessment & Plan    · Resolved  Anemia   Assessment & Plan    · Stable  · No active bleeding  · Likely component of hemodilution from IV fluids  · Likely anemia of chronic kidney disease  · Recheck in the future  Moderate mitral regurgitation   Assessment & Plan    · Follow up with primary cardiology, Dr Marc Molina        Facial rash   Assessment & Plan    · Improving   · Followed by her dermatologist  Uses topical cream the name of which she can't recall        CKD (chronic kidney disease) stage 3, GFR 30-59 ml/min   Assessment & Plan    · Stable and better  · Baseline creatinine appears around 1 5  She is known to Dr Jaci Jones  · Recheck BMP in the future  Atrial fibrillation ([address] )   Assessment & Plan    · Rate controlled  Continue Eliquis  · Known to Dr Marc Molina        Hemiparesis affecting left side as late effect of stroke [facility])   Assessment & Plan    · Noted on prior admissions/baseline  Restless leg   Assessment & Plan    · Continue home meds        Hypertension   Assessment & Plan    · Continue home meds  · Follow up with nephrology        Chronic diastolic CHF (congestive heart failure) ([facility])   Assessment & Plan    · Monitor fluid status closely given that she was given 2 5L fluids in ED  · Continue torsemide 20mg BID  · No shortness of breath anymore  Hypothyroidism   Assessment & Plan    Normal TSH  Continue Synthroid  * Abdominal pain, vomiting, and diarrhea   Assessment & Plan    · No more diarrhea  However, patient had an episode of nausea and vomiting earlier today  · Likely acute viral gastroenteritis according to GI  · CT abdomen with no acute pathology  Normal lactate level on admission  · C   Diff negative  · Enteric stool panel negative  · Continue present diet  Tolerating well     · May have Imodium  · Antiemetic as needed  I explained this to the patient  VTE Pharmacologic Prophylaxis:   Pharmacologic: Apixaban (Eliquis)  Mechanical VTE Prophylaxis in Place: Yes    Patient Centered Rounds: I have performed bedside rounds with nursing staff today  Discussions with Specialists or Other Care Team Provider:     Education and Discussions with Family / Patient:  Patient  I offered to call patient's family but patient declined  Time Spent for Care: 30 minutes  More than 50% of total time spent on counseling and coordination of care as described above  Current Length of Stay: 5 day(s)    Current Patient Status: Inpatient   Certification Statement: The patient will continue to require additional inpatient hospital stay due to Above findings and plans  Discharge Plan:  As per discussion with case management, no placement yet for this patient  According to case management, this may happen tomorrow  Code Status: Level 1 - Full Code      Subjective:   Presently, patient is doing fine and feels a lot better  Patient had an episode of nausea vomiting earlier today but none since then  No diarrhea  No abdominal pains  No other pains  No shortness of breath  Objective:     Vitals:   Temp (24hrs), Av 9 °F (36 6 °C), Min:97 6 °F (36 4 °C), Max:98 3 °F (36 8 °C)    HR:  [75-86] 86  Resp:  [20-22] 20  BP: (103-140)/(48-80) 140/80  SpO2:  [96 %-97 %] 97 %  Body mass index is 29 63 kg/m²  Input and Output Summary (last 24 hours): Intake/Output Summary (Last 24 hours) at 18 1341  Last data filed at 18 0700   Gross per 24 hour   Intake              360 ml   Output              675 ml   Net             -315 ml       Physical Exam:     Physical Exam   Constitutional: She is oriented to person, place, and time  No distress     HENT:   Head: Normocephalic and atraumatic  Eyes: Right eye exhibits no discharge  Left eye exhibits no discharge  No scleral icterus  Neck: No JVD present  No tracheal deviation present  Cardiovascular: Normal rate  Exam reveals no gallop and no friction rub  No murmur heard  Irregularly irregular heart rhythm  Pulmonary/Chest: Effort normal and breath sounds normal  No stridor  No respiratory distress  She has no wheezes  She has no rales  Abdominal: Soft  Bowel sounds are normal  She exhibits no distension  There is no tenderness  There is no rebound and no guarding  Musculoskeletal: She exhibits no edema or tenderness  Neurological: She is alert and oriented to person, place, and time  No cranial nerve deficit  Chronic left-sided weakness from a previous stroke (at baseline)  Skin: Skin is warm  She is not diaphoretic  No pallor  Positive for chronic facial erythema/rash (improving as compared to my examination is this past few days )   Psychiatric: She has a normal mood and affect  Her behavior is normal  Thought content normal    Vitals reviewed  Additional Data:     Labs:      Results from last 7 days  Lab Units 01/26/18  0630  01/22/18  0923   WBC Thousand/uL 7 62  < > 11 27*   HEMOGLOBIN g/dL 10 7*  < > 11 1*   HEMATOCRIT % 32 0*  < > 33 3*   PLATELETS Thousands/uL 241  < > 264   NEUTROS PCT %  --   --  87*   LYMPHS PCT %  --   --  6*   MONOS PCT %  --   --  5   EOS PCT %  --   --  2   < > = values in this interval not displayed      Results from last 7 days  Lab Units 01/26/18  0630  01/22/18  0923   SODIUM mmol/L 139  < > 138   POTASSIUM mmol/L 3 6  < > 4 1   CHLORIDE mmol/L 105  < > 105   CO2 mmol/L 26  < > 28   BUN mg/dL 23  < > 48*   CREATININE mg/dL 1 24  < > 1 71*   CALCIUM mg/dL 8 7  < > 9 0   TOTAL PROTEIN g/dL  --   --  7 9   BILIRUBIN TOTAL mg/dL  --   --  0 34   ALK PHOS U/L  --   --  75   ALT U/L  --   --  11*   AST U/L  --   --  16   GLUCOSE RANDOM mg/dL 90  < > 108   < > = values in this interval not displayed  Results from last 7 days  Lab Units 01/22/18  0923   INR  1 21*       * I Have Reviewed All Lab Data Listed Above  * Additional Pertinent Lab Tests Reviewed: Deepika [address] Admission Reviewed    Imaging:    Imaging Reports Reviewed Today Include:  Diagnostic imaging studies that were done on this admission  Imaging Personally Reviewed by Myself Includes:  None  Recent Cultures (last 7 days):       Results from last 7 days  Lab Units 01/22/18  1229   C DIFF TOXIN B  NEGATIVE for C difficle toxin by PCR  Last 24 Hours Medication List:     EMS replenish medication  Does not apply Once   acetaminophen 488 mg Oral Q12H   amLODIPine 5 mg Oral Daily   apixaban 2 5 mg Oral BID   bimatoprost 1 drop Both Eyes HS   brimonidine 1 drop Both Eyes BID   calcitriol 0 25 mcg Oral Daily   cholecalciferol 1,000 Units Oral Daily   eplerenone 25 mg Oral Daily   fluticasone 1 spray Nasal Daily   gabapentin 100 mg Oral BID   gabapentin 300 mg Oral HS   latanoprost 1 drop Both Eyes HS   levothyroxine 88 mcg Oral Early Morning   loratadine 10 mg Oral Daily   melatonin 6 mg Oral HS   metoprolol tartrate 50 mg Oral BID   OXcarbazepine 150 mg Oral HS   pantoprazole 40 mg Oral Early Morning   potassium chloride 40 mEq Oral BID   rOPINIRole 1 mg Oral Q8H [facility]   schraggers 1 application Topical Once   tamsulosin 0 4 mg Oral Daily With Dinner   torsemide 20 mg Oral BID (diuretic)        Today, Patient Was Seen By: Matilde Castleman, MD    ** Please Note: Dragon 360 Dictation voice to text software may have been used in the creation of this document   **

## 2018-01-28 NOTE — CASE MANAGEMENT
Continued Stay Review    Date:     Vital Signs: Temp (24hrs), Av 8 °F (36 6 °C), Min:97 6 °F (36 4 °C), Max:97 9 °F (36 6 °C)     HR:  [76-83] 76  Resp:  [18-20] 20  BP: (103-140)/(48-60) 103/48  SpO2:  [95 %-96 %] 96 %  Body mass index is 29 63 kg/m²  Medications:   Scheduled Meds:   EMS replenish medication  Does not apply Once   acetaminophen 488 mg Oral Q12H   amLODIPine 5 mg Oral Daily   apixaban 2 5 mg Oral BID   bimatoprost 1 drop Both Eyes HS   brimonidine 1 drop Both Eyes BID   calcitriol 0 25 mcg Oral Daily   cholecalciferol 1,000 Units Oral Daily   eplerenone 25 mg Oral Daily   fluticasone 1 spray Nasal Daily   gabapentin 100 mg Oral BID   gabapentin 300 mg Oral HS   latanoprost 1 drop Both Eyes HS   levothyroxine 88 mcg Oral Early Morning   loratadine 10 mg Oral Daily   melatonin 6 mg Oral HS   metoprolol tartrate 50 mg Oral BID   OXcarbazepine 150 mg Oral HS   pantoprazole 40 mg Oral Early Morning   potassium chloride 40 mEq Oral BID   rOPINIRole 1 mg Oral Q8H [address]   schraggers 1 application Topical Once   tamsulosin 0 4 mg Oral Daily With Dinner   torsemide 20 mg Oral BID (diuretic)     Continuous Infusions:    PRN Meds: acetaminophen    diphenhydrAMINE    loperamide    ondansetron    traMADol    Abnormal Labs/Diagnostic Results:      Hgb 10 7, Hct 32 0    Age/Sex: 80 y o  female     Assessment/Plan:   Hypokalemia   Assessment & Plan     · Resolved             Anemia   Assessment & Plan     · Stable  · No active bleeding  · Likely component of hemodilution from IV fluids  · Likely anemia of chronic kidney disease    · Recheck in the future           Moderate mitral regurgitation   Assessment & Plan     · Follow up with primary cardiology, Dr Landy Landrum          Facial rash   Assessment & Plan     · Followed by her dermatologist  Uses topical cream the name of which she can't recall          CKD (chronic kidney disease) stage 3, GFR 30-59 ml/min   Assessment & Plan     · Stable and better  · Baseline creatinine appears around 1 5  She is known to Dr Can Molina  · Recheck BMP in the future             Atrial fibrillation [facility])   Assessment & Plan     · Rate controlled  Continue Eliquis  · Known to Dr Lindy Mcfadden          Hemiparesis affecting left side as late effect of stroke [facility])   Assessment & Plan     · Noted on prior admissions          Restless leg   Assessment & Plan     · Continue home meds          Hypertension   Assessment & Plan     · Continue home meds  · Follow up with nephrology          Chronic diastolic CHF (congestive heart failure) ([facility])   Assessment & Plan     · Monitor fluid status closely given that she was given 2 5L fluids in ED  · Continue torsemide 20mg BID  · No shortness of breath anymore           Hypothyroidism   Assessment & Plan     Normal TSH  Continue Synthroid           * Abdominal pain, vomiting, and diarrhea   Assessment & Plan     · All resolved  · Likely acute viral gastroenteritis according to GI  · CT abdomen with no acute pathology  Normal lactate level on admission  · C  Diff negative  · Enteric stool panel pending  · Continue present diet  Tolerating well     · May have Imodium                VTE Pharmacologic Prophylaxis:   Pharmacologic: Apixaban (Eliquis)  Mechanical VTE Prophylaxis in Place: Yes    Discharge Plan: TBD

## 2018-01-29 PROCEDURE — 99232 SBSQ HOSP IP/OBS MODERATE 35: CPT | Performed by: INTERNAL MEDICINE

## 2018-01-29 RX ADMIN — MELATONIN TAB 3 MG 6 MG: 3 TAB at 21:03

## 2018-01-29 RX ADMIN — LORATADINE 10 MG: 10 TABLET ORAL at 09:44

## 2018-01-29 RX ADMIN — EPLERENONE 25 MG: 25 TABLET, FILM COATED ORAL at 09:43

## 2018-01-29 RX ADMIN — FLUTICASONE PROPIONATE 1 SPRAY: 50 SPRAY, METERED NASAL at 09:43

## 2018-01-29 RX ADMIN — GABAPENTIN 100 MG: 100 CAPSULE ORAL at 09:44

## 2018-01-29 RX ADMIN — POTASSIUM CHLORIDE 40 MEQ: 1500 TABLET, EXTENDED RELEASE ORAL at 09:44

## 2018-01-29 RX ADMIN — APIXABAN 2.5 MG: 2.5 TABLET, FILM COATED ORAL at 09:44

## 2018-01-29 RX ADMIN — POTASSIUM CHLORIDE 40 MEQ: 1500 TABLET, EXTENDED RELEASE ORAL at 18:55

## 2018-01-29 RX ADMIN — TAMSULOSIN HYDROCHLORIDE 0.4 MG: 0.4 CAPSULE ORAL at 16:41

## 2018-01-29 RX ADMIN — AMLODIPINE BESYLATE 5 MG: 5 TABLET ORAL at 09:44

## 2018-01-29 RX ADMIN — TORSEMIDE 20 MG: 20 TABLET ORAL at 09:44

## 2018-01-29 RX ADMIN — ACETAMINOPHEN 488 MG: 325 TABLET, FILM COATED ORAL at 16:41

## 2018-01-29 RX ADMIN — OXCARBAZEPINE 150 MG: 150 TABLET ORAL at 21:02

## 2018-01-29 RX ADMIN — METOPROLOL TARTRATE 50 MG: 50 TABLET ORAL at 09:44

## 2018-01-29 RX ADMIN — ROPINIROLE 1 MG: 1 TABLET, FILM COATED ORAL at 21:02

## 2018-01-29 RX ADMIN — METOPROLOL TARTRATE 50 MG: 50 TABLET ORAL at 18:54

## 2018-01-29 RX ADMIN — GABAPENTIN 300 MG: 300 CAPSULE ORAL at 21:03

## 2018-01-29 RX ADMIN — BRIMONIDINE TARTRATE 1 DROP: 1.5 SOLUTION OPHTHALMIC at 18:54

## 2018-01-29 RX ADMIN — APIXABAN 2.5 MG: 2.5 TABLET, FILM COATED ORAL at 18:54

## 2018-01-29 RX ADMIN — BRIMONIDINE TARTRATE 1 DROP: 1.5 SOLUTION OPHTHALMIC at 09:43

## 2018-01-29 RX ADMIN — TRAMADOL HYDROCHLORIDE 50 MG: 50 TABLET, FILM COATED ORAL at 14:29

## 2018-01-29 RX ADMIN — ACETAMINOPHEN 488 MG: 325 TABLET, FILM COATED ORAL at 05:01

## 2018-01-29 RX ADMIN — CALCITRIOL 0.25 MCG: 0.25 CAPSULE, LIQUID FILLED ORAL at 09:44

## 2018-01-29 RX ADMIN — LEVOTHYROXINE SODIUM 88 MCG: 88 TABLET ORAL at 05:01

## 2018-01-29 RX ADMIN — LATANOPROST 1 DROP: 50 SOLUTION OPHTHALMIC at 21:05

## 2018-01-29 RX ADMIN — VITAMIN D, TAB 1000IU (100/BT) 1000 UNITS: 25 TAB at 09:44

## 2018-01-29 RX ADMIN — PANTOPRAZOLE SODIUM 40 MG: 40 TABLET, DELAYED RELEASE ORAL at 05:01

## 2018-01-29 RX ADMIN — GABAPENTIN 100 MG: 100 CAPSULE ORAL at 18:54

## 2018-01-29 RX ADMIN — ROPINIROLE 1 MG: 1 TABLET, FILM COATED ORAL at 05:01

## 2018-01-29 RX ADMIN — BIMATOPROST 1 DROP: 0.1 SOLUTION/ DROPS OPHTHALMIC at 21:05

## 2018-01-29 RX ADMIN — ROPINIROLE 1 MG: 1 TABLET, FILM COATED ORAL at 14:29

## 2018-01-29 NOTE — PROGRESS NOTES
Progress Note - [facility] 6/14/1931, 80 y o  female MRN: [id number]    Unit/Bed#: [facility] 711-20 Encounter: [id number]    Primary Care Provider: Beryle Bicker, MD   Date and time admitted to hospital: 1/22/2018  7:33 AM        * Abdominal pain, vomiting, and diarrhea   Assessment & Plan    · All resolved   · Likely acute viral gastroenteritis according to GI  · CT abdomen with no acute pathology  Normal lactate level on admission  · C  Diff negative  · Enteric stool panel negative  · Continue present diet  Tolerating well     · May have Imodium  · Antiemetic as needed  Anemia   Assessment & Plan    · Stable  · No active bleeding  · Likely component of hemodilution from IV fluids  · Likely anemia of chronic kidney disease  · Recheck in the future  CKD (chronic kidney disease) stage 3, GFR 30-59 ml/min   Assessment & Plan    · Stable and better  · Baseline creatinine appears around 1 5  She is known to Dr Suzie Duran  · Recheck BMP in the future  Atrial fibrillation ([facility] [address] )   Assessment & Plan    · Rate controlled  · Continue Eliquis  · Known to Dr Gerardo Barrett        Chronic diastolic CHF (congestive heart failure) ([facility])   Assessment & Plan    · Monitor fluid status closely given that she was given 2 5L fluids in ED  · Continue torsemide 20mg BID  · No shortness of breath anymore  Hypokalemia   Assessment & Plan    · Resolved  Moderate mitral regurgitation   Assessment & Plan    · Follow up with primary cardiology, Dr Gerardo Barrett        Facial rash   Assessment & Plan    · Improving   · Followed by her dermatologist  Uses topical cream the name of which she can't recall  Hemiparesis affecting left side as late effect of stroke [facility])   Assessment & Plan    · Noted on prior admissions/baseline          Restless leg   Assessment & Plan    · Continue home meds        Hypertension   Assessment & Plan    · Continue home meds  · Follow up with nephrology Hypothyroidism   Assessment & Plan    Normal TSH  Continue Synthroid  VTE Pharmacologic Prophylaxis:   Pharmacologic: Apixaban (Eliquis)  Mechanical VTE Prophylaxis in Place: Yes    Patient Centered Rounds: I have performed bedside rounds with nursing staff today  Discussions with Specialists or Other Care Team Provider:  Case management  Education and Discussions with Family / Patient:  Patient  I offered to call patient's family but patient declined    Time Spent for Care: 20 minutes  More than 50% of total time spent on counseling and coordination of care as described above  Current Length of Stay: 6 day(s)    Current Patient Status: Inpatient   Certification Statement: The patient will continue to require additional inpatient hospital stay due to Above findings and plans  Discharge Plan:  No placement yet as per case management  [facility] still not accepting patients due to outbreak of flu  Patient [facility] not [facility] to [facility]  Case management sent for other referrals  Code Status: Level 1 - Full Code      Subjective:   Patient is doing fine and a lot better  Patient denies any more nausea, vomiting, diarrhea or abdominal pains  No other complaints  No shortness of breath  No other pains  Objective:     Vitals:   Temp (24hrs), Av 5 °F (36 4 °C), Min:97 2 °F (36 2 °C), Max:97 8 °F (36 6 °C)    HR:  [68-79] 79  Resp:  [18-21] 18  BP: (110-132)/(56-62) 110/61  SpO2:  [95 %-96 %] 96 %  Body mass index is 29 63 kg/m²  Input and Output Summary (last 24 hours): Intake/Output Summary (Last 24 hours) at 18 1830  Last data filed at 18 1630   Gross per 24 hour   Intake              240 ml   Output             1350 ml   Net            -1110 ml       Physical Exam:     Physical Exam   Constitutional: She is oriented to person, place, and time  No distress  HENT:   Head: Normocephalic and atraumatic     Eyes: Right eye exhibits no discharge  Left eye exhibits no discharge  No scleral icterus  Neck: No JVD present  No tracheal deviation present  Cardiovascular: Normal rate  Exam reveals no gallop and no friction rub  No murmur heard  Irregularly irregular heart rhythm  Pulmonary/Chest: Effort normal and breath sounds normal  No stridor  No respiratory distress  She has no wheezes  She has no rales  Abdominal: Soft  Bowel sounds are normal  She exhibits no distension  There is no tenderness  There is no rebound and no guarding  Musculoskeletal: She exhibits no edema or tenderness  Neurological: She is alert and oriented to person, place, and time  No cranial nerve deficit  Chronic left-sided weakness (baseline from previous stroke)  Skin: Skin is warm  She is not diaphoretic  No pallor  Positive for chronic facial erythema/rash (improving as compared to my examination this past few days )    Psychiatric: She has a normal mood and affect  Her behavior is normal  Thought content normal    Patient is pleasant  Vitals reviewed  Additional Data:     Labs:      Results from last 7 days  Lab Units 01/26/18  0630   WBC Thousand/uL 7 62   HEMOGLOBIN g/dL 10 7*   HEMATOCRIT % 32 0*   PLATELETS Thousands/uL 241       Results from last 7 days  Lab Units 01/26/18  0630   SODIUM mmol/L 139   POTASSIUM mmol/L 3 6   CHLORIDE mmol/L 105   CO2 mmol/L 26   BUN mg/dL 23   CREATININE mg/dL 1 24   CALCIUM mg/dL 8 7   GLUCOSE RANDOM mg/dL 90           * I Have Reviewed All Lab Data Listed Above  * Additional Pertinent Lab Tests Reviewed: Deepika 66 Admission Reviewed    Imaging:    Imaging Reports Reviewed Today Include:  Diagnostic imaging studies that were done on this admission  Imaging Personally Reviewed by Myself Includes:  None      Recent Cultures (last 7 days):           Last 24 Hours Medication List:     EMS replenish medication  Does not apply Once   acetaminophen 488 mg Oral Q12H   amLODIPine 5 mg Oral Daily   apixaban 2 5 mg Oral BID   bimatoprost 1 drop Both Eyes HS   brimonidine 1 drop Both Eyes BID   calcitriol 0 25 mcg Oral Daily   cholecalciferol 1,000 Units Oral Daily   eplerenone 25 mg Oral Daily   fluticasone 1 spray Nasal Daily   gabapentin 100 mg Oral BID   gabapentin 300 mg Oral HS   latanoprost 1 drop Both Eyes HS   levothyroxine 88 mcg Oral Early Morning   loratadine 10 mg Oral Daily   melatonin 6 mg Oral HS   metoprolol tartrate 50 mg Oral BID   OXcarbazepine 150 mg Oral HS   pantoprazole 40 mg Oral Early Morning   potassium chloride 40 mEq Oral BID   rOPINIRole 1 mg Oral Q8H [facility]   schraggers 1 application Topical Once   tamsulosin 0 4 mg Oral Daily With Dinner   torsemide 20 mg Oral BID (diuretic)        Today, Patient Was Seen By: Avery Vera MD    ** Please Note: Dragon 360 Dictation voice to text software may have been used in the creation of this document   **

## 2018-01-29 NOTE — SOCIAL WORK
Per Alison Guerra @  facility closed to admissions for RSV/Flu  CM able to check back tomorrow re: change in status  Awaiting response from [facility]  CM updated pt's son Sherron Nelida [phone] re: same and requested additional choices  Sherron Krause is preferred contact as sibling Mihir Jordan unable to take calls  CM received t/c from [facility] requesting to be kept updated re: where pt's STR plan

## 2018-01-29 NOTE — RESTORATIVE TECHNICIAN NOTE
Restorative Specialist Mobility Note                      Repositioned: Other (Comment) (Rep /sat pt upright in bed  Bed alarm on   Pt callbell, phone/tray within reach )          Cara BLAIR, Restorative Technician, [employer]

## 2018-01-29 NOTE — PLAN OF CARE
DISCHARGE PLANNING - CARE MANAGEMENT     Discharge to post-acute care or home with appropriate resources Progressing        GASTROINTESTINAL - ADULT     Maintains adequate nutritional intake Progressing        Potential for Falls     Patient will remain free of falls Progressing        Prexisting or High Potential for Compromised Skin Integrity     Skin integrity is maintained or improved Progressing

## 2018-01-29 NOTE — ASSESSMENT & PLAN NOTE
· Stable and better  · Baseline creatinine appears around 1 5  She is known to Dr Chelsea Quinones  · Recheck BMP in the future

## 2018-01-29 NOTE — ASSESSMENT & PLAN NOTE
· All resolved   · Likely acute viral gastroenteritis according to GI  · CT abdomen with no acute pathology  Normal lactate level on admission  · C  Diff negative  · Enteric stool panel negative  · Continue present diet  Tolerating well     · May have Imodium  · Antiemetic as needed

## 2018-01-30 VITALS
DIASTOLIC BLOOD PRESSURE: 73 MMHG | BODY MASS INDEX: 29.47 KG/M2 | RESPIRATION RATE: 18 BRPM | TEMPERATURE: 97.6 F | WEIGHT: 172.62 LBS | HEART RATE: 83 BPM | HEIGHT: 64 IN | OXYGEN SATURATION: 96 % | SYSTOLIC BLOOD PRESSURE: 124 MMHG

## 2018-01-30 PROBLEM — E87.6 HYPOKALEMIA: Status: RESOLVED | Noted: 2018-01-24 | Resolved: 2018-01-30

## 2018-01-30 PROBLEM — R10.9 ABDOMINAL PAIN, VOMITING, AND DIARRHEA: Status: RESOLVED | Noted: 2018-01-22 | Resolved: 2018-01-30

## 2018-01-30 PROBLEM — R19.7 ABDOMINAL PAIN, VOMITING, AND DIARRHEA: Status: RESOLVED | Noted: 2018-01-22 | Resolved: 2018-01-30

## 2018-01-30 PROBLEM — R11.10 ABDOMINAL PAIN, VOMITING, AND DIARRHEA: Status: RESOLVED | Noted: 2018-01-22 | Resolved: 2018-01-30

## 2018-01-30 PROBLEM — R21 FACIAL RASH: Status: RESOLVED | Noted: 2018-01-22 | Resolved: 2018-01-30

## 2018-01-30 PROCEDURE — 99239 HOSP IP/OBS DSCHRG MGMT >30: CPT | Performed by: GENERAL PRACTICE

## 2018-01-30 RX ORDER — ONDANSETRON 4 MG/1
4 TABLET, ORALLY DISINTEGRATING ORAL EVERY 8 HOURS PRN
Qty: 20 TABLET | Refills: 0
Start: 2018-01-30 | End: 2018-03-29 | Stop reason: ALTCHOICE

## 2018-01-30 RX ORDER — TORSEMIDE 20 MG/1
20 TABLET ORAL
Refills: 0
Start: 2018-01-30 | End: 2018-03-20 | Stop reason: SDUPTHER

## 2018-01-30 RX ORDER — TRAMADOL HYDROCHLORIDE 50 MG/1
50 TABLET ORAL EVERY 12 HOURS PRN
Qty: 5 TABLET | Refills: 0 | Status: SHIPPED | OUTPATIENT
Start: 2018-01-30 | End: 2018-02-09

## 2018-01-30 RX ORDER — POTASSIUM CHLORIDE 20 MEQ/1
40 TABLET, EXTENDED RELEASE ORAL 2 TIMES DAILY
Refills: 0
Start: 2018-01-30 | End: 2018-03-20 | Stop reason: ALTCHOICE

## 2018-01-30 RX ORDER — ROPINIROLE 1 MG/1
1 TABLET, FILM COATED ORAL EVERY 8 HOURS SCHEDULED
Refills: 0
Start: 2018-01-30 | End: 2018-03-29 | Stop reason: ALTCHOICE

## 2018-01-30 RX ADMIN — APIXABAN 2.5 MG: 2.5 TABLET, FILM COATED ORAL at 09:16

## 2018-01-30 RX ADMIN — ACETAMINOPHEN 488 MG: 325 TABLET, FILM COATED ORAL at 05:18

## 2018-01-30 RX ADMIN — FLUTICASONE PROPIONATE 1 SPRAY: 50 SPRAY, METERED NASAL at 09:15

## 2018-01-30 RX ADMIN — EPLERENONE 25 MG: 25 TABLET, FILM COATED ORAL at 09:14

## 2018-01-30 RX ADMIN — METOPROLOL TARTRATE 50 MG: 50 TABLET ORAL at 09:15

## 2018-01-30 RX ADMIN — ONDANSETRON 4 MG: 4 TABLET, ORALLY DISINTEGRATING ORAL at 19:56

## 2018-01-30 RX ADMIN — CALCITRIOL 0.25 MCG: 0.25 CAPSULE, LIQUID FILLED ORAL at 09:14

## 2018-01-30 RX ADMIN — TORSEMIDE 20 MG: 20 TABLET ORAL at 08:26

## 2018-01-30 RX ADMIN — BRIMONIDINE TARTRATE 1 DROP: 1.5 SOLUTION OPHTHALMIC at 09:15

## 2018-01-30 RX ADMIN — VITAMIN D, TAB 1000IU (100/BT) 1000 UNITS: 25 TAB at 09:15

## 2018-01-30 RX ADMIN — PANTOPRAZOLE SODIUM 40 MG: 40 TABLET, DELAYED RELEASE ORAL at 05:18

## 2018-01-30 RX ADMIN — POTASSIUM CHLORIDE 40 MEQ: 1500 TABLET, EXTENDED RELEASE ORAL at 09:16

## 2018-01-30 RX ADMIN — TRAMADOL HYDROCHLORIDE 50 MG: 50 TABLET, FILM COATED ORAL at 05:22

## 2018-01-30 RX ADMIN — ROPINIROLE 1 MG: 1 TABLET, FILM COATED ORAL at 13:05

## 2018-01-30 RX ADMIN — ROPINIROLE 1 MG: 1 TABLET, FILM COATED ORAL at 05:23

## 2018-01-30 RX ADMIN — GABAPENTIN 100 MG: 100 CAPSULE ORAL at 09:15

## 2018-01-30 RX ADMIN — LORATADINE 10 MG: 10 TABLET ORAL at 09:15

## 2018-01-30 RX ADMIN — AMLODIPINE BESYLATE 5 MG: 5 TABLET ORAL at 09:15

## 2018-01-30 RX ADMIN — LEVOTHYROXINE SODIUM 88 MCG: 88 TABLET ORAL at 05:22

## 2018-01-30 NOTE — DISCHARGE SUMMARY
Discharge- Carolyne Villarreal 6/14/1931, 80 y o  female MRN: [id number]    Unit/Bed#: DISCHARGE [place] Encounter: [id number]    Primary Care Provider: Alona Boykin MD   Date and time admitted to hospital: 1/22/2018  7:33 AM        * Abdominal pain, vomiting, and diarrhea-resolved as of 1/30/2018   Assessment & Plan    · All resolved   · Likely acute viral gastroenteritis according to GI  · CT abdomen with no acute pathology  Normal lactate level on admission  · C  Diff negative  · Enteric stool panel negative  · Continue present diet  Tolerating well     · May have Imodium  · Antiemetic as needed  Anemia   Assessment & Plan    · Stable  · No active bleeding  · Likely component of hemodilution from IV fluids  · Likely anemia of chronic kidney disease  · Recheck in the future  Moderate mitral regurgitation   Assessment & Plan    · Follow up with primary cardiology, Dr Tennille Russell        CKD (chronic kidney disease) stage 3, GFR 30-59 ml/min   Assessment & Plan    · Stable and better  · Baseline creatinine appears around 1 5  She is known to Dr Сергей Duncan  · Recheck BMP in the future  Atrial fibrillation ([facility] [address] )   Assessment & Plan    · Rate controlled  · Continue Eliquis  · Known to Dr Tennille Russell        Hemiparesis affecting left side as late effect of stroke [facility])   Assessment & Plan    · Noted on prior admissions/baseline  Restless leg   Assessment & Plan    · Continue home meds        Hypertension   Assessment & Plan    · Continue home meds  · Follow up with nephrology        Chronic diastolic CHF (congestive heart failure) ([facility])   Assessment & Plan    · Monitored fluid status closely given that she was given 2 5L fluids in ED  · Continue torsemide 20mg BID  · No shortness of breath anymore  Hypothyroidism   Assessment & Plan    Normal TSH  Continue Synthroid  Hypokalemia-resolved as of 1/30/2018   Assessment & Plan    · Resolved            Facial rash-resolved as of 1/30/2018   Assessment & Plan    · Improving   · Followed by her dermatologist  Uses topical cream the name of which she can't recall  Discharging Physician / Practitioner: Nagi Bailey DO  PCP: Ethan Amador MD  Admission Date: 1/22/2018  Discharge Date: 01/30/18    Disposition:      Short Term Rehab or SNF at a [facility] SNF (see below)    For Discharges to [facility] SNF:   · [facility] / Luda Oh at [address] Texted SNF Physician    Reason for Admission: abdominal pain, vomitting, diarrhea    Consultations During Hospital Stay:  · Wound care  · Dr Tianna Ivey - GI    Procedures Performed:     · none     Medication Adjustments and Discharge Medications:  · Summary of Medication Adjustments made as a result of this hospitalization: Potassium supplement added  · Medication Dosing Tapers - Please refer to Discharge Medication List for details on any medication dosing tapers (if applicable to patient)  · Medications being temporarily held (include recommended restart time): no  · Discharge Medication List: See after visit summary for reconciled discharge medications  Wound Care Recommendations:  When applicable, please see wound care section of After Visit Summary  Diet Recommendations at Discharge:  Diet -        Diet Orders            Start     Ordered    01/25/18 1347  Diet Dysphagia/Modified Consistency; Dysphagia 2-Mechanical Soft; Thin Liquid  Diet effective now     Question Answer Comment   Diet Type Dysphagia/Modified Consistency    Dysphagia/Modified Consistency Dysphagia 2-Mechanical Soft    Liquid Modifier Thin Liquid    RD to adjust diet per protocol? Yes        01/25/18 1346          Instructions for any Catheters / Lines Present at Discharge (including removal date, if applicable): n/a    Significant Findings / Test Results:     CT abdomen pelvis wo contrast   Final Result by Johnson Flower MD (01/22 1101)      No acute pathology   Colonic diverticulosis without acute diverticulitis  Fluid throughout the colon, consistent with history of diarrhea  Workstation performed: [id number]         XR chest 1 view portable   ED Interpretation by DO Anthony (01/22 9445)   No free air      Final Result by Elijah Smith MD (01/22 1100)      Stable cardiomegaly  No acute findings  No obvious gross free air on this semierect exam   Follow-up with dedicated abdominal series or CT as warranted  Workstation performed: [id number]               Incidental Findings:   · none     Test Results Pending at Discharge (will require follow up):   · none     Outpatient Tests Requested:  · CBC and BMP in a few days    Complications:  none    Hospital Course:     Iola Boas is a 80 y o  female patient who originally presented to the hospital on 1/22/2018 due to vomitting, diarrhea, and abdominal pain  Condition at Discharge: stable     Discharge Day Visit / Exam:     Subjective:  Pt seen and examined  No acute complaints  Vitals: Blood Pressure: 124/73 (01/30/18 0700)  Pulse: 83 (01/30/18 0700)  Temperature: 97 6 °F (36 4 °C) (01/30/18 0700)  Temp Source: Oral (01/30/18 0700)  Respirations: 18 (01/30/18 0700)  Height: 5' 4" (162 6 cm) (01/22/18 1612)  Weight - Scale: 78 3 kg (172 lb 9 9 oz) (01/22/18 1612)  SpO2: 96 % (01/30/18 0700)  Exam:   Physical Exam   Constitutional: No distress  HENT:   Head: Normocephalic and atraumatic  Eyes: Conjunctivae and EOM are normal    Neck: Normal range of motion  Neck supple  Cardiovascular: Normal rate and regular rhythm  Pulmonary/Chest: Effort normal and breath sounds normal    Abdominal: Soft  Bowel sounds are normal  She exhibits no distension  There is no tenderness  Musculoskeletal: Normal range of motion  She exhibits no edema  Neurological: She is alert  Skin: Skin is warm and dry  She is not diaphoretic  Discussion with Family: CM talked with family      Goals of Care Discussions:  · Code Status at Discharge: Level 1 - Full Code  · Were there any Goals of Care Discussions during Hospitalization?: No  · Results of any General Goals of Care Discussions: n/a   · POLST Completed: No   · If POLST Completed, Summary of POLST Agreement Provided Here: n/a   · OK to Rehospitalize if Needed? Yes    Discharge instructions/Information to patient and family:   See after visit summary section titled Discharge Instructions for information provided to patient and family  Planned Readmission: no      Discharge Statement:  I spent 35 minutes discharging the patient  This time was spent on the day of discharge  I had direct contact with the patient on the day of discharge  Greater than 50% of the total time was spent examining patient, answering all patient questions, arranging and discussing plan of care with patient as well as directly providing post-discharge instructions  Additional time then spent on discharge activities      ** Please Note: This note has been constructed using a voice recognition system **

## 2018-01-30 NOTE — ASSESSMENT & PLAN NOTE
· Stable and better  · Baseline creatinine appears around 1 5  She is known to Dr Pippa Block  · Recheck BMP in the future

## 2018-01-30 NOTE — SOCIAL WORK
CM received t/c from pt's son Tina Kendrick [phone]  Updated son re: no availability @ [facility]  Son expressed unhappiness pt not able to return to [facility] following review of PT notes over weekend  Son will call CM re: pt receiving therapy at their facility  Requested referrals to be placed to various SNFs in the area  [facility] referrals made to [facility], [facility], [facility], [facility]  Mario requesting update from CM tomorrow morning re: same

## 2018-01-30 NOTE — DISCHARGE INSTRUCTIONS
Abdominal Pain   WHAT YOU NEED TO KNOW:   Abdominal pain can be dull, achy, or sharp  You may have pain in one area of your abdomen, or in your entire abdomen  Your pain may be caused by a condition such as constipation, food sensitivity or poisoning, infection, or a blockage  Abdominal pain can also be from a hernia, appendicitis, or an ulcer  Liver, gallbladder, or kidney conditions can also cause abdominal pain  The cause of your abdominal pain may be unknown  DISCHARGE INSTRUCTIONS:   Return to the emergency department if:   · You have new chest pain or shortness of breath  · You have pulsing pain in your upper abdomen or lower back that suddenly becomes constant  · Your pain is in the right lower abdominal area and worsens with movement  · You have a fever over 100 4°F (38°C) or shaking chills  · You are vomiting and cannot keep food or liquids down  · Your pain does not improve or gets worse over the next 8 to 12 hours  · You see blood in your vomit or bowel movements, or they look black and tarry  · Your skin or the whites of your eyes turn yellow  · You are a woman and have a large amount of vaginal bleeding that is not your monthly period  Contact your healthcare provider if:   · You have pain in your lower back  · You are a man and have pain in your testicles  · You have pain when you urinate  · You have questions or concerns about your condition or care  Follow up with your healthcare provider within 24 hours or as directed:  Write down your questions so you remember to ask them during your visits  Medicines:   · Medicines  may be given to calm your stomach and prevent vomiting or to decrease pain  Ask how to take pain medicine safely  · Take your medicine as directed  Contact your healthcare provider if you think your medicine is not helping or if you have side effects  Tell him of her if you are allergic to any medicine   Keep a list of the medicines, vitamins, and herbs you take  Include the amounts, and when and why you take them  Bring the list or the pill bottles to follow-up visits  Carry your medicine list with you in case of an emergency  © 2017 [address] Information is for End User's use only and may not be sold, redistributed or otherwise used for commercial purposes  All illustrations and images included in CareNotes® are the copyrighted property of [employer]  or [employer]  The above information is an  only  It is not intended as medical advice for individual conditions or treatments  Talk to your doctor, nurse or pharmacist before following any medical regimen to see if it is safe and effective for you

## 2018-01-30 NOTE — ASSESSMENT & PLAN NOTE
· Monitored fluid status closely given that she was given 2 5L fluids in ED  · Continue torsemide 20mg BID  · No shortness of breath anymore

## 2018-01-30 NOTE — SOCIAL WORK
Bed available at [facility] for pt today  Pt and family agreeable with d/c to [facility]  Transport arranged via [facility] at [address] to [facility]F  Pt, pt's son Shera Meigs, bedside RN, and D[facility] notified of transport time  Chart copy requested  Transfer to facility and CMN forms completed

## 2018-01-30 NOTE — RESTORATIVE TECHNICIAN NOTE
Restorative Specialist Mobility Note                      Repositioned: Other (Comment) (Rep /sat pt upright in bed  Bed alarm on   Pt callbell, phone/tray within reach )           Range of Motion: All extremities       Reece BLAIR, Restorative Technician, [employer]

## 2018-01-30 NOTE — WOUND OSTOMY CARE
Progress Note - Wound   Tim Howell 80 y o  female MRN: [id number]  Unit/Bed#: [facility] 697-61 Encounter: [id number]      Assessment:  Patient seen for weekly follow up visit with wound care  Incontinent of bowel and bladder per nursing noted  Assist of one with turning  Cooperative with assessment  MASD that was noted on previous assessment is now resolved  Skin to the buttocks, perineum and sacrum is intact with blanchable redness  No denuded areas or open wounds noted  Recommend to continue with current plan of care in place, as schraggers paste has a barrier component  B/l heels are intact with no redness noted  Recommend offloading and skin nourishing cream      Wound care will sign off as patient has no active wounds- recommend current plan of care in place including preventative skin care recommendations  Primary nurse aware of assessment findings  Plan:   -Clean sacro-buttock area with soap and water, apply schraggers paste TID and PRN  2-Turn/reposition q2h for pressure re-distribution on skin  3-Elevate heels to offload pressure  4-Moisturize skin daily with skin nourishing  5-Soft care cushion when out of bed  Objective:    Vitals: Blood pressure 124/73, pulse 83, temperature 97 6 °F (36 4 °C), temperature source Oral, resp  rate 18, height 5' 4" (1 626 m), weight 78 3 kg (172 lb 9 9 oz), SpO2 96 %  ,Body mass index is 29 63 kg/m²  Recommendations written as orders    Kia Crum RN BSN

## 2018-03-01 DIAGNOSIS — R60.9 EDEMA: ICD-10-CM

## 2018-03-01 DIAGNOSIS — E78.5 HYPERLIPIDEMIA: ICD-10-CM

## 2018-03-01 DIAGNOSIS — I70.1 ATHEROSCLEROSIS OF RENAL ARTERY (HCC): ICD-10-CM

## 2018-03-01 DIAGNOSIS — I12.9 HYPERTENSIVE CHRONIC KIDNEY DISEASE WITH STAGE 1 THROUGH STAGE 4 CHRONIC KIDNEY DISEASE, OR UNSPECIFIED CHRONIC KIDNEY DISEASE: ICD-10-CM

## 2018-03-01 DIAGNOSIS — N18.4 CHRONIC KIDNEY DISEASE, STAGE IV (SEVERE) (HCC): ICD-10-CM

## 2018-03-01 DIAGNOSIS — N18.30 CHRONIC KIDNEY DISEASE, STAGE III (MODERATE) (HCC): ICD-10-CM

## 2018-03-01 DIAGNOSIS — E83.40 DISORDER OF MAGNESIUM METABOLISM: ICD-10-CM

## 2018-03-07 DIAGNOSIS — N25.81 SECONDARY HYPERPARATHYROIDISM OF RENAL ORIGIN (HCC): Primary | ICD-10-CM

## 2018-03-07 DIAGNOSIS — E78.5 DYSLIPIDEMIA: Primary | ICD-10-CM

## 2018-03-08 RX ORDER — CRANBERRY FRUIT EXTRACT 200 MG
CAPSULE ORAL
Qty: 60 EACH | Refills: 4 | Status: SHIPPED | OUTPATIENT
Start: 2018-03-08 | End: 2018-03-29 | Stop reason: ALTCHOICE

## 2018-03-08 RX ORDER — UBIDECARENONE 200 MG
CAPSULE ORAL
Qty: 30 CAPSULE | Refills: 4 | Status: SHIPPED | OUTPATIENT
Start: 2018-03-08 | End: 2018-03-20 | Stop reason: SDUPTHER

## 2018-03-09 RX ORDER — CALCITRIOL 0.25 UG/1
CAPSULE, LIQUID FILLED ORAL
Qty: 30 CAPSULE | Refills: 4 | Status: SHIPPED | OUTPATIENT
Start: 2018-03-09 | End: 2018-04-16 | Stop reason: HOSPADM

## 2018-03-20 ENCOUNTER — OFFICE VISIT (OUTPATIENT)
Dept: NEPHROLOGY | Facility: CLINIC | Age: 83
End: 2018-03-20
Payer: MEDICARE

## 2018-03-20 DIAGNOSIS — N25.81 SECONDARY HYPERPARATHYROIDISM OF RENAL ORIGIN (HCC): ICD-10-CM

## 2018-03-20 DIAGNOSIS — I50.32 CHRONIC DIASTOLIC CHF (CONGESTIVE HEART FAILURE) (HCC): ICD-10-CM

## 2018-03-20 DIAGNOSIS — E87.79 OTHER HYPERVOLEMIA: ICD-10-CM

## 2018-03-20 DIAGNOSIS — I15.0 RENOVASCULAR HYPERTENSION: ICD-10-CM

## 2018-03-20 DIAGNOSIS — I12.9 HYPERTENSIVE CHRONIC KIDNEY DISEASE WITH STAGE 1 THROUGH STAGE 4 CHRONIC KIDNEY DISEASE, OR UNSPECIFIED CHRONIC KIDNEY DISEASE: ICD-10-CM

## 2018-03-20 DIAGNOSIS — N18.30 CHRONIC KIDNEY DISEASE, STAGE III (MODERATE) (HCC): Primary | ICD-10-CM

## 2018-03-20 DIAGNOSIS — E61.1 IRON DEFICIENCY: ICD-10-CM

## 2018-03-20 DIAGNOSIS — E78.5 DYSLIPIDEMIA: ICD-10-CM

## 2018-03-20 PROBLEM — E87.70 HYPERVOLEMIA: Status: ACTIVE | Noted: 2018-03-20

## 2018-03-20 PROCEDURE — 99214 OFFICE O/P EST MOD 30 MIN: CPT | Performed by: INTERNAL MEDICINE

## 2018-03-20 RX ORDER — CYCLOSPORINE 0.5 MG/ML
1 EMULSION OPHTHALMIC 2 TIMES DAILY
COMMUNITY
End: 2018-03-29 | Stop reason: ALTCHOICE

## 2018-03-20 RX ORDER — TRAMADOL HYDROCHLORIDE 50 MG/1
50 TABLET ORAL EVERY 6 HOURS PRN
COMMUNITY
End: 2018-04-16 | Stop reason: HOSPADM

## 2018-03-20 RX ORDER — TORSEMIDE 20 MG/1
20 TABLET ORAL
Refills: 0
Start: 2018-03-20 | End: 2018-04-16 | Stop reason: HOSPADM

## 2018-03-20 NOTE — LETTER
March 20, 2018     Rossy Aldana MD  [facility]    Patient: Tim Howell   YOB: 1931   Date of Visit: 3/20/2018       Dear Dr Aniya Bey: Thank you for referring Herman Uriarte to me for evaluation  Below are my notes for this consultation  If you have questions, please do not hesitate to call me  I look forward to following your patient along with you  Sincerely,        Maite Taveras MD        CC: No Recipients  Maite Taveras MD  3/20/2018 10:52 AM  Sign at close encounter  RENAL FOLLOW UP NOTE: td    ASSESSMENT AND PLAN:  ***    PATIENT INSTRUCTIONS:    Patient Instructions   1  Still medication changes today:  -increase torsemide to 40 mg or 2-20 mg tablets in the morning, and 20 mg in the evening on Mondays Wednesdays and Fridays only; every other day should be 20 mg twice a day including Tuesday, Thursday, Saturday and Sunday  2  Please set up an infusion of Feraheme 500 mg on 2 separate occasions  3  Please have the skilled facility send in 1 week a blood pressure readings beginning in about 1 week morning evening  4  Please have the facility set up stool for occult blood x3 as a screen to rule out GI bleeding  5  Please go for lab work in 1-2 weeks after making the above medication changes  6  Please go for repeat blood count in about 1 month  7  Follow-up in 3 months:  -Fasting lab work prior to the appointment  -please have the skilled facility send in 1 week a blood pressure readings at that appointment morning and evening  8  Please have the patient follow up with the hematologist Dr Christina Camacho regarding MGUS and anemia  9  General instructions:  -avoid salt  -avoid medications such as Motrin, Naprosyn, ibuprofen, Aleve or Advil or Celebrex as they can affect your kidney function; you can use Tylenol as needed for pain or fevers if you have no liver problems  -avoid medications such as Sudafed or other medications with decongestants as they can raise your blood pressure          Subjective:   ***    ROS:  See HPI, otherwise review of systems as completely reviewed with the patient are negative    Past Medical History:   Diagnosis Date    A-fib ([facility] )     Anemia     Anemia     Anxiety     Asthma     CAD (coronary artery disease)     CHF (congestive heart failure) (HCC)     CHF (congestive heart failure) (HCC)     CKD (chronic kidney disease) stage 3, GFR 30-59 ml/min     ckd3    Constipation     CVA (cerebral vascular accident) ([facility] )     left hemiparesis    Diastolic CHF, chronic (HCC)     Disease of thyroid gland     Dry eye     First degree AV block     Gastritis     Gastritis     GERD (gastroesophageal reflux disease)     Glaucoma     Glaucoma     Hemiparesis (HCC)     left side    Hemiparesis affecting left side as late effect of stroke ([facility])     Hyperlipidemia     Hypertension     Impetigo     Insomnia     Knee pain     Neuropathy     Osteoarthritis     Parkinson disease ([facility] )     Pulmonary HTN     PVD (peripheral vascular disease) ([facility] )     Restless leg     Vitamin D deficiency      Past Surgical History:   Procedure Laterality Date    APPENDECTOMY      BLADDER SURGERY      CAROTID ENDARTERECTOMY Left     ESOPHAGOGASTRODUODENOSCOPY N/A 4/8/2016    Procedure: ESOPHAGOGASTRODUODENOSCOPY (EGD); Surgeon: Louis Omalley MD;  Location: [facility]; Service:     RENAL ARTERY STENT       Family History   Problem Relation Age of Onset    Stroke Father     Cancer Brother     No Known Problems Mother       reports that she has never smoked  She has never used smokeless tobacco  She reports that she does not drink alcohol or use drugs  I COMPLETELY REVIEWED THE PAST MEDICAL HISTORY/PAST SURGICAL HISTORY/SOCIAL HISTORY/FAMILY HISTORY/AND MEDICATIONS  AND UPDATED ALL    Objective:     Vitals: There were no vitals filed for this visit      Weight (last 2 days)     None        There is no height or weight on file to calculate BMI  Physical Exam: General:  No acute distress  Skin:  No acute rash  Eyes:  No scleral icterus, noninjected  ENT:  Moist mucous membranes  Neck:  Supple, no jugular venous distention  Back   No CVAT  Chest:  Clear to auscultation and percussion  CVS:  Regular rate and rhythm without a rub, murmurs or gallops  Abdomen:  Soft and nontender with normal bowel sounds  Extremities:  No cyanosis and no edema  Neuro:  Grossly intact  Psych:  Alert, oriented x3 and appropriate      Medications:    Current Outpatient Prescriptions:     acetaminophen (TYLENOL) 500 mg tablet, Take 1 tablet by mouth every 12 (twelve) hours, Disp: 30 tablet, Rfl: 0    Alum & Mag Hydroxide-Simeth (ANTACID ANTI-GAS PO), Take 30 mL by mouth every 6 (six) hours as needed  , Disp: , Rfl:     amLODIPine (NORVASC) 5 mg tablet, Take 5 mg by mouth daily, Disp: , Rfl:     apixaban (ELIQUIS) 2 5 mg, Take 1 tablet by mouth 2 (two) times a day, Disp: 30 tablet, Rfl: 0    bisacodyl (DULCOLAX) 5 mg EC tablet, Take 10 mg by mouth as needed for constipation Every 4th day as needed for constipation, Disp: , Rfl:     calcitriol (ROCALTROL) 0 25 mcg capsule, TAKE 1 CAPSULE ORALLY DAILY (CHRONIC HYPERPARATHYROIDISM), Disp: 30 capsule, Rfl: 4    CO-ENZYME Q-10 PO, Take 200 mg by mouth daily, Disp: , Rfl:     cycloSPORINE (RESTASIS) 0 05 % ophthalmic emulsion, Administer 1 drop to both eyes 2 (two) times a day, Disp: , Rfl:     diphenhydrAMINE (BENADRYL) 25 mg capsule, Take 25 mg by mouth daily at bedtime as needed for itching, Disp: , Rfl:     eplerenone (INSPRA) 25 mg tablet, Take 25 mg by mouth daily, Disp: , Rfl:     fluticasone (FLONASE) 50 mcg/act nasal spray, 1 spray into each nostril daily  , Disp: , Rfl:     gabapentin (NEURONTIN) 100 mg capsule, Take 100 mg by mouth 2 (two) times a day, Disp: , Rfl:     gabapentin (NEURONTIN) 300 mg capsule, Take 2 capsules by mouth daily at bedtime (Patient taking differently: Take 300 mg by mouth daily at bedtime  ), Disp: 30 capsule, Rfl: 0    latanoprost (XALATAN) 0 005 % ophthalmic solution, Administer 1 drop to both eyes daily at bedtime  , Disp: , Rfl:     levothyroxine 75 mcg tablet, Take 1 tablet by mouth daily in the early morning (Patient taking differently: Take 88 mcg by mouth daily in the early morning  ), Disp: 30 tablet, Rfl: 0    loperamide (IMODIUM) 2 mg capsule, Take 2 mg by mouth as needed for diarrhea After each loose stool, Disp: , Rfl:     loratadine (CLARITIN) 10 mg tablet, Take 10 mg by mouth daily, Disp: , Rfl:     melatonin 3 mg, Take 2 tablets by mouth daily at bedtime, Disp: 30 tablet, Rfl: 0    metoprolol tartrate (LOPRESSOR) 50 mg tablet, Take 50 mg by mouth 2 (two) times a day, Disp: , Rfl:     mineral oil enema, Insert 1 enema into the rectum once Insert in rectum every 4th day as needed for constipation  , Disp: , Rfl:     ondansetron (ZOFRAN-ODT) 4 mg disintegrating tablet, Take 1 tablet (4 mg total) by mouth every 8 (eight) hours as needed for nausea or vomiting, Disp: 20 tablet, Rfl: 0    OXcarbazepine (TRILEPTAL) 150 mg tablet, Take 1 tablet by mouth daily at bedtime, Disp: 30 tablet, Rfl: 0    pantoprazole (PROTONIX) 40 mg tablet, Take 40 mg by mouth daily  , Disp: , Rfl:     Red Yeast Rice Extract 600 MG CAPS, TAKE 1 CAPSULE ORALLY TWICE DAILY (VITAMIN DEFICIENCY), Disp: 60 each, Rfl: 4    rOPINIRole (REQUIP) 1 mg tablet, Take 1 tablet (1 mg total) by mouth every 8 (eight) hours (Patient taking differently: Take 1 mg by mouth every 6 (six) hours  ), Disp: , Rfl: 0    tamsulosin (FLOMAX) 0 4 mg, Take 1 capsule by mouth daily with dinner, Disp: 7 capsule, Rfl: 0    torsemide (DEMADEX) 20 mg tablet, Take 1 tablet (20 mg total) by mouth 2 (two) times a day Take 1 tablet twice a day except on Monday Wednesday and Friday when you will take 2 tablets in the morning which is equivalent to 40 mg, and 1 tablet in the evening 20 mg, Disp: , Rfl: 0    traMADol (ULTRAM) 50 mg tablet, Take 50 mg by mouth every 6 (six) hours as needed for moderate pain, Disp: , Rfl:     Lab, Imaging and other studies: I have personally reviewed pertinent labs  Laboratory Results:  Results for orders placed or performed during the hospital encounter of 01/22/18   Clostridium difficile toxin by PCR   Result Value Ref Range    C difficile toxin by PCR NEGATIVE for C difficle toxin by PCR  NEGATIVE for C difficle toxin by PCR      Occult blood 1-3, stool   Result Value Ref Range    Fecal Occult Blood Diagnostic Negative Negative    Fecal Occult Blood Diagnostic 2  Negative    Fecal Occult Blood Diagnostic 3  Negative   Stool Enteric Bacterial Panel by PCR   Result Value Ref Range    Salmonella sp PCR None Detected None Detected    Shigella sp/Enteroinvasive E  coli (EIEC) PCR None Detected None Detected    Campylobacter sp (jejuni and coli) PCR None Detected None Detected    Shiga toxin 1/Shiga tonix 2 genes PCR None Detected None Detected   CBC and differential   Result Value Ref Range    WBC 11 27 (H) 4 31 - 10 16 Thousand/uL    RBC 3 80 (L) 3 81 - 5 12 Million/uL    Hemoglobin 11 1 (L) 11 5 - 15 4 g/dL    Hematocrit 33 3 (L) 34 8 - 46 1 %    MCV 88 82 - 98 fL    MCH 29 2 26 8 - 34 3 pg    MCHC 33 3 31 4 - 37 4 g/dL    RDW 14 8 11 6 - 15 1 %    MPV 10 2 8 9 - 12 7 fL    Platelets 949 912 - 285 Thousands/uL    nRBC 0 /100 WBCs    Neutrophils Relative 87 (H) 43 - 75 %    Lymphocytes Relative 6 (L) 14 - 44 %    Monocytes Relative 5 4 - 12 %    Eosinophils Relative 2 0 - 6 %    Basophils Relative 0 0 - 1 %    Neutrophils Absolute 9 76 (H) 1 85 - 7 62 Thousands/µL    Lymphocytes Absolute 0 62 0 60 - 4 47 Thousands/µL    Monocytes Absolute 0 55 0 17 - 1 22 Thousand/µL    Eosinophils Absolute 0 25 0 00 - 0 61 Thousand/µL    Basophils Absolute 0 01 0 00 - 0 10 Thousands/µL   Comprehensive metabolic panel   Result Value Ref Range    Sodium 138 136 - 145 mmol/L    Potassium 4 1 3 5 - 5 3 mmol/L Chloride 105 100 - 108 mmol/L    CO2 28 21 - 32 mmol/L    Anion Gap 5 4 - 13 mmol/L    BUN 48 (H) 5 - 25 mg/dL    Creatinine 1 71 (H) 0 60 - 1 30 mg/dL    Glucose 108 65 - 140 mg/dL    Calcium 9 0 8 3 - 10 1 mg/dL    AST 16 5 - 45 U/L    ALT 11 (L) 12 - 78 U/L    Alkaline Phosphatase 75 46 - 116 U/L    Total Protein 7 9 6 4 - 8 2 g/dL    Albumin 3 9 3 5 - 5 0 g/dL    Total Bilirubin 0 34 0 20 - 1 00 mg/dL    eGFR 27 ml/min/1 73sq m   Lipase   Result Value Ref Range    Lipase 229 73 - 393 u/L   Troponin I   Result Value Ref Range    Troponin I <0 02 <=0 04 ng/mL   Protime-INR   Result Value Ref Range    Protime 15 4 (H) 12 1 - 14 4 seconds    INR 1 21 (H) 0 86 - 1 16   APTT   Result Value Ref Range    PTT 40 (H) 23 - 35 seconds   Lactic acid x2 Q2H   Result Value Ref Range    LACTIC ACID 1 8 0 5 - 2 0 mmol/L   Basic metabolic panel   Result Value Ref Range    Sodium 145 136 - 145 mmol/L    Potassium 3 5 3 5 - 5 3 mmol/L    Chloride 113 (H) 100 - 108 mmol/L    CO2 23 21 - 32 mmol/L    Anion Gap 9 4 - 13 mmol/L    BUN 29 (H) 5 - 25 mg/dL    Creatinine 1 39 (H) 0 60 - 1 30 mg/dL    Glucose 94 65 - 140 mg/dL    Calcium 8 0 (L) 8 3 - 10 1 mg/dL    eGFR 34 ml/min/1 73sq m   CBC (With Platelets)   Result Value Ref Range    WBC 5 67 4 31 - 10 16 Thousand/uL    RBC 3 23 (L) 3 81 - 5 12 Million/uL    Hemoglobin 9 4 (L) 11 5 - 15 4 g/dL    Hematocrit 28 6 (L) 34 8 - 46 1 %    MCV 89 82 - 98 fL    MCH 29 1 26 8 - 34 3 pg    MCHC 32 9 31 4 - 37 4 g/dL    RDW 15 1 11 6 - 15 1 %    Platelets 838 349 - 430 Thousands/uL    MPV 10 2 8 9 - 12 7 fL   Hemoglobin and hematocrit, blood   Result Value Ref Range    Hemoglobin 10 6 (L) 11 5 - 15 4 g/dL    Hematocrit 31 7 (L) 34 8 - 46 1 %   Hemoglobin and hematocrit, blood   Result Value Ref Range    Hemoglobin 10 2 (L) 11 5 - 15 4 g/dL    Hematocrit 31 3 (L) 34 8 - 46 1 %   CBC   Result Value Ref Range    WBC 5 74 4 31 - 10 16 Thousand/uL    RBC 3 51 (L) 3 81 - 5 12 Million/uL    Hemoglobin 10 2 (L) 11 5 - 15 4 g/dL    Hematocrit 30 7 (L) 34 8 - 46 1 %    MCV 88 82 - 98 fL    MCH 29 1 26 8 - 34 3 pg    MCHC 33 2 31 4 - 37 4 g/dL    RDW 14 9 11 6 - 15 1 %    Platelets 172 733 - 254 Thousands/uL    MPV 10 6 8 9 - 12 7 fL   Basic metabolic panel   Result Value Ref Range    Sodium 140 136 - 145 mmol/L    Potassium 3 3 (L) 3 5 - 5 3 mmol/L    Chloride 107 100 - 108 mmol/L    CO2 25 21 - 32 mmol/L    Anion Gap 8 4 - 13 mmol/L    BUN 23 5 - 25 mg/dL    Creatinine 1 18 0 60 - 1 30 mg/dL    Glucose 89 65 - 140 mg/dL    Calcium 8 5 8 3 - 10 1 mg/dL    eGFR 42 ml/min/1 73sq m   Hemoglobin and hematocrit, blood   Result Value Ref Range    Hemoglobin 11 4 (L) 11 5 - 15 4 g/dL    Hematocrit 34 1 (L) 34 8 - 46 1 %   Basic metabolic panel   Result Value Ref Range    Sodium 142 136 - 145 mmol/L    Potassium 3 1 (L) 3 5 - 5 3 mmol/L    Chloride 107 100 - 108 mmol/L    CO2 26 21 - 32 mmol/L    Anion Gap 9 4 - 13 mmol/L    BUN 23 5 - 25 mg/dL    Creatinine 1 26 0 60 - 1 30 mg/dL    Glucose 82 65 - 140 mg/dL    Calcium 8 7 8 3 - 10 1 mg/dL    eGFR 39 ml/min/1 73sq m   CBC   Result Value Ref Range    WBC 6 10 4 31 - 10 16 Thousand/uL    RBC 3 64 (L) 3 81 - 5 12 Million/uL    Hemoglobin 10 6 (L) 11 5 - 15 4 g/dL    Hematocrit 31 8 (L) 34 8 - 46 1 %    MCV 87 82 - 98 fL    MCH 29 1 26 8 - 34 3 pg    MCHC 33 3 31 4 - 37 4 g/dL    RDW 14 6 11 6 - 15 1 %    Platelets 880 358 - 156 Thousands/uL    MPV 10 7 8 9 - 12 7 fL   TSH, 3rd generation   Result Value Ref Range    TSH 3RD GENERATON 1 160 0 358 - 3 740 uIU/mL   CBC   Result Value Ref Range    WBC 7 62 4 31 - 10 16 Thousand/uL    RBC 3 70 (L) 3 81 - 5 12 Million/uL    Hemoglobin 10 7 (L) 11 5 - 15 4 g/dL    Hematocrit 32 0 (L) 34 8 - 46 1 %    MCV 87 82 - 98 fL    MCH 28 9 26 8 - 34 3 pg    MCHC 33 4 31 4 - 37 4 g/dL    RDW 14 3 11 6 - 15 1 %    Platelets 962 629 - 029 Thousands/uL    MPV 10 4 8 9 - 12 7 fL   Basic metabolic panel   Result Value Ref Range    Sodium 139 136 - 145 mmol/L    Potassium 3 6 3 5 - 5 3 mmol/L    Chloride 105 100 - 108 mmol/L    CO2 26 21 - 32 mmol/L    Anion Gap 8 4 - 13 mmol/L    BUN 23 5 - 25 mg/dL    Creatinine 1 24 0 60 - 1 30 mg/dL    Glucose 90 65 - 140 mg/dL    Calcium 8 7 8 3 - 10 1 mg/dL    eGFR 39 ml/min/1 73sq m   POCT urinalysis dipstick   Result Value Ref Range    Color, UA clear;yellow    ECG 12 lead   Result Value Ref Range    Ventricular Rate 84 BPM    Atrial Rate 84 BPM    UT Interval 240 ms    QRSD Interval 82 ms    QT Interval 386 ms    QTC Interval 456 ms    P Axis 71 degrees    QRS Axis 13 degrees    T Wave Holder 57 degrees   ECG 12 lead   Result Value Ref Range    Ventricular Rate 66 BPM    Atrial Rate 75 BPM    UT Interval  ms    QRSD Interval 90 ms    QT Interval 430 ms    QTC Interval 450 ms    P Axis  degrees    QRS Axis 14 degrees    T Wave Axis 66 degrees   Type and screen   Result Value Ref Range    ABO Grouping A     Rh Factor Negative     Antibody Screen Negative     Specimen Expiration Date [id number]    ED Urine Macroscopic   Result Value Ref Range    Color, UA Yellow     Clarity, UA Clear     pH, UA 7 0 4 5 - 8 0    Leukocytes, UA Negative Negative    Nitrite, UA Negative Negative    Protein, UA Negative Negative mg/dl    Glucose, UA Negative Negative mg/dl    Ketones, UA Negative Negative mg/dl    Urobilinogen, UA 0 2 0 2, 1 0 E U /dl E U /dl    Bilirubin, UA Negative Negative    Blood, UA Negative Negative    Specific Gravity, UA 1 015 1 003 - 1 030               Radiology review:   chest X-ray    Ultrasound      Portions of the record may have been created with voice recognition software   Occasional wrong word or "sound a like" substitutions may have occurred due to the inherent limitations of voice recognition software   Read the chart carefully and recognize, using context, where substitutions have occurred

## 2018-03-20 NOTE — PATIENT INSTRUCTIONS
1   Still medication changes today:  -increase torsemide to 40 mg or 2-20 mg tablets in the morning, and 20 mg in the evening on Mondays Wednesdays and Fridays only; every other day should be 20 mg twice a day including Tuesday, Thursday, Saturday and Sunday  2  Please set up an infusion of Feraheme 500 mg on 2 separate occasions  3  Please have the skilled facility send in 1 week a blood pressure readings beginning in about 1 week morning evening  4  Please have the facility set up stool for occult blood x3 as a screen to rule out GI bleeding  5  Please go for lab work in 1-2 weeks after making the above medication changes  6  Please go for repeat blood count in about 1 month  7  Follow-up in 3 months:  -Fasting lab work prior to the appointment  -please have the skilled facility send in 1 week a blood pressure readings at that appointment morning and evening  8  Please have the patient follow up with the hematologist Dr Juan Joseph regarding MGUS and anemia  9  General instructions:  -avoid salt  -avoid medications such as Motrin, Naprosyn, ibuprofen, Aleve or Advil or Celebrex as they can affect your kidney function; you can use Tylenol as needed for pain or fevers if you have no liver problems  -avoid medications such as Sudafed or other medications with decongestants as they can raise your blood pressure

## 2018-03-20 NOTE — LETTER
March 20, 2018     Omid Quiros MD  [facility]    Patient: Mirella Gambino   YOB: 1931   Date of Visit: 3/20/2018       Dear Dr Jorge Plascencia: Thank you for referring Bob Ayala to me for evaluation  Below are my notes for this consultation  If you have questions, please do not hesitate to call me  I look forward to following your patient along with you  Sincerely,        Sixto Kumar MD        CC: MD Misty Mccracken MD Janyce Fan, MD  3/20/2018 10:58 AM  Sign at close encounter  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, arteriolar nephrosclerosis, cardiorenal syndrome, renal artery disease  Her creatinine is 2 19 mg/dL  This may be her new baseline   -increase torsemide 40 mg the morning and 20 mg in the evening  -repeat labs  -watch weights  #2  CHF/volume status: EF 77%, grade 2 diastolic dysfunction, mild valvular dysfunction  Please see above recommendations regarding torsemide  Patient's legs are slightly worse  Please see above  #3  Hypertension/renal artery disease: Patient's blood pressure is acceptable today  Recheck at the skilled facility after making medication adjustments  #4  Electrolytes: Potassium 4 2  Which is acceptable  #5  CKD MBD: Secondary 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, level acceptable  #6   Anemia:  Low iron studies   -Feraheme 500 mg x2  -check stool for occult blood  -recheck labs in 1 month  -follow-up with hematology regarding MGUS/anemia  Patient probably not a candidate for [facility] given CVA    #7   Other issues:  -recent C difficile  -recent viral gastroenteritis  -atrial fibrillation  -status post CVA  I spent additional 20 minutes plus usual 20 minutes appointment time reviewing all the above regarding adjustments of the diuretics review of blood pressure and treatment of anemia  PATIENT INSTRUCTIONS:    Patient Instructions   1  Still medication changes today:  -increase torsemide to 40 mg or 2-20 mg tablets in the morning, and 20 mg in the evening on Mondays Wednesdays and Fridays only; every other day should be 20 mg twice a day including Tuesday, Thursday, Saturday and Sunday  2  Please set up an infusion of Feraheme 500 mg on 2 separate occasions  3  Please have the skilled facility send in 1 week a blood pressure readings beginning in about 1 week morning evening  4  Please have the facility set up stool for occult blood x3 as a screen to rule out GI bleeding  5  Please go for lab work in 1-2 weeks after making the above medication changes  6  Please go for repeat blood count in about 1 month  7  Follow-up in 3 months:  -Fasting lab work prior to the appointment  -please have the skilled facility send in 1 week a blood pressure readings at that appointment morning and evening  8  Please have the patient follow up with the hematologist   [facility] regarding MGUS and anemia  9  General instructions:  -avoid salt  -avoid medications such as Motrin, Naprosyn, ibuprofen, Aleve or Advil or Celebrex as they can affect your kidney function; you can use Tylenol as needed for pain or fevers if you have no liver problems  -avoid medications such as Sudafed or other medications with decongestants as they can raise your blood pressure          Subjective:   ***    ROS:  See HPI, otherwise review of systems as completely reviewed with the patient are negative    Past Medical History:   Diagnosis Date    [facility]     Anemia     Anemia     Anxiety     Asthma     CAD (coronary artery disease)     CHF (congestive heart failure) (HCC)     CHF (congestive heart failure) (HCC)     CKD (chronic kidney disease) stage 3, GFR 30-59 ml/min     ckd3    Constipation     CVA [facility]cerebral vascular accident) ([facility] [address] [facility]     left hemiparesis    Diastolic CHF, chronic ([facility] [address] [facility]     Disease of thyroid gland     Dry eye     First degree AV block     Gastritis     Gastritis     GERD (gastroesophageal reflux disease)     Glaucoma     Glaucoma     Hemiparesis (HCC)     left side    Hemiparesis affecting left side as late effect of stroke (HCC)     Hyperlipidemia     Hypertension     Impetigo     Insomnia     Knee pain     Neuropathy     Osteoarthritis     Parkinson disease ([facility] [address] )     Pulmonary HTN     PVD [facility]peripheral vascular disease) [facility]     Restless leg     Vitamin D deficiency      Past Surgical History:   Procedure Laterality Date    APPENDECTOMY      BLADDER SURGERY      CAROTID ENDARTERECTOMY Left     ESOPHAGOGASTRODUODENOSCOPY N/A 4/8/2016    Procedure: ESOPHAGOGASTRODUODENOSCOPY (EGD); Surgeon: Az Sawyer MD;  Location: [facility]; Service:     RENAL ARTERY STENT       Family History   Problem Relation Age of Onset    Stroke Father     Cancer Brother     No Known Problems Mother       reports that she has never smoked  She has never used smokeless tobacco  She reports that she does not drink alcohol or use drugs  I COMPLETELY REVIEWED THE PAST MEDICAL HISTORY/PAST SURGICAL HISTORY/SOCIAL HISTORY/FAMILY HISTORY/AND MEDICATIONS  AND UPDATED ALL    Objective:     Vitals: There were no vitals filed for this visit  Weight (last 2 days)     None        There is no height or weight on file to calculate BMI      Physical Exam: General:  No acute distress  Skin:  No acute rash  Eyes:  No scleral icterus, noninjected  ENT:  Moist mucous membranes  Neck:  Supple, no jugular venous distention  Back   No CVAT  Chest:  Clear to auscultation and percussion  CVS:  Regular rate and rhythm without a rub, murmurs or gallops  Abdomen:  Soft and nontender with normal bowel sounds  Extremities:  No cyanosis and no edema  Neuro:  Grossly intact  Psych:  Alert, oriented x3 and appropriate      Medications:    Current Outpatient Prescriptions:     acetaminophen (TYLENOL) 500 mg tablet, Take 1 tablet by mouth every 12 (twelve) hours, Disp: 30 tablet, Rfl: 0    Alum & Mag Hydroxide-Simeth (ANTACID ANTI-GAS PO), Take 30 mL by mouth every 6 (six) hours as needed  , Disp: , Rfl:     amLODIPine (NORVASC) 5 mg tablet, Take 5 mg by mouth daily, Disp: , Rfl:     apixaban (ELIQUIS) 2 5 mg, Take 1 tablet by mouth 2 (two) times a day, Disp: 30 tablet, Rfl: 0    bisacodyl (DULCOLAX) 5 mg EC tablet, Take 10 mg by mouth as needed for constipation Every 4th day as needed for constipation, Disp: , Rfl:     calcitriol (ROCALTROL) 0 25 mcg capsule, TAKE 1 CAPSULE ORALLY DAILY (CHRONIC HYPERPARATHYROIDISM), Disp: 30 capsule, Rfl: 4    CO-ENZYME Q-10 PO, Take 200 mg by mouth daily, Disp: , Rfl:     cycloSPORINE (RESTASIS) 0 05 % ophthalmic emulsion, Administer 1 drop to both eyes 2 (two) times a day, Disp: , Rfl:     diphenhydrAMINE (BENADRYL) 25 mg capsule, Take 25 mg by mouth daily at bedtime as needed for itching, Disp: , Rfl:     eplerenone (INSPRA) 25 mg tablet, Take 25 mg by mouth daily, Disp: , Rfl:     fluticasone (FLONASE) 50 mcg/act nasal spray, 1 spray into each nostril daily  , Disp: , Rfl:     gabapentin (NEURONTIN) 100 mg capsule, Take 100 mg by mouth 2 (two) times a day, Disp: , Rfl:     gabapentin (NEURONTIN) 300 mg capsule, Take 2 capsules by mouth daily at bedtime (Patient taking differently: Take 300 mg by mouth daily at bedtime  ), Disp: 30 capsule, Rfl: 0    latanoprost (XALATAN) 0 005 % ophthalmic solution, Administer 1 drop to both eyes daily at bedtime  , Disp: , Rfl:     levothyroxine 75 mcg tablet, Take 1 tablet by mouth daily in the early morning (Patient taking differently: Take 88 mcg by mouth daily in the early morning  ), Disp: 30 tablet, Rfl: 0    loperamide (IMODIUM) 2 mg capsule, Take 2 mg by mouth as needed for diarrhea After each loose stool, Disp: , Rfl:     loratadine (CLARITIN) 10 mg tablet, Take 10 mg by mouth daily, Disp: , Rfl:     melatonin 3 mg, Take 2 tablets by mouth daily at bedtime, Disp: 30 tablet, Rfl: 0    metoprolol tartrate (LOPRESSOR) 50 mg tablet, Take 50 mg by mouth 2 (two) times a day, Disp: , Rfl:     mineral oil enema, Insert 1 enema into the rectum once Insert in rectum every 4th day as needed for constipation  , Disp: , Rfl:     ondansetron (ZOFRAN-ODT) 4 mg disintegrating tablet, Take 1 tablet (4 mg total) by mouth every 8 (eight) hours as needed for nausea or vomiting, Disp: 20 tablet, Rfl: 0    OXcarbazepine (TRILEPTAL) 150 mg tablet, Take 1 tablet by mouth daily at bedtime, Disp: 30 tablet, Rfl: 0    pantoprazole (PROTONIX) 40 mg tablet, Take 40 mg by mouth daily  , Disp: , Rfl:     Red Yeast Rice Extract 600 MG CAPS, TAKE 1 CAPSULE ORALLY TWICE DAILY (VITAMIN DEFICIENCY), Disp: 60 each, Rfl: 4    rOPINIRole (REQUIP) 1 mg tablet, Take 1 tablet (1 mg total) by mouth every 8 (eight) hours (Patient taking differently: Take 1 mg by mouth every 6 (six) hours  ), Disp: , Rfl: 0    tamsulosin (FLOMAX) 0 4 mg, Take 1 capsule by mouth daily with dinner, Disp: 7 capsule, Rfl: 0    torsemide (DEMADEX) 20 mg tablet, Take 1 tablet (20 mg total) by mouth 2 (two) times a day Take 1 tablet twice a day except on Monday Wednesday and Friday when you will take 2 tablets in the morning which is equivalent to 40 mg, and 1 tablet in the evening 20 mg, Disp: , Rfl: 0    traMADol (ULTRAM) 50 mg tablet, Take 50 mg by mouth every 6 (six) hours as needed for moderate pain, Disp: , Rfl:     Lab, Imaging and other studies: I have personally reviewed pertinent labs  Laboratory Results:  Results for orders placed or performed during the hospital encounter of 01/22/18   Clostridium difficile toxin by PCR   Result Value Ref Range    C difficile toxin by PCR NEGATIVE for C difficle toxin by PCR  NEGATIVE for C difficle toxin by PCR      Occult blood 1-3, stool   Result Value Ref Range    Fecal Occult Blood Diagnostic Negative Negative    Fecal Occult Blood Diagnostic 2 Negative    Fecal Occult Blood Diagnostic 3  Negative   Stool Enteric Bacterial Panel by PCR   Result Value Ref Range    Salmonella sp PCR None Detected None Detected    Shigella sp/Enteroinvasive E  coli (EIEC) PCR None Detected None Detected    Campylobacter sp (jejuni and coli) PCR None Detected None Detected    Shiga toxin 1/Shiga tonix 2 genes PCR None Detected None Detected   CBC and differential   Result Value Ref Range    WBC 11 27 (H) 4 31 - 10 16 Thousand/uL    RBC 3 80 (L) 3 81 - 5 12 Million/uL    Hemoglobin 11 1 (L) 11 5 - 15 4 g/dL    Hematocrit 33 3 (L) 34 8 - 46 1 %    MCV 88 82 - 98 fL    MCH 29 2 26 8 - 34 3 pg    MCHC 33 3 31 4 - 37 4 g/dL    RDW 14 8 11 6 - 15 1 %    MPV 10 2 8 9 - 12 7 fL    Platelets 144 996 - 192 Thousands/uL    nRBC 0 /100 WBCs    Neutrophils Relative 87 (H) 43 - 75 %    Lymphocytes Relative 6 (L) 14 - 44 %    Monocytes Relative 5 4 - 12 %    Eosinophils Relative 2 0 - 6 %    Basophils Relative 0 0 - 1 %    Neutrophils Absolute 9 76 (H) 1 85 - 7 62 Thousands/µL    Lymphocytes Absolute 0 62 0 60 - 4 47 Thousands/µL    Monocytes Absolute 0 55 0 17 - 1 22 Thousand/µL    Eosinophils Absolute 0 25 0 00 - 0 61 Thousand/µL    Basophils Absolute 0 01 0 00 - 0 10 Thousands/µL   Comprehensive metabolic panel   Result Value Ref Range    Sodium 138 136 - 145 mmol/L    Potassium 4 1 3 5 - 5 3 mmol/L    Chloride 105 100 - 108 mmol/L    CO2 28 21 - 32 mmol/L    Anion Gap 5 4 - 13 mmol/L    BUN 48 (H) 5 - 25 mg/dL    Creatinine 1 71 (H) 0 60 - 1 30 mg/dL    Glucose 108 65 - 140 mg/dL    Calcium 9 0 8 3 - 10 1 mg/dL    AST 16 5 - 45 U/L    ALT 11 (L) 12 - 78 U/L    Alkaline Phosphatase 75 46 - 116 U/L    Total Protein 7 9 6 4 - 8 2 g/dL    Albumin 3 9 3 5 - 5 0 g/dL    Total Bilirubin 0 34 0 20 - 1 00 mg/dL    eGFR 27 ml/min/1 73sq m   Lipase   Result Value Ref Range    Lipase 229 73 - 393 u/L   Troponin I   Result Value Ref Range    Troponin I <0 02 <=0 04 ng/mL   Protime-INR   Result Value Ref Range    Protime 15 4 (H) 12 1 - 14 4 seconds    INR 1 21 (H) 0 86 - 1 16   APTT   Result Value Ref Range    PTT 40 (H) 23 - 35 seconds   Lactic acid x2 Q2H   Result Value Ref Range    LACTIC ACID 1 8 0 5 - 2 0 mmol/L   Basic metabolic panel   Result Value Ref Range    Sodium 145 136 - 145 mmol/L    Potassium 3 5 3 5 - 5 3 mmol/L    Chloride 113 (H) 100 - 108 mmol/L    CO2 23 21 - 32 mmol/L    Anion Gap 9 4 - 13 mmol/L    BUN 29 (H) 5 - 25 mg/dL    Creatinine 1 39 (H) 0 60 - 1 30 mg/dL    Glucose 94 65 - 140 mg/dL    Calcium 8 0 (L) 8 3 - 10 1 mg/dL    eGFR 34 ml/min/1 73sq m   CBC (With Platelets)   Result Value Ref Range    WBC 5 67 4 31 - 10 16 Thousand/uL    RBC 3 23 (L) 3 81 - 5 12 Million/uL    Hemoglobin 9 4 (L) 11 5 - 15 4 g/dL    Hematocrit 28 6 (L) 34 8 - 46 1 %    MCV 89 82 - 98 fL    MCH 29 1 26 8 - 34 3 pg    MCHC 32 9 31 4 - 37 4 g/dL    RDW 15 1 11 6 - 15 1 %    Platelets 095 001 - 862 Thousands/uL    MPV 10 2 8 9 - 12 7 fL   Hemoglobin and hematocrit, blood   Result Value Ref Range    Hemoglobin 10 6 (L) 11 5 - 15 4 g/dL    Hematocrit 31 7 (L) 34 8 - 46 1 %   Hemoglobin and hematocrit, blood   Result Value Ref Range    Hemoglobin 10 2 (L) 11 5 - 15 4 g/dL    Hematocrit 31 3 (L) 34 8 - 46 1 %   CBC   Result Value Ref Range    WBC 5 74 4 31 - 10 16 Thousand/uL    RBC 3 51 (L) 3 81 - 5 12 Million/uL    Hemoglobin 10 2 (L) 11 5 - 15 4 g/dL    Hematocrit 30 7 (L) 34 8 - 46 1 %    MCV 88 82 - 98 fL    MCH 29 1 26 8 - 34 3 pg    MCHC 33 2 31 4 - 37 4 g/dL    RDW 14 9 11 6 - 15 1 %    Platelets 761 261 - 258 Thousands/uL    MPV 10 6 8 9 - 12 7 fL   Basic metabolic panel   Result Value Ref Range    Sodium 140 136 - 145 mmol/L    Potassium 3 3 (L) 3 5 - 5 3 mmol/L    Chloride 107 100 - 108 mmol/L    CO2 25 21 - 32 mmol/L    Anion Gap 8 4 - 13 mmol/L    BUN 23 5 - 25 mg/dL    Creatinine 1 18 0 60 - 1 30 mg/dL    Glucose 89 65 - 140 mg/dL    Calcium 8 5 8 3 - 10 1 mg/dL    eGFR 42 ml/min/1 73sq m Hemoglobin and hematocrit, blood   Result Value Ref Range    Hemoglobin 11 4 (L) 11 5 - 15 4 g/dL    Hematocrit 34 1 (L) 34 8 - 46 1 %   Basic metabolic panel   Result Value Ref Range    Sodium 142 136 - 145 mmol/L    Potassium 3 1 (L) 3 5 - 5 3 mmol/L    Chloride 107 100 - 108 mmol/L    CO2 26 21 - 32 mmol/L    Anion Gap 9 4 - 13 mmol/L    BUN 23 5 - 25 mg/dL    Creatinine 1 26 0 60 - 1 30 mg/dL    Glucose 82 65 - 140 mg/dL    Calcium 8 7 8 3 - 10 1 mg/dL    eGFR 39 ml/min/1 73sq m   CBC   Result Value Ref Range    WBC 6 10 4 31 - 10 16 Thousand/uL    RBC 3 64 (L) 3 81 - 5 12 Million/uL    Hemoglobin 10 6 (L) 11 5 - 15 4 g/dL    Hematocrit 31 8 (L) 34 8 - 46 1 %    MCV 87 82 - 98 fL    MCH 29 1 26 8 - 34 3 pg    MCHC 33 3 31 4 - 37 4 g/dL    RDW 14 6 11 6 - 15 1 %    Platelets 105 393 - 852 Thousands/uL    MPV 10 7 8 9 - 12 7 fL   TSH, 3rd generation   Result Value Ref Range    TSH 3RD GENERATON 1 160 0 358 - 3 740 uIU/mL   CBC   Result Value Ref Range    WBC 7 62 4 31 - 10 16 Thousand/uL    RBC 3 70 (L) 3 81 - 5 12 Million/uL    Hemoglobin 10 7 (L) 11 5 - 15 4 g/dL    Hematocrit 32 0 (L) 34 8 - 46 1 %    MCV 87 82 - 98 fL    MCH 28 9 26 8 - 34 3 pg    MCHC 33 4 31 4 - 37 4 g/dL    RDW 14 3 11 6 - 15 1 %    Platelets 403 359 - 105 Thousands/uL    MPV 10 4 8 9 - 12 7 fL   Basic metabolic panel   Result Value Ref Range    Sodium 139 136 - 145 mmol/L    Potassium 3 6 3 5 - 5 3 mmol/L    Chloride 105 100 - 108 mmol/L    CO2 26 21 - 32 mmol/L    Anion Gap 8 4 - 13 mmol/L    BUN 23 5 - 25 mg/dL    Creatinine 1 24 0 60 - 1 30 mg/dL    Glucose 90 65 - 140 mg/dL    Calcium 8 7 8 3 - 10 1 mg/dL    eGFR 39 ml/min/1 73sq m   POCT urinalysis dipstick   Result Value Ref Range    Color, UA clear;yellow    ECG 12 lead   Result Value Ref Range    Ventricular Rate 84 BPM    Atrial Rate 84 BPM    AK Interval 240 ms    QRSD Interval 82 ms    QT Interval 386 ms    QTC Interval 456 ms    P Axis 71 degrees    QRS Axis 13 degrees    T Wave Axis 57 degrees   ECG 12 lead   Result Value Ref Range    Ventricular Rate 66 BPM    Atrial Rate 75 BPM    OR Interval  ms    QRSD Interval 90 ms    QT Interval 430 ms    QTC Interval 450 ms    P Axis  degrees    QRS Axis 14 degrees    T Wave Axis 66 degrees   Type and screen   Result Value Ref Range    ABO Grouping A     Rh Factor Negative     Antibody Screen Negative     Specimen Expiration Date [id number]    ED Urine Macroscopic   Result Value Ref Range    Color, UA Yellow     Clarity, UA Clear     pH, UA 7 0 4 5 - 8 0    Leukocytes, UA Negative Negative    Nitrite, UA Negative Negative    Protein, UA Negative Negative mg/dl    Glucose, UA Negative Negative mg/dl    Ketones, UA Negative Negative mg/dl    Urobilinogen, UA 0 2 0 2, 1 0 E U /dl E U /dl    Bilirubin, UA Negative Negative    Blood, UA Negative Negative    Specific Gravity, UA 1 015 1 003 - 1 030               Radiology review:   chest X-ray    Ultrasound      Portions of the record may have been created with voice recognition software   Occasional wrong word or "sound a like" substitutions may have occurred due to the inherent limitations of voice recognition software   Read the chart carefully and recognize, using context, where substitutions have occurred

## 2018-03-20 NOTE — LETTER
March 20, 2018     Mellissa Garcia MD  [facility]    Patient: Naila Pryor   YOB: 1931   Date of Visit: 3/20/2018       Dear Dr Hugo Gómez: Thank you for referring Geneva French to me for evaluation  Below are my notes for this consultation  If you have questions, please do not hesitate to call me  I look forward to following your patient along with you  Sincerely,        Beatris Hatchet, MD        CC: Darnell Herb, MD Emanuel Rinks, MD Beatris Hatchet, MD  3/20/2018 10:46 AM  Incomplete  RENAL FOLLOW UP NOTE: td    ASSESSMENT AND PLAN:  #1  Chronic kidney disease stage III: Baseline creatinine 1 3-1 95: 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, arteriolar nephrosclerosis, cardiorenal syndrome, renal artery disease  Her creatinine is probably at a new baseline at 2 19 mg/dL  In part this is related to excessive volume/edema on the torsemide  Recommendations:  -increase torsemide to 40 mg in the morning and 20 mg in the evening on Mondays Wednesdays and Fridays only; every other day 20 mg twice a day  -repeat a basic metabolic profile 1-2 weeks after the above medication changes  #2  CHF/volume status: EF 73%, grade 2 diastolic dysfunction, mild valvular dysfunction  Please see above recommendations regarding torsemide  Patient has slightly worsening edema  Continue to avoid salt  #3  Hypertension/renal artery disease: Patient's blood pressure is quite good today  Please see above adjustments in torsemide  Recheck blood pressures at the skilled care facility  Goal is less than 135/85  #4  Electrolytes: Potassium 4 2  Which is acceptable  #4  Electrolytes: Potassium 4 2  #5  CKD MBD: Secondary 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, doing well at this time with a level of 68 4  Magnesium only very slightly above normal on no supplements  Phosphorus acceptable  #6    Anemia:  Hemoglobin slightly lower at 9 6    Iron saturation is also slightly low at this time at 18% with a ferritin of 81   -intravenous iron infusion Feraheme 500 mg in 2 separate infusions  -follow-up with hematology Dr Phong Simmons regarding MGUS as well as anemia of chronic kidney disease  Given massive CVA would not recommend NELLY  Transfuse as needed for hemoglobin less than 7  # 7   OTHER ISSUES:  -recent C difficile  -atrial fibrillation  -recent viral gastroenteritis  -restless leg syndrome:  PLEASE BE CAUTIOUS REGARDING INCREASING GABAPENTIN WITH THE CHRONIC KIDNEY DISEASE  -right CVA in the past  PATIENT INSTRUCTIONS:    There are no Patient Instructions on file for this visit  Subjective:   Patient was in the hospital at the end of January with viral gastroenteritis; CT the abdomen and pelvis was unremarkable  History of C difficile but was negative during that appointment  Chronic restless leg syndrome, volume overload/chronic diastolic CHF, and atrial fibrillation on anticoagulation  She did have hyperkalemia in the hospital which resolved  She also had mild PETE at that time  Currently:  -she noted slightly more swelling today  She had had more swelling about a week or so ago associated with cellulitis treated with antibiotics of the right leg in particular  Which is unchanged  She has chronic leg pain, which is unchanged   -no fevers chills cough or colds at this time  Her appetite has improved she is off the mechanical diet  Energy is fair   -she had an episode of nausea vomiting last week for 2 nights which resolved now  No abdominal pain   No diarrhea   -no active urinary symptoms  -no headaches dizziness or lightheadedness  -very rare chest pain which is without change, chronic dyspnea on exertion which is unchanged, and the leg swelling is noted  Blood pressure medications:  -torsemide 20 mg twice a day  -metoprolol tartrate 50 mg twice a day  -amlodipine 5 mg daily  -Inspra 25 mg daily  We do not have any recent blood pressures but apparently it is labile according to the patient  Weight has been quite good and stable      ROS:  See HPI, otherwise review of systems as completely reviewed with the patient are negative    Past Medical History:   Diagnosis Date    [facility]fib [facility] )     Anemia     Anemia     Anxiety     Asthma     CAD (coronary artery disease)     CHF (congestive heart failure) (HCC)     CHF (congestive heart failure) (HCC)     CKD (chronic kidney disease) stage 3, GFR 30-59 ml/min     ckd3    Constipation     CVA (cerebral vascular accident) ([facility] )     left hemiparesis    Diastolic CHF, chronic ([facility]     Disease of thyroid gland     Dry eye     First degree AV block     Gastritis     Gastritis     GERD (gastroesophageal reflux disease)     Glaucoma     Glaucoma     Hemiparesis ([facility])     left side    Hemiparesis affecting left side as late effect of stroke ([facility])     Hyperlipidemia     Hypertension     Impetigo     Insomnia     Knee pain     Neuropathy     Osteoarthritis     Parkinson disease ([facility] )     Pulmonary HTN     PVD (peripheral vascular disease) ([facility])     Restless leg     Vitamin D deficiency      Past Surgical History:   Procedure Laterality Date    APPENDECTOMY      BLADDER SURGERY      CAROTID ENDARTERECTOMY Left     ESOPHAGOGASTRODUODENOSCOPY N/A 2016    Procedure: ESOPHAGOGASTRODUODENOSCOPY (EGD); Surgeon: Reuben Fernández MD;  Location: [facility]; Service:     RENAL ARTERY STENT       Family History   Problem Relation Age of Onset    Stroke Father     Cancer Brother     No Known Problems Mother       reports that she has never smoked  She has never used smokeless tobacco  She reports that she does not drink alcohol or use drugs      I COMPLETELY REVIEWED THE PAST MEDICAL HISTORY/PAST SURGICAL HISTORY/SOCIAL HISTORY/FAMILY HISTORY/AND MEDICATIONS  AND UPDATED ALL    Objective:     Vitals:  BP sittin/80 hr 64 and sl irregular    Weight (last 2 days)     None        There is no height or weight on file to calculate BMI  Physical Exam: General: left hemiparesis, wheelchair bound, NAD  Skin:  No acute rash  Eyes:  No scleral icterus, noninjected  ENT:  Moist mucous membranes  Neck:  Supple, no jugular venous distention  Back   No CVAT  Chest:  Good respiratory effort, left basilar crackles very slight and no dullness to percussion  CVS:  Regular rate and rhythm without a rub, murmurs or gallops  Abdomen:  Obese, Soft and nontender with normal bowel sounds  Extremities:  No cyanosis; 2+ lower extremity edema to the knees bilaterally right greater than left  Neuro:  Left melania paresis  Psych:  Alert, oriented x3 and appropriate      Medications:    Current Outpatient Prescriptions:     acetaminophen (TYLENOL) 500 mg tablet, Take 1 tablet by mouth every 12 (twelve) hours, Disp: 30 tablet, Rfl: 0    Alum & Mag Hydroxide-Simeth (ANTACID ANTI-GAS PO), Take 30 mL by mouth every 6 (six) hours as needed  , Disp: , Rfl:     amLODIPine (NORVASC) 5 mg tablet, Take 5 mg by mouth daily, Disp: , Rfl:     apixaban (ELIQUIS) 2 5 mg, Take 1 tablet by mouth 2 (two) times a day, Disp: 30 tablet, Rfl: 0    bisacodyl (DULCOLAX) 5 mg EC tablet, Take 10 mg by mouth as needed for constipation Every 4th day as needed for constipation, Disp: , Rfl:     calcitriol (ROCALTROL) 0 25 mcg capsule, TAKE 1 CAPSULE ORALLY DAILY (CHRONIC HYPERPARATHYROIDISM), Disp: 30 capsule, Rfl: 4    CO-ENZYME Q-10 PO, Take 200 mg by mouth daily, Disp: , Rfl:     cycloSPORINE (RESTASIS) 0 05 % ophthalmic emulsion, Administer 1 drop to both eyes 2 (two) times a day, Disp: , Rfl:     diphenhydrAMINE (BENADRYL) 25 mg capsule, Take 25 mg by mouth daily at bedtime as needed for itching, Disp: , Rfl:     eplerenone (INSPRA) 25 mg tablet, Take 25 mg by mouth daily, Disp: , Rfl:     fluticasone (FLONASE) 50 mcg/act nasal spray, 1 spray into each nostril daily  , Disp: , Rfl:     gabapentin (NEURONTIN) 100 mg capsule, Take 100 mg by mouth 2 (two) times a day, Disp: , Rfl:     gabapentin (NEURONTIN) 300 mg capsule, Take 2 capsules by mouth daily at bedtime (Patient taking differently: Take 300 mg by mouth daily at bedtime  ), Disp: 30 capsule, Rfl: 0    latanoprost (XALATAN) 0 005 % ophthalmic solution, Administer 1 drop to both eyes daily at bedtime  , Disp: , Rfl:     levothyroxine 75 mcg tablet, Take 1 tablet by mouth daily in the early morning (Patient taking differently: Take 88 mcg by mouth daily in the early morning  ), Disp: 30 tablet, Rfl: 0    loperamide (IMODIUM) 2 mg capsule, Take 2 mg by mouth as needed for diarrhea After each loose stool, Disp: , Rfl:     loratadine (CLARITIN) 10 mg tablet, Take 10 mg by mouth daily, Disp: , Rfl:     melatonin 3 mg, Take 2 tablets by mouth daily at bedtime, Disp: 30 tablet, Rfl: 0    metoprolol tartrate (LOPRESSOR) 50 mg tablet, Take 50 mg by mouth 2 (two) times a day, Disp: , Rfl:     mineral oil enema, Insert 1 enema into the rectum once Insert in rectum every 4th day as needed for constipation  , Disp: , Rfl:     ondansetron (ZOFRAN-ODT) 4 mg disintegrating tablet, Take 1 tablet (4 mg total) by mouth every 8 (eight) hours as needed for nausea or vomiting, Disp: 20 tablet, Rfl: 0    OXcarbazepine (TRILEPTAL) 150 mg tablet, Take 1 tablet by mouth daily at bedtime, Disp: 30 tablet, Rfl: 0    pantoprazole (PROTONIX) 40 mg tablet, Take 40 mg by mouth daily  , Disp: , Rfl:     Red Yeast Rice Extract 600 MG CAPS, TAKE 1 CAPSULE ORALLY TWICE DAILY (VITAMIN DEFICIENCY), Disp: 60 each, Rfl: 4    rOPINIRole (REQUIP) 1 mg tablet, Take 1 tablet (1 mg total) by mouth every 8 (eight) hours (Patient taking differently: Take 1 mg by mouth every 6 (six) hours  ), Disp: , Rfl: 0    tamsulosin (FLOMAX) 0 4 mg, Take 1 capsule by mouth daily with dinner, Disp: 7 capsule, Rfl: 0    torsemide (DEMADEX) 20 mg tablet, Take 1 tablet (20 mg total) by mouth 2 (two) times a day, Disp: , Rfl: 0    traMADol (ULTRAM) 50 mg tablet, Take 50 mg by mouth every 6 (six) hours as needed for moderate pain, Disp: , Rfl:     Lab, Imaging and other studies: I have personally reviewed pertinent labs  Laboratory Results:  Results for orders placed or performed during the hospital encounter of 01/22/18   Clostridium difficile toxin by PCR   Result Value Ref Range    C difficile toxin by PCR NEGATIVE for C difficle toxin by PCR  NEGATIVE for C difficle toxin by PCR      Occult blood 1-3, stool   Result Value Ref Range    Fecal Occult Blood Diagnostic Negative Negative    Fecal Occult Blood Diagnostic 2  Negative    Fecal Occult Blood Diagnostic 3  Negative   Stool Enteric Bacterial Panel by PCR   Result Value Ref Range    Salmonella sp PCR None Detected None Detected    Shigella sp/Enteroinvasive E  coli (EIEC) PCR None Detected None Detected    Campylobacter sp (jejuni and coli) PCR None Detected None Detected    Shiga toxin 1/Shiga tonix 2 genes PCR None Detected None Detected   CBC and differential   Result Value Ref Range    WBC 11 27 (H) 4 31 - 10 16 Thousand/uL    RBC 3 80 (L) 3 81 - 5 12 Million/uL    Hemoglobin 11 1 (L) 11 5 - 15 4 g/dL    Hematocrit 33 3 (L) 34 8 - 46 1 %    MCV 88 82 - 98 fL    MCH 29 2 26 8 - 34 3 pg    MCHC 33 3 31 4 - 37 4 g/dL    RDW 14 8 11 6 - 15 1 %    MPV 10 2 8 9 - 12 7 fL    Platelets 822 135 - 347 Thousands/uL    nRBC 0 /100 WBCs    Neutrophils Relative 87 (H) 43 - 75 %    Lymphocytes Relative 6 (L) 14 - 44 %    Monocytes Relative 5 4 - 12 %    Eosinophils Relative 2 0 - 6 %    Basophils Relative 0 0 - 1 %    Neutrophils Absolute 9 76 (H) 1 85 - 7 62 Thousands/µL    Lymphocytes Absolute 0 62 0 60 - 4 47 Thousands/µL    Monocytes Absolute 0 55 0 17 - 1 22 Thousand/µL    Eosinophils Absolute 0 25 0 00 - 0 61 Thousand/µL    Basophils Absolute 0 01 0 00 - 0 10 Thousands/µL   Comprehensive metabolic panel   Result Value Ref Range    Sodium 138 136 - 145 mmol/L    Potassium 4 1 3 5 - 5 3 mmol/L    Chloride 105 100 - 108 mmol/L CO2 28 21 - 32 mmol/L    Anion Gap 5 4 - 13 mmol/L    BUN 48 (H) 5 - 25 mg/dL    Creatinine 1 71 (H) 0 60 - 1 30 mg/dL    Glucose 108 65 - 140 mg/dL    Calcium 9 0 8 3 - 10 1 mg/dL    AST 16 5 - 45 U/L    ALT 11 (L) 12 - 78 U/L    Alkaline Phosphatase 75 46 - 116 U/L    Total Protein 7 9 6 4 - 8 2 g/dL    Albumin 3 9 3 5 - 5 0 g/dL    Total Bilirubin 0 34 0 20 - 1 00 mg/dL    eGFR 27 ml/min/1 73sq m   Lipase   Result Value Ref Range    Lipase 229 73 - 393 u/L   Troponin I   Result Value Ref Range    Troponin I <0 02 <=0 04 ng/mL   Protime-INR   Result Value Ref Range    Protime 15 4 (H) 12 1 - 14 4 seconds    INR 1 21 (H) 0 86 - 1 16   APTT   Result Value Ref Range    PTT 40 (H) 23 - 35 seconds   Lactic acid x2 Q2H   Result Value Ref Range    LACTIC ACID 1 8 0 5 - 2 0 mmol/L   Basic metabolic panel   Result Value Ref Range    Sodium 145 136 - 145 mmol/L    Potassium 3 5 3 5 - 5 3 mmol/L    Chloride 113 (H) 100 - 108 mmol/L    CO2 23 21 - 32 mmol/L    Anion Gap 9 4 - 13 mmol/L    BUN 29 (H) 5 - 25 mg/dL    Creatinine 1 39 (H) 0 60 - 1 30 mg/dL    Glucose 94 65 - 140 mg/dL    Calcium 8 0 (L) 8 3 - 10 1 mg/dL    eGFR 34 ml/min/1 73sq m   CBC (With Platelets)   Result Value Ref Range    WBC 5 67 4 31 - 10 16 Thousand/uL    RBC 3 23 (L) 3 81 - 5 12 Million/uL    Hemoglobin 9 4 (L) 11 5 - 15 4 g/dL    Hematocrit 28 6 (L) 34 8 - 46 1 %    MCV 89 82 - 98 fL    MCH 29 1 26 8 - 34 3 pg    MCHC 32 9 31 4 - 37 4 g/dL    RDW 15 1 11 6 - 15 1 %    Platelets 878 580 - 831 Thousands/uL    MPV 10 2 8 9 - 12 7 fL   Hemoglobin and hematocrit, blood   Result Value Ref Range    Hemoglobin 10 6 (L) 11 5 - 15 4 g/dL    Hematocrit 31 7 (L) 34 8 - 46 1 %   Hemoglobin and hematocrit, blood   Result Value Ref Range    Hemoglobin 10 2 (L) 11 5 - 15 4 g/dL    Hematocrit 31 3 (L) 34 8 - 46 1 %   CBC   Result Value Ref Range    WBC 5 74 4 31 - 10 16 Thousand/uL    RBC 3 51 (L) 3 81 - 5 12 Million/uL    Hemoglobin 10 2 (L) 11 5 - 15 4 g/dL Hematocrit 30 7 (L) 34 8 - 46 1 %    MCV 88 82 - 98 fL    MCH 29 1 26 8 - 34 3 pg    MCHC 33 2 31 4 - 37 4 g/dL    RDW 14 9 11 6 - 15 1 %    Platelets 425 193 - 328 Thousands/uL    MPV 10 6 8 9 - 12 7 fL   Basic metabolic panel   Result Value Ref Range    Sodium 140 136 - 145 mmol/L    Potassium 3 3 (L) 3 5 - 5 3 mmol/L    Chloride 107 100 - 108 mmol/L    CO2 25 21 - 32 mmol/L    Anion Gap 8 4 - 13 mmol/L    BUN 23 5 - 25 mg/dL    Creatinine 1 18 0 60 - 1 30 mg/dL    Glucose 89 65 - 140 mg/dL    Calcium 8 5 8 3 - 10 1 mg/dL    eGFR 42 ml/min/1 73sq m   Hemoglobin and hematocrit, blood   Result Value Ref Range    Hemoglobin 11 4 (L) 11 5 - 15 4 g/dL    Hematocrit 34 1 (L) 34 8 - 46 1 %   Basic metabolic panel   Result Value Ref Range    Sodium 142 136 - 145 mmol/L    Potassium 3 1 (L) 3 5 - 5 3 mmol/L    Chloride 107 100 - 108 mmol/L    CO2 26 21 - 32 mmol/L    Anion Gap 9 4 - 13 mmol/L    BUN 23 5 - 25 mg/dL    Creatinine 1 26 0 60 - 1 30 mg/dL    Glucose 82 65 - 140 mg/dL    Calcium 8 7 8 3 - 10 1 mg/dL    eGFR 39 ml/min/1 73sq m   CBC   Result Value Ref Range    WBC 6 10 4 31 - 10 16 Thousand/uL    RBC 3 64 (L) 3 81 - 5 12 Million/uL    Hemoglobin 10 6 (L) 11 5 - 15 4 g/dL    Hematocrit 31 8 (L) 34 8 - 46 1 %    MCV 87 82 - 98 fL    MCH 29 1 26 8 - 34 3 pg    MCHC 33 3 31 4 - 37 4 g/dL    RDW 14 6 11 6 - 15 1 %    Platelets 453 705 - 427 Thousands/uL    MPV 10 7 8 9 - 12 7 fL   TSH, 3rd generation   Result Value Ref Range    TSH 3RD GENERATON 1 160 0 358 - 3 740 uIU/mL   CBC   Result Value Ref Range    WBC 7 62 4 31 - 10 16 Thousand/uL    RBC 3 70 (L) 3 81 - 5 12 Million/uL    Hemoglobin 10 7 (L) 11 5 - 15 4 g/dL    Hematocrit 32 0 (L) 34 8 - 46 1 %    MCV 87 82 - 98 fL    MCH 28 9 26 8 - 34 3 pg    MCHC 33 4 31 4 - 37 4 g/dL    RDW 14 3 11 6 - 15 1 %    Platelets 371 485 - 929 Thousands/uL    MPV 10 4 8 9 - 12 7 fL   Basic metabolic panel   Result Value Ref Range    Sodium 139 136 - 145 mmol/L    Potassium 3 6 3 5 - 5 3 mmol/L    Chloride 105 100 - 108 mmol/L    CO2 26 21 - 32 mmol/L    Anion Gap 8 4 - 13 mmol/L    BUN 23 5 - 25 mg/dL    Creatinine 1 24 0 60 - 1 30 mg/dL    Glucose 90 65 - 140 mg/dL    Calcium 8 7 8 3 - 10 1 mg/dL    eGFR 39 ml/min/1 73sq m   POCT urinalysis dipstick   Result Value Ref Range    Color, UA clear;yellow    ECG 12 lead   Result Value Ref Range    Ventricular Rate 84 BPM    Atrial Rate 84 BPM    NC Interval 240 ms    QRSD Interval 82 ms    QT Interval 386 ms    QTC Interval 456 ms    P Axis 71 degrees    QRS Axis 13 degrees    T Wave [place] 57 degrees   ECG 12 lead   Result Value Ref Range    Ventricular Rate 66 BPM    Atrial Rate 75 BPM    NC Interval  ms    QRSD Interval 90 ms    QT Interval 430 ms    QTC Interval 450 ms    P Axis  degrees    QRS Axis 14 degrees    T Wave Axis 66 degrees   Type and screen   Result Value Ref Range    ABO Grouping A     Rh Factor Negative     Antibody Screen Negative     Specimen Expiration Date 20180125    ED Urine Macroscopic   Result Value Ref Range    Color, UA Yellow     Clarity, UA Clear     pH, UA 7 0 4 5 - 8 0    Leukocytes, UA Negative Negative    Nitrite, UA Negative Negative    Protein, UA Negative Negative mg/dl    Glucose, UA Negative Negative mg/dl    Ketones, UA Negative Negative mg/dl    Urobilinogen, UA 0 2 0 2, 1 0 E U /dl E U /dl    Bilirubin, UA Negative Negative    Blood, UA Negative Negative    Specific Gravity, UA 1 015 1 003 - 1 030               Radiology review:   chest X-ray    Ultrasound      Portions of the record may have been created with voice recognition software   Occasional wrong word or "sound a like" substitutions may have occurred due to the inherent limitations of voice recognition software   Read the chart carefully and recognize, using context, where substitutions have occurred

## 2018-03-20 NOTE — PROGRESS NOTES
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, arteriolar nephrosclerosis, cardiorenal syndrome, renal artery disease  Her creatinine is 2 19 mg/dL  This may be her new baseline   -increase torsemide 40 mg the morning and 20 mg in the evening  -repeat labs  -watch weights  #2  CHF/volume status: EF 79%, grade 2 diastolic dysfunction, mild valvular dysfunction  Please see above recommendations regarding torsemide  Patient's legs are slightly worse  Please see above  #3  Hypertension/renal artery disease: Patient's blood pressure is acceptable today  Recheck at the skilled facility after making medication adjustments  #4  Electrolytes: Potassium 4 2  Which is acceptable  #5  CKD MBD: Secondary 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, level acceptable  #6   Anemia:  Low iron studies   -Feraheme 500 mg x2  -check stool for occult blood  -recheck labs in 1 month  -follow-up with hematology regarding MGUS/anemia  Patient probably not a candidate for [facility] given CVA  #7   Other issues:  -recent C difficile  -recent viral gastroenteritis  -atrial fibrillation  -status post CVA  I spent additional 20 minutes plus usual 20 minutes appointment time reviewing all the above regarding adjustments of the diuretics review of blood pressure and treatment of anemia  PATIENT INSTRUCTIONS:    Patient Instructions   1  Still medication changes today:  -increase torsemide to 40 mg or 2-20 mg tablets in the morning, and 20 mg in the evening on Mondays Wednesdays and Fridays only; every other day should be 20 mg twice a day including Tuesday, Thursday, Saturday and Sunday  2  Please set up an infusion of Feraheme 500 mg on 2 separate occasions  3  Please have the skilled facility send in 1 week a blood pressure readings beginning in about 1 week morning evening  4    Please have the facility set up stool for occult blood x3 as a screen to rule out GI bleeding  5  Please go for lab work in 1-2 weeks after making the above medication changes  6  Please go for repeat blood count in about 1 month  7  Follow-up in 3 months:  -Fasting lab work prior to the appointment  -please have the [facility] facility send in 1 week a blood pressure readings at that appointment morning and evening  8  Please have the patient follow up with the hematologist Dr Kaylee Ivey regarding MGUS and anemia  9  General instructions:  -avoid salt  -avoid medications such as Motrin, Naprosyn, ibuprofen, Aleve or Advil or Celebrex as they can affect your kidney function; you can use Tylenol as needed for pain or fevers if you have no liver problems  -avoid medications such as Sudafed or other medications with decongestants as they can raise your blood pressure          Subjective:   Patient with recent far gastritis PETE in hyperkalemia  Currently feeling overall better except for leg swelling  Episode of nausea and vomiting the other day resolved no further abdominal pain  No urinary symptoms  Headaches dizziness or lightheadedness  No chest pain except for rare occasions which is chronic, dyspnea on exertion which is chronic  Recent episode of cellulitis treated about a week ago  Torsemide 20 mg twice a day for swelling    ROS:  See HPI, otherwise review of systems as completely reviewed with the patient are negative    Past Medical History:   Diagnosis Date    [facility]     Anemia     Anemia     Anxiety     Asthma     CAD (coronary artery disease)     CHF (congestive heart failure) (HCC)     CHF (congestive heart failure) (HCC)     CKD (chronic kidney disease) stage 3, GFR 30-59 ml/min     ckd3    Constipation     [facility]     left hemiparesis    Diastolic CHF, chronic (HCC)     Disease of thyroid gland     Dry eye     First degree AV block     Gastritis     Gastritis     GERD (gastroesophageal reflux disease)  Glaucoma     Glaucoma     Hemiparesis ([facility] )     left side    Hemiparesis affecting left side as late effect of stroke (HCC)     Hyperlipidemia     Hypertension     Impetigo     Insomnia     Knee pain     Neuropathy     Osteoarthritis     [facility]     Pulmonary HTN     PVD [facility]peripheral vascular disease) ([facility])     Restless leg     Vitamin D deficiency      Past Surgical History:   Procedure Laterality Date    APPENDECTOMY      BLADDER SURGERY      CAROTID ENDARTERECTOMY Left     ESOPHAGOGASTRODUODENOSCOPY N/A 4/8/2016    Procedure: ESOPHAGOGASTRODUODENOSCOPY (EGD); Surgeon: Nicolette Arango MD;  Location: [facility]; Service:     RENAL ARTERY STENT       Family History   Problem Relation Age of Onset    Stroke Father     Cancer Brother     No Known Problems Mother       reports that she has never smoked  She has never used smokeless tobacco  She reports that she does not drink alcohol or use drugs  I COMPLETELY REVIEWED THE PAST MEDICAL HISTORY/PAST SURGICAL HISTORY/SOCIAL HISTORY/FAMILY HISTORY/AND MEDICATIONS  AND UPDATED ALL    Objective:     Vitals: There were no vitals filed for this visit  Weight (last 2 days)     None        There is no height or weight on file to calculate BMI      Physical Exam: General:  No acute distress  Skin:  No acute rash  Eyes:  No scleral icterus, noninjected  ENT:  Moist mucous membranes  Neck:  Supple, no jugular venous distention  Back   No CVAT  Chest:  Clear to auscultation and percussion  CVS:  Regular rate and rhythm without a rub, murmurs or gallops  Abdomen:  Soft and nontender with normal bowel sounds  Extremities:  No cyanosis and no edema  Neuro:  Grossly intact  Psych:  Alert, oriented x3 and appropriate      Medications:    Current Outpatient Prescriptions:     acetaminophen (TYLENOL) 500 mg tablet, Take 1 tablet by mouth every 12 (twelve) hours, Disp: 30 tablet, Rfl: 0    Alum & Mag Hydroxide-Simeth (ANTACID ANTI-GAS PO), Take 30 mL by mouth every 6 (six) hours as needed  , Disp: , Rfl:     amLODIPine (NORVASC) 5 mg tablet, Take 5 mg by mouth daily, Disp: , Rfl:     apixaban (ELIQUIS) 2 5 mg, Take 1 tablet by mouth 2 (two) times a day, Disp: 30 tablet, Rfl: 0    bisacodyl (DULCOLAX) 5 mg EC tablet, Take 10 mg by mouth as needed for constipation Every 4th day as needed for constipation, Disp: , Rfl:     calcitriol (ROCALTROL) 0 25 mcg capsule, TAKE 1 CAPSULE ORALLY DAILY (CHRONIC HYPERPARATHYROIDISM), Disp: 30 capsule, Rfl: 4    CO-ENZYME Q-10 PO, Take 200 mg by mouth daily, Disp: , Rfl:     cycloSPORINE (RESTASIS) 0 05 % ophthalmic emulsion, Administer 1 drop to both eyes 2 (two) times a day, Disp: , Rfl:     diphenhydrAMINE (BENADRYL) 25 mg capsule, Take 25 mg by mouth daily at bedtime as needed for itching, Disp: , Rfl:     eplerenone (INSPRA) 25 mg tablet, Take 25 mg by mouth daily, Disp: , Rfl:     fluticasone (FLONASE) 50 mcg/act nasal spray, 1 spray into each nostril daily  , Disp: , Rfl:     gabapentin (NEURONTIN) 100 mg capsule, Take 100 mg by mouth 2 (two) times a day, Disp: , Rfl:     gabapentin (NEURONTIN) 300 mg capsule, Take 2 capsules by mouth daily at bedtime (Patient taking differently: Take 300 mg by mouth daily at bedtime  ), Disp: 30 capsule, Rfl: 0    latanoprost (XALATAN) 0 005 % ophthalmic solution, Administer 1 drop to both eyes daily at bedtime  , Disp: , Rfl:     levothyroxine 75 mcg tablet, Take 1 tablet by mouth daily in the early morning (Patient taking differently: Take 88 mcg by mouth daily in the early morning  ), Disp: 30 tablet, Rfl: 0    loperamide (IMODIUM) 2 mg capsule, Take 2 mg by mouth as needed for diarrhea After each loose stool, Disp: , Rfl:     loratadine (CLARITIN) 10 mg tablet, Take 10 mg by mouth daily, Disp: , Rfl:     melatonin 3 mg, Take 2 tablets by mouth daily at bedtime, Disp: 30 tablet, Rfl: 0    metoprolol tartrate (LOPRESSOR) 50 mg tablet, Take 50 mg by mouth 2 (two) times a day, Disp: , Rfl:     mineral oil enema, Insert 1 enema into the rectum once Insert in rectum every 4th day as needed for constipation  , Disp: , Rfl:     ondansetron (ZOFRAN-ODT) 4 mg disintegrating tablet, Take 1 tablet (4 mg total) by mouth every 8 (eight) hours as needed for nausea or vomiting, Disp: 20 tablet, Rfl: 0    OXcarbazepine (TRILEPTAL) 150 mg tablet, Take 1 tablet by mouth daily at bedtime, Disp: 30 tablet, Rfl: 0    pantoprazole (PROTONIX) 40 mg tablet, Take 40 mg by mouth daily  , Disp: , Rfl:     Red Yeast Rice Extract 600 MG CAPS, TAKE 1 CAPSULE ORALLY TWICE DAILY (VITAMIN DEFICIENCY), Disp: 60 each, Rfl: 4    rOPINIRole (REQUIP) 1 mg tablet, Take 1 tablet (1 mg total) by mouth every 8 (eight) hours (Patient taking differently: Take 1 mg by mouth every 6 (six) hours  ), Disp: , Rfl: 0    tamsulosin (FLOMAX) 0 4 mg, Take 1 capsule by mouth daily with dinner, Disp: 7 capsule, Rfl: 0    torsemide (DEMADEX) 20 mg tablet, Take 1 tablet (20 mg total) by mouth 2 (two) times a day Take 1 tablet twice a day except on Monday Wednesday and Friday when you will take 2 tablets in the morning which is equivalent to 40 mg, and 1 tablet in the evening 20 mg, Disp: , Rfl: 0    traMADol (ULTRAM) 50 mg tablet, Take 50 mg by mouth every 6 (six) hours as needed for moderate pain, Disp: , Rfl:     Lab, Imaging and other studies: I have personally reviewed pertinent labs  Laboratory Results:  Results for orders placed or performed during the hospital encounter of 01/22/18   Clostridium difficile toxin by PCR   Result Value Ref Range    C difficile toxin by PCR NEGATIVE for C difficle toxin by PCR  NEGATIVE for C difficle toxin by PCR      Occult blood 1-3, stool   Result Value Ref Range    Fecal Occult Blood Diagnostic Negative Negative    Fecal Occult Blood Diagnostic 2  Negative    Fecal Occult Blood Diagnostic 3  Negative   Stool Enteric Bacterial Panel by PCR   Result Value Ref Range Salmonella sp PCR None Detected None Detected    Shigella sp/Enteroinvasive E  coli (EIEC) PCR None Detected None Detected    Campylobacter sp (jejuni and coli) PCR None Detected None Detected    Shiga toxin 1/Shiga tonix 2 genes PCR None Detected None Detected   CBC and differential   Result Value Ref Range    WBC 11 27 (H) 4 31 - 10 16 Thousand/uL    RBC 3 80 (L) 3 81 - 5 12 Million/uL    Hemoglobin 11 1 (L) 11 5 - 15 4 g/dL    Hematocrit 33 3 (L) 34 8 - 46 1 %    MCV 88 82 - 98 fL    MCH 29 2 26 8 - 34 3 pg    MCHC 33 3 31 4 - 37 4 g/dL    RDW 14 8 11 6 - 15 1 %    MPV 10 2 8 9 - 12 7 fL    Platelets 014 349 - 215 Thousands/uL    nRBC 0 /100 WBCs    Neutrophils Relative 87 (H) 43 - 75 %    Lymphocytes Relative 6 (L) 14 - 44 %    Monocytes Relative 5 4 - 12 %    Eosinophils Relative 2 0 - 6 %    Basophils Relative 0 0 - 1 %    Neutrophils Absolute 9 76 (H) 1 85 - 7 62 Thousands/µL    Lymphocytes Absolute 0 62 0 60 - 4 47 Thousands/µL    Monocytes Absolute 0 55 0 17 - 1 22 Thousand/µL    Eosinophils Absolute 0 25 0 00 - 0 61 Thousand/µL    Basophils Absolute 0 01 0 00 - 0 10 Thousands/µL   Comprehensive metabolic panel   Result Value Ref Range    Sodium 138 136 - 145 mmol/L    Potassium 4 1 3 5 - 5 3 mmol/L    Chloride 105 100 - 108 mmol/L    CO2 28 21 - 32 mmol/L    Anion Gap 5 4 - 13 mmol/L    BUN 48 (H) 5 - 25 mg/dL    Creatinine 1 71 (H) 0 60 - 1 30 mg/dL    Glucose 108 65 - 140 mg/dL    Calcium 9 0 8 3 - 10 1 mg/dL    AST 16 5 - 45 U/L    ALT 11 (L) 12 - 78 U/L    Alkaline Phosphatase 75 46 - 116 U/L    Total Protein 7 9 6 4 - 8 2 g/dL    Albumin 3 9 3 5 - 5 0 g/dL    Total Bilirubin 0 34 0 20 - 1 00 mg/dL    eGFR 27 ml/min/1 73sq m   Lipase   Result Value Ref Range    Lipase 229 73 - 393 u/L   Troponin I   Result Value Ref Range    Troponin I <0 02 <=0 04 ng/mL   Protime-INR   Result Value Ref Range    Protime 15 4 (H) 12 1 - 14 4 seconds    INR 1 21 (H) 0 86 - 1 16   APTT   Result Value Ref Range    PTT 40 (H) 23 - 35 seconds   Lactic acid x2 Q2H   Result Value Ref Range    LACTIC ACID 1 8 0 5 - 2 0 mmol/L   Basic metabolic panel   Result Value Ref Range    Sodium 145 136 - 145 mmol/L    Potassium 3 5 3 5 - 5 3 mmol/L    Chloride 113 (H) 100 - 108 mmol/L    CO2 23 21 - 32 mmol/L    Anion Gap 9 4 - 13 mmol/L    BUN 29 (H) 5 - 25 mg/dL    Creatinine 1 39 (H) 0 60 - 1 30 mg/dL    Glucose 94 65 - 140 mg/dL    Calcium 8 0 (L) 8 3 - 10 1 mg/dL    eGFR 34 ml/min/1 73sq m   CBC (With Platelets)   Result Value Ref Range    WBC 5 67 4 31 - 10 16 Thousand/uL    RBC 3 23 (L) 3 81 - 5 12 Million/uL    Hemoglobin 9 4 (L) 11 5 - 15 4 g/dL    Hematocrit 28 6 (L) 34 8 - 46 1 %    MCV 89 82 - 98 fL    MCH 29 1 26 8 - 34 3 pg    MCHC 32 9 31 4 - 37 4 g/dL    RDW 15 1 11 6 - 15 1 %    Platelets 535 761 - 068 Thousands/uL    MPV 10 2 8 9 - 12 7 fL   Hemoglobin and hematocrit, blood   Result Value Ref Range    Hemoglobin 10 6 (L) 11 5 - 15 4 g/dL    Hematocrit 31 7 (L) 34 8 - 46 1 %   Hemoglobin and hematocrit, blood   Result Value Ref Range    Hemoglobin 10 2 (L) 11 5 - 15 4 g/dL    Hematocrit 31 3 (L) 34 8 - 46 1 %   CBC   Result Value Ref Range    WBC 5 74 4 31 - 10 16 Thousand/uL    RBC 3 51 (L) 3 81 - 5 12 Million/uL    Hemoglobin 10 2 (L) 11 5 - 15 4 g/dL    Hematocrit 30 7 (L) 34 8 - 46 1 %    MCV 88 82 - 98 fL    MCH 29 1 26 8 - 34 3 pg    MCHC 33 2 31 4 - 37 4 g/dL    RDW 14 9 11 6 - 15 1 %    Platelets 109 626 - 624 Thousands/uL    MPV 10 6 8 9 - 12 7 fL   Basic metabolic panel   Result Value Ref Range    Sodium 140 136 - 145 mmol/L    Potassium 3 3 (L) 3 5 - 5 3 mmol/L    Chloride 107 100 - 108 mmol/L    CO2 25 21 - 32 mmol/L    Anion Gap 8 4 - 13 mmol/L    BUN 23 5 - 25 mg/dL    Creatinine 1 18 0 60 - 1 30 mg/dL    Glucose 89 65 - 140 mg/dL    Calcium 8 5 8 3 - 10 1 mg/dL    eGFR 42 ml/min/1 73sq m   Hemoglobin and hematocrit, blood   Result Value Ref Range    Hemoglobin 11 4 (L) 11 5 - 15 4 g/dL    Hematocrit 34 1 (L) 34 8 - 46 1 %   Basic metabolic panel   Result Value Ref Range    Sodium 142 136 - 145 mmol/L    Potassium 3 1 (L) 3 5 - 5 3 mmol/L    Chloride 107 100 - 108 mmol/L    CO2 26 21 - 32 mmol/L    Anion Gap 9 4 - 13 mmol/L    BUN 23 5 - 25 mg/dL    Creatinine 1 26 0 60 - 1 30 mg/dL    Glucose 82 65 - 140 mg/dL    Calcium 8 7 8 3 - 10 1 mg/dL    eGFR 39 ml/min/1 73sq m   CBC   Result Value Ref Range    WBC 6 10 4 31 - 10 16 Thousand/uL    RBC 3 64 (L) 3 81 - 5 12 Million/uL    Hemoglobin 10 6 (L) 11 5 - 15 4 g/dL    Hematocrit 31 8 (L) 34 8 - 46 1 %    MCV 87 82 - 98 fL    MCH 29 1 26 8 - 34 3 pg    MCHC 33 3 31 4 - 37 4 g/dL    RDW 14 6 11 6 - 15 1 %    Platelets 909 826 - 639 Thousands/uL    MPV 10 7 8 9 - 12 7 fL   TSH, 3rd generation   Result Value Ref Range    TSH 3RD GENERATON 1 160 0 358 - 3 740 uIU/mL   CBC   Result Value Ref Range    WBC 7 62 4 31 - 10 16 Thousand/uL    RBC 3 70 (L) 3 81 - 5 12 Million/uL    Hemoglobin 10 7 (L) 11 5 - 15 4 g/dL    Hematocrit 32 0 (L) 34 8 - 46 1 %    MCV 87 82 - 98 fL    MCH 28 9 26 8 - 34 3 pg    MCHC 33 4 31 4 - 37 4 g/dL    RDW 14 3 11 6 - 15 1 %    Platelets 147 324 - 624 Thousands/uL    MPV 10 4 8 9 - 12 7 fL   Basic metabolic panel   Result Value Ref Range    Sodium 139 136 - 145 mmol/L    Potassium 3 6 3 5 - 5 3 mmol/L    Chloride 105 100 - 108 mmol/L    CO2 26 21 - 32 mmol/L    Anion Gap 8 4 - 13 mmol/L    BUN 23 5 - 25 mg/dL    Creatinine 1 24 0 60 - 1 30 mg/dL    Glucose 90 65 - 140 mg/dL    Calcium 8 7 8 3 - 10 1 mg/dL    eGFR 39 ml/min/1 73sq m   POCT urinalysis dipstick   Result Value Ref Range    Color, UA clear;yellow    ECG 12 lead   Result Value Ref Range    Ventricular Rate 84 BPM    Atrial Rate 84 BPM    KY Interval 240 ms    QRSD Interval 82 ms    QT Interval 386 ms    QTC Interval 456 ms    P Axis 71 degrees    QRS Axis 13 degrees    T Wave [place] 57 degrees   ECG 12 lead   Result Value Ref Range    Ventricular Rate 66 BPM    Atrial Rate 75 BPM    KY Interval  ms QRSD Interval 90 ms    QT Interval 430 ms    QTC Interval 450 ms    P Axis  degrees    QRS Axis 14 degrees    T Wave Axis 66 degrees   Type and screen   Result Value Ref Range    ABO Grouping A     Rh Factor Negative     Antibody Screen Negative     Specimen Expiration Date [id number]    ED Urine Macroscopic   Result Value Ref Range    Color, UA Yellow     Clarity, UA Clear     pH, UA 7 0 4 5 - 8 0    Leukocytes, UA Negative Negative    Nitrite, UA Negative Negative    Protein, UA Negative Negative mg/dl    Glucose, UA Negative Negative mg/dl    Ketones, UA Negative Negative mg/dl    Urobilinogen, UA 0 2 0 2, 1 0 E U /dl E U /dl    Bilirubin, UA Negative Negative    Blood, UA Negative Negative    Specific Gravity, UA 1 015 1 003 - 1 030               Radiology review:   chest X-ray    Ultrasound      Portions of the record may have been created with voice recognition software   Occasional wrong word or "sound a like" substitutions may have occurred due to the inherent limitations of voice recognition software   Read the chart carefully and recognize, using context, where substitutions have occurred

## 2018-03-20 NOTE — LETTER
March 20, 2018     Jayshree Guajardo MD  [facility]    Patient: Jim Cardoza   YOB: 1931   Date of Visit: 3/20/2018       Dear Dr Juan Merino: Thank you for referring Willy Malone to me for evaluation  Below are my notes for this consultation  If you have questions, please do not hesitate to call me  I look forward to following your patient along with you  Sincerely,        Daina Ye MD        CC: MD Go Sifuentes MD Joneen Leaks, MD  3/20/2018 11:00 AM  Sign at close encounter  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, arteriolar nephrosclerosis, cardiorenal syndrome, renal artery disease  Her creatinine is 2 19 mg/dL  This may be her new baseline   -increase torsemide 40 mg the morning and 20 mg in the evening  -repeat labs  -watch weights  #2  CHF/volume status: EF 21%, grade 2 diastolic dysfunction, mild valvular dysfunction  Please see above recommendations regarding torsemide  Patient's legs are slightly worse  Please see above  #3  Hypertension/renal artery disease: Patient's blood pressure is acceptable today  Recheck at the skilled facility after making medication adjustments  #4  Electrolytes: Potassium 4 2  Which is acceptable  #5  CKD MBD: Secondary 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, level acceptable  #6   Anemia:  Low iron studies   -Feraheme 500 mg x2  -check stool for occult blood  -recheck labs in 1 month  -follow-up with hematology regarding MGUS/anemia  Patient probably not a candidate for NELLY given CVA    #7   Other issues:  -recent C difficile  -recent viral gastroenteritis  -atrial fibrillation  -status post CVA  I spent additional 20 minutes plus usual 20 minutes appointment time reviewing all the above regarding adjustments of the diuretics review of blood pressure and treatment of anemia  PATIENT INSTRUCTIONS:    Patient Instructions   1  Still medication changes today:  -increase torsemide to 40 mg or 2-20 mg tablets in the morning, and 20 mg in the evening on Mondays Wednesdays and Fridays only; every other day should be 20 mg twice a day including Tuesday, Thursday, Saturday and Sunday  2  Please set up an infusion of Feraheme 500 mg on 2 separate occasions  3  Please have the skilled facility send in 1 week a blood pressure readings beginning in about 1 week morning evening  4  Please have the facility set up stool for occult blood x3 as a screen to rule out GI bleeding  5  Please go for lab work in 1-2 weeks after making the above medication changes  6  Please go for repeat blood count in about 1 month  7  Follow-up in 3 months:  -Fasting lab work prior to the appointment  -please have the skilled facility send in 1 week a blood pressure readings at that appointment morning and evening  8  Please have the patient follow up with the hematologist Dr Joyce Lion regarding MGUS and anemia  9  General instructions:  -avoid salt  -avoid medications such as Motrin, Naprosyn, ibuprofen, Aleve or Advil or Celebrex as they can affect your kidney function; you can use Tylenol as needed for pain or fevers if you have no liver problems  -avoid medications such as Sudafed or other medications with decongestants as they can raise your blood pressure          Subjective:   Patient with recent far gastritis PETE in hyperkalemia  Currently feeling overall better except for leg swelling  Episode of nausea and vomiting the other day resolved no further abdominal pain  No urinary symptoms  Headaches dizziness or lightheadedness  No chest pain except for rare occasions which is chronic, dyspnea on exertion which is chronic  Recent episode of cellulitis treated about a week ago  Torsemide 20 mg twice a day for swelling    ROS:  See HPI, otherwise review of systems as completely reviewed with the patient are negative    Past Medical History:   Diagnosis Date    A-fib ([place] [address] )     Anemia     Anemia     Anxiety     Asthma     CAD (coronary artery disease)     [facility]est[facility]     CHF [facility]congestive heart failure) [facility]     CKD (chronic kidney disease) stage 3, GFR 30-59 ml/min     ckd3    Constipation     CVA (cerebral vascular accident) ([place] [address] )     left hemiparesis    Diastolic CHF, chronic ([facility]     Disease of thyroid gland     Dry eye     First degree AV block     Gastritis     Gastritis     GERD (gastroesophageal reflux disease)     Glaucoma     Glaucoma     Hemiparesis ([facility])     left side    Hemiparesis affecting left side as late effect of stroke ([facility])     Hyperlipidemia     Hypertension     Impetigo     Insomnia     Knee pain     Neuropathy     Osteoarthritis     Parkinson disease ([place] [address] )     Pulmonary HTN     PVD (peripheral vascular disease) ([facility]     Restless leg     Vitamin D deficiency      Past Surgical History:   Procedure Laterality Date    APPENDECTOMY      BLADDER SURGERY      CAROTID ENDARTERECTOMY Left     ESOPHAGOGASTRODUODENOSCOPY N/A 4/8/2016    Procedure: ESOPHAGOGASTRODUODENOSCOPY (EGD); Surgeon: Joy Quiros MD;  Location: [facility]; Service:     RENAL ARTERY STENT       Family History   Problem Relation Age of Onset    Stroke Father     Cancer Brother     No Known Problems Mother       reports that she has never smoked  She has never used smokeless tobacco  She reports that she does not drink alcohol or use drugs  I COMPLETELY REVIEWED THE PAST MEDICAL HISTORY/PAST SURGICAL HISTORY/SOCIAL HISTORY/FAMILY HISTORY/AND MEDICATIONS  AND UPDATED ALL    Objective:     Vitals: There were no vitals filed for this visit  Weight (last 2 days)     None        There is no height or weight on file to calculate BMI      Physical Exam: General:  No acute distress  Skin:  No acute rash  Eyes:  No scleral icterus, noninjected  ENT:  Moist mucous membranes  Neck:  Supple, no jugular venous distention  Back   No CVAT  Chest:  Clear to auscultation and percussion  CVS:  Regular rate and rhythm without a rub, murmurs or gallops  Abdomen:  Soft and nontender with normal bowel sounds  Extremities:  No cyanosis and no edema  Neuro:  Grossly intact  Psych:  Alert, oriented x3 and appropriate      Medications:    Current Outpatient Prescriptions:     acetaminophen (TYLENOL) 500 mg tablet, Take 1 tablet by mouth every 12 (twelve) hours, Disp: 30 tablet, Rfl: 0    Alum & Mag Hydroxide-Simeth (ANTACID ANTI-GAS PO), Take 30 mL by mouth every 6 (six) hours as needed  , Disp: , Rfl:     amLODIPine (NORVASC) 5 mg tablet, Take 5 mg by mouth daily, Disp: , Rfl:     apixaban (ELIQUIS) 2 5 mg, Take 1 tablet by mouth 2 (two) times a day, Disp: 30 tablet, Rfl: 0    bisacodyl (DULCOLAX) 5 mg EC tablet, Take 10 mg by mouth as needed for constipation Every 4th day as needed for constipation, Disp: , Rfl:     calcitriol (ROCALTROL) 0 25 mcg capsule, TAKE 1 CAPSULE ORALLY DAILY (CHRONIC HYPERPARATHYROIDISM), Disp: 30 capsule, Rfl: 4    CO-ENZYME Q-10 PO, Take 200 mg by mouth daily, Disp: , Rfl:     cycloSPORINE (RESTASIS) 0 05 % ophthalmic emulsion, Administer 1 drop to both eyes 2 (two) times a day, Disp: , Rfl:     diphenhydrAMINE (BENADRYL) 25 mg capsule, Take 25 mg by mouth daily at bedtime as needed for itching, Disp: , Rfl:     eplerenone (INSPRA) 25 mg tablet, Take 25 mg by mouth daily, Disp: , Rfl:     fluticasone (FLONASE) 50 mcg/act nasal spray, 1 spray into each nostril daily  , Disp: , Rfl:     gabapentin (NEURONTIN) 100 mg capsule, Take 100 mg by mouth 2 (two) times a day, Disp: , Rfl:     gabapentin (NEURONTIN) 300 mg capsule, Take 2 capsules by mouth daily at bedtime (Patient taking differently: Take 300 mg by mouth daily at bedtime  ), Disp: 30 capsule, Rfl: 0    latanoprost (XALATAN) 0 005 % ophthalmic solution, Administer 1 drop to both eyes daily at bedtime  , Disp: , Rfl:     levothyroxine 75 mcg tablet, Take 1 tablet by mouth daily in the early morning (Patient taking differently: Take 88 mcg by mouth daily in the early morning  ), Disp: 30 tablet, Rfl: 0    loperamide (IMODIUM) 2 mg capsule, Take 2 mg by mouth as needed for diarrhea After each loose stool, Disp: , Rfl:     loratadine (CLARITIN) 10 mg tablet, Take 10 mg by mouth daily, Disp: , Rfl:     melatonin 3 mg, Take 2 tablets by mouth daily at bedtime, Disp: 30 tablet, Rfl: 0    metoprolol tartrate (LOPRESSOR) 50 mg tablet, Take 50 mg by mouth 2 (two) times a day, Disp: , Rfl:     mineral oil enema, Insert 1 enema into the rectum once Insert in rectum every 4th day as needed for constipation  , Disp: , Rfl:     ondansetron (ZOFRAN-ODT) 4 mg disintegrating tablet, Take 1 tablet (4 mg total) by mouth every 8 (eight) hours as needed for nausea or vomiting, Disp: 20 tablet, Rfl: 0    OXcarbazepine (TRILEPTAL) 150 mg tablet, Take 1 tablet by mouth daily at bedtime, Disp: 30 tablet, Rfl: 0    pantoprazole (PROTONIX) 40 mg tablet, Take 40 mg by mouth daily  , Disp: , Rfl:     Red Yeast Rice Extract 600 MG CAPS, TAKE 1 CAPSULE ORALLY TWICE DAILY (VITAMIN DEFICIENCY), Disp: 60 each, Rfl: 4    rOPINIRole (REQUIP) 1 mg tablet, Take 1 tablet (1 mg total) by mouth every 8 (eight) hours (Patient taking differently: Take 1 mg by mouth every 6 (six) hours  ), Disp: , Rfl: 0    tamsulosin (FLOMAX) 0 4 mg, Take 1 capsule by mouth daily with dinner, Disp: 7 capsule, Rfl: 0    torsemide (DEMADEX) 20 mg tablet, Take 1 tablet (20 mg total) by mouth 2 (two) times a day Take 1 tablet twice a day except on Monday Wednesday and Friday when you will take 2 tablets in the morning which is equivalent to 40 mg, and 1 tablet in the evening 20 mg, Disp: , Rfl: 0    traMADol (ULTRAM) 50 mg tablet, Take 50 mg by mouth every 6 (six) hours as needed for moderate pain, Disp: , Rfl:     Lab, Imaging and other studies: I have personally reviewed pertinent labs  Laboratory Results:  Results for orders placed or performed during the hospital encounter of 01/22/18   Clostridium difficile toxin by PCR   Result Value Ref Range    C difficile toxin by PCR NEGATIVE for C difficle toxin by PCR  NEGATIVE for C difficle toxin by PCR      Occult blood 1-3, stool   Result Value Ref Range    Fecal Occult Blood Diagnostic Negative Negative    Fecal Occult Blood Diagnostic 2  Negative    Fecal Occult Blood Diagnostic 3  Negative   Stool Enteric Bacterial Panel by PCR   Result Value Ref Range    Salmonella sp PCR None Detected None Detected    Shigella sp/Enteroinvasive E  coli (EIEC) PCR None Detected None Detected    Campylobacter sp (jejuni and coli) PCR None Detected None Detected    Shiga toxin 1/Shiga tonix 2 genes PCR None Detected None Detected   CBC and differential   Result Value Ref Range    WBC 11 27 (H) 4 31 - 10 16 Thousand/uL    RBC 3 80 (L) 3 81 - 5 12 Million/uL    Hemoglobin 11 1 (L) 11 5 - 15 4 g/dL    Hematocrit 33 3 (L) 34 8 - 46 1 %    MCV 88 82 - 98 fL    MCH 29 2 26 8 - 34 3 pg    MCHC 33 3 31 4 - 37 4 g/dL    RDW 14 8 11 6 - 15 1 %    MPV 10 2 8 9 - 12 7 fL    Platelets 700 284 - 508 Thousands/uL    nRBC 0 /100 WBCs    Neutrophils Relative 87 (H) 43 - 75 %    Lymphocytes Relative 6 (L) 14 - 44 %    Monocytes Relative 5 4 - 12 %    Eosinophils Relative 2 0 - 6 %    Basophils Relative 0 0 - 1 %    Neutrophils Absolute 9 76 (H) 1 85 - 7 62 Thousands/µL    Lymphocytes Absolute 0 62 0 60 - 4 47 Thousands/µL    Monocytes Absolute 0 55 0 17 - 1 22 Thousand/µL    Eosinophils Absolute 0 25 0 00 - 0 61 Thousand/µL    Basophils Absolute 0 01 0 00 - 0 10 Thousands/µL   Comprehensive metabolic panel   Result Value Ref Range    Sodium 138 136 - 145 mmol/L    Potassium 4 1 3 5 - 5 3 mmol/L    Chloride 105 100 - 108 mmol/L    CO2 28 21 - 32 mmol/L    Anion Gap 5 4 - 13 mmol/L    BUN 48 (H) 5 - 25 mg/dL    Creatinine 1 71 (H) 0 60 - 1 30 mg/dL Glucose 108 65 - 140 mg/dL    Calcium 9 0 8 3 - 10 1 mg/dL    AST 16 5 - 45 U/L    ALT 11 (L) 12 - 78 U/L    Alkaline Phosphatase 75 46 - 116 U/L    Total Protein 7 9 6 4 - 8 2 g/dL    Albumin 3 9 3 5 - 5 0 g/dL    Total Bilirubin 0 34 0 20 - 1 00 mg/dL    eGFR 27 ml/min/1 73sq m   Lipase   Result Value Ref Range    Lipase 229 73 - 393 u/L   Troponin I   Result Value Ref Range    Troponin I <0 02 <=0 04 ng/mL   Protime-INR   Result Value Ref Range    Protime 15 4 (H) 12 1 - 14 4 seconds    INR 1 21 (H) 0 86 - 1 16   APTT   Result Value Ref Range    PTT 40 (H) 23 - 35 seconds   Lactic acid x2 Q2H   Result Value Ref Range    LACTIC ACID 1 8 0 5 - 2 0 mmol/L   Basic metabolic panel   Result Value Ref Range    Sodium 145 136 - 145 mmol/L    Potassium 3 5 3 5 - 5 3 mmol/L    Chloride 113 (H) 100 - 108 mmol/L    CO2 23 21 - 32 mmol/L    Anion Gap 9 4 - 13 mmol/L    BUN 29 (H) 5 - 25 mg/dL    Creatinine 1 39 (H) 0 60 - 1 30 mg/dL    Glucose 94 65 - 140 mg/dL    Calcium 8 0 (L) 8 3 - 10 1 mg/dL    eGFR 34 ml/min/1 73sq m   CBC (With Platelets)   Result Value Ref Range    WBC 5 67 4 31 - 10 16 Thousand/uL    RBC 3 23 (L) 3 81 - 5 12 Million/uL    Hemoglobin 9 4 (L) 11 5 - 15 4 g/dL    Hematocrit 28 6 (L) 34 8 - 46 1 %    MCV 89 82 - 98 fL    MCH 29 1 26 8 - 34 3 pg    MCHC 32 9 31 4 - 37 4 g/dL    RDW 15 1 11 6 - 15 1 %    Platelets 042 475 - 453 Thousands/uL    MPV 10 2 8 9 - 12 7 fL   Hemoglobin and hematocrit, blood   Result Value Ref Range    Hemoglobin 10 6 (L) 11 5 - 15 4 g/dL    Hematocrit 31 7 (L) 34 8 - 46 1 %   Hemoglobin and hematocrit, blood   Result Value Ref Range    Hemoglobin 10 2 (L) 11 5 - 15 4 g/dL    Hematocrit 31 3 (L) 34 8 - 46 1 %   CBC   Result Value Ref Range    WBC 5 74 4 31 - 10 16 Thousand/uL    RBC 3 51 (L) 3 81 - 5 12 Million/uL    Hemoglobin 10 2 (L) 11 5 - 15 4 g/dL    Hematocrit 30 7 (L) 34 8 - 46 1 %    MCV 88 82 - 98 fL    MCH 29 1 26 8 - 34 3 pg    MCHC 33 2 31 4 - 37 4 g/dL    RDW 14 9 11 6 - 15 1 %    Platelets 384 306 - 457 Thousands/uL    MPV 10 6 8 9 - 12 7 fL   Basic metabolic panel   Result Value Ref Range    Sodium 140 136 - 145 mmol/L    Potassium 3 3 (L) 3 5 - 5 3 mmol/L    Chloride 107 100 - 108 mmol/L    CO2 25 21 - 32 mmol/L    Anion Gap 8 4 - 13 mmol/L    BUN 23 5 - 25 mg/dL    Creatinine 1 18 0 60 - 1 30 mg/dL    Glucose 89 65 - 140 mg/dL    Calcium 8 5 8 3 - 10 1 mg/dL    eGFR 42 ml/min/1 73sq m   Hemoglobin and hematocrit, blood   Result Value Ref Range    Hemoglobin 11 4 (L) 11 5 - 15 4 g/dL    Hematocrit 34 1 (L) 34 8 - 46 1 %   Basic metabolic panel   Result Value Ref Range    Sodium 142 136 - 145 mmol/L    Potassium 3 1 (L) 3 5 - 5 3 mmol/L    Chloride 107 100 - 108 mmol/L    CO2 26 21 - 32 mmol/L    Anion Gap 9 4 - 13 mmol/L    BUN 23 5 - 25 mg/dL    Creatinine 1 26 0 60 - 1 30 mg/dL    Glucose 82 65 - 140 mg/dL    Calcium 8 7 8 3 - 10 1 mg/dL    eGFR 39 ml/min/1 73sq m   CBC   Result Value Ref Range    WBC 6 10 4 31 - 10 16 Thousand/uL    RBC 3 64 (L) 3 81 - 5 12 Million/uL    Hemoglobin 10 6 (L) 11 5 - 15 4 g/dL    Hematocrit 31 8 (L) 34 8 - 46 1 %    MCV 87 82 - 98 fL    MCH 29 1 26 8 - 34 3 pg    MCHC 33 3 31 4 - 37 4 g/dL    RDW 14 6 11 6 - 15 1 %    Platelets 671 568 - 302 Thousands/uL    MPV 10 7 8 9 - 12 7 fL   TSH, 3rd generation   Result Value Ref Range    TSH 3RD GENERATON 1 160 0 358 - 3 740 uIU/mL   CBC   Result Value Ref Range    WBC 7 62 4 31 - 10 16 Thousand/uL    RBC 3 70 (L) 3 81 - 5 12 Million/uL    Hemoglobin 10 7 (L) 11 5 - 15 4 g/dL    Hematocrit 32 0 (L) 34 8 - 46 1 %    MCV 87 82 - 98 fL    MCH 28 9 26 8 - 34 3 pg    MCHC 33 4 31 4 - 37 4 g/dL    RDW 14 3 11 6 - 15 1 %    Platelets 020 099 - 738 Thousands/uL    MPV 10 4 8 9 - 12 7 fL   Basic metabolic panel   Result Value Ref Range    Sodium 139 136 - 145 mmol/L    Potassium 3 6 3 5 - 5 3 mmol/L    Chloride 105 100 - 108 mmol/L    CO2 26 21 - 32 mmol/L    Anion Gap 8 4 - 13 mmol/L    BUN 23 5 - 25 mg/dL Creatinine 1 24 0 60 - 1 30 mg/dL    Glucose 90 65 - 140 mg/dL    Calcium 8 7 8 3 - 10 1 mg/dL    eGFR 39 ml/min/1 73sq m   POCT urinalysis dipstick   Result Value Ref Range    Color, UA clear;yellow    ECG 12 lead   Result Value Ref Range    Ventricular Rate 84 BPM    Atrial Rate 84 BPM    UT Interval 240 ms    QRSD Interval 82 ms    QT Interval 386 ms    QTC Interval 456 ms    P Axis 71 degrees    QRS Axis 13 degrees    T Wave [place] 57 degrees   ECG 12 lead   Result Value Ref Range    Ventricular Rate 66 BPM    Atrial Rate 75 BPM    UT Interval  ms    QRSD Interval 90 ms    QT Interval 430 ms    QTC Interval 450 ms    P Axis  degrees    QRS Axis 14 degrees    T Wave Axis 66 degrees   Type and screen   Result Value Ref Range    ABO Grouping A     Rh Factor Negative     Antibody Screen Negative     Specimen Expiration Date 02164011    ED Urine Macroscopic   Result Value Ref Range    Color, UA Yellow     Clarity, UA Clear     pH, UA 7 0 4 5 - 8 0    Leukocytes, UA Negative Negative    Nitrite, UA Negative Negative    Protein, UA Negative Negative mg/dl    Glucose, UA Negative Negative mg/dl    Ketones, UA Negative Negative mg/dl    Urobilinogen, UA 0 2 0 2, 1 0 E U /dl E U /dl    Bilirubin, UA Negative Negative    Blood, UA Negative Negative    Specific Gravity, UA 1 015 1 003 - 1 030               Radiology review:   chest X-ray    Ultrasound      Portions of the record may have been created with voice recognition software   Occasional wrong word or "sound a like" substitutions may have occurred due to the inherent limitations of voice recognition software   Read the chart carefully and recognize, using context, where substitutions have occurred

## 2018-03-22 ENCOUNTER — DOCUMENTATION (OUTPATIENT)
Dept: NEPHROLOGY | Facility: CLINIC | Age: 83
End: 2018-03-22

## 2018-03-22 NOTE — PROGRESS NOTES
THE PATIENT HAD HEME-POSITIVE STOOLS IN THE SETTING OF ANEMIA  I SPOKE WITH MARISSA ROMO WHO WILL CONSULT GI FROM [facility] WHO SHE HAD SEEN IN THE PAST IN [facility]

## 2018-03-27 ENCOUNTER — TELEPHONE (OUTPATIENT)
Dept: NEPHROLOGY | Facility: CLINIC | Age: 83
End: 2018-03-27

## 2018-03-27 LAB
EXT GLUCOSE BLD: 90
EXTERNAL ANION GAP: 6
EXTERNAL BUN: 49
EXTERNAL CALCIUM: 9.4
EXTERNAL CHLORIDE: 102
EXTERNAL CO2: 32
EXTERNAL CREATININE: 1.87
EXTERNAL EGFR: 24
EXTERNAL POTASSIUM: 4.1
EXTERNAL SODIUM: 140

## 2018-03-27 NOTE — TELEPHONE ENCOUNTER
I spoke with Chriss Fernandes from [facility], Juany Hunter does have a pending appointment with GI on 4/6    ----- Message from Lisandro Coello MD sent at 3/26/2018  9:25 AM EDT -----  Please make sure the patient has a GI appt re heme + stools  ----- Message -----  From: Prabhu Vega  Sent: 3/26/2018   8:33 AM  To: Lisandro Coello MD

## 2018-03-29 ENCOUNTER — HOSPITAL ENCOUNTER (OUTPATIENT)
Dept: INFUSION CENTER | Facility: CLINIC | Age: 83
Discharge: HOME | End: 2018-03-29
Payer: MEDICARE

## 2018-03-29 VITALS
TEMPERATURE: 97.6 F | SYSTOLIC BLOOD PRESSURE: 143 MMHG | HEART RATE: 72 BPM | RESPIRATION RATE: 20 BRPM | DIASTOLIC BLOOD PRESSURE: 65 MMHG

## 2018-03-29 PROCEDURE — 96365 THER/PROPH/DIAG IV INF INIT: CPT

## 2018-03-29 RX ORDER — POLYVINYL ALCOHOL 14 MG/ML
1 SOLUTION/ DROPS OPHTHALMIC 2 TIMES DAILY
COMMUNITY
End: 2018-04-16 | Stop reason: HOSPADM

## 2018-03-29 RX ORDER — ACETAMINOPHEN 325 MG/1
650 TABLET ORAL EVERY 4 HOURS PRN
COMMUNITY
End: 2018-04-16 | Stop reason: HOSPADM

## 2018-03-29 RX ORDER — BISACODYL 10 MG
10 SUPPOSITORY, RECTAL RECTAL DAILY
COMMUNITY
End: 2018-04-16 | Stop reason: HOSPADM

## 2018-03-29 RX ADMIN — FERUMOXYTOL 510 MG: 510 INJECTION INTRAVENOUS at 14:38

## 2018-03-29 NOTE — PROGRESS NOTES
Patient without s/s michael's 30 min feraheme dosing and discharged back to [facility]  She offers no c/o

## 2018-03-29 NOTE — PLAN OF CARE
Problem: Potential for Falls  Goal: Patient will remain free of falls  INTERVENTIONS:  - Assess patient frequently for physical needs  -  Identify cognitive and physical deficits and behaviors that affect risk of falls    -  [place] fall precautions as indicated by assessment   - Educate patient/family on patient safety including physical limitations  - Instruct patient to call for assistance with activity based on assessment  - Modify environment to reduce risk of injury  - Consider OT/PT consult to assist with strengthening/mobility   Outcome: Progressing      Problem: PAIN - ADULT  Goal: Verbalizes/displays adequate comfort level or baseline comfort level  Interventions:  - Encourage patient to monitor pain and request assistance  - Assess pain using appropriate pain scale  - Administer analgesics based on type and severity of pain and evaluate response  - Implement non-pharmacological measures as appropriate and evaluate response  - Consider cultural and social influences on pain and pain management  - Notify physician/advanced practitioner if interventions unsuccessful or patient reports new pain  Outcome: Progressing      Problem: INFECTION - ADULT  Goal: Absence or prevention of progression during hospitalization  INTERVENTIONS:  - Assess and monitor for signs and symptoms of infection  - Monitor lab/diagnostic results  - Monitor all insertion sites, i e  indwelling lines, tubes, and drains  - Monitor endotracheal (as able) and nasal secretions for changes in amount and color  - [place] appropriate cooling/warming therapies per order  - Administer medications as ordered  - Instruct and encourage patient and family to use good hand hygiene technique  - Identify and instruct in appropriate isolation precautions for identified infection/condition  Outcome: Progressing

## 2018-04-02 ENCOUNTER — APPOINTMENT (EMERGENCY)
Dept: CT IMAGING | Facility: HOSPITAL | Age: 83
End: 2018-04-02
Attending: EMERGENCY MEDICINE
Payer: MEDICARE

## 2018-04-02 ENCOUNTER — APPOINTMENT (EMERGENCY)
Dept: RADIOLOGY | Facility: HOSPITAL | Age: 83
End: 2018-04-02
Payer: MEDICARE

## 2018-04-02 ENCOUNTER — HOSPITAL ENCOUNTER (EMERGENCY)
Facility: HOSPITAL | Age: 83
Discharge: HOME | End: 2018-04-02
Attending: EMERGENCY MEDICINE | Admitting: EMERGENCY MEDICINE
Payer: MEDICARE

## 2018-04-02 ENCOUNTER — TELEPHONE (OUTPATIENT)
Dept: NEPHROLOGY | Facility: CLINIC | Age: 83
End: 2018-04-02

## 2018-04-02 VITALS
SYSTOLIC BLOOD PRESSURE: 157 MMHG | HEART RATE: 63 BPM | WEIGHT: 172.4 LBS | HEIGHT: 60 IN | RESPIRATION RATE: 20 BRPM | DIASTOLIC BLOOD PRESSURE: 68 MMHG | OXYGEN SATURATION: 100 % | TEMPERATURE: 97.8 F | BODY MASS INDEX: 33.85 KG/M2

## 2018-04-02 DIAGNOSIS — R05.9 COUGH: ICD-10-CM

## 2018-04-02 DIAGNOSIS — L53.9 FACIAL ERYTHEMA: ICD-10-CM

## 2018-04-02 DIAGNOSIS — K52.9 GASTROENTERITIS: Primary | ICD-10-CM

## 2018-04-02 LAB
ALBUMIN SERPL BCP-MCNC: 3.6 G/DL (ref 3.5–5)
ALP SERPL-CCNC: 71 U/L (ref 46–116)
ALT SERPL W P-5'-P-CCNC: 22 U/L (ref 12–78)
ANION GAP SERPL CALCULATED.3IONS-SCNC: 10 MMOL/L (ref 4–13)
APTT PPP: 44 SECONDS (ref 23–35)
AST SERPL W P-5'-P-CCNC: 49 U/L (ref 5–45)
BASOPHILS # BLD MANUAL: 0 THOUSAND/UL (ref 0–0.1)
BASOPHILS NFR MAR MANUAL: 0 % (ref 0–1)
BILIRUB SERPL-MCNC: 0.5 MG/DL (ref 0.2–1)
BUN SERPL-MCNC: 37 MG/DL (ref 5–25)
CALCIUM SERPL-MCNC: 9 MG/DL (ref 8.3–10.1)
CHLORIDE SERPL-SCNC: 100 MMOL/L (ref 100–108)
CO2 SERPL-SCNC: 29 MMOL/L (ref 21–32)
CREAT SERPL-MCNC: 1.82 MG/DL (ref 0.6–1.3)
EOSINOPHIL # BLD MANUAL: 0.25 THOUSAND/UL (ref 0–0.4)
EOSINOPHIL NFR BLD MANUAL: 5 % (ref 0–6)
ERYTHROCYTE [DISTWIDTH] IN BLOOD BY AUTOMATED COUNT: 15.4 % (ref 11.6–15.1)
GFR SERPL CREATININE-BSD FRML MDRD: 25 ML/MIN/1.73SQ M
GLUCOSE SERPL-MCNC: 81 MG/DL (ref 65–140)
HCT VFR BLD AUTO: 33.6 % (ref 34.8–46.1)
HGB BLD-MCNC: 10.7 G/DL (ref 11.5–15.4)
INR PPP: 1.08 (ref 0.86–1.16)
LIPASE SERPL-CCNC: 113 U/L (ref 73–393)
LYMPHOCYTES # BLD AUTO: 0.76 THOUSAND/UL (ref 0.6–4.47)
LYMPHOCYTES # BLD AUTO: 15 % (ref 14–44)
MCH RBC QN AUTO: 28.5 PG (ref 26.8–34.3)
MCHC RBC AUTO-ENTMCNC: 31.8 G/DL (ref 31.4–37.4)
MCV RBC AUTO: 90 FL (ref 82–98)
METAMYELOCYTES NFR BLD MANUAL: 1 % (ref 0–1)
MONOCYTES # BLD AUTO: 0.45 THOUSAND/UL (ref 0–1.22)
MONOCYTES NFR BLD: 9 % (ref 4–12)
NEUTROPHILS # BLD MANUAL: 3.38 THOUSAND/UL (ref 1.85–7.62)
NEUTS BAND NFR BLD MANUAL: 1 % (ref 0–8)
NEUTS SEG NFR BLD AUTO: 66 % (ref 43–75)
PLATELET # BLD AUTO: 241 THOUSANDS/UL (ref 149–390)
PLATELET BLD QL SMEAR: ADEQUATE
PMV BLD AUTO: 10.6 FL (ref 8.9–12.7)
POTASSIUM SERPL-SCNC: 5 MMOL/L (ref 3.5–5.3)
PROT SERPL-MCNC: 8 G/DL (ref 6.4–8.2)
PROTHROMBIN TIME: 14.4 SECONDS (ref 12.1–14.4)
RBC # BLD AUTO: 3.75 MILLION/UL (ref 3.81–5.12)
SODIUM SERPL-SCNC: 139 MMOL/L (ref 136–145)
TOTAL CELLS COUNTED SPEC: 100
VARIANT LYMPHS # BLD AUTO: 3 %
WBC # BLD AUTO: 5.04 THOUSAND/UL (ref 4.31–10.16)

## 2018-04-02 PROCEDURE — 74176 CT ABD & PELVIS W/O CONTRAST: CPT

## 2018-04-02 PROCEDURE — 96361 HYDRATE IV INFUSION ADD-ON: CPT

## 2018-04-02 PROCEDURE — 36415 COLL VENOUS BLD VENIPUNCTURE: CPT | Performed by: EMERGENCY MEDICINE

## 2018-04-02 PROCEDURE — 96360 HYDRATION IV INFUSION INIT: CPT

## 2018-04-02 PROCEDURE — 85007 BL SMEAR W/DIFF WBC COUNT: CPT | Performed by: EMERGENCY MEDICINE

## 2018-04-02 PROCEDURE — 99285 EMERGENCY DEPT VISIT HI MDM: CPT

## 2018-04-02 PROCEDURE — 85610 PROTHROMBIN TIME: CPT | Performed by: EMERGENCY MEDICINE

## 2018-04-02 PROCEDURE — 83690 ASSAY OF LIPASE: CPT | Performed by: EMERGENCY MEDICINE

## 2018-04-02 PROCEDURE — 93005 ELECTROCARDIOGRAM TRACING: CPT

## 2018-04-02 PROCEDURE — 71046 X-RAY EXAM CHEST 2 VIEWS: CPT

## 2018-04-02 PROCEDURE — 85730 THROMBOPLASTIN TIME PARTIAL: CPT | Performed by: EMERGENCY MEDICINE

## 2018-04-02 PROCEDURE — 85027 COMPLETE CBC AUTOMATED: CPT | Performed by: EMERGENCY MEDICINE

## 2018-04-02 PROCEDURE — 80053 COMPREHEN METABOLIC PANEL: CPT | Performed by: EMERGENCY MEDICINE

## 2018-04-02 RX ORDER — LEVOTHYROXINE SODIUM 0.07 MG/1
88 TABLET ORAL DAILY
COMMUNITY
End: 2018-04-16 | Stop reason: HOSPADM

## 2018-04-02 RX ORDER — LORATADINE 10 MG/1
10 TABLET ORAL DAILY
COMMUNITY
End: 2018-04-16 | Stop reason: HOSPADM

## 2018-04-02 RX ORDER — ACETAMINOPHEN 500 MG
500 TABLET ORAL EVERY 12 HOURS
COMMUNITY
End: 2018-04-16 | Stop reason: HOSPADM

## 2018-04-02 RX ORDER — FLUTICASONE PROPIONATE 50 MCG
1 SPRAY, SUSPENSION (ML) NASAL DAILY
COMMUNITY
End: 2018-04-16 | Stop reason: HOSPADM

## 2018-04-02 RX ADMIN — SODIUM CHLORIDE 1000 ML: 0.9 INJECTION, SOLUTION INTRAVENOUS at 14:50

## 2018-04-02 NOTE — ED NOTES
[facility] called at this time and aware pt will be returning to facility  1900 p/u via [facility] ems        Regina Soni RN  04/02/18 0701

## 2018-04-02 NOTE — TELEPHONE ENCOUNTER
I received a call from Migue Huff regarding Nicole Armendariz  She states since receiving her venofer infusion she has c/o SOB, itchiness, and a rash  I did advise them to take her to the ED, I contacted [facility] and made her aware pt should be reporting to [facility]

## 2018-04-02 NOTE — ED NOTES
Pt requested ice chips instead of water for PO challenge  Tolerating well        Amanda Carty, WIN  04/02/18 [id number]

## 2018-04-02 NOTE — DISCHARGE INSTRUCTIONS
Your recent cough, vomiting, diarrhea and facial redness do not appear to be an allergic reaction to the iron infusions  Guaifenesin should be taken as previously ordered up to 4 times daily as needed for productive cough  Gradually advance your diet over the next few days as your appetite improves  Return as needed for worsening or new concerns  Acute Cough   WHAT YOU NEED TO KNOW:   An acute cough can last up to 3 weeks  Common causes of an acute cough include a cold, allergies, or a lung infection  DISCHARGE INSTRUCTIONS:   Return to the emergency department if:   · You have trouble breathing or feel short of breath  · You cough up blood, or you see blood in your mucus  · You faint or feel weak or dizzy  · You have chest pain when you cough or take a deep breath  · You have new wheezing  Contact your healthcare provider if:   · You have a fever  · Your cough lasts longer than 4 weeks  · Your symptoms do not improve with treatment  · You have questions or concerns about your condition or care  Medicines:   · Medicines  may be needed to stop the cough, decrease swelling in your airways, or help open your airways  Medicine may also be given to help you cough up mucus  Ask your healthcare provider what over-the-counter medicines you can take  If you have an infection caused by bacteria, you may need antibiotics  · Take your medicine as directed  Contact your healthcare provider if you think your medicine is not helping or if you have side effects  Tell him or her if you are allergic to any medicine  Keep a list of the medicines, vitamins, and herbs you take  Include the amounts, and when and why you take them  Bring the list or the pill bottles to follow-up visits  Carry your medicine list with you in case of an emergency  Manage your symptoms:   · Do not smoke and stay away from others who smoke    Nicotine and other chemicals in cigarettes and cigars can cause lung damage and make your cough worse  Ask your healthcare provider for information if you currently smoke and need help to quit  E-cigarettes or smokeless tobacco still contain nicotine  Talk to your healthcare provider before you use these products  · Drink extra liquids as directed  Liquids will help thin and loosen mucus so you can cough it up  Liquids will also help prevent dehydration  Examples of good liquids to drink include water, fruit juice, and broth  Do not drink liquids that contain caffeine  Caffeine can increase your risk for dehydration  Ask your healthcare provider how much liquid to drink each day  · Rest as directed  Do not do activities that make your cough worse, such as exercise  · Use a humidifier or vaporizer  Use a cool mist humidifier or a vaporizer to increase air moisture in your home  This may make it easier for you to breathe and help decrease your cough  · Eat 2 to 5 mL of honey 2 times each day  Honey can help thin mucus and decrease your cough  · Use cough drops or lozenges  These can help decrease throat irritation and your cough  Follow up with your healthcare provider as directed:  Write down your questions so you remember to ask them during your visits  © 2017 [facility] Information is for End User's use only and may not be sold, redistributed or otherwise used for commercial purposes  All illustrations and images included in CareNotes® are the copyrighted property of A D A M [employer]  or [employer]  The above information is an  only  It is not intended as medical advice for individual conditions or treatments  Talk to your doctor, nurse or pharmacist before following any medical regimen to see if it is safe and effective for you  Gastroenteritis   WHAT YOU NEED TO KNOW:   Gastroenteritis, or stomach flu, is an infection of the stomach and intestines          DISCHARGE INSTRUCTIONS:   Call 911 for any of the following:   · You have trouble breathing or a very fast pulse  Seek care immediately if:   · You see blood in your diarrhea  · You cannot stop vomiting  · You have not urinated for 12 hours  · You feel like you are going to faint  Contact your healthcare provider if:   · You have a fever  · You continue to vomit or have diarrhea, even after treatment  · You see worms in your diarrhea  · Your mouth or eyes are dry  You are not urinating as much or as often  · You have questions or concerns about your condition or care  Medicines:   · Medicines  may be given to stop vomiting or diarrhea, decrease abdominal cramps, or treat an infection  · Take your medicine as directed  Contact your healthcare provider if you think your medicine is not helping or if you have side effects  Tell him or her if you are allergic to any medicine  Keep a list of the medicines, vitamins, and herbs you take  Include the amounts, and when and why you take them  Bring the list or the pill bottles to follow-up visits  Carry your medicine list with you in case of an emergency  Manage your symptoms:   · Drink liquids as directed  Ask your healthcare provider how much liquid to drink each day, and which liquids are best for you  You may also need to drink an oral rehydration solution (ORS)  An ORS has the right amounts of sugar, salt, and minerals in water to replace body fluids  · Eat bland foods  When you feel hungry, begin eating soft, bland foods  Examples are bananas, clear soup, potatoes, and applesauce  Do not have dairy products, alcohol, sugary drinks, or drinks with caffeine until you feel better  · Rest as much as possible  Slowly start to do more each day when you begin to feel better  Prevent the spread of gastroenteritis:  Gastroenteritis can spread easily  Keep yourself, your family, and your surroundings clean to help prevent the spread of gastroenteritis:  · Wash your hands often    Use soap and water  Wash your hands after you use the bathroom, change a child's diapers, or sneeze  Wash your hands before you prepare or eat food  · Clean surfaces and do laundry often  Wash your clothes and towels separately from the rest of the laundry  Clean surfaces in your home with antibacterial  or bleach  · Clean food thoroughly and cook safely  Wash raw vegetables before you cook  Cook meat, fish, and eggs fully  Do not use the same dishes for raw meat as you do for other foods  Refrigerate any leftover food immediately  · Be aware when you camp or travel  Drink only clean water  Do not drink from rivers or lakes unless you purify or boil the water first  When you travel, drink bottled water and do not add ice  Do not eat fruit that has not been peeled  Do not eat raw fish or meat that is not fully cooked  Follow up with your healthcare provider as directed:  Write down your questions so you remember to ask them during your visits  © 2017 [facility] Information is for End User's use only and may not be sold, redistributed or otherwise used for commercial purposes  All illustrations and images included in CareNotes® are the copyrighted property of [employer] M [employer]  or [employer]  The above information is an  only  It is not intended as medical advice for individual conditions or treatments  Talk to your doctor, nurse or pharmacist before following any medical regimen to see if it is safe and effective for you

## 2018-04-02 NOTE — ED PROVIDER NOTES
History  Chief Complaint   Patient presents with    Weakness - Generalized     Weakness since Friday, N/V and diarrhea x3 since Friday  Received iron tranfusion on Friday, SOB reported that night  Woke up with morning with a rash on face  No pain noted by patient  15-year-old female presents to the emergency department relating "I have been sick all weekend " She relates that she has been having "vomiting and diarrhea  She relates that she had difficulty with her memory on Friday and Saturday although the seemed better yesterday  She relates that she was having difficulty with time as well over this couple of days  The vomiting has been approximately once daily  Patient has noted this after taking her medications  Her appetite and oral intake have been extremely poor over the past few days  She relates that she had multiple episodes of watery diarrhea on Saturday (2 days ago), 1 loose stool yesterday and another loose 1 today  She did have significant discomfort in the lower abdomen today prior to her bowel movement  She relates that she had a fever a couple of days ago and believes that the nurse said that this was 105  She relates that it then came down to 103 and that today she does not have a fever  When questioned regarding urination patient reports no changes  She shares that she is on a diuretic and urinates frequently  No dysuria  Patient denies awareness of any bad food ingestions  She is not aware of any sick contacts with same  She was on an antibiotic for leg cellulitis within the past few months  The patient additionally relates that a nurse asked her what was going on with her face today  Patient had not visualized this in a mirror and was not aware that anything had changed  She relates that she does see a dermatologist for condition that her skin has been somewhat scaly at baseline  Today it was deeper red in color  She does not have any pain or pruritus along with this  She did receive an infusion of iron on Thursday (4 days ago) & wonders whether her symptoms are related  Prior to Admission Medications   Prescriptions Last Dose Informant Patient Reported? Taking? Alum & Mag Hydroxide-Simeth (ANTACID ANTI-GAS PO)  Outside Facility (Specify) Yes No   Sig: Take 30 mL by mouth every 6 (six) hours as needed     CO-ENZYME Q-10 PO 2018 at Unknown time Outside Facility (Specify) Yes Yes   Sig: Take 200 mg by mouth daily   acetaminophen (TYLENOL) 325 mg tablet   Yes No   Sig: Take 650 mg by mouth every 4 (four) hours as needed for mild pain For mild pain or fever greater than 100   acetaminophen (TYLENOL) 500 mg tablet 2018 at Unknown time  Yes Yes   Sig: Take 500 mg by mouth every 12 (twelve) hours   amLODIPine (NORVASC) 5 mg tablet 2018 at Unknown time Outside Facility ([facility]) Yes Yes   Sig: Take 5 mg by mouth daily   apixaban (ELIQUIS) 2 5 mg 2018 at Unknown time Outside Facility (Specify) No Yes   Sig: Take 1 tablet by mouth 2 (two) times a day   bisacodyl (DULCOLAX) 10 mg suppository   Yes No   Sig: Insert 10 mg into the rectum daily Every 4th day prn constipation   bisacodyl (DULCOLAX) 5 mg EC tablet  Outside Facility (Specify) Yes No   Sig: Take 10 mg by mouth as needed for constipation Every 4th day as needed for constipation   calcitriol (ROCALTROL) 0 25 mcg capsule 2018 at Unknown time Outside Facility (Specify) No Yes   Sig: TAKE 1 CAPSULE ORALLY DAILY (CHRONIC HYPERPARATHYROIDISM)   diphenhydrAMINE (BENADRYL) 25 mg capsule  Outside Facility (Specify) Yes No   Sig: Take 25 mg by mouth daily at bedtime as needed for itching   eplerenone (INSPRA) 25 mg tablet 2018 at Unknown time Outside Facility (Specify) Yes Yes   Sig: Take 25 mg by mouth daily   fluticasone (FLONASE) 50 mcg/act nasal spray 2018 at Unknown time Outside Facility (Specify) Yes Yes   Si spray into each nostril daily     fluticasone (FLONASE) 50 mcg/act nasal spray 2018 at Unknown time  Yes Yes   Si spray into each nostril daily   gabapentin (NEURONTIN) 100 mg capsule 2018 at Unknown time Outside Facility ([address] [place] [address]) Yes Yes   Sig: Take 100 mg by mouth 2 (two) times a day   gabapentin (NEURONTIN) 300 mg capsule  Outside Facility (Specify) No No   Sig: Take 2 capsules by mouth daily at bedtime   Patient taking differently: Take 300 mg by mouth daily at bedtime     guaiFENesin (ROBITUSSIN) 100 MG/5ML oral liquid   Yes No   Sig: Take 100 mg by mouth every 4 (four) hours as needed for cough   latanoprost (XALATAN) 0 005 % ophthalmic solution  Outside Facility (Specify) Yes No   Sig: Administer 1 drop to both eyes daily at bedtime  levothyroxine 75 mcg tablet 2018 at Unknown time  Yes Yes   Sig: Take 88 mcg by mouth daily   loperamide (IMODIUM) 2 mg capsule  Outside Facility (Specify) Yes No   Sig: Take 2 mg by mouth as needed for diarrhea After each loose stool   loratadine (CLARITIN) 10 mg tablet 2018 at Unknown time  Yes Yes   Sig: Take 10 mg by mouth daily   mineral oil enema  Outside Facility (Specify) Yes No   Sig: Insert 1 enema into the rectum once Insert in rectum every 4th day as needed for constipation     mupirocin (BACTROBAN) 2 % ointment   Yes No   Sig: Apply 1 application topically daily as needed   polyvinyl alcohol (LIQUIFILM TEARS) 1 4 % ophthalmic solution 2018 at Unknown time  Yes Yes   Sig: Administer 1 drop to both eyes 2 (two) times a day   torsemide (DEMADEX) 20 mg tablet 2018 at Unknown time  No Yes   Sig: Take 1 tablet (20 mg total) by mouth 2 (two) times a day Take 1 tablet twice a day except on  and Friday when you will take 2 tablets in the morning which is equivalent to 40 mg, and 1 tablet in the evening 20 mg   Patient taking differently: Take 20 mg by mouth 2 (two) times a day 20mg bid , Tuesday, Thursday and Saturday, 40mg Monday, Wednesday, Friday    traMADol (ULTRAM) 50 mg tablet  Outside Facility (Specify) Yes No   Sig: Take 50 mg by mouth every 6 (six) hours as needed for moderate pain      Facility-Administered Medications: None       Past Medical History:   Diagnosis Date    A-fib ([place] [address] )     Anemia     Anemia     Anxiety     Asthma     CAD (coronary artery disease)     [facility]     CHF [facility]congestive heart failure) [facility]     CKD (chronic kidney disease) stage 3, GFR 30-59 ml/min     ckd3    Constipation     CVA (cerebral vascular accident) ([place] [address] )     left hemiparesis    Diastolic CHF, chronic ([facility])     Disease of thyroid gland     Dry eye     First degree AV block     Gastritis     Gastritis     GERD (gastroesophageal reflux disease)     Glaucoma     Glaucoma     Hemiparesis ([facility])     left side    Hemiparesis affecting left side as late effect of stroke ([facility])     Hyperlipidemia     Hypertension     Hypomagnesemia     Impetigo     Insomnia     Knee pain     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    Neuropathy     Osteoarthritis     Parkinson disease ([place] [address] )     Pulmonary HTN     PVD (peripheral vascular disease) ([facility])     Restless leg     Vitamin D deficiency        Past Surgical History:   Procedure Laterality Date    APPENDECTOMY      BLADDER SURGERY      CAROTID ENDARTERECTOMY Left     ESOPHAGOGASTRODUODENOSCOPY N/A 4/8/2016    Procedure: ESOPHAGOGASTRODUODENOSCOPY (EGD); Surgeon: Benoit Stokes MD;  Location: [facility]; Service:     RENAL ARTERY STENT      VASCULAR SURGERY         Family History   Problem Relation Age of Onset    Stroke Father     Cancer Brother     No Known Problems Mother      I have reviewed and agree with the history as documented  Social History   Substance Use Topics    Smoking status: Never Smoker    Smokeless tobacco: Never Used    Alcohol use No        Review of Systems   Cardiovascular: Negative for chest pain  Gastrointestinal: Negative for blood in stool  Genitourinary: Negative for dysuria and flank pain  Skin: Positive for rash (dry facial skin)  All other systems reviewed and are negative  Physical Exam  ED Triage Vitals [04/02/18 1355]   Temperature Pulse Respirations Blood Pressure SpO2   97 8 °F (36 6 °C) 78 22 152/67 93 %      Temp Source Heart Rate Source Patient Position - Orthostatic VS BP Location FiO2 (%)   Oral Monitor Sitting Right arm --      Pain Score       No Pain           Orthostatic Vital Signs  Vitals:    04/02/18 1355 04/02/18 1511 04/02/18 1530 04/02/18 1730   BP: 152/67 135/60 147/64 157/68   Pulse: 78 63     Patient Position - Orthostatic VS: Sitting Lying Lying Lying       Physical Exam   Constitutional: She is oriented to person, place, and time  She appears well-developed and well-nourished  Eyes: Conjunctivae and EOM are normal    Cardiovascular: Normal rate and regular rhythm  Pulmonary/Chest: Effort normal and breath sounds normal    Abdominal: Soft  Bowel sounds are normal  She exhibits distension (mildly)  There is no tenderness  Musculoskeletal:   LUE contracture/ limited movement   Neurological: She is alert and oriented to person, place, and time  Skin: Skin is warm and dry  Patches of deep raised erythema without erythema over the face  There are additionally some scattered petechiae over the face  Other skin appears clear  Psychiatric: She has a normal mood and affect  Her behavior is normal    Nursing note and vitals reviewed        ED Medications  Medications    EMS REPLENISHMENT MED ( Does not apply Given to EMS 4/2/18 1436)   sodium chloride 0 9 % bolus 1,000 mL (0 mL Intravenous Stopped 4/2/18 1828)       Diagnostic Studies  Results Reviewed     Procedure Component Value Units Date/Time    CBC and differential [[id number]]  (Abnormal) Collected:  04/02/18 1451    Lab Status:  Final result Specimen:  Blood from Arm, Right Updated:  04/02/18 1548     WBC 5 04 Thousand/uL      RBC 3 75 (L) Million/uL Hemoglobin 10 7 (L) g/dL      Hematocrit 33 6 (L) %      MCV 90 fL      MCH 28 5 pg      MCHC 31 8 g/dL      RDW 15 4 (H) %      MPV 10 6 fL      Platelets 347 Thousands/uL     Narrative: This is an appended report  These results have been appended to a previously verified report  Comprehensive metabolic panel [[id number]]  (Abnormal) Collected:  04/02/18 1451    Lab Status:  Final result Specimen:  Blood from Arm, Right Updated:  04/02/18 1523     Sodium 139 mmol/L      Potassium 5 0 mmol/L      Chloride 100 mmol/L      CO2 29 mmol/L      Anion Gap 10 mmol/L      BUN 37 (H) mg/dL      Creatinine 1 82 (H) mg/dL      Glucose 81 mg/dL      Calcium 9 0 mg/dL      AST 49 (H) U/L      ALT 22 U/L      Alkaline Phosphatase 71 U/L      Total Protein 8 0 g/dL      Albumin 3 6 g/dL      Total Bilirubin 0 50 mg/dL      eGFR 25 ml/min/1 73sq m     Narrative:         National Kidney Disease Education Program recommendations are as follows:  GFR calculation is accurate only with a steady state creatinine  Chronic Kidney disease less than 60 ml/min/1 73 sq  meters  Kidney failure less than 15 ml/min/1 73 sq  meters  Lipase [[id number]]  (Normal) Collected:  04/02/18 1451    Lab Status:  Final result Specimen:  Blood from Arm, Right Updated:  04/02/18 1513     Lipase 113 u/L     Protime-INR [[id number]]  (Normal) Collected:  04/02/18 1451    Lab Status:  Final result Specimen:  Blood from Arm, Right Updated:  04/02/18 1511     Protime 14 4 seconds      INR 1 08    APTT [[id number]]  (Abnormal) Collected:  04/02/18 1451    Lab Status:  Final result Specimen:  Blood from Arm, Right Updated:  04/02/18 1511     PTT 44 (H) seconds     Narrative: Therapeutic Heparin Range = 60-90 seconds                 XR chest 2 views   Final Result by Edie Simpson DO (04/02 1731)      No acute cardiopulmonary disease              Workstation performed: [id number]         CT abdomen pelvis wo contrast   Final Result by Yvette Frank MD (04/02 1617)         1  Colonic diverticulosis   2  Underdistention versus gastric wall thickening which may reflect underlying gastritis  Further clinical correlation recommended  Workstation performed: [id number]                    Procedures  ECG 12 Lead Documentation  Date/Time: 4/2/2018 3:06 PM  Performed by: [place] [facility]  Authorized by: [place] [facility]     ECG reviewed by me, the ED Provider: yes    Patient location:  ED and bedside  Previous ECG:     Previous ECG:  Compared to current    Comparison ECG info:  January 22, 2018    Similarity:  No change  Interpretation:     Interpretation: normal    Rate:     ECG rate:  73    ECG rate assessment: normal    Rhythm:     Rhythm: sinus rhythm    Ectopy:     Ectopy: none    QRS:     QRS axis:  Normal    QRS intervals:  Normal  Conduction:     Conduction: normal    ST segments:     ST segments:  Normal           Phone Contacts  ED Phone Contact    ED Course  ED Course as of Apr 02 1901 Mon Apr 02, 2018   1649 Study results reviewed with patient  Hemoglobin and renal function stable  No acute findings on CT of the abdomen pelvis  Patient had reported high temperatures a couple of days ago-subsequently improved  She had several loose stools a few days ago and these have decreased significantly  Suspect viral etiology  Noted in patient's chart a complaint of shortness of breath  Patient had not previously mentioned this  She now relates that she has been feeling short of breath for quite time  She typically notices this with activity  This has been going on much longer than the last week  She notes over the last week she has had a cough productive of yellow mucus  Chest x-ray will be obtained  1744 Chest x-ray unremarkable & O2 sat on RA %  Patient does have p r n  order for guaifenesin  Patient has not had this recently & I indicated I would advise its use on her DC papers    She questions whether there was a role for her to stop the iron infusions  As discussed, I do not believe that her current symptoms (cough for 1 week, started prior to infusion) and  GI symptoms along with facial redness (without pruritus) are allergic in nature or related to iron infusions  I suspect viral etiology to cough & GI symptoms (improving)  MDM  CritCare Time    Disposition  Final diagnoses:   Gastroenteritis   Cough   Facial erythema     Time reflects when diagnosis was documented in both MDM as applicable and the Disposition within this note     Time User Action Codes Description Comment    4/2/2018  5:53 PM Bettey Shearing A Add [K52 9] Gastroenteritis     4/2/2018  5:53 PM Bettey Shearing A Add [R05] Cough     4/2/2018  5:53 PM Bettey Shearing A Add [L53 9] Facial erythema       ED Disposition     ED Disposition Condition Comment    Discharge  Chloé Hidden discharge to home/self care  Condition at discharge: Good        Follow-up Information     Follow up With Specialties Details Why 12 Nikolas Frances MD Internal Medicine Schedule an appointment as soon as possible for a visit  [address] [place] [address]  [phone]          Patient's Medications   Discharge Prescriptions    No medications on file     No discharge procedures on file      ED Provider  Electronically Signed by           Jevon Barron MD  04/02/18 0548

## 2018-04-03 LAB
ATRIAL RATE: 73 BPM
P AXIS: 59 DEGREES
PR INTERVAL: 240 MS
QRS AXIS: 10 DEGREES
QRSD INTERVAL: 88 MS
QT INTERVAL: 416 MS
QTC INTERVAL: 458 MS
T WAVE AXIS: 58 DEGREES
VENTRICULAR RATE: 73 BPM

## 2018-04-03 PROCEDURE — 93010 ELECTROCARDIOGRAM REPORT: CPT | Performed by: INTERNAL MEDICINE

## 2018-04-05 ENCOUNTER — HOSPITAL ENCOUNTER (OUTPATIENT)
Dept: INFUSION CENTER | Facility: CLINIC | Age: 83
Discharge: HOME | End: 2018-04-05

## 2018-04-11 ENCOUNTER — OFFICE VISIT (OUTPATIENT)
Dept: GASTROENTEROLOGY | Facility: MEDICAL CENTER | Age: 83
End: 2018-04-11
Payer: MEDICARE

## 2018-04-11 VITALS
WEIGHT: 165 LBS | HEIGHT: 60 IN | HEART RATE: 75 BPM | SYSTOLIC BLOOD PRESSURE: 128 MMHG | TEMPERATURE: 98.2 F | DIASTOLIC BLOOD PRESSURE: 68 MMHG | BODY MASS INDEX: 32.39 KG/M2

## 2018-04-11 DIAGNOSIS — R19.5 POSITIVE FIT (FECAL IMMUNOCHEMICAL TEST): Primary | ICD-10-CM

## 2018-04-11 DIAGNOSIS — R19.8 ABNORMAL BOWEL HABITS: ICD-10-CM

## 2018-04-11 DIAGNOSIS — D50.0 IRON DEFICIENCY ANEMIA DUE TO CHRONIC BLOOD LOSS: ICD-10-CM

## 2018-04-11 PROCEDURE — 99214 OFFICE O/P EST MOD 30 MIN: CPT | Performed by: INTERNAL MEDICINE

## 2018-04-11 RX ORDER — TAMSULOSIN HYDROCHLORIDE 0.4 MG/1
CAPSULE ORAL
COMMUNITY
Start: 2018-03-31 | End: 2018-04-16 | Stop reason: HOSPADM

## 2018-04-11 NOTE — LETTER
April 11, 2018     Josee Salazar MD  [facility] [address]    Patient: Guillermina Gracia   YOB: 1931   Date of Visit: 4/11/2018       Dear Dr María Elena Tran: Thank you for referring Mildred Cantrell to me for evaluation  Below are my notes for this consultation  If you have questions, please do not hesitate to call me  I look forward to following your patient along with you  Sincerely,        Yanni Jackson MD        CC: MD Yanni Chacon MD  4/11/2018  5:07 PM  Sign at close encounter  [facility] - Outpatient Consultation  Guillermina Gracia 80 y o  female MRN: [id number]  Encounter: [id number]      PCP: Josee Salazar MD  Referring: Josee Salazar MD  [facility]  [place], [address] [place] [address]      ASSESSMENT AND PLAN:      1  Positive FIT (fecal immunochemical test)  2  Iron deficiency anemia due to chronic blood loss  Patient has extensive comorbidities which would make her high risk for procedure requiring anesthesia  The benefits for colonoscopy would be limited, as discussed the differential for +FIT, polyp vs colon cancer vs false positive  The patient remains adamant that she does not want endoscopic evaluation, which would include a colonoscopy  This is corroborated by the son, who confirms his brothers and sisters agree with the patient's wishes  For this reason, will defer to hematology and nephrology for treatment of iron deficiency anemia  3  Abnormal bowel habits ?rectal bleeding  Irregular bowel habits with frequent gastroenteritis episodes resulting in diarrhea and constipation at baseline  Discussed and recommend fiber supplementation, she prefers pills so Citrucil would be the best option  I have titration of dosing - starting 1 tbsp once daily and increasing eg 2 tbsp daily with goal for soft bowel movement daily   Primarily I would encourage fiber usage as opposed to colon stimulants/laxatives given her alternation between diarrhea and constipation  ______________________________________________________________________    Chief Complaint   Patient presents with   HOSP [facility] admission 4/2    Rectal Bleeding       HPI:      Patient is an 80year old female referred to me for positive FIT ? positive heme occult testing  She has a past medical history of CHF, CAD, PVD, A fib on Eliquis, hemiparesis s/p CVA, CKD stage 3  She is currently a resident of [facility] through which she receives her medications and rehabilitation  She is accompanied by her son, Giovanni Henning, who also provides historical information  It is unclear from the chart review and orders - however, she was seen by her Nephrologist Dr Tavo Nava and evaluated for anemia  A FIT was ordered and is positive  Additionally, the son states there was a question of bright red blood seen in the patient's stool at the nursing home, where hemeoccult stool was ordered x 3 and found to be positive  The patient relates irregular bowel habits with two episodes of diarrhea in the past 2 months  She was admitted from 1/22-1/30 for abdominal pain, nausea, and vomiting consistent with an acute gastroenteritis that spontaneously resolved  Within the last two weeks, she also complains of a similar symptoms with diarrhea that spontaneously resolved  For the last two days, her bowel movements have been soft, normal without straining  She does also relate episodes of constipation  She states her last colonoscopy was > 5 years ago  She and her son confirm that she does not want a colonoscopy, despite the possibility that this could be colon cancer, and this is confirmed by her remaining children  REVIEW OF SYSTEMS:    CONSTITUTIONAL: Denies any fever, chills, rigors, and weight loss  HEENT: No earache or tinnitus  Denies hearing loss or visual disturbances  CARDIOVASCULAR: No chest pain or palpitations     RESPIRATORY: Denies any cough, hemoptysis, shortness of breath or dyspnea on exertion  GASTROINTESTINAL: As noted in the History of Present Illness  GENITOURINARY: No problems with urination  Denies any hematuria or dysuria  NEUROLOGIC: No dizziness or vertigo, denies headaches  MUSCULOSKELETAL: Denies any muscle or joint pain  SKIN: Denies skin rashes or itching  ENDOCRINE: Denies excessive thirst  Denies intolerance to heat or cold  PSYCHOSOCIAL: Denies depression or anxiety  Denies any recent memory loss  Historical Information   Past Medical History:   Diagnosis Date    [facility]fib ([facility] )     Anemia     Anemia     Anxiety     Asthma     CAD (coronary artery disease)     [facility]     [facility]congest[facility] failure) [facility]     CKD (chronic kidney disease) stage 3, GFR 30-59 ml/min     ckd3    Constipation     CVA (cerebral vascular accident) ([facility] )     left hemiparesis    Diastolic CHF, chronic ([facility])     Disease of thyroid gland     Dry eye     First degree AV block     Gastritis     Gastritis     GERD (gastroesophageal reflux disease)     Glaucoma     Glaucoma     Hemiparesis ([facility])     left side    Hemiparesis affecting left side as late effect of stroke ([facility])     Hyperlipidemia     Hypertension     Hypomagnesemia     Impetigo     Insomnia     Knee pain     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    Neuropathy     Osteoarthritis     [facility] disease [facility]     Pul[facility] HTN [facility]     PVD (peripheral vascular disease) ([facility] )     Restless leg     Vitamin D deficiency      Past Surgical History:   Procedure Laterality Date    APPENDECTOMY      BLADDER SURGERY      CAROTID ENDARTERECTOMY Left     ESOPHAGOGASTRODUODENOSCOPY N/A 4/8/2016    Procedure: ESOPHAGOGASTRODUODENOSCOPY (EGD); Surgeon: Darinel Stallworth MD;  [facility];   Service:     RENAL ARTERY STENT      VASCULAR SURGERY       Social History   History Alcohol Use No     History   Drug Use No     History   Smoking Status    Never Smoker   Smokeless Tobacco    Never Used     Family History   Problem Relation Age of Onset    Stroke Father     Cancer Brother     No Known Problems Mother        Meds/Allergies       Current Outpatient Prescriptions:     acetaminophen (TYLENOL) 325 mg tablet    acetaminophen (TYLENOL) 500 mg tablet    Alum & Mag Hydroxide-Simeth (ANTACID ANTI-GAS PO)    amLODIPine (NORVASC) 5 mg tablet    apixaban (ELIQUIS) 2 5 mg    bisacodyl (DULCOLAX) 10 mg suppository    bisacodyl (DULCOLAX) 5 mg EC tablet    calcitriol (ROCALTROL) 0 25 mcg capsule    CO-ENZYME Q-10 PO    diphenhydrAMINE (BENADRYL) 25 mg capsule    eplerenone (INSPRA) 25 mg tablet    fluticasone (FLONASE) 50 mcg/act nasal spray    fluticasone (FLONASE) 50 mcg/act nasal spray    gabapentin (NEURONTIN) 100 mg capsule    gabapentin (NEURONTIN) 300 mg capsule    guaiFENesin (ROBITUSSIN) 100 MG/5ML oral liquid    latanoprost (XALATAN) 0 005 % ophthalmic solution    levothyroxine 75 mcg tablet    loperamide (IMODIUM) 2 mg capsule    loratadine (CLARITIN) 10 mg tablet    mineral oil enema    mupirocin (BACTROBAN) 2 % ointment    polyvinyl alcohol (LIQUIFILM TEARS) 1 4 % ophthalmic solution    tamsulosin (FLOMAX) 0 4 mg    torsemide (DEMADEX) 20 mg tablet    traMADol (ULTRAM) 50 mg tablet    Allergies   Allergen Reactions    Lipitor [Atorvastatin]     Morphine     Percocet [Oxycodone-Acetaminophen] GI Intolerance    Spironolactone     Sulfa Antibiotics            Objective     Blood pressure 128/68, pulse 75, temperature 98 2 °F (36 8 °C), temperature source Tympanic, height 5' (1 524 m), weight 74 8 kg (165 lb)  Body mass index is 32 22 kg/m²       PHYSICAL EXAM:      General Appearance:   Alert, cooperative, no distress, in a wheel chair   HEENT:   Normocephalic, atraumatic, anicteric      Neck:  Supple, symmetrical, trachea midline   Lungs:   Clear to auscultation bilaterally; no rales, rhonchi or wheezing; respirations unlabored    Heart[de-identified]   Regular rate and rhythm; no murmur, rub, or gallop  Abdomen:   Soft, non-tender, non-distended; normal bowel sounds; no masses, no organomegaly    Genitalia:   Deferred    Rectal:   Deferred    Extremities:  No cyanosis, clubbing or edema, left sided hemiparesis, + arthritic changes of the hands bilaterally   Pulses:  2+ and symmetric    Skin:  No jaundice, rashes, or lesions    Lymph nodes:  No palpable cervical lymphadenopathy        Lab Results:     Lab Results   Component Value Date    WBC 5 04 04/02/2018    HGB 10 7 (L) 04/02/2018    HCT 33 6 (L) 04/02/2018    MCV 90 04/02/2018     04/02/2018       Lab Results   Component Value Date     04/02/2018    K 5 0 04/02/2018     04/02/2018    CO2 29 04/02/2018    ANIONGAP 10 04/02/2018    BUN 37 (H) 04/02/2018    CREATININE 1 82 (H) 04/02/2018    GLUCOSE 81 04/02/2018    GLUF 111 (H) 07/17/2017    CALCIUM 9 0 04/02/2018    AST 49 (H) 04/02/2018    ALT 22 04/02/2018    ALKPHOS 71 04/02/2018    PROT 8 0 04/02/2018    BILITOT 0 50 04/02/2018    EGFR 25 04/02/2018       Lab Results   Component Value Date    INR 1 08 04/02/2018    INR 1 21 (H) 01/22/2018    INR 1 22 (H) 11/20/2017    PROTIME 14 4 04/02/2018    PROTIME 15 4 (H) 01/22/2018    PROTIME 15 5 (H) 11/20/2017         Radiology Results:   Ct Abdomen Pelvis Wo Contrast    Result Date: 4/2/2018  Narrative: CT ABDOMEN AND PELVIS WITHOUT IV CONTRAST INDICATION:   Vomiting & loose stools w/ intermittent abdominal pain  COMPARISON: 1/22/2018 TECHNIQUE:  CT examination of the abdomen and pelvis was performed without intravenous contrast   Axial, sagittal, and coronal 2D reformatted images were created from the source data and submitted for interpretation  Radiation dose length product (DLP) for this visit:  78 [id number] 803 mGy-cm     This examination, like all CT scans performed in the [facility], was performed utilizing techniques to minimize radiation dose exposure, including the use of iterative reconstruction and automated exposure control  Enteric contrast was administered  FINDINGS: ABDOMEN LOWER CHEST:  Bibasilar strandy densities possibly scarring and/or subsegmental atelectasis  There are coronary artery calcifications especially in the right coronary artery  Dense annular calcification of the mitral valve noted  Probable left atrial enlargement and left ventricular dilatation  LIVER/BILIARY TREE:  Unremarkable  GALLBLADDER:  No calcified gallstones  No pericholecystic inflammatory change  SPLEEN:  Calcified granulomata are noted in the spleen  No suspicious splenic mass  PANCREAS:  Unremarkable  ADRENAL GLANDS:  Unremarkable  KIDNEYS/URETERS:  Unremarkable  No hydronephrosis  STOMACH AND BOWEL:  There is colonic diverticulosis without evidence of acute diverticulitis  Questionable gastric wall thickening versus underdistention  APPENDIX:  No findings to suggest appendicitis  ABDOMINOPELVIC CAVITY:  No ascites or free intraperitoneal air  No lymphadenopathy  VESSELS:  Atherosclerotic changes are present  No evidence of aneurysm  PELVIS REPRODUCTIVE ORGANS:  Unremarkable for patient's age  URINARY BLADDER:  Unremarkable  ABDOMINAL WALL/INGUINAL REGIONS:  Unremarkable  OSSEOUS STRUCTURES:  Scattered spondylotic changes moderate to advanced in severity  Impression: 1  Colonic diverticulosis 2  Underdistention versus gastric wall thickening which may reflect underlying gastritis  Further clinical correlation recommended

## 2018-04-11 NOTE — PROGRESS NOTES
[facility] - Outpatient Consultation  Gay Lozada 80 y o  female MRN: [id number]  Encounter: [id number]      PCP: Fab Goodman MD  Referring: Fab Goodman MD  [facility], [address] [place] [address]      ASSESSMENT AND PLAN:      1  Positive FIT (fecal immunochemical test)  2  Iron deficiency anemia due to chronic blood loss  Patient has extensive comorbidities which would make her high risk for procedure requiring anesthesia  The benefits for colonoscopy would be limited, as discussed the differential for +FIT, polyp vs colon cancer vs false positive  The patient remains adamant that she does not want endoscopic evaluation, which would include a colonoscopy  This is corroborated by the son, who confirms his brothers and sisters agree with the patient's wishes  For this reason, will defer to hematology and nephrology for treatment of iron deficiency anemia  3  Abnormal bowel habits ?rectal bleeding  Irregular bowel habits with frequent gastroenteritis episodes resulting in diarrhea and constipation at baseline  Discussed and recommend fiber supplementation, she prefers pills so Citrucil would be the best option  I have titration of dosing - starting 1 tbsp once daily and increasing eg 2 tbsp daily with goal for soft bowel movement daily  Primarily I would encourage fiber usage as opposed to colon stimulants/laxatives given her alternation between diarrhea and constipation  ______________________________________________________________________    Chief Complaint   Patient presents with   [facility]    Rectal Bleeding       HPI:      Patient is an 80year old female referred to me for positive FIT ? positive heme occult testing  She has a past medical history of CHF, CAD, PVD, A fib on Eliquis, hemiparesis s/p CVA, CKD stage 3   She is currently a resident of [facility] through which she receives her medications and rehabilitation  She is accompanied by her son, David Ramirez, who also provides historical information  It is unclear from the chart review and orders - however, she was seen by her Nephrologist Dr Loly Marina and evaluated for anemia  A FIT was ordered and is positive  Additionally, the son states there was a question of bright red blood seen in the patient's stool at the nursing home, where hemeoccult stool was ordered x 3 and found to be positive  The patient relates irregular bowel habits with two episodes of diarrhea in the past 2 months  She was admitted from 1/22-1/30 for abdominal pain, nausea, and vomiting consistent with an acute gastroenteritis that spontaneously resolved  Within the last two weeks, she also complains of a similar symptoms with diarrhea that spontaneously resolved  For the last two days, her bowel movements have been soft, normal without straining  She does also relate episodes of constipation  She states her last colonoscopy was > 5 years ago  She and her son confirm that she does not want a colonoscopy, despite the possibility that this could be colon cancer, and this is confirmed by her remaining children  REVIEW OF SYSTEMS:    CONSTITUTIONAL: Denies any fever, chills, rigors, and weight loss  HEENT: No earache or tinnitus  Denies hearing loss or visual disturbances  CARDIOVASCULAR: No chest pain or palpitations  RESPIRATORY: Denies any cough, hemoptysis, shortness of breath or dyspnea on exertion  GASTROINTESTINAL: As noted in the History of Present Illness  GENITOURINARY: No problems with urination  Denies any hematuria or dysuria  NEUROLOGIC: No dizziness or vertigo, denies headaches  MUSCULOSKELETAL: Denies any muscle or joint pain  SKIN: Denies skin rashes or itching  ENDOCRINE: Denies excessive thirst  Denies intolerance to heat or cold  PSYCHOSOCIAL: Denies depression or anxiety  Denies any recent memory loss         Historical Information Past Medical History:   Diagnosis Date    A-fib ([place] [address] )     Anemia     Anemia     Anxiety     Asthma     CAD (coronary artery disease)     [facility]     CHF (congestive [facility] failure) [facility]     CKD (chronic kidney disease) stage 3, GFR 30-59 ml/min     ckd3    Constipation     CVA (cerebral vascular accident) ([place] [address] )     left hemiparesis    Diastolic CHF, chronic ([facility])     Disease of thyroid gland     Dry eye     First degree AV block     Gastritis     Gastritis     GERD (gastroesophageal reflux disease)     Glaucoma     Glaucoma     Hemiparesis ([facility])     left side    Hemiparesis affecting left side as late effect of stroke ([facility])     Hyperlipidemia     Hypertension     Hypomagnesemia     Impetigo     Insomnia     Knee pain     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    Neuropathy     Osteoarthritis     Parkinson disease ([place] [address] )     Pulmonary HTN ([place] [address] )     PVD (peripheral vascular disease) ([place] [address] )     Restless leg     Vitamin D deficiency      Past Surgical History:   Procedure Laterality Date    APPENDECTOMY      BLADDER SURGERY      CAROTID ENDARTERECTOMY Left     ESOPHAGOGASTRODUODENOSCOPY N/A 4/8/2016    Procedure: ESOPHAGOGASTRODUODENOSCOPY (EGD); Surgeon: Mark Steele MD;  [facility];   Service:     RENAL ARTERY STENT      VASCULAR SURGERY       Social History   History   Alcohol Use No     History   Drug Use No     History   Smoking Status    Never Smoker   Smokeless Tobacco    Never Used     Family History   Problem Relation Age of Onset    Stroke Father     Cancer Brother     No Known Problems Mother        Meds/Allergies       Current Outpatient Prescriptions:     acetaminophen (TYLENOL) 325 mg tablet    acetaminophen (TYLENOL) 500 mg tablet    Alum & Mag Hydroxide-Simeth (ANTACID ANTI-GAS PO)    amLODIPine (NORVASC) 5 mg tablet    apixaban (ELIQUIS) 2 5 mg    bisacodyl (DULCOLAX) 10 mg suppository    bisacodyl (DULCOLAX) 5 mg EC tablet    calcitriol (ROCALTROL) 0 25 mcg capsule    CO-ENZYME Q-10 PO    diphenhydrAMINE (BENADRYL) 25 mg capsule    eplerenone (INSPRA) 25 mg tablet    fluticasone (FLONASE) 50 mcg/act nasal spray    fluticasone (FLONASE) 50 mcg/act nasal spray    gabapentin (NEURONTIN) 100 mg capsule    gabapentin (NEURONTIN) 300 mg capsule    guaiFENesin (ROBITUSSIN) 100 MG/5ML oral liquid    latanoprost (XALATAN) 0 005 % ophthalmic solution    levothyroxine 75 mcg tablet    loperamide (IMODIUM) 2 mg capsule    loratadine (CLARITIN) 10 mg tablet    mineral oil enema    mupirocin (BACTROBAN) 2 % ointment    polyvinyl alcohol (LIQUIFILM TEARS) 1 4 % ophthalmic solution    tamsulosin (FLOMAX) 0 4 mg    torsemide (DEMADEX) 20 mg tablet    traMADol (ULTRAM) 50 mg tablet    Allergies   Allergen Reactions    Lipitor [Atorvastatin]     Morphine     Percocet [Oxycodone-Acetaminophen] GI Intolerance    Spironolactone     Sulfa Antibiotics            Objective     Blood pressure 128/68, pulse 75, temperature 98 2 °F (36 8 °C), temperature source Tympanic, height 5' (1 524 m), weight 74 8 kg (165 lb)  Body mass index is 32 22 kg/m²  PHYSICAL EXAM:      General Appearance:   Alert, cooperative, no distress, in a wheel chair   HEENT:   Normocephalic, atraumatic, anicteric      Neck:  Supple, symmetrical, trachea midline   Lungs:   Clear to auscultation bilaterally; no rales, rhonchi or wheezing; respirations unlabored    Heart[de-identified]   Regular rate and rhythm; no murmur, rub, or gallop     Abdomen:   Soft, non-tender, non-distended; normal bowel sounds; no masses, no organomegaly    Genitalia:   Deferred    Rectal:   Deferred    Extremities:  No cyanosis, clubbing or edema, left sided hemiparesis, + arthritic changes of the hands bilaterally   Pulses:  2+ and symmetric    Skin:  No jaundice, rashes, or lesions    Lymph nodes:  No palpable cervical lymphadenopathy        Lab Results:     Lab Results   Component Value Date    WBC 5 04 04/02/2018    HGB 10 7 (L) 04/02/2018    HCT 33 6 (L) 04/02/2018    MCV 90 04/02/2018     04/02/2018       Lab Results   Component Value Date     04/02/2018    K 5 0 04/02/2018     04/02/2018    CO2 29 04/02/2018    ANIONGAP 10 04/02/2018    BUN 37 (H) 04/02/2018    CREATININE 1 82 (H) 04/02/2018    GLUCOSE 81 04/02/2018    GLUF 111 (H) 07/17/2017    CALCIUM 9 0 04/02/2018    AST 49 (H) 04/02/2018    ALT 22 04/02/2018    ALKPHOS 71 04/02/2018    PROT 8 0 04/02/2018    BILITOT 0 50 04/02/2018    EGFR 25 04/02/2018       Lab Results   Component Value Date    INR 1 08 04/02/2018    INR 1 21 (H) 01/22/2018    INR 1 22 (H) 11/20/2017    PROTIME 14 4 04/02/2018    PROTIME 15 4 (H) 01/22/2018    PROTIME 15 5 (H) 11/20/2017         Radiology Results:   Ct Abdomen Pelvis Wo Contrast    Result Date: 4/2/2018  Narrative: CT ABDOMEN AND PELVIS WITHOUT IV CONTRAST INDICATION:   Vomiting & loose stools w/ intermittent abdominal pain  COMPARISON: 1/22/2018 TECHNIQUE:  CT examination of the abdomen and pelvis was performed without intravenous contrast   Axial, sagittal, and coronal 2D reformatted images were created from the source data and submitted for interpretation  Radiation dose length product (DLP) for this visit:  78 [id number] 803 mGy-cm   This examination, like all CT scans performed in the [facility], was performed utilizing techniques to minimize radiation dose exposure, including the use of iterative reconstruction and automated exposure control  Enteric contrast was administered  FINDINGS: ABDOMEN LOWER CHEST:  Bibasilar strandy densities possibly scarring and/or subsegmental atelectasis  There are coronary artery calcifications especially in the right coronary artery  Dense annular calcification of the mitral valve noted  Probable left atrial enlargement and left ventricular dilatation  LIVER/BILIARY TREE:  Unremarkable  GALLBLADDER:  No calcified gallstones  No pericholecystic inflammatory change  SPLEEN:  Calcified granulomata are noted in the spleen  No suspicious splenic mass  PANCREAS:  Unremarkable  ADRENAL GLANDS:  Unremarkable  KIDNEYS/URETERS:  Unremarkable  No hydronephrosis  STOMACH AND BOWEL:  There is colonic diverticulosis without evidence of acute diverticulitis  Questionable gastric wall thickening versus underdistention  APPENDIX:  No findings to suggest appendicitis  ABDOMINOPELVIC CAVITY:  No ascites or free intraperitoneal air  No lymphadenopathy  VESSELS:  Atherosclerotic changes are present  No evidence of aneurysm  PELVIS REPRODUCTIVE ORGANS:  Unremarkable for patient's age  URINARY BLADDER:  Unremarkable  ABDOMINAL WALL/INGUINAL REGIONS:  Unremarkable  OSSEOUS STRUCTURES:  Scattered spondylotic changes moderate to advanced in severity  Impression: 1  Colonic diverticulosis 2  Underdistention versus gastric wall thickening which may reflect underlying gastritis  Further clinical correlation recommended

## 2018-04-12 ENCOUNTER — TELEPHONE (OUTPATIENT)
Dept: GASTROENTEROLOGY | Facility: CLINIC | Age: 83
End: 2018-04-12

## 2018-04-12 NOTE — TELEPHONE ENCOUNTER
DR Vincent Carranza called to verify pt rx that was prescribed yesterday  They need to know if CITRUCIL dosage will be 500 or 900 mg   Please call the nurses station at [phone]

## 2018-04-13 ENCOUNTER — APPOINTMENT (EMERGENCY)
Dept: RADIOLOGY | Facility: HOSPITAL | Age: 83
DRG: 291 | End: 2018-04-13
Payer: MEDICARE

## 2018-04-13 ENCOUNTER — HOSPITAL ENCOUNTER (INPATIENT)
Facility: HOSPITAL | Age: 83
LOS: 3 days | DRG: 291 | End: 2018-04-16
Attending: EMERGENCY MEDICINE | Admitting: INTERNAL MEDICINE
Payer: MEDICARE

## 2018-04-13 DIAGNOSIS — I50.9 CHF EXACERBATION (HCC): Primary | ICD-10-CM

## 2018-04-13 DIAGNOSIS — G89.29 CHRONIC INTRACTABLE PAIN: Chronic | ICD-10-CM

## 2018-04-13 DIAGNOSIS — I50.33 ACUTE ON CHRONIC DIASTOLIC HEART FAILURE (HCC): ICD-10-CM

## 2018-04-13 PROBLEM — N30.00 ACUTE CYSTITIS WITHOUT HEMATURIA: Status: ACTIVE | Noted: 2018-04-13

## 2018-04-13 PROBLEM — R53.81 PHYSICAL DECONDITIONING: Chronic | Status: ACTIVE | Noted: 2017-07-27

## 2018-04-13 PROBLEM — J06.9 URTI (ACUTE UPPER RESPIRATORY INFECTION): Status: ACTIVE | Noted: 2018-04-13

## 2018-04-13 PROBLEM — J96.01 ACUTE RESPIRATORY FAILURE WITH HYPOXIA (HCC): Status: ACTIVE | Noted: 2018-04-13

## 2018-04-13 LAB
ALBUMIN SERPL BCP-MCNC: 3.7 G/DL (ref 3.5–5)
ALP SERPL-CCNC: 69 U/L (ref 46–116)
ALT SERPL W P-5'-P-CCNC: 17 U/L (ref 12–78)
ANION GAP SERPL CALCULATED.3IONS-SCNC: 10 MMOL/L (ref 4–13)
ANISOCYTOSIS BLD QL SMEAR: PRESENT
AST SERPL W P-5'-P-CCNC: 17 U/L (ref 5–45)
BACTERIA UR QL AUTO: ABNORMAL /HPF
BASOPHILS # BLD MANUAL: 0.08 THOUSAND/UL (ref 0–0.1)
BASOPHILS NFR MAR MANUAL: 1 % (ref 0–1)
BILIRUB SERPL-MCNC: 0.5 MG/DL (ref 0.2–1)
BILIRUB UR QL STRIP: NEGATIVE
BUN SERPL-MCNC: 22 MG/DL (ref 5–25)
CALCIUM SERPL-MCNC: 8.6 MG/DL
CHLORIDE SERPL-SCNC: 101 MMOL/L (ref 100–108)
CLARITY UR: ABNORMAL
CO2 SERPL-SCNC: 28 MMOL/L (ref 21–32)
COLOR UR: YELLOW
CREAT SERPL-MCNC: 1.58 MG/DL (ref 0.6–1.3)
EOSINOPHIL # BLD MANUAL: 0.16 THOUSAND/UL (ref 0–0.4)
EOSINOPHIL NFR BLD MANUAL: 2 % (ref 0–6)
ERYTHROCYTE [DISTWIDTH] IN BLOOD BY AUTOMATED COUNT: 15.6 % (ref 11.6–15.1)
GFR SERPL CREATININE-BSD FRML MDRD: 29 ML/MIN/1.73SQ M
GLUCOSE SERPL-MCNC: 111 MG/DL (ref 65–140)
GLUCOSE UR STRIP-MCNC: NEGATIVE MG/DL
HCT VFR BLD AUTO: 32.5 % (ref 34.8–46.1)
HGB BLD-MCNC: 10.3 G/DL (ref 11.5–15.4)
HGB UR QL STRIP.AUTO: ABNORMAL
KETONES UR STRIP-MCNC: NEGATIVE MG/DL
LEUKOCYTE ESTERASE UR QL STRIP: ABNORMAL
LYMPHOCYTES # BLD AUTO: 1.1 THOUSAND/UL (ref 0.6–4.47)
LYMPHOCYTES # BLD AUTO: 14 % (ref 14–44)
MCH RBC QN AUTO: 28.2 PG (ref 26.8–34.3)
MCHC RBC AUTO-ENTMCNC: 31.7 G/DL (ref 31.4–37.4)
MCV RBC AUTO: 89 FL (ref 82–98)
METAMYELOCYTES NFR BLD MANUAL: 1 % (ref 0–1)
MONOCYTES # BLD AUTO: 0.78 THOUSAND/UL (ref 0–1.22)
MONOCYTES NFR BLD: 10 % (ref 4–12)
NEUTROPHILS # BLD MANUAL: 5.64 THOUSAND/UL (ref 1.85–7.62)
NEUTS SEG NFR BLD AUTO: 72 % (ref 43–75)
NITRITE UR QL STRIP: NEGATIVE
NON-SQ EPI CELLS URNS QL MICRO: ABNORMAL /HPF
NT-PROBNP SERPL-MCNC: 1522 PG/ML
PH UR STRIP.AUTO: 6 [PH] (ref 4.5–8)
PLATELET # BLD AUTO: 270 THOUSANDS/UL (ref 149–390)
PLATELET BLD QL SMEAR: ADEQUATE
PMV BLD AUTO: 10.1 FL (ref 8.9–12.7)
POTASSIUM SERPL-SCNC: 3.6 MMOL/L (ref 3.5–5.3)
PROT SERPL-MCNC: 7.6 G/DL (ref 6.4–8.2)
PROT UR STRIP-MCNC: NEGATIVE MG/DL
RBC # BLD AUTO: 3.65 MILLION/UL (ref 3.81–5.12)
RBC #/AREA URNS AUTO: ABNORMAL /HPF
SODIUM SERPL-SCNC: 139 MMOL/L (ref 136–145)
SP GR UR STRIP.AUTO: >=1.03 (ref 1–1.03)
TOTAL CELLS COUNTED SPEC: 100
TROPONIN I SERPL-MCNC: <0.02 NG/ML
TROPONIN I SERPL-MCNC: <0.02 NG/ML
UROBILINOGEN UR QL STRIP.AUTO: 0.2 E.U./DL
WBC # BLD AUTO: 7.83 THOUSAND/UL (ref 4.31–10.16)
WBC #/AREA URNS AUTO: ABNORMAL /HPF

## 2018-04-13 PROCEDURE — 99223 1ST HOSP IP/OBS HIGH 75: CPT | Performed by: INTERNAL MEDICINE

## 2018-04-13 PROCEDURE — 83880 ASSAY OF NATRIURETIC PEPTIDE: CPT | Performed by: NURSE PRACTITIONER

## 2018-04-13 PROCEDURE — 87086 URINE CULTURE/COLONY COUNT: CPT

## 2018-04-13 PROCEDURE — 85007 BL SMEAR W/DIFF WBC COUNT: CPT | Performed by: NURSE PRACTITIONER

## 2018-04-13 PROCEDURE — 96374 THER/PROPH/DIAG INJ IV PUSH: CPT

## 2018-04-13 PROCEDURE — 94760 N-INVAS EAR/PLS OXIMETRY 1: CPT

## 2018-04-13 PROCEDURE — 87147 CULTURE TYPE IMMUNOLOGIC: CPT

## 2018-04-13 PROCEDURE — 71046 X-RAY EXAM CHEST 2 VIEWS: CPT

## 2018-04-13 PROCEDURE — 99285 EMERGENCY DEPT VISIT HI MDM: CPT

## 2018-04-13 PROCEDURE — 85027 COMPLETE CBC AUTOMATED: CPT | Performed by: NURSE PRACTITIONER

## 2018-04-13 PROCEDURE — 93005 ELECTROCARDIOGRAM TRACING: CPT

## 2018-04-13 PROCEDURE — 84484 ASSAY OF TROPONIN QUANT: CPT | Performed by: PHYSICIAN ASSISTANT

## 2018-04-13 PROCEDURE — 81001 URINALYSIS AUTO W/SCOPE: CPT

## 2018-04-13 PROCEDURE — 94664 DEMO&/EVAL PT USE INHALER: CPT

## 2018-04-13 PROCEDURE — 87077 CULTURE AEROBIC IDENTIFY: CPT

## 2018-04-13 PROCEDURE — 84484 ASSAY OF TROPONIN QUANT: CPT | Performed by: INTERNAL MEDICINE

## 2018-04-13 PROCEDURE — 87186 SC STD MICRODIL/AGAR DIL: CPT

## 2018-04-13 PROCEDURE — 36415 COLL VENOUS BLD VENIPUNCTURE: CPT | Performed by: NURSE PRACTITIONER

## 2018-04-13 PROCEDURE — 80053 COMPREHEN METABOLIC PANEL: CPT | Performed by: NURSE PRACTITIONER

## 2018-04-13 PROCEDURE — 87631 RESP VIRUS 3-5 TARGETS: CPT | Performed by: INTERNAL MEDICINE

## 2018-04-13 RX ORDER — CALCITRIOL 0.25 UG/1
0.25 CAPSULE, LIQUID FILLED ORAL DAILY
Status: DISCONTINUED | OUTPATIENT
Start: 2018-04-14 | End: 2018-04-15

## 2018-04-13 RX ORDER — ACETAMINOPHEN 325 MG/1
650 TABLET ORAL ONCE
Status: COMPLETED | OUTPATIENT
Start: 2018-04-13 | End: 2018-04-13

## 2018-04-13 RX ORDER — CEPHALEXIN 500 MG/1
500 CAPSULE ORAL EVERY 8 HOURS SCHEDULED
Status: DISCONTINUED | OUTPATIENT
Start: 2018-04-13 | End: 2018-04-15

## 2018-04-13 RX ORDER — GUAIFENESIN AND DEXTROMETHORPHAN HYDROBROMIDE 100; 10 MG/5ML; MG/5ML
5 SOLUTION ORAL 4 TIMES DAILY
COMMUNITY
End: 2018-04-16 | Stop reason: HOSPADM

## 2018-04-13 RX ORDER — LATANOPROST 50 UG/ML
1 SOLUTION/ DROPS OPHTHALMIC
Status: DISCONTINUED | OUTPATIENT
Start: 2018-04-13 | End: 2018-04-15

## 2018-04-13 RX ORDER — CYCLOSPORINE 0.5 MG/ML
1 EMULSION OPHTHALMIC 2 TIMES DAILY
Status: DISCONTINUED | OUTPATIENT
Start: 2018-04-13 | End: 2018-04-16 | Stop reason: HOSPADM

## 2018-04-13 RX ORDER — FUROSEMIDE 10 MG/ML
20 INJECTION INTRAMUSCULAR; INTRAVENOUS ONCE
Status: COMPLETED | OUTPATIENT
Start: 2018-04-13 | End: 2018-04-13

## 2018-04-13 RX ORDER — TRAMADOL HYDROCHLORIDE 50 MG/1
50 TABLET ORAL EVERY 6 HOURS PRN
Status: DISCONTINUED | OUTPATIENT
Start: 2018-04-13 | End: 2018-04-15

## 2018-04-13 RX ORDER — EPLERENONE 25 MG/1
25 TABLET, FILM COATED ORAL DAILY
Status: DISCONTINUED | OUTPATIENT
Start: 2018-04-14 | End: 2018-04-15

## 2018-04-13 RX ORDER — CEFPODOXIME PROXETIL 200 MG/1
200 TABLET, FILM COATED ORAL 2 TIMES DAILY
COMMUNITY
End: 2018-04-16 | Stop reason: HOSPADM

## 2018-04-13 RX ORDER — CHOLECALCIFEROL (VITAMIN D3) 25 MCG
3 TABLET ORAL
COMMUNITY
End: 2018-04-16 | Stop reason: HOSPADM

## 2018-04-13 RX ORDER — NYSTATIN 100000 [USP'U]/G
POWDER TOPICAL 2 TIMES DAILY
Status: DISCONTINUED | OUTPATIENT
Start: 2018-04-13 | End: 2018-04-15

## 2018-04-13 RX ORDER — METOPROLOL TARTRATE 50 MG/1
25 TABLET, FILM COATED ORAL EVERY 12 HOURS SCHEDULED
COMMUNITY
End: 2018-04-16 | Stop reason: HOSPADM

## 2018-04-13 RX ORDER — ROPINIROLE 0.25 MG/1
0.5 TABLET, FILM COATED ORAL 4 TIMES DAILY
Status: DISCONTINUED | OUTPATIENT
Start: 2018-04-13 | End: 2018-04-15

## 2018-04-13 RX ORDER — GABAPENTIN 300 MG/1
300 CAPSULE ORAL
Status: DISCONTINUED | OUTPATIENT
Start: 2018-04-13 | End: 2018-04-15

## 2018-04-13 RX ORDER — POLYVINYL ALCOHOL 14 MG/ML
1 SOLUTION/ DROPS OPHTHALMIC 2 TIMES DAILY
Status: DISCONTINUED | OUTPATIENT
Start: 2018-04-13 | End: 2018-04-16 | Stop reason: HOSPADM

## 2018-04-13 RX ORDER — OXCARBAZEPINE 150 MG/1
150 TABLET, FILM COATED ORAL DAILY
Status: DISCONTINUED | OUTPATIENT
Start: 2018-04-14 | End: 2018-04-15

## 2018-04-13 RX ORDER — FLUTICASONE PROPIONATE 50 MCG
2 SPRAY, SUSPENSION (ML) NASAL 2 TIMES DAILY
Status: DISCONTINUED | OUTPATIENT
Start: 2018-04-13 | End: 2018-04-15

## 2018-04-13 RX ORDER — FUROSEMIDE 10 MG/ML
40 INJECTION INTRAMUSCULAR; INTRAVENOUS 2 TIMES DAILY
Status: DISCONTINUED | OUTPATIENT
Start: 2018-04-13 | End: 2018-04-15

## 2018-04-13 RX ORDER — PANTOPRAZOLE SODIUM 40 MG/1
40 TABLET, DELAYED RELEASE ORAL DAILY
COMMUNITY
End: 2018-04-16 | Stop reason: HOSPADM

## 2018-04-13 RX ORDER — ONDANSETRON 2 MG/ML
4 INJECTION INTRAMUSCULAR; INTRAVENOUS EVERY 6 HOURS PRN
Status: DISCONTINUED | OUTPATIENT
Start: 2018-04-13 | End: 2018-04-16 | Stop reason: HOSPADM

## 2018-04-13 RX ORDER — LEVOTHYROXINE SODIUM 88 UG/1
88 TABLET ORAL
Status: DISCONTINUED | OUTPATIENT
Start: 2018-04-14 | End: 2018-04-15

## 2018-04-13 RX ORDER — GABAPENTIN 100 MG/1
100 CAPSULE ORAL 2 TIMES DAILY
Status: DISCONTINUED | OUTPATIENT
Start: 2018-04-13 | End: 2018-04-15

## 2018-04-13 RX ORDER — ACETAMINOPHEN 325 MG/1
650 TABLET ORAL EVERY 4 HOURS PRN
Status: DISCONTINUED | OUTPATIENT
Start: 2018-04-13 | End: 2018-04-15

## 2018-04-13 RX ORDER — FUROSEMIDE 10 MG/ML
40 INJECTION INTRAMUSCULAR; INTRAVENOUS ONCE
Status: COMPLETED | OUTPATIENT
Start: 2018-04-13 | End: 2018-04-13

## 2018-04-13 RX ORDER — ROPINIROLE 0.5 MG/1
0.5 TABLET, FILM COATED ORAL 4 TIMES DAILY
COMMUNITY
End: 2018-04-16 | Stop reason: HOSPADM

## 2018-04-13 RX ORDER — AMLODIPINE BESYLATE 5 MG/1
5 TABLET ORAL DAILY
Status: DISCONTINUED | OUTPATIENT
Start: 2018-04-14 | End: 2018-04-15

## 2018-04-13 RX ORDER — LANOLIN ALCOHOL/MO/W.PET/CERES
3 CREAM (GRAM) TOPICAL
Status: DISCONTINUED | OUTPATIENT
Start: 2018-04-13 | End: 2018-04-16 | Stop reason: HOSPADM

## 2018-04-13 RX ORDER — TAMSULOSIN HYDROCHLORIDE 0.4 MG/1
0.4 CAPSULE ORAL
Status: DISCONTINUED | OUTPATIENT
Start: 2018-04-13 | End: 2018-04-15

## 2018-04-13 RX ORDER — PANTOPRAZOLE SODIUM 40 MG/1
40 TABLET, DELAYED RELEASE ORAL
Status: DISCONTINUED | OUTPATIENT
Start: 2018-04-14 | End: 2018-04-15

## 2018-04-13 RX ORDER — OXCARBAZEPINE 150 MG/1
150 TABLET, FILM COATED ORAL DAILY
COMMUNITY
End: 2018-04-16 | Stop reason: HOSPADM

## 2018-04-13 RX ORDER — AMPICILLIN TRIHYDRATE 250 MG
600 CAPSULE ORAL
COMMUNITY
End: 2018-04-16 | Stop reason: HOSPADM

## 2018-04-13 RX ORDER — CYCLOSPORINE 0.5 MG/ML
1 EMULSION OPHTHALMIC 2 TIMES DAILY
COMMUNITY
End: 2018-04-16 | Stop reason: HOSPADM

## 2018-04-13 RX ORDER — LIDOCAINE 50 MG/G
1 PATCH TOPICAL DAILY
Status: DISCONTINUED | OUTPATIENT
Start: 2018-04-14 | End: 2018-04-15

## 2018-04-13 RX ORDER — GUAIFENESIN/DEXTROMETHORPHAN 100-10MG/5
5 SYRUP ORAL 4 TIMES DAILY
Status: DISCONTINUED | OUTPATIENT
Start: 2018-04-13 | End: 2018-04-15

## 2018-04-13 RX ADMIN — TRAMADOL HYDROCHLORIDE 50 MG: 50 TABLET, FILM COATED ORAL at 20:35

## 2018-04-13 RX ADMIN — ROPINIROLE 0.5 MG: 0.25 TABLET, FILM COATED ORAL at 20:31

## 2018-04-13 RX ADMIN — FUROSEMIDE 40 MG: 10 INJECTION, SOLUTION INTRAVENOUS at 13:28

## 2018-04-13 RX ADMIN — GABAPENTIN 300 MG: 300 CAPSULE ORAL at 22:02

## 2018-04-13 RX ADMIN — MELATONIN TAB 3 MG 3 MG: 3 TAB at 22:02

## 2018-04-13 RX ADMIN — METOPROLOL TARTRATE 25 MG: 25 TABLET ORAL at 20:31

## 2018-04-13 RX ADMIN — ACETAMINOPHEN 650 MG: 325 TABLET, FILM COATED ORAL at 13:47

## 2018-04-13 RX ADMIN — ONDANSETRON 4 MG: 2 INJECTION INTRAMUSCULAR; INTRAVENOUS at 20:51

## 2018-04-13 RX ADMIN — FUROSEMIDE 20 MG: 10 INJECTION, SOLUTION INTRAMUSCULAR; INTRAVENOUS at 14:18

## 2018-04-13 RX ADMIN — CEPHALEXIN 500 MG: 500 CAPSULE ORAL at 22:04

## 2018-04-13 RX ADMIN — FLUTICASONE PROPIONATE 2 SPRAY: 50 SPRAY, METERED NASAL at 22:05

## 2018-04-13 RX ADMIN — TAMSULOSIN HYDROCHLORIDE 0.4 MG: 0.4 CAPSULE ORAL at 20:51

## 2018-04-13 RX ADMIN — GUAIFENESIN AND DEXTROMETHORPHAN 5 ML: 100; 10 SYRUP ORAL at 20:31

## 2018-04-13 RX ADMIN — CEPHALEXIN 500 MG: 500 CAPSULE ORAL at 16:59

## 2018-04-13 RX ADMIN — APIXABAN 2.5 MG: 2.5 TABLET, FILM COATED ORAL at 20:51

## 2018-04-13 RX ADMIN — POLYVINYL ALCOHOL 1 DROP: 14 SOLUTION/ DROPS OPHTHALMIC at 22:05

## 2018-04-13 RX ADMIN — LATANOPROST 1 DROP: 50 SOLUTION OPHTHALMIC at 22:05

## 2018-04-13 RX ADMIN — NYSTATIN: 100000 POWDER TOPICAL at 22:05

## 2018-04-13 RX ADMIN — HYDROMORPHONE HYDROCHLORIDE 0.2 MG: 1 INJECTION, SOLUTION INTRAMUSCULAR; INTRAVENOUS; SUBCUTANEOUS at 18:08

## 2018-04-13 NOTE — ED NOTES
Called patient's daughter Chip Walker [phone] with update that patient will be admitted  Also called [facility] [address] and spoke with RN Silvino Ibarra for update as well  Dx of CHF exacerbation       [address] [place] [address], RN  04/13/18 3498

## 2018-04-13 NOTE — ED NOTES
Pt  Provided with dinner menu   Daughter at [facility], [facility]  04/13/18 3880

## 2018-04-13 NOTE — ASSESSMENT & PLAN NOTE
· POA, patient normally without O2 needs, now requiring O2 to maintain adequate saturation and had been dyspneic on exertion prior   Likely secondary to primary problem with element of mild UTRI   · Management as per primary problem  · Supportive care   · O2 PRN

## 2018-04-13 NOTE — H&P
[address] Internal Medicine  H&P- [facility] 6/14/1931, 80 y o  female MRN: [id number]    Unit/Bed#: ED 16 Encounter: [id number]    Primary Care Provider: Justin Mary MD   Date and time admitted to hospital: 4/13/2018 11:26 AM        Acute on chronic diastolic heart failure [facility])   Assessment & Plan    · POA, patient with shortness of breath, orthopnea, PND, as well as objective findings of 6 lb weight gain from 4/11, pitting edema in bilateral legs, rales bibasilarly, JVD/HJR, elevated BNP  Compliant with diuretic regimen, but does not follow low salt diet  Prior echo showing preserved EF with grade 2 diastolic dysfunction  Troponin pending  Known to Dr Radha Jalloh   · Admit patient to med/surg under inpatient status with telemetry monitoring   · Consult cardiology   · Lasix 40 mg IV BID   · Low salt, fluid restriction   · DW and I/O   · Check troponin   · Trend labs         Acute respiratory failure with hypoxia (HCC)   Assessment & Plan    · POA, patient normally without O2 needs, now requiring O2 to maintain adequate saturation and had been dyspneic on exertion prior  Likely secondary to primary problem with element of mild UTRI   · Management as per primary problem  · Supportive care   · O2 PRN         Paroxysmal atrial fibrillation (HCC)   Assessment & Plan    · Appears to be in NSR on exam   · Continue rate control with beta blocker  · Renally dose Eliquis         Acute cystitis without hematuria   Assessment & Plan    · POA, noted on UA  Not septic   · Start Keflex 500 mg Q8 hours   · Follow up with urine culture   · Monitor CBC        URTI (acute upper respiratory infection)   Assessment & Plan    · Noted upper airway congestion   No suggestion of pneumonia or bronchitis on exam or imaging   · Supportive care as ordered         Chronic kidney disease, stage III (moderate)   Assessment & Plan    · Creatinine at baseline - 1 3-1 95 as per Dr Nataliia Chavez, known to him  · Monitor creatinine closely with diuresis         Pulmonary hypertension (HCC)   Assessment & Plan    · Continue oxygen as above  · On Inspra          Secondary hyperparathyroidism of renal origin ([facility] [address] )   Assessment & Plan    · Continue calcitriol            VTE Prophylaxis: Apixaban (Eliquis)  / sequential compression device   Code Status: DNR/DNI  POLST: POLST form is not discussed and not completed at this time  Discussion with family: Offered to discuss with daughter when she arrives     Anticipated Length of Stay:  Patient will be admitted on an Inpatient basis with an anticipated length of stay of  Greater than 2 midnights  Justification for Hospital Stay: Acute on chronic heart failure needing IV diuresis causing acute respiratory failure with hypoxia     Total Time for Visit, including Counseling / Coordination of Care: 1 hour  Greater than 50% of this total time spent on direct patient counseling and coordination of care  Chief Complaint:   Shortness of breath     History of Present Illness:    Cordell Guzmán is a 80 y o  female with a history of chronic diastolic congestive heart failure, paroxysmal A  Fib, pulmonary HTN, Stage 3 CKD who presents with increasing shortness of breath over the last 2 weeks  Patient reports that her shortness of breath is worse with exertion mostly  She reports a cough that appears to be non-productive, but states that sometimes she would vomit after coughing  She states that she has noticed some leg swelling as well as feels as though she was slightly bloated  She reports that she has a hard time laying flat as well as woke up in the middle of the night short of breath a few times in the last few weeks that she reports is new  She reports some upper airway congestion  Reports fevers and chills, but states that she does not know her temperature  Denies other complaints  Reports that she is managed by Dr Carmelita Mcdaniel, but reports that he has not seen him in awhile   Denies following a low salt diet, but states that she only drinks a "few" water bottles a day  She reports that she is complaint with her diuretic regimen  Review of Systems:    Review of Systems   Constitutional: Positive for chills, fever and unexpected weight change  Negative for appetite change, diaphoresis and fatigue  HENT: Negative for congestion, rhinorrhea and sore throat  Eyes: Negative for visual disturbance  Respiratory: Positive for cough and shortness of breath  Negative for chest tightness and wheezing  Cardiovascular: Positive for leg swelling  Negative for chest pain and palpitations  Gastrointestinal: Positive for abdominal distention and vomiting  Negative for abdominal pain, constipation, diarrhea and nausea  Genitourinary: Negative for dysuria  Musculoskeletal: Negative for arthralgias and myalgias  Neurological: Negative for dizziness, syncope, weakness, light-headedness, numbness and headaches  All other systems reviewed and are negative        Past Medical and Surgical History:     Past Medical History:   Diagnosis Date    A-fib ([place] [address] )     Anemia     Anemia     Anxiety     Asthma     CAD (coronary artery disease)     CHF [facility]congestive heart failure) [facility])     CHF (congestive heart failure) ([facility])     CKD (chronic kidney disease) stage 3, GFR 30-59 ml/min     ckd3    Constipation     CVA (cerebral vascular accident) ([place] [address] )     left hemiparesis    Diastolic CHF, chronic ([facility])     Disease of thyroid gland     Dry eye     First degree AV block     Gastritis     Gastritis     GERD (gastroesophageal reflux disease)     Glaucoma     Glaucoma     Hemiparesis ([facility])     left side    Hemiparesis affecting left side as late effect of stroke ([facility])     Hyperlipidemia     Hypertension     Hypomagnesemia     Impetigo     Insomnia     Knee pain     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    Neuropathy     Osteoarthritis     Parkinson disease ([place] [address] )     Pulmonary HTN ([place] [address] )  PVD (peripheral vascular disease) (HCC)     Restless leg     Vitamin D deficiency        Past Surgical History:   Procedure Laterality Date    APPENDECTOMY      BLADDER SURGERY      CAROTID ENDARTERECTOMY Left     ESOPHAGOGASTRODUODENOSCOPY N/A 4/8/2016    Procedure: ESOPHAGOGASTRODUODENOSCOPY (EGD); Surgeon: Tapan Shelton MD;  Location: [facility]; Service:     RENAL ARTERY STENT      VASCULAR SURGERY         Meds/Allergies:    Prior to Admission medications    Medication Sig Start Date End Date Taking? Authorizing Provider   acetaminophen (TYLENOL) 500 mg tablet Take 500 mg by mouth every 12 (twelve) hours   Yes Historical Provider, MD   amLODIPine (NORVASC) 5 mg tablet Take 5 mg by mouth daily   Yes Historical Provider, MD   apixaban (ELIQUIS) 2 5 mg Take 1 tablet by mouth 2 (two) times a day 7/29/17  Yes Haider Malone MD   calcitriol (ROCALTROL) 0 25 mcg capsule TAKE 1 CAPSULE ORALLY DAILY (CHRONIC HYPERPARATHYROIDISM) 3/9/18  Yes Ethlyn Fails, CRNP   cefpodoxime (VANTIN) 200 mg tablet Take 200 mg by mouth 2 (two) times a day   Yes Historical Provider, MD   CO-ENZYME Q-10 PO Take 200 mg by mouth daily   Yes Historical Provider, MD   cycloSPORINE (RESTASIS) 0 05 % ophthalmic emulsion Administer 1 drop to both eyes 2 (two) times a day   Yes Historical Provider, MD   dextromethorphan-guaiFENesin (ROBAFEN DM)  mg/5 mL oral syrup Take 5 mL by mouth 4 (four) times a day   Yes Historical Provider, MD   eplerenone (INSPRA) 25 mg tablet Take 25 mg by mouth daily   Yes Historical Provider, MD   fluticasone (FLONASE) 50 mcg/act nasal spray 1 spray into each nostril daily     Yes Historical Provider, MD   gabapentin (NEURONTIN) 100 mg capsule Take 100 mg by mouth 2 (two) times a day   Yes Historical Provider, MD   gabapentin (NEURONTIN) 300 mg capsule Take 2 capsules by mouth daily at bedtime  Patient taking differently: Take 300 mg by mouth daily at bedtime   7/29/17  Yes Haider Malone MD latanoprost (XALATAN) 0 005 % ophthalmic solution Administer 1 drop to both eyes daily at bedtime     Yes Historical Provider, MD   levothyroxine 75 mcg tablet Take 88 mcg by mouth daily   Yes Historical Provider, MD   loratadine (CLARITIN) 10 mg tablet Take 10 mg by mouth daily   Yes Historical Provider, MD   Melatonin ER (MELADOX) 3 MG TBCR Take 3 mg by mouth daily at bedtime   Yes Historical Provider, MD   metoprolol tartrate (LOPRESSOR) 50 mg tablet Take 25 mg by mouth every 12 (twelve) hours   Yes Historical Provider, MD   OXcarbazepine (TRILEPTAL) 150 mg tablet Take 150 mg by mouth daily   Yes Historical Provider, MD   pantoprazole (PROTONIX) 40 mg tablet Take 40 mg by mouth daily   Yes Historical Provider, MD   Red Yeast Rice 600 MG CAPS Take 600 mg by mouth   Yes Historical Provider, MD   rOPINIRole (REQUIP) 0 5 mg tablet Take 0 5 mg by mouth 4 (four) times a day   Yes Historical Provider, MD   tamsulosin (FLOMAX) 0 4 mg  3/31/18  Yes Historical Provider, MD   torsemide (DEMADEX) 20 mg tablet Take 1 tablet (20 mg total) by mouth 2 (two) times a day Take 1 tablet twice a day except on Monday Wednesday and Friday when you will take 2 tablets in the morning which is equivalent to 40 mg, and 1 tablet in the evening 20 mg  Patient taking differently: Take 20 mg by mouth 2 (two) times a day 20mg bid Sunday, Tuesday, Thursday and Saturday, 40mg Monday, Wednesday, Friday  3/20/18  Yes Fabian Slater MD   traMADol Alfonso Fleeting) 50 mg tablet Take 50 mg by mouth every 6 (six) hours as needed for moderate pain   Yes Historical Provider, MD   acetaminophen (TYLENOL) 325 mg tablet Take 650 mg by mouth every 4 (four) hours as needed for mild pain For mild pain or fever greater than 100    Historical Provider, MD   Alum & Mag Hydroxide-Simeth (ANTACID ANTI-GAS PO) Take 30 mL by mouth every 6 (six) hours as needed      Historical Provider, MD   bisacodyl (DULCOLAX) 10 mg suppository Insert 10 mg into the rectum daily Every 4th day prn constipation    Historical Provider, MD   bisacodyl (DULCOLAX) 5 mg EC tablet Take 10 mg by mouth as needed for constipation Every 4th day as needed for constipation    Historical Provider, MD   diphenhydrAMINE (BENADRYL) 25 mg capsule Take 25 mg by mouth daily at bedtime as needed for itching    Historical Provider, MD   fluticasone (FLONASE) 50 mcg/act nasal spray 1 spray into each nostril daily    Historical Provider, MD   guaiFENesin (ROBITUSSIN) 100 MG/5ML oral liquid Take 100 mg by mouth every 4 (four) hours as needed for cough    Historical Provider, MD   loperamide (IMODIUM) 2 mg capsule Take 2 mg by mouth as needed for diarrhea After each loose stool    Historical Provider, MD   mineral oil enema Insert 1 enema into the rectum once Insert in rectum every 4th day as needed for constipation  Historical Provider, MD   mupirocin (BACTROBAN) 2 % ointment Apply 1 application topically daily as needed 3/22/18   Historical Provider, MD   polyvinyl alcohol (LIQUIFILM TEARS) 1 4 % ophthalmic solution Administer 1 drop to both eyes 2 (two) times a day    Historical Provider, MD     I have reveiwed home medications using records provided by [facility]  Allergies: Allergies   Allergen Reactions    Lipitor [Atorvastatin]     Morphine     Percocet [Oxycodone-Acetaminophen] GI Intolerance    Spironolactone     Sulfa Antibiotics        Social History:     Marital Status:     Occupation: Retired   Patient Pre-hospital Living Situation: [facility]  Patient Pre-hospital Level of Mobility: Some assistance needed  Patient Pre-hospital Diet Restrictions: Low salt - not following   Substance Use History:   History   Alcohol Use No     History   Smoking Status    Never Smoker   Smokeless Tobacco    Never Used     History   Drug Use No       Family History:    Family History   Problem Relation Age of Onset    Stroke Father     Cancer Brother     No Known Problems Mother        Physical Exam:     Vitals:   Blood Pressure: 124/57 (04/13/18 1600)  Pulse: 84 (04/13/18 1615)  Temperature: 98 1 °F (36 7 °C) (04/13/18 1128)  Temp Source: Oral (04/13/18 1128)  Respirations: 20 (04/13/18 1410)  Height: 5' 3" (160 cm) (04/13/18 1128)  Weight - Scale: 77 8 kg (171 lb 8 3 oz) (04/13/18 1128)  SpO2: 99 % (04/13/18 1615)    Physical Exam   Constitutional: She is oriented to person, place, and time  Vital signs are normal  She appears well-developed and well-nourished  Non-toxic appearance  No distress  Nasal cannula in place  HENT:   Head: Normocephalic and atraumatic  Mouth/Throat: Mucous membranes are not dry  Eyes: Conjunctivae and EOM are normal  Pupils are equal, round, and reactive to light  Pupils are equal    Neck: Phonation normal  Neck supple  Hepatojugular reflux and JVD present  Cardiovascular: Normal rate, regular rhythm, S1 normal and intact distal pulses  Exam reveals no S3 and no S4  Murmur heard  Systolic murmur is present with a grade of 2/6   Pulmonary/Chest: No accessory muscle usage  She is in respiratory distress (some belly breathing after speaking, rare)  She has no decreased breath sounds  She has no wheezes  She has no rhonchi  She has rales (scant) in the right lower field and the left lower field  She exhibits no tenderness  Abdominal: Soft  Bowel sounds are normal  She exhibits no distension and no mass  There is no tenderness  There is no rigidity, no rebound and no guarding  Neurological: She is alert and oriented to person, place, and time  She is not disoriented  GCS eye subscore is 4  GCS verbal subscore is 5  GCS motor subscore is 6  Skin: Skin is warm and dry  Additional Data:     Lab Results: I have personally reviewed pertinent reports          Results from last 7 days  Lab Units 04/13/18  1240   WBC Thousand/uL 7 83   HEMOGLOBIN g/dL 10 3*   HEMATOCRIT % 32 5*   PLATELETS Thousands/uL 270   LYMPHO PCT % 14   MONO PCT MAN % 10   EOSINO PCT MANUAL % 2       Results from last 7 days  Lab Units 04/13/18  1241   SODIUM mmol/L 139   POTASSIUM mmol/L 3 6   CHLORIDE mmol/L 101   CO2 mmol/L 28   BUN mg/dL 22   CREATININE mg/dL 1 58*   CALCIUM mg/dL 8 6   TOTAL PROTEIN g/dL 7 6   BILIRUBIN TOTAL mg/dL 0 50   ALK PHOS U/L 69   ALT U/L 17   AST U/L 17   GLUCOSE RANDOM mg/dL 111           Imaging: I have personally reviewed pertinent reports  XR chest 2 views   Final Result by Vincenzo Javier MD (04/13 1329)         1  Mild pulmonary vascular congestion  2   Unchanged mild cardiomegaly  Workstation performed: [id number]             EKG, Pathology, and Other Studies Reviewed on Admission:   · EKG: read as accelerated junctional rhythm, but appears NSR  · Echo from July: Grade 2 DD   · CXR: Mild vascular congestion, mild cardiomegaly    Allscripts / Epic Records Reviewed: Yes     ** Please Note: This note has been constructed using a voice recognition system   **

## 2018-04-13 NOTE — ED NOTES
Ruthie [address]bs [address] to confirm Medication List  Left message for Nurse      Cedrick Chau RN  04/13/18 0543

## 2018-04-13 NOTE — ASSESSMENT & PLAN NOTE
· Noted upper airway congestion   No suggestion of pneumonia or bronchitis on exam or imaging   · Supportive care as ordered

## 2018-04-13 NOTE — ASSESSMENT & PLAN NOTE
· POA, noted on UA   Not septic   · Start Keflex 500 mg Q8 hours   · Follow up with urine culture   · Monitor CBC

## 2018-04-13 NOTE — ASSESSMENT & PLAN NOTE
· Creatinine at baseline - 1 3-1 95 as per Dr Sarah Leigh, known to him  · Monitor creatinine closely with diuresis

## 2018-04-13 NOTE — ED NOTES
Daughter, Boaz Jackson at bedside: phone number to call later when leaving #[phone], RN  04/13/18 4682

## 2018-04-13 NOTE — ED NOTES
Pt  Sleeping in bed   Turned and repositioned for comfort measures     Dominique Gomez RN  04/13/18 6801

## 2018-04-13 NOTE — ED PROVIDER NOTES
History  Chief Complaint   Patient presents with    Shortness of Breath     Pt  reports to ED via EMS with increased work of breathing and Bilateral Rhonci per EMS Pt  94% on Room Air     [de-identified] year old female arrives to ED via ambulance from nursing home, with complaints respiratory distress  Initial O2 sats 94% on room air patient plaed on 2 L of nasal cannula for respiratory insufficiency and increase work of breathing  She reports that she has been experiencing confusion increased, weakness over the past 2 weeks and awoke this morning with increased SOB  She a history stroke with left-sided deficit and CHF  She acknowledges that her face has increased redness/ dry, which noted issues being managed by her primary care provider  She also relates that she have a sore on her sacrum and has been recently treated for bilateral lower extremity cellulitis  No alleviating or aggravating factors  Denies chest pain/tenderness, nausea, vomiting, fever, chills, history of cancer, hx PE/DVT  History provided by:  Patient   used: No    Shortness of Breath   Severity:  Mild  Onset quality:  Sudden  Duration:  1 day  Timing:  Constant  Progression:  Worsening  Chronicity:  Chronic  Context: activity, known allergens and occupational exposure    Context: not fumes and not URI    Relieved by:  None tried  Worsened by:  Nothing  Ineffective treatments:  None tried  Associated symptoms: sputum production    Associated symptoms: no abdominal pain, no chest pain, no diaphoresis, no fever, no vomiting and no wheezing    Risk factors: no recent alcohol use, no hx of cancer, no hx of PE/DVT, no oral contraceptive use and no recent surgery        Prior to Admission Medications   Prescriptions Last Dose Informant Patient Reported? Taking?    Alum & Mag Hydroxide-Simeth (ANTACID ANTI-GAS PO)  Outside Facility (Specify) Yes No   Sig: Take 30 mL by mouth every 6 (six) hours as needed     CO-ENZYME Q-10 PO 2018 at Unknown time Outside Facility ([address] [place] [address]) Yes Yes   Sig: Take 200 mg by mouth daily   Melatonin ER (MELADOX) 3 MG TBCR   Yes Yes   Sig: Take 3 mg by mouth daily at bedtime   acetaminophen (TYLENOL) 325 mg tablet Unknown at Unknown time  Yes No   Sig: Take 650 mg by mouth every 4 (four) hours as needed for mild pain For mild pain or fever greater than 100   acetaminophen (TYLENOL) 500 mg tablet 2018 at Unknown time  Yes Yes   Sig: Take 500 mg by mouth every 12 (twelve) hours   amLODIPine (NORVASC) 5 mg tablet 2018 at Unknown time Outside Facility ([address] [place] [address]) Yes Yes   Sig: Take 5 mg by mouth daily   apixaban (ELIQUIS) 2 5 mg 2018 at Unknown time Outside Facility (Specify) No Yes   Sig: Take 1 tablet by mouth 2 (two) times a day   bisacodyl (DULCOLAX) 10 mg suppository   Yes No   Sig: Insert 10 mg into the rectum daily Every 4th day prn constipation   bisacodyl (DULCOLAX) 5 mg EC tablet  Outside Facility (Specify) Yes No   Sig: Take 10 mg by mouth as needed for constipation Every 4th day as needed for constipation   calcitriol (ROCALTROL) 0 25 mcg capsule 2018 at Unknown time Outside Facility (Specify) No Yes   Sig: TAKE 1 CAPSULE ORALLY DAILY (CHRONIC HYPERPARATHYROIDISM)   cefpodoxime (VANTIN) 200 mg tablet 2018 at Unknown time  Yes Yes   Sig: Take 200 mg by mouth 2 (two) times a day   diphenhydrAMINE (BENADRYL) 25 mg capsule  Outside Facility (Specify) Yes No   Sig: Take 25 mg by mouth daily at bedtime as needed for itching   eplerenone (INSPRA) 25 mg tablet 2018 at Unknown time Outside Facility (Specify) Yes Yes   Sig: Take 25 mg by mouth daily   fluticasone (FLONASE) 50 mcg/act nasal spray 2018 at Unknown time Outside Facility ([address] [place] [address]) Yes Yes   Si spray into each nostril daily     fluticasone (FLONASE) 50 mcg/act nasal spray   Yes No   Si spray into each nostril daily   gabapentin (NEURONTIN) 100 mg capsule 2018 at Unknown time Outside Facility (Specify) Yes Yes   Sig: Take 100 mg by mouth 2 (two) times a day   gabapentin (NEURONTIN) 300 mg capsule 4/12/2018 at Unknown time Outside Facility (Specify) No Yes   Sig: Take 2 capsules by mouth daily at bedtime   Patient taking differently: Take 300 mg by mouth daily at bedtime     guaiFENesin (ROBITUSSIN) 100 MG/5ML oral liquid   Yes No   Sig: Take 100 mg by mouth every 4 (four) hours as needed for cough   latanoprost (XALATAN) 0 005 % ophthalmic solution 4/12/2018 at Unknown time Outside Facility (Specify) Yes Yes   Sig: Administer 1 drop to both eyes daily at bedtime  levothyroxine 75 mcg tablet 4/12/2018 at Unknown time  Yes Yes   Sig: Take 88 mcg by mouth daily   loperamide (IMODIUM) 2 mg capsule  Outside Facility (Specify) Yes No   Sig: Take 2 mg by mouth as needed for diarrhea After each loose stool   loratadine (CLARITIN) 10 mg tablet 4/12/2018 at Unknown time  Yes Yes   Sig: Take 10 mg by mouth daily   mineral oil enema  Outside Facility (Specify) Yes No   Sig: Insert 1 enema into the rectum once Insert in rectum every 4th day as needed for constipation     mupirocin (BACTROBAN) 2 % ointment   Yes No   Sig: Apply 1 application topically daily as needed   polyvinyl alcohol (LIQUIFILM TEARS) 1 4 % ophthalmic solution   Yes No   Sig: Administer 1 drop to both eyes 2 (two) times a day   tamsulosin (FLOMAX) 0 4 mg   Yes No   torsemide (DEMADEX) 20 mg tablet 4/12/2018 at Unknown time  No Yes   Sig: Take 1 tablet (20 mg total) by mouth 2 (two) times a day Take 1 tablet twice a day except on Monday Wednesday and Friday when you will take 2 tablets in the morning which is equivalent to 40 mg, and 1 tablet in the evening 20 mg   Patient taking differently: Take 20 mg by mouth 2 (two) times a day 20mg bid Sunday, Tuesday, Thursday and Saturday, 40mg Monday, Wednesday, Friday    traMADol (ULTRAM) 50 mg tablet  Outside Facility (Specify) Yes No   Sig: Take 50 mg by mouth every 6 (six) hours as needed for moderate pain      Facility-Administered Medications: None       Past Medical History:   Diagnosis Date    A-fib ([place] [address] )     Anemia     Anemia     Anxiety     Asthma     CAD (coronary artery disease)     [facility]     CHF (congestive heart failure) [facility])     CKD (chronic kidney disease) stage 3, GFR 30-59 ml/min     ckd3    Constipation     CVA (cerebral vascular accident) ([place] [address] )     left hemiparesis    Diastolic CHF, chronic ([facility])     Disease of thyroid gland     Dry eye     First degree AV block     Gastritis     Gastritis     GERD (gastroesophageal reflux disease)     Glaucoma     Glaucoma     Hemiparesis ([facility])     left side    Hemiparesis affecting left side as late effect of stroke ([facility])     Hyperlipidemia     Hypertension     Hypomagnesemia     Impetigo     Insomnia     Knee pain     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    Neuropathy     Osteoarthritis     Parkinson disease ([place] [address] )     Pulmonary HTN ([place] [address] )     PVD (peripheral vascular disease) ([place] [address] )     Restless leg     Vitamin D deficiency        Past Surgical History:   Procedure Laterality Date    APPENDECTOMY      BLADDER SURGERY      CAROTID ENDARTERECTOMY Left     ESOPHAGOGASTRODUODENOSCOPY N/A 4/8/2016    Procedure: ESOPHAGOGASTRODUODENOSCOPY (EGD); Surgeon: Tapan Shelton MD;  Location: [facility]; Service:     RENAL ARTERY STENT      VASCULAR SURGERY         Family History   Problem Relation Age of Onset    Stroke Father     Cancer Brother     No Known Problems Mother      I have reviewed and agree with the history as documented  Social History   Substance Use Topics    Smoking status: Never Smoker    Smokeless tobacco: Never Used    Alcohol use No        Review of Systems   Constitutional: Positive for activity change and fatigue  Negative for chills, diaphoresis and fever  HENT: Negative  Eyes: Negative      Respiratory: Positive for sputum production and shortness of breath  Negative for wheezing  Cardiovascular: Negative for chest pain  Gastrointestinal: Negative for abdominal distention, abdominal pain and vomiting  Endocrine: Negative  Genitourinary: Negative  Negative for decreased urine volume, dysuria, flank pain and urgency  Musculoskeletal: Negative  Negative for back pain  Skin: Negative  Allergic/Immunologic: Negative  Neurological: Negative  Hematological: Negative  Psychiatric/Behavioral: Positive for confusion  Patient reports that she is having increase moments of confusion, however is able to reoriented  All other systems reviewed and are negative  Physical Exam  ED Triage Vitals [04/13/18 1128]   Temperature Pulse Respirations Blood Pressure SpO2   98 1 °F (36 7 °C) 90 20 135/57 94 %      Temp Source Heart Rate Source Patient Position - Orthostatic VS BP Location FiO2 (%)   Oral -- Sitting Right arm --      Pain Score       9           Orthostatic Vital Signs  Vitals:    04/13/18 1128 04/13/18 1230 04/13/18 1245 04/13/18 1330   BP: 135/57 116/54  124/60   Pulse: 90 84 86 80   Patient Position - Orthostatic VS: Sitting          Physical Exam   Constitutional: She is oriented to person, place, and time  She appears well-developed and well-nourished  No distress  HENT:   Head: Normocephalic  Eyes: Pupils are equal, round, and reactive to light  No scleral icterus  Neck: Neck supple  Cardiovascular: Normal rate and intact distal pulses  Exam reveals no gallop and no friction rub  No murmur heard  Pulmonary/Chest: She is in respiratory distress  She exhibits no tenderness  rhonchi throughout all lobes    Abdominal: Soft  Bowel sounds are normal  She exhibits no distension and no mass  There is no tenderness  There is no rebound and no guarding  No hernia  Musculoskeletal: She exhibits tenderness     Tenderness noted with palpation over bilateral lower extremities   Neurological: She is alert and oriented to person, place, and time  Skin: Skin is warm and dry  Capillary refill takes less than 2 seconds  She is not diaphoretic  There is erythema  Erythema and dryness noted to face  Edema noted to BLE   Psychiatric: She has a normal mood and affect  Nursing note and vitals reviewed  ED Medications  Medications   furosemide (LASIX) injection 20 mg (not administered)    EMS REPLENISHMENT MED ( Does not apply Given to EMS 4/13/18 1206)   furosemide (LASIX) injection 40 mg (40 mg Intravenous Given 4/13/18 1328)   acetaminophen (TYLENOL) tablet 650 mg (650 mg Oral Given 4/13/18 1347)       Diagnostic Studies  Results Reviewed     Procedure Component Value Units Date/Time    Shannon draw [[id number]] Collected:  04/13/18 1241    Lab Status: In process Specimen:  Blood Updated:  04/13/18 1401    Narrative: The following orders were created for panel order Shannon draw  Procedure                               Abnormality         Status                     ---------                               -----------         ------                     Warnell Bare Top on QRTO[[id number]]                            Final result               Green / Black tube on AADC[[id number]]                        In process                   Please view results for these tests on the individual orders  BNP [[id number]]  (Abnormal) Collected:  04/13/18 1241    Lab Status:  Final result Specimen:  Blood from Arm, Right Updated:  04/13/18 1315     NT-proBNP 1,522 (H) pg/mL     CBC and differential [[id number]]  (Abnormal) Collected:  04/13/18 1240    Lab Status:  Final result Specimen:  Blood from Arm, Right Updated:  04/13/18 1312     WBC 7 83 Thousand/uL      RBC 3 65 (L) Million/uL      Hemoglobin 10 3 (L) g/dL      Hematocrit 32 5 (L) %      MCV 89 fL      MCH 28 2 pg      MCHC 31 7 g/dL      RDW 15 6 (H) %      MPV 10 1 fL      Platelets 503 Thousands/uL     Narrative: This is an appended report    These results have been appended to a previously verified report  Comprehensive metabolic panel [[id number]]  (Abnormal) Collected:  04/13/18 1241    Lab Status:  Final result Specimen:  Blood from Arm, Right Updated:  04/13/18 1308     Sodium 139 mmol/L      Potassium 3 6 mmol/L      Chloride 101 mmol/L      CO2 28 mmol/L      Anion Gap 10 mmol/L      BUN 22 mg/dL      Creatinine 1 58 (H) mg/dL      Glucose 111 mg/dL      Calcium 8 6 mg/dL      AST 17 U/L      ALT 17 U/L      Alkaline Phosphatase 69 U/L      Total Protein 7 6 g/dL      Albumin 3 7 g/dL      Total Bilirubin 0 50 mg/dL      eGFR 29 ml/min/1 73sq m     Narrative:         National Kidney Disease Education Program recommendations are as follows:  GFR calculation is accurate only with a steady state creatinine  Chronic Kidney disease less than 60 ml/min/1 73 sq  meters  Kidney failure less than 15 ml/min/1 73 sq  meters  Urine Microscopic [[id number]]  (Abnormal) Collected:  04/13/18 1205    Lab Status:  Final result Specimen:  Urine from Urine, Other Updated:  04/13/18 1226     RBC, UA 0-1 (A) /hpf      WBC, UA Innumerable (A) /hpf      Epithelial Cells Occasional /hpf      Bacteria, UA Occasional /hpf     Urine culture [[id number]] Collected:  04/13/18 1205    Lab Status: In process Specimen:  Urine from Urine, Other Updated:  04/13/18 1226    ED Urine Macroscopic [[id number]]  (Abnormal) Collected:  04/13/18 1205    Lab Status:  Final result Specimen:  Urine Updated:  04/13/18 1204     Color, UA Yellow     Clarity, UA Cloudy     pH, UA 6 0     Leukocytes, UA Large (A)     Nitrite, UA Negative     Protein, UA Negative mg/dl      Glucose, UA Negative mg/dl      Ketones, UA Negative mg/dl      Urobilinogen, UA 0 2 E U /dl      Bilirubin, UA Negative     Blood, UA Trace (A)     Specific Gravity, UA >=1 030    Narrative:       CL[facility]K RESULT                 XR chest 2 views   Final Result by Sameera Watts MD (04/13 1329)         1    Mild pulmonary vascular congestion  2   Unchanged mild cardiomegaly  Workstation performed: [id number]                    Procedures  Procedures       Phone Contacts  ED Phone Contact    ED Course  ED Course as of Apr 13 1408   Fri Apr 13, 2018   1145 Differential doses likely but not limited to:  Bronchitis, pneumonia, CHF exacerbation  1359 CHF exacerbation likely cause of SOB  Will admit for further symptom management and diuresis  Discussed case with Dr Vandana Rutledge and patient will be admitted for inpatient admission  MDM  Number of Diagnoses or Management Options  CHF exacerbation ([facility] [address] ): established and worsening  Diagnosis management comments: The patient (and any family present) verbalized understanding of treatment plan  Specifically highlighted areas of special concern regarding CHF exacerbation  All questions were answered prior to admission  Amount and/or Complexity of Data Reviewed  Clinical lab tests: ordered and reviewed  Tests in the radiology section of CPT®: ordered and reviewed  Discuss the patient with other providers: yes (Dr Vikas Amaro)    Risk of Complications, Morbidity, and/or Mortality  Presenting problems: high  Diagnostic procedures: high  Management options: high  General comments:   1  CHF exacerbation  -chief complaint shortness of breath likely secondary to CHF exacerbation, CXR mild pulmonary congestion, BNP > 1500  -lasix given  - Will admit to [facility] for further diuresis and further symptom management        Patient Progress  Patient progress: stable    CritCare Time    Disposition  Final diagnoses:   CHF exacerbation ([facility] [address] )     Time reflects when diagnosis was documented in both MDM as applicable and the Disposition within this note     Time User Action Codes Description Comment    4/13/2018  1:34 PM Rocky Madden Add [I50 9] CHF exacerbation [facility])       ED Disposition     ED Disposition Condition Comment    Admit  Case was discussed with Dr Vandana Rutledge and the patient's admission status was agreed to be Admission Status: inpatient status to the service of Dr Anel Inman   Follow-up Information    None       Patient's Medications   Discharge Prescriptions    No medications on file     No discharge procedures on file      ED Provider  Electronically Signed by           REDD Pete  04/13/18 9519

## 2018-04-13 NOTE — ASSESSMENT & PLAN NOTE
· POA, patient with shortness of breath, orthopnea, PND, as well as objective findings of 6 lb weight gain from 4/11, pitting edema in bilateral legs, rales bibasilarly, JVD/HJR, elevated BNP  Compliant with diuretic regimen, but does not follow low salt diet  Prior echo showing preserved EF with grade 2 diastolic dysfunction  Troponin pending   Known to Dr Radha Jalloh   · Admit patient to med/surg under inpatient status with telemetry monitoring   · Consult cardiology   · Lasix 40 mg IV BID   · Low salt, fluid restriction   · DW and I/O   · Check troponin   · Trend labs

## 2018-04-13 NOTE — ED NOTES
Pt  Jose R Otero on bedpan and repositioned for comfort measures     Sarthak Marquez RN  04/13/18 9758

## 2018-04-14 LAB
ANION GAP SERPL CALCULATED.3IONS-SCNC: 14 MMOL/L (ref 4–13)
ATRIAL RATE: 102 BPM
ATRIAL RATE: 86 BPM
ATRIAL RATE: 87 BPM
BUN SERPL-MCNC: 22 MG/DL (ref 5–25)
CALCIUM SERPL-MCNC: 8.7 MG/DL
CHLORIDE SERPL-SCNC: 102 MMOL/L (ref 100–108)
CO2 SERPL-SCNC: 26 MMOL/L (ref 21–32)
CREAT SERPL-MCNC: 1.45 MG/DL (ref 0.6–1.3)
ERYTHROCYTE [DISTWIDTH] IN BLOOD BY AUTOMATED COUNT: 15.4 % (ref 11.6–15.1)
FLUAV AG SPEC QL: NORMAL
FLUBV AG SPEC QL: NORMAL
GFR SERPL CREATININE-BSD FRML MDRD: 33 ML/MIN/1.73SQ M
GLUCOSE SERPL-MCNC: 102 MG/DL (ref 65–140)
HCT VFR BLD AUTO: 30.9 % (ref 34.8–46.1)
HGB BLD-MCNC: 9.8 G/DL (ref 11.5–15.4)
MCH RBC QN AUTO: 28.3 PG (ref 26.8–34.3)
MCHC RBC AUTO-ENTMCNC: 31.7 G/DL (ref 31.4–37.4)
MCV RBC AUTO: 89 FL (ref 82–98)
P AXIS: 1 DEGREES
PLATELET # BLD AUTO: 257 THOUSANDS/UL (ref 149–390)
PMV BLD AUTO: 10.8 FL (ref 8.9–12.7)
POTASSIUM SERPL-SCNC: 3.4 MMOL/L (ref 3.5–5.3)
PR INTERVAL: 188 MS
QRS AXIS: 10 DEGREES
QRS AXIS: 11 DEGREES
QRS AXIS: 5 DEGREES
QRSD INTERVAL: 80 MS
QRSD INTERVAL: 84 MS
QRSD INTERVAL: 86 MS
QT INTERVAL: 354 MS
QT INTERVAL: 394 MS
QT INTERVAL: 400 MS
QTC INTERVAL: 421 MS
QTC INTERVAL: 471 MS
QTC INTERVAL: 478 MS
RBC # BLD AUTO: 3.46 MILLION/UL (ref 3.81–5.12)
RSV B RNA SPEC QL NAA+PROBE: NORMAL
SODIUM SERPL-SCNC: 142 MMOL/L (ref 136–145)
T WAVE AXIS: 38 DEGREES
T WAVE AXIS: 47 DEGREES
T WAVE AXIS: 57 DEGREES
VENTRICULAR RATE: 85 BPM
VENTRICULAR RATE: 86 BPM
VENTRICULAR RATE: 86 BPM
WBC # BLD AUTO: 8.78 THOUSAND/UL (ref 4.31–10.16)

## 2018-04-14 PROCEDURE — 85027 COMPLETE CBC AUTOMATED: CPT | Performed by: PHYSICIAN ASSISTANT

## 2018-04-14 PROCEDURE — 99222 1ST HOSP IP/OBS MODERATE 55: CPT | Performed by: INTERNAL MEDICINE

## 2018-04-14 PROCEDURE — 93010 ELECTROCARDIOGRAM REPORT: CPT | Performed by: INTERNAL MEDICINE

## 2018-04-14 PROCEDURE — 99232 SBSQ HOSP IP/OBS MODERATE 35: CPT | Performed by: PHYSICIAN ASSISTANT

## 2018-04-14 PROCEDURE — 80048 BASIC METABOLIC PNL TOTAL CA: CPT | Performed by: PHYSICIAN ASSISTANT

## 2018-04-14 RX ORDER — LORAZEPAM 2 MG/ML
1 INJECTION INTRAMUSCULAR EVERY 6 HOURS PRN
Status: DISCONTINUED | OUTPATIENT
Start: 2018-04-14 | End: 2018-04-15

## 2018-04-14 RX ORDER — HYDROMORPHONE HYDROCHLORIDE 2 MG/1
2 TABLET ORAL EVERY 4 HOURS PRN
Status: DISCONTINUED | OUTPATIENT
Start: 2018-04-14 | End: 2018-04-16 | Stop reason: HOSPADM

## 2018-04-14 RX ADMIN — FLUTICASONE PROPIONATE 2 SPRAY: 50 SPRAY, METERED NASAL at 17:02

## 2018-04-14 RX ADMIN — ONDANSETRON 4 MG: 2 INJECTION INTRAMUSCULAR; INTRAVENOUS at 04:44

## 2018-04-14 RX ADMIN — LEVOTHYROXINE SODIUM 88 MCG: 88 TABLET ORAL at 05:59

## 2018-04-14 RX ADMIN — GUAIFENESIN AND DEXTROMETHORPHAN 5 ML: 100; 10 SYRUP ORAL at 21:27

## 2018-04-14 RX ADMIN — PANTOPRAZOLE SODIUM 40 MG: 40 TABLET, DELAYED RELEASE ORAL at 05:59

## 2018-04-14 RX ADMIN — OXCARBAZEPINE 150 MG: 150 TABLET ORAL at 09:25

## 2018-04-14 RX ADMIN — HYDROMORPHONE HYDROCHLORIDE 0.2 MG: 1 INJECTION, SOLUTION INTRAMUSCULAR; INTRAVENOUS; SUBCUTANEOUS at 04:44

## 2018-04-14 RX ADMIN — POLYVINYL ALCOHOL 1 DROP: 14 SOLUTION/ DROPS OPHTHALMIC at 17:02

## 2018-04-14 RX ADMIN — MELATONIN TAB 3 MG 3 MG: 3 TAB at 21:24

## 2018-04-14 RX ADMIN — HYDROMORPHONE HYDROCHLORIDE 0.2 MG: 1 INJECTION, SOLUTION INTRAMUSCULAR; INTRAVENOUS; SUBCUTANEOUS at 09:46

## 2018-04-14 RX ADMIN — FUROSEMIDE 40 MG: 10 INJECTION, SOLUTION INTRAMUSCULAR; INTRAVENOUS at 09:26

## 2018-04-14 RX ADMIN — APIXABAN 2.5 MG: 2.5 TABLET, FILM COATED ORAL at 09:26

## 2018-04-14 RX ADMIN — LIDOCAINE 1 PATCH: 50 PATCH TOPICAL at 09:26

## 2018-04-14 RX ADMIN — CALCITRIOL 0.25 MCG: 0.25 CAPSULE, LIQUID FILLED ORAL at 09:28

## 2018-04-14 RX ADMIN — FUROSEMIDE 40 MG: 10 INJECTION, SOLUTION INTRAMUSCULAR; INTRAVENOUS at 16:58

## 2018-04-14 RX ADMIN — NYSTATIN: 100000 POWDER TOPICAL at 17:01

## 2018-04-14 RX ADMIN — EPLERENONE 25 MG: 25 TABLET, FILM COATED ORAL at 09:28

## 2018-04-14 RX ADMIN — GUAIFENESIN AND DEXTROMETHORPHAN 5 ML: 100; 10 SYRUP ORAL at 09:25

## 2018-04-14 RX ADMIN — AMLODIPINE BESYLATE 5 MG: 5 TABLET ORAL at 09:25

## 2018-04-14 RX ADMIN — FLUTICASONE PROPIONATE 2 SPRAY: 50 SPRAY, METERED NASAL at 09:27

## 2018-04-14 RX ADMIN — ACETAMINOPHEN 650 MG: 325 TABLET, FILM COATED ORAL at 23:05

## 2018-04-14 RX ADMIN — GABAPENTIN 100 MG: 100 CAPSULE ORAL at 09:25

## 2018-04-14 RX ADMIN — ROPINIROLE 0.5 MG: 0.25 TABLET, FILM COATED ORAL at 09:25

## 2018-04-14 RX ADMIN — CEPHALEXIN 500 MG: 500 CAPSULE ORAL at 05:59

## 2018-04-14 RX ADMIN — POLYVINYL ALCOHOL 1 DROP: 14 SOLUTION/ DROPS OPHTHALMIC at 09:27

## 2018-04-14 RX ADMIN — ROPINIROLE 0.5 MG: 0.25 TABLET, FILM COATED ORAL at 21:24

## 2018-04-14 RX ADMIN — CEPHALEXIN 500 MG: 500 CAPSULE ORAL at 14:53

## 2018-04-14 RX ADMIN — HYDROMORPHONE HYDROCHLORIDE 0.2 MG: 1 INJECTION, SOLUTION INTRAMUSCULAR; INTRAVENOUS; SUBCUTANEOUS at 21:35

## 2018-04-14 RX ADMIN — METOPROLOL TARTRATE 25 MG: 25 TABLET ORAL at 09:26

## 2018-04-14 RX ADMIN — GABAPENTIN 100 MG: 100 CAPSULE ORAL at 16:58

## 2018-04-14 RX ADMIN — LATANOPROST 1 DROP: 50 SOLUTION OPHTHALMIC at 21:39

## 2018-04-14 RX ADMIN — GUAIFENESIN AND DEXTROMETHORPHAN 5 ML: 100; 10 SYRUP ORAL at 16:58

## 2018-04-14 RX ADMIN — ROPINIROLE 0.5 MG: 0.25 TABLET, FILM COATED ORAL at 16:58

## 2018-04-14 RX ADMIN — METOPROLOL TARTRATE 25 MG: 25 TABLET ORAL at 21:25

## 2018-04-14 RX ADMIN — HYDROMORPHONE HYDROCHLORIDE 2 MG: 2 TABLET ORAL at 16:58

## 2018-04-14 RX ADMIN — APIXABAN 2.5 MG: 2.5 TABLET, FILM COATED ORAL at 16:58

## 2018-04-14 RX ADMIN — TAMSULOSIN HYDROCHLORIDE 0.4 MG: 0.4 CAPSULE ORAL at 14:53

## 2018-04-14 RX ADMIN — GABAPENTIN 300 MG: 300 CAPSULE ORAL at 21:24

## 2018-04-14 RX ADMIN — NYSTATIN: 100000 POWDER TOPICAL at 09:26

## 2018-04-14 RX ADMIN — CEPHALEXIN 500 MG: 500 CAPSULE ORAL at 21:24

## 2018-04-14 NOTE — ASSESSMENT & PLAN NOTE
· Creatinine at baseline - 1 3-1 95 as per Dr Ana Zamudio, known to him   Creatinine improved from 1 5 to 1 4  · Monitor creatinine closely with diuresis

## 2018-04-14 NOTE — ASSESSMENT & PLAN NOTE
· POA, patient with shortness of breath, orthopnea, PND, pitting edema in bilateral legs, rales bibasilarly, JVD/HJR, elevated BNP  Compliant with diuretic regimen, but does not follow low salt diet  Prior echo showing preserved EF with grade 2 diastolic dysfunction  Troponin normal  Weight on admission likely inaccurate  Net negative 1,325  Known to Dr Riley Grad   · [address] cardiology recommendation  · Hold off on echo, patient may wish to be made comfort measure after family discussion   Discussed with daughter who will discuss with family and get back to me   · Continue Lasix 40 mg IV BID   · Low salt, fluid restriction   · DW and I/O   · Trend labs

## 2018-04-14 NOTE — ASSESSMENT & PLAN NOTE
· POA, patient normally without O2 needs, now continuing to require O2 to maintain adequate saturation and had been dyspneic on exertion prior   Likely secondary to primary problem with element of mild UTRI   · Management as per primary problem  · Supportive care   · O2 PRN

## 2018-04-14 NOTE — PROGRESS NOTES
[facility]  Progress Note - Kasandra Rajput 6/14/1931, 80 y o  female MRN: [id number]    Unit/Bed#: -Blade Encounter: [id number]    Primary Care Provider: Bertha Jaime MD   Date and time admitted to hospital: 4/13/2018 11:26 AM        * Acute on chronic diastolic heart failure ([facility] [address] )   Assessment & Plan    · POA, patient with shortness of breath, orthopnea, PND, pitting edema in bilateral legs, rales bibasilarly, JVD/HJR, elevated BNP  Compliant with diuretic regimen, but does not follow low salt diet  Prior echo showing preserved EF with grade 2 diastolic dysfunction  Troponin normal  Weight on admission likely inaccurate  Net negative 1,325  Known to Dr Josee Waggoner   · [address] cardiology recommendation  · Hold off on echo, patient may wish to be made comfort measure after family discussion  Discussed with daughter who will discuss with family and get back to me   · Continue Lasix 40 mg IV BID   · Low salt, fluid restriction   · DW and I/O   · Trend labs         Acute respiratory failure with hypoxia ([facility] )   Assessment & Plan    · POA, patient normally without O2 needs, now continuing to require O2 to maintain adequate saturation and had been dyspneic on exertion prior  Likely secondary to primary problem with element of mild UTRI   · Management as per primary problem  · Supportive care   · O2 PRN         Paroxysmal atrial fibrillation (HCC)   Assessment & Plan    · Appears to be in NSR on exam   · Continue rate control with beta blocker  · Renally dose Eliquis         Acute cystitis without hematuria   Assessment & Plan    · POA, noted on UA  Not septic  UCx pending   · Start Keflex 500 mg Q8 hours    · Monitor CBC        URTI (acute upper respiratory infection)   Assessment & Plan    · Noted upper airway congestion  No suggestion of pneumonia or bronchitis on exam or imaging   Flu PCR negative   · Supportive care as ordered         Chronic kidney disease, stage III (moderate)   Assessment & Plan    · Creatinine at baseline - 1 3-1 95 as per Dr Reece Malcolm, known to him  Creatinine improved from 1 5 to 1 4  · Monitor creatinine closely with diuresis         Pulmonary hypertension (HCC)   Assessment & Plan    · Continue oxygen as above  · On Inspra          Secondary hyperparathyroidism of renal origin ([facility] [address] )   Assessment & Plan    · Continue calcitriol            VTE Pharmacologic Prophylaxis:   Pharmacologic: Apixaban (Eliquis)  Mechanical VTE Prophylaxis in Place: No    Patient Centered Rounds: I have performed bedside rounds with nursing staff today  Discussions with Specialists or Other Care Team Provider: Discussed with RN, CM    Education and Discussions with Family / Patient: Discussed with patient, called daughter who will speak with family and arrange meeting     Time Spent for Care: 30 minutes  More than 50% of total time spent on counseling and coordination of care as described above  Current Length of Stay: 1 day(s)    Current Patient Status: Inpatient   Certification Statement: The patient will continue to require additional inpatient hospital stay due to on going IV diuresis  Need to develop a plan of care for patient in conjunction with her wishes    Discharge Plan: Pending treatment team meeting with family at some point tomorrow or Monday     Code Status: Level 3 - DNAR and DNI      Subjective:   Patient unsure if she is feeling better  Still reporting long term on going left leg pain  Still stating that she does not want to live like this  Offers no complaints about her breathing or chest pain  She is unsure of how she wants to proceed until she talks with her family  Amicable to continuing all treatment at this time  Objective:     Vitals:   Temp (24hrs), Av °F (36 7 °C), Min:97 8 °F (36 6 °C), Max:98 1 °F (36 7 °C)    HR:  [72-98] 94  Resp:  [18-20] 18  BP: (106-159)/(51-65) 150/65  SpO2:  [95 %-100 %] 95 %  Body mass index is 30 78 kg/m²       Input and Output Summary (last 24 hours): Intake/Output Summary (Last 24 hours) at 04/14/18 1407  Last data filed at 04/14/18 0301   Gross per 24 hour   Intake                0 ml   Output              900 ml   Net             -900 ml       Physical Exam:     Physical Exam   Constitutional: She is oriented to person, place, and time  Vital signs are normal  She appears well-developed and well-nourished  Non-toxic appearance  She appears distressed  Nasal cannula in place  HENT:   Head: Normocephalic and atraumatic  Eyes: Conjunctivae and EOM are normal  Pupils are equal, round, and reactive to light  Neck: Neck supple  Cardiovascular: Normal rate, regular rhythm, S1 normal, S2 normal, normal heart sounds and intact distal pulses  Exam reveals no S3 and no S4  No murmur heard  Mild pitting edema     Pulmonary/Chest: Effort normal  No accessory muscle usage  No respiratory distress  She has no decreased breath sounds  She has no wheezes  She has no rhonchi  She has rales in the right lower field and the left lower field  She exhibits no tenderness  Abdominal: Soft  Bowel sounds are normal  She exhibits no distension and no mass  There is no tenderness  There is no rebound and no guarding  Neurological: She is alert and oriented to person, place, and time  She is not disoriented  GCS eye subscore is 4  GCS verbal subscore is 5  GCS motor subscore is 6  Skin: Skin is warm and dry  Psychiatric: Her affect is labile         Additional Data:     Labs:      Results from last 7 days  Lab Units 04/14/18  0612 04/13/18  1240   WBC Thousand/uL 8 78 7 83   HEMOGLOBIN g/dL 9 8* 10 3*   HEMATOCRIT % 30 9* 32 5*   PLATELETS Thousands/uL 257 270   LYMPHO PCT %  --  14   MONO PCT MAN %  --  10   EOSINO PCT MANUAL %  --  2       Results from last 7 days  Lab Units 04/14/18  0602 04/13/18  1241   SODIUM mmol/L 142 139   POTASSIUM mmol/L 3 4* 3 6   CHLORIDE mmol/L 102 101   CO2 mmol/L 26 28   BUN mg/dL 22 22   CREATININE mg/dL 1 45* 1 58*   CALCIUM mg/dL 8 7 8 6   TOTAL PROTEIN g/dL  --  7 6   BILIRUBIN TOTAL mg/dL  --  0 50   ALK PHOS U/L  --  69   ALT U/L  --  17   AST U/L  --  17   GLUCOSE RANDOM mg/dL 102 111           * I Have Reviewed All Lab Data Listed Above  * Additional Pertinent Lab Tests Reviewed: [facility] Admission Reviewed    Imaging:    Imaging Reports Reviewed Today Include: None  Imaging Personally Reviewed by Myself Includes:  None    Recent Cultures (last 7 days):       Results from last 7 days  Lab Units 04/13/18  1839 04/13/18  1205   URINE CULTURE   --  Culture results to follow     INFLUENZA A PCR  None Detected  --    INFLUENZA B PCR  None Detected  --    RSV PCR  None Detected  --        Last 24 Hours Medication List:     Current Facility-Administered Medications:  acetaminophen 650 mg Oral Q4H PRN Neil Ramos PA-C   amLODIPine 5 mg Oral Daily Neil POLO Ramos PA-C   apixaban 2 5 mg Oral BID Neilnorm Duncan PA-C   calcitriol 0 25 mcg Oral Daily Neil Macie Duncan PA-C   cephalexin 500 mg Oral Q8H [facility] Neil POLO Ramos PA-C   cycloSPORINE 1 drop Both Eyes BID Neilnorm Ramos PA-C   dextromethorphan-guaiFENesin 5 mL Oral 4x Daily Neil Macie Duncan PA-C   eplerenone 25 mg Oral Daily Neil POLO Ramos PA-C   fluticasone 2 spray Nasal BID Neil Macie Duncan PA-C   furosemide 40 mg Intravenous BID Neil Macie Duncan PA-C   gabapentin 100 mg Oral BID Neil Macie Duncan PA-C   gabapentin 300 mg Oral HS Neil Macie Duncan PA-C   HYDROmorphone 0 2 mg Intravenous Q6H PRN Henrik Evans MD   HYDROmorphone 2 mg Oral Q4H PRN Neil Ramos PA-C   latanoprost 1 drop Both Eyes HS Neil Macie Duncan PA-C   levothyroxine 88 mcg Oral Early Morning Neil Macie Duncan PA-C   lidocaine 1 patch Transdermal Daily Neil Irine Dakota, PA-C   melatonin 3 mg Oral HS Neil P Ramos, PA-C   metoprolol tartrate 25 mg Oral Q12H [facility] Neil Ramos PA-C   nystatin  Topical BID Henrik Evans MD   ondansetron 4 mg Intravenous Q6H PRN Neil Ramos PA-C   OXcarbazepine 150 mg Oral Daily Neil Munoz PA-C   pantoprazole 40 mg Oral Early Morning Neil Ramso PA-C   polyvinyl alcohol 1 drop Both Eyes BID Neil Ramos PA-C   rOPINIRole 0 5 mg Oral 4x Daily Neil Munoz PA-C   tamsulosin 0 4 mg Oral Daily With Dinner Neil Munoz PA-C   traMADol 50 mg Oral Q6H PRN Irma Lane PA-C        Today, Patient Was Seen By: Irma Lane PA-C    ** Please Note: Dictation voice to text software may have been used in the creation of this document   **

## 2018-04-14 NOTE — CONSULTS
Consultation - Cardiology   Rommel Espinoza 80 y o  female MRN: [id number]  Unit/Bed#: -01 Encounter: [id number]      Assessment:  1  A/C diast chf, HFpEF, one of several admits  2  H/o pafib on a/c in nsr  3  Moderate Mitral regurgitation  4  Mild AV stenosis  5 ckd 3, stable creat 1/4-1 5  6  H/o cea and left hemiparesis      Plan:  Continue IV diuretics at current dose  Stable will start low-dose Ace inhibitor lisinopril 2 5 mg daily and watch renal function  Will likely need increased dose of home diuretic but is already on torsemide 20 mg b i d  She is on aldosterone antagonist already  [facility]  Can repeat for completeness and assess mitral valve and left ventricular function    History of Present Illness   Physician Requesting Consult: Katie Lozano MD  Reason for Consult / Principal Problem: diastolic chf  HPI: Rommel Espinoza is a 80y o  year old female who presents with increasing shortness of breath over the last 2 weeks  Shortness of breath originally with exertion but then became at rest     Has a history of chronic diastolic congestive heart failure and paroxysmal atrial fibrillation and secondary pulmonary hypertension  She has stage 3 kidney disease with current creatinine 1 4-1 5  She has felt bloated and has had some leg swelling  This is 1 of several admissions for diastolic congestive heart failure  She takes torsemide 20 mg b i d  Due to shortness of breath at rest she came to emergency department  IV diuretics started  No paroxysmal atrial fibrillation seen  Remains in sinus rhythm  She is on anticoagulation  Has a history of left hemiparesis from a prior CVA prior carotid endarterectomy  Symptoms improved overnight    Due to exacerbation of heart failure preserved ejection fraction been asked to see her in the setting       Review of Systems:  Review of Systems    14 systems reviewed and negative with the exception of the above and the following fatigue, shortness of breath      Current Facility-Administered Medications:  acetaminophen 650 mg Oral Q4H PRN Neil Ramos PA-C   amLODIPine 5 mg Oral Daily Neil Ramos PA-C   apixaban 2 5 mg Oral BID Neil Mccormick PA-C   calcitriol 0 25 mcg Oral Daily Neil Mccormick PA-C   cephalexin 500 mg Oral Q8H [facility] Neil Ramos PA-C   cycloSPORINE 1 drop Both Eyes BID Neil Ramos PA-C   dextromethorphan-guaiFENesin 5 mL Oral 4x Daily Neil Mccormick PA-C   eplerenone 25 mg Oral Daily Neil Ramos PA-C   fluticasone 2 spray Nasal BID Neil Mccormick PA-C   furosemide 40 mg Intravenous BID Neil Mccormick PA-C   gabapentin 100 mg Oral BID Neil Mccormick PA-C   gabapentin 300 mg Oral HS Neil Ramos PA-C   HYDROmorphone 0 2 mg Intravenous Q6H PRN Puneet Wild MD   latanoprost 1 drop Both Eyes HS Neil Mccormick PA-C   levothyroxine 88 mcg Oral Early Morning Neil Mccormick PA-C   lidocaine 1 patch Transdermal Daily Neil Mccormick PA-C   melatonin 3 mg Oral HS Neil Ramos PA-C   metoprolol tartrate 25 mg Oral Q12H [facility] Neil Ramos PA-C   nystatin  Topical BID Puneet Wild MD   ondansetron 4 mg Intravenous Q6H PRN Neil Mccormick PA-C   OXcarbazepine 150 mg Oral Daily Neil Ramos PA-C   pantoprazole 40 mg Oral Early Morning Neil Ramos PA-C   polyvinyl alcohol 1 drop Both Eyes BID Neil Mccormick PA-C   rOPINIRole 0 5 mg Oral 4x Daily Neil Mccormick PA-C   tamsulosin 0 4 mg Oral Daily With Dinner Neil Mccormick PA-C   traMADol 50 mg Oral Q6H PRN Constance Martin PA-C              Historical Information   Past Medical History:   Diagnosis Date    A[facility]fib [facility] )     Anemia     Anemia     Anxiety     Asthma     CAD (coronary artery disease)     CHF (congestive heart failure) (HCC)     CHF (congestive heart failure) (HCC)     CKD (chronic kidney disease) stage 3, GFR 30-59 ml/min     ckd3    Constipation     CVA (cerebral vascular accident) ([facility] )     left hemiparesis    Diastolic CHF, chronic (HCC)     Disease of thyroid gland     Dry eye     First degree AV block     Gastritis     Gastritis     GERD (gastroesophageal reflux disease)     Glaucoma     Glaucoma     Hemiparesis (HCC)     left side    Hemiparesis affecting left side as late effect of stroke (HCC)     Hyperlipidemia     Hypertension     Hypomagnesemia     Impetigo     Insomnia     Knee pain     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    Neuropathy     Osteoarthritis     Parkinson disease ([facility] [address] [facility]     Pulmonary HTN ([facility] [address] [facility]     [facility]     Restless leg     Vitamin D deficiency      Past Surgical History:   Procedure Laterality Date    APPENDECTOMY      BLADDER SURGERY      CAROTID ENDARTERECTOMY Left     ESOPHAGOGASTRODUODENOSCOPY N/A 4/8/2016    Procedure: ESOPHAGOGASTRODUODENOSCOPY (EGD); Surgeon: Lisy Burdick MD;  Location: [facility]; Service:     RENAL ARTERY STENT      VASCULAR SURGERY       History   Alcohol Use No     History   Drug Use No     History   Smoking Status    Never Smoker   Smokeless Tobacco    Never Used     Family History:   Family History   Problem Relation Age of Onset    Stroke Father     Cancer Brother     No Known Problems Mother        Meds/Allergies         Allergies   Allergen Reactions    Lipitor [Atorvastatin]     Morphine     Percocet [Oxycodone-Acetaminophen] GI Intolerance    Spironolactone     Sulfa Antibiotics        Objective   Vitals: Blood pressure 150/65, pulse 94, temperature 98 °F (36 7 °C), temperature source Oral, resp  rate 18, height 5' (1 524 m), weight 71 5 kg (157 lb 10 1 oz), SpO2 95 %  , Body mass index is 30 78 kg/m² , Orthostatic Blood Pressures    Flowsheet Row Most Recent Value   Blood Pressure  150/65 filed at 04/14/2018 7786   Patient Position - Orthostatic VS  Lying filed at 04/14/2018 0657            Intake/Output Summary (Last 24 hours) at 04/14/18 0845  Last data filed at 04/14/18 0301   Gross per 24 hour   Intake                0 ml   Output             1325 ml   Net            -1325 ml       Invasive Devices     Peripheral Intravenous Line            Peripheral IV 04/13/18 Right Antecubital less than 1 day                    Physical Exam:  Physical Exam    Gen: No acute distress  HEENT: anicteric, mucous membranes moist  Neck: supple, no jugular venous distention, or carotid bruit  Heart: regular, normal s1 and s2, 2/6 alina  Lungs :bibasilar crackles  Abdomen: soft nontender, normoactive bowel sounds, no organomegaly  Ext: warm and perfused, normal femoral pulses, no edema, clubbing  Skin: warm, no rashes  Neuro: AAO x 3, no focal findings  Psychiatric: normal affect  Musculoskeletal: no obvious joint deformities      Lab Results:       Results from last 7 days  Lab Units 04/14/18  0602 04/13/18  1241   SODIUM mmol/L 142 139   POTASSIUM mmol/L 3 4* 3 6   CHLORIDE mmol/L 102 101   CO2 mmol/L 26 28   BUN mg/dL 22 22   CREATININE mg/dL 1 45* 1 58*   GLUCOSE RANDOM mg/dL 102 111   CALCIUM mg/dL 8 7 8 6         Results from last 7 days  Lab Units 04/13/18  2139 04/13/18  1845   TROPONIN I ng/mL <0 02 <0 02         Results from last 7 days  Lab Units 04/14/18  0612 04/13/18  1240   WBC Thousand/uL 8 78 7 83   HEMOGLOBIN g/dL 9 8* 10 3*   HEMATOCRIT % 30 9* 32 5*   PLATELETS Thousands/uL 257 270       Lab Results   Component Value Date    CHOL 224 (H) 07/25/2017    CHOL 133 12/02/2014     Lab Results   Component Value Date    HDL 33 (L) 07/25/2017    HDL 44 12/02/2014     Lab Results   Component Value Date    LDLCALC 139 (H) 07/25/2017    LDLCALC 55 12/02/2014     Lab Results   Component Value Date    TRIG 262 (H) 07/25/2017    TRIG 110 10/15/2016           Lab Results   Component Value Date    ALT 17 04/13/2018    ALT 22 04/02/2018    AST 17 04/13/2018    AST 49 (H) 04/02/2018               Lab Results   Component Value Date    NTBNP 1,522 (H) 04/13/2018    NTBNP 683 (H) 12/11/2017         Lab Results   Component Value Date    HGBA1C 5 5 07/25/2017

## 2018-04-14 NOTE — ASSESSMENT & PLAN NOTE
· Noted upper airway congestion  No suggestion of pneumonia or bronchitis on exam or imaging   Flu PCR negative   · Supportive care as ordered

## 2018-04-14 NOTE — CASE MANAGEMENT
Initial Clinical Review    Admission: Date/Time/Statement: 4/13/18 @ 1355     Orders Placed This Encounter   Procedures    Inpatient Admission (expected length of stay for this patient is greater than two midnights)     Standing Status:   Standing     Number of Occurrences:   1     Order Specific Question:   Admitting Physician     Answer:   Jose Torres [[id number]]     Order Specific Question:   Level of Care     Answer:   Med Surg [16]     Order Specific Question:   Estimated length of stay     Answer:   More than 2 Midnights     Order Specific Question:   Certification     Answer:   I certify that inpatient services are medically necessary for this patient for a duration of greater than two midnights  See H&P and MD Progress Notes for additional information about the patient's course of treatment  ED: Date/Time/Mode of Arrival:   ED Arrival Information     Expected Arrival Acuity Means of Arrival Escorted By Service Admission Type    - 4/13/2018 11:25 Urgent Ambulance [facility] Ambulance General Medicine Urgent    Arrival Complaint    sob          Chief Complaint:   Chief Complaint   Patient presents with    Shortness of Breath     Pt  reports to ED via EMS with increased work of breathing and Bilateral Rhonci per EMS Pt  94% on Room Air       History of Illness: 80 y o  female with a history of chronic diastolic congestive heart failure, paroxysmal A  Fib, pulmonary HTN, Stage 3 CKD who presents with increasing shortness of breath over the last 2 weeks  Patient reports that her shortness of breath is worse with exertion mostly  She reports a cough that appears to be non-productive, but states that sometimes she would vomit after coughing  She states that she has noticed some leg swelling as well as feels as though she was slightly bloated   She reports that she has a hard time laying flat as well as woke up in the middle of the night short of breath a few times in the last few weeks that she reports is new  She reports some upper airway congestion  Reports fevers and chills, but states that she does not know her temperature  Denies other complaints  Reports that she is managed by Dr Al Cassidy, but reports that he has not seen him in awhile  Denies following a low salt diet, but states that she only drinks a "few" water bottles a day  She reports that she is complaint with her diuretic regimen  ED Vital Signs:   ED Triage Vitals   Temperature Pulse Respirations Blood Pressure SpO2   04/13/18 1128 04/13/18 1128 04/13/18 1128 04/13/18 1128 04/13/18 1128   98 1 °F (36 7 °C) 90 20 135/57 94 %      Temp Source Heart Rate Source Patient Position - Orthostatic VS BP Location FiO2 (%)   04/13/18 1128 04/13/18 1410 04/13/18 1128 04/13/18 1128 --   Oral Monitor Sitting Right arm       Pain Score       04/13/18 1128       9        Wt Readings from Last 1 Encounters:   04/14/18 71 5 kg (157 lb 10 1 oz)       Vital Signs (abnormal): none  Exam - respiratory distress  Rhonchi  Erythema and dryness to face  Tenderness with palpation over bilateral lower extremities  Abnormal Labs/Diagnostic Test Results:   NT-BNP  1522  hgb 10 3, hct 32 5  Bun 22  Creatinine 1 58    CxR- Mild pulmonary vascular congestion  2   Unchanged mild cardiomegaly  Serial troponin negative  Labs 4/14- hgb 9 8, hct 30 9  K 3 4  Anion gap 14  Bun 22    Creatinine 1 45    ED Treatment:   Medication Administration from 04/13/2018 1125 to 04/13/2018 1728       Date/Time Order Dose Route Action Action by Comments     04/13/2018 1206  EMS REPLENISHMENT MED 0  Does not apply Given to EMS [id number] [facility] 28, RN      04/13/2018 1328 furosemide (LASIX) injection 40 mg 40 mg Intravenous Given Kelsea Lopez RN      04/13/2018 1347 acetaminophen (TYLENOL) tablet 650 mg 650 mg Oral Given Kelsea Lopez RN      04/13/2018 1418 furosemide (LASIX) injection 20 mg 20 mg Intravenous Given Kelsea Lopez RN      04/13/2018 1659 cephalexin (Manny Tadeo) capsule 500 mg 500 mg Oral Given Ortiz Byrd RN           Past Medical/Surgical History:  Oxygen 2 liters     Active Ambulatory Problems     Diagnosis Date Noted    Acute on chronic diastolic heart failure ([facility] ) 01/09/2016    Hypothyroidism 01/09/2016    Pulmonary hypertension ([facility] ) 01/09/2016    Transaminitis 01/09/2016    CAD (coronary artery disease)     Chronic diastolic [facility]est[facility]     Hypertension     Hyperlipidemia     Restless leg     Hemiparesis affecting left side as late effect of stroke ([facility])     GERD (gastroesophageal reflux disease)     Vitamin D deficiency     PVD (peripheral vascular disease)     Neuropathy     CVA (cerebral vascular accident) ([place] [address] )     Anxiety     History of stroke 07/17/2016    Paroxysmal atrial fibrillation ([facility] ) 07/20/2016    Chronic kidney disease, stage III (moderate)     A-fib ([place] [address] )     First degree AV block     Glaucoma     Transaminitis 10/12/2016    Hyperparathyroidism ([place] [address] ) 07/24/2017    Polypharmacy 07/27/2017    Physical deconditioning 07/27/2017    Hypoalbuminemia due to protein-calorie malnutrition ([facility] ) 07/27/2017    Visual impairment 07/27/2017    Moderate mitral regurgitation 01/23/2018    Anemia 01/23/2018    Hypervolemia 03/20/2018    Hypertensive chronic kidney disease with stage 1 through stage 4 chronic kidney disease, or unspecified chronic kidney disease 03/20/2018    Dyslipidemia 03/20/2018    Secondary hyperparathyroidism of renal origin ([facility] ) 03/20/2018    Iron deficiency 03/20/2018     Resolved Ambulatory Problems     Diagnosis Date Noted    Generalized weakness 01/09/2016    Ambulatory dysfunction 01/09/2016    Glaucoma     Insomnia     Asthma     Gastritis     Acute kidney injury 04/01/2016    UTI (urinary tract infection) 04/01/2016    Acute metabolic encephalopathy 33/80/8203    Encephalopathy 04/11/2016    Encephalopathy acute 07/17/2016    ARF (acute renal failure) ([facility] ) 07/17/2016    Fever 07/30/2016    Leukocytosis 07/30/2016    Intractable nausea and vomiting 08/01/2016    Bacteremia 08/01/2016    Aspiration pneumonia ([facility] ) 08/04/2016    Gastritis     Fall 10/12/2016    Elevated lipase 10/12/2016    SOB (shortness of breath) 03/22/2017    Bronchitis, acute, with bronchospasm 03/22/2017    Chest pain 04/14/2017    Cellulitis 07/14/2017    Pedal edema 07/15/2017    Acute renal failure superimposed on chronic kidney disease ([facility] ) 07/17/2017    Hypoxia 07/17/2017    Altered mental status 07/22/2017    Unresponsive episode 07/27/2017    Abdominal pain, vomiting, and diarrhea 01/22/2018    Facial rash 01/22/2018    Hypokalemia 01/24/2018     Past Medical History:   Diagnosis Date    A-fib ([place] [address] )     Anemia     Anemia     Anxiety     Asthma     CAD (coronary artery disease)     [facility]) [facility]     CHF (congestive heart failure) ([facility])     CKD (chronic kidney disease) stage 3, GFR 30-59 ml/min     Constipation     CVA (cerebral vascular accident) ([place] [address] )     Diastolic CHF, chronic ([place] [address] )     Disease of thyroid gland     Dry eye     First degree AV block     Gastritis     Gastritis     GERD (gastroesophageal reflux disease)     Glaucoma     Glaucoma     Hemiparesis ([place] [address] )     Hemiparesis affecting left side as late effect of stroke ([facility])     Hyperlipidemia     Hypertension     Hypomagnesemia     Impetigo     Insomnia     Knee pain     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    Neuropathy     Osteoarthritis     [facility]     Pulmonary HTN ([facility] )     PVD (peripheral vascular disease) ([place] [address] )     Restless leg     Vitamin D deficiency        Admitting Diagnosis: Acute on chronic diastolic heart failure (HCC) [I50 33]  SOB (shortness of breath) [R06 02]  CHF exacerbation ([place] [address] ) [I50 9]    Age/Sex: 80 y o  female    Assessment/Plan:   Acute on chronic diastolic heart failure ([place] [address] ) Assessment & Plan     · POA, patient with shortness of breath, orthopnea, PND, as well as objective findings of 6 lb weight gain from 4/11, pitting edema in bilateral legs, rales bibasilarly, JVD/HJR, elevated BNP  Compliant with diuretic regimen, but does not follow low salt diet  Prior echo showing preserved EF with grade 2 diastolic dysfunction  Troponin pending  Known to Dr Dionicio Harrison   ? Admit patient to med/surg under inpatient status with telemetry monitoring   § Consult cardiology   ? Lasix 40 mg IV BID   ? Low salt, fluid restriction   ? DW and I/O   ? Check troponin   ? Trend labs           Acute respiratory failure with hypoxia (HCC)   Assessment & Plan     · POA, patient normally without O2 needs, now requiring O2 to maintain adequate saturation and had been dyspneic on exertion prior  Likely secondary to primary problem with element of mild UTRI   ? Management as per primary problem  ? Supportive care   ? O2 PRN           Paroxysmal atrial fibrillation (HCC)   Assessment & Plan     · Appears to be in NSR on exam   ? Continue rate control with beta blocker  ? Renally dose Eliquis           Acute cystitis without hematuria   Assessment & Plan     · POA, noted on UA  Not septic   ? Start Keflex 500 mg Q8 hours   ? Follow up with urine culture   ? Monitor CBC          URTI (acute upper respiratory infection)   Assessment & Plan     · Noted upper airway congestion  No suggestion of pneumonia or bronchitis on exam or imaging   ? Supportive care as ordered           Chronic kidney disease, stage III (moderate)   Assessment & Plan     · Creatinine at baseline - 1 3-1 95 as per Dr Nava Ceballos, known to him  ?  Monitor creatinine closely with diuresis           Pulmonary hypertension (HCC)   Assessment & Plan     · Continue oxygen as above  · On Inspra             Secondary hyperparathyroidism of renal origin [facility])   Assessment & Plan     · Continue calcitriol         Admission Orders:  4/13/2018  1355 INPATIENT Scheduled Meds:   Current Facility-Administered Medications:  acetaminophen 650 mg Oral Q4H PRN Neil Ramos PA-C   amLODIPine 5 mg Oral Daily Neil Ramos PA-C   apixaban 2 5 mg Oral BID [facility]   calcitriol 0 25 mcg Oral Daily [facility]   cephalexin 500 mg Oral Q8H [address] Neilnorm Ramos PA-C   cycloSPORINE 1 drop Both Eyes BID Neilnorm Ramos PA-C   dextromethorphan-guaiFENesin 5 mL Oral 4x Daily Neilnorm Saunders [facility]   eplerenone 25 mg Oral Daily Neil Ramos PA-C   fluticasone 2 spray Nasal BID [facility]   furosemide 40 mg Intravenous BID Neil [facility]   gabapentin 100 mg Oral BID Neil [facility]   gabapentin 300 mg Oral HS [facility]   HYDROmorphone 0 2 mg Intravenous Q6H PRN Katie Lozano MD   HYDROmorphone 2 mg Oral Q4H PRN Neil Ramos PA-C   latanoprost 1 drop Both Eyes HS Neilnorm Saunders [facility]   levothyroxine 88 mcg Oral Early Morning Neil Chip [facility]   lidocaine 1 patch Transdermal Daily Neil Ramos PA-C   LORazepam 1 mg Intravenous Q6H PRN Neil Chappell [facility]   melatonin 3 mg Oral HS Neilnorm Ramos PA-C   metoprolol tartrate 25 mg Oral Q12H [address] Neil Ramos PA-C   nystatin  Topical BID Katie Lozano MD   ondansetron 4 mg Intravenous Q6H PRN Neil Chappell [facility]   OXcarbazepine 150 mg Oral Daily Neilnorm Ramos PA-C   pantoprazole 40 mg Oral Early Morning Neil Ramos PA-C   polyvinyl alcohol 1 drop Both Eyes BID Neilnorm Carrington PA-C   rOPINIRole 0 5 mg Oral 4x Daily Neil Carrington PA-C   tamsulosin 0 4 mg Oral Daily With Dinner Neil Carrington PA-C   traMADol 50 mg Oral Q6H PRN Neil Carrington PA-C     Continuous Infusions:    PRN Meds:   acetaminophen    HYDROmorphone 0 2 iv - used x 3 (2918; 1757; 2268)    HYDROmorphone    LORazepam    ondansetron    traMADol    Heart failure  Telemetry  Oxygen 2 liters  scds  Consult cardiology    Per cardiology: A/C diast chf, HFpEF, one of several admits  2  H/o pafib on a/c in nsr  3   Moderate Mitral regurgitation  4  Mild AV stenosis  5 ckd 3, stable creat 1/4-1 5  6  H/o cea and left hemiparesis     Plan:  Continue IV diuretics at current dose  Stable will start low-dose Ace inhibitor lisinopril 2 5 mg daily and watch renal function  Will likely need increased dose of home diuretic but is already on torsemide 20 mg b i d  She is on aldosterone antagonist already  Last echocardiogram was July 2017 at [facility]    Can repeat for completeness and assess mitral valve and left ventricular function

## 2018-04-15 PROBLEM — G89.29 CHRONIC INTRACTABLE PAIN: Chronic | Status: ACTIVE | Noted: 2018-04-15

## 2018-04-15 LAB
ANION GAP SERPL CALCULATED.3IONS-SCNC: 12 MMOL/L (ref 4–13)
BACTERIA UR CULT: ABNORMAL
BACTERIA UR CULT: ABNORMAL
BUN SERPL-MCNC: 30 MG/DL (ref 5–25)
CALCIUM SERPL-MCNC: 9 MG/DL
CHLORIDE SERPL-SCNC: 99 MMOL/L (ref 100–108)
CO2 SERPL-SCNC: 28 MMOL/L (ref 21–32)
CREAT SERPL-MCNC: 1.74 MG/DL (ref 0.6–1.3)
ERYTHROCYTE [DISTWIDTH] IN BLOOD BY AUTOMATED COUNT: 15.1 % (ref 11.6–15.1)
GFR SERPL CREATININE-BSD FRML MDRD: 26 ML/MIN/1.73SQ M
GLUCOSE SERPL-MCNC: 128 MG/DL (ref 65–140)
HCT VFR BLD AUTO: 34.3 % (ref 34.8–46.1)
HGB BLD-MCNC: 11.2 G/DL (ref 11.5–15.4)
MCH RBC QN AUTO: 28.8 PG (ref 26.8–34.3)
MCHC RBC AUTO-ENTMCNC: 32.7 G/DL (ref 31.4–37.4)
MCV RBC AUTO: 88 FL (ref 82–98)
PLATELET # BLD AUTO: 282 THOUSANDS/UL (ref 149–390)
PMV BLD AUTO: 11.5 FL (ref 8.9–12.7)
POTASSIUM SERPL-SCNC: 3.3 MMOL/L (ref 3.5–5.3)
RBC # BLD AUTO: 3.89 MILLION/UL (ref 3.81–5.12)
SODIUM SERPL-SCNC: 139 MMOL/L (ref 136–145)
WBC # BLD AUTO: 14.23 THOUSAND/UL (ref 4.31–10.16)

## 2018-04-15 PROCEDURE — 99231 SBSQ HOSP IP/OBS SF/LOW 25: CPT | Performed by: NURSE PRACTITIONER

## 2018-04-15 PROCEDURE — 85027 COMPLETE CBC AUTOMATED: CPT | Performed by: PHYSICIAN ASSISTANT

## 2018-04-15 PROCEDURE — 99232 SBSQ HOSP IP/OBS MODERATE 35: CPT | Performed by: PHYSICIAN ASSISTANT

## 2018-04-15 PROCEDURE — 80048 BASIC METABOLIC PNL TOTAL CA: CPT | Performed by: PHYSICIAN ASSISTANT

## 2018-04-15 RX ORDER — OXYCODONE HCL 10 MG/1
10 TABLET, FILM COATED, EXTENDED RELEASE ORAL EVERY 12 HOURS SCHEDULED
Status: DISCONTINUED | OUTPATIENT
Start: 2018-04-15 | End: 2018-04-16

## 2018-04-15 RX ORDER — LORAZEPAM 2 MG/ML
2 INJECTION INTRAMUSCULAR EVERY 6 HOURS PRN
Status: DISCONTINUED | OUTPATIENT
Start: 2018-04-15 | End: 2018-04-16 | Stop reason: HOSPADM

## 2018-04-15 RX ORDER — MORPHINE SULFATE 2 MG/ML
2 INJECTION, SOLUTION INTRAMUSCULAR; INTRAVENOUS EVERY 6 HOURS PRN
Status: DISCONTINUED | OUTPATIENT
Start: 2018-04-15 | End: 2018-04-16 | Stop reason: HOSPADM

## 2018-04-15 RX ORDER — METOPROLOL TARTRATE 5 MG/5ML
2.5 INJECTION INTRAVENOUS EVERY 6 HOURS PRN
Status: DISCONTINUED | OUTPATIENT
Start: 2018-04-15 | End: 2018-04-15

## 2018-04-15 RX ORDER — ACETAMINOPHEN 325 MG/1
325 TABLET ORAL ONCE
Status: COMPLETED | OUTPATIENT
Start: 2018-04-15 | End: 2018-04-15

## 2018-04-15 RX ORDER — ACETAMINOPHEN 650 MG/1
325 SUPPOSITORY RECTAL EVERY 4 HOURS PRN
Status: DISCONTINUED | OUTPATIENT
Start: 2018-04-15 | End: 2018-04-16 | Stop reason: HOSPADM

## 2018-04-15 RX ADMIN — OXCARBAZEPINE 150 MG: 150 TABLET ORAL at 10:06

## 2018-04-15 RX ADMIN — ACETAMINOPHEN 325 MG: 325 TABLET, FILM COATED ORAL at 01:16

## 2018-04-15 RX ADMIN — NYSTATIN: 100000 POWDER TOPICAL at 09:00

## 2018-04-15 RX ADMIN — ROPINIROLE 0.5 MG: 0.25 TABLET, FILM COATED ORAL at 10:06

## 2018-04-15 RX ADMIN — HYDROMORPHONE HYDROCHLORIDE 2 MG: 2 TABLET ORAL at 15:03

## 2018-04-15 RX ADMIN — HYDROMORPHONE HYDROCHLORIDE 0.5 MG: 1 INJECTION, SOLUTION INTRAMUSCULAR; INTRAVENOUS; SUBCUTANEOUS at 17:16

## 2018-04-15 RX ADMIN — METOPROLOL TARTRATE 25 MG: 25 TABLET ORAL at 10:07

## 2018-04-15 RX ADMIN — HYDROMORPHONE HYDROCHLORIDE 0.2 MG: 1 INJECTION, SOLUTION INTRAMUSCULAR; INTRAVENOUS; SUBCUTANEOUS at 09:51

## 2018-04-15 RX ADMIN — FUROSEMIDE 40 MG: 10 INJECTION, SOLUTION INTRAMUSCULAR; INTRAVENOUS at 10:07

## 2018-04-15 RX ADMIN — CEPHALEXIN 500 MG: 500 CAPSULE ORAL at 15:03

## 2018-04-15 RX ADMIN — ONDANSETRON 4 MG: 2 INJECTION INTRAMUSCULAR; INTRAVENOUS at 15:00

## 2018-04-15 RX ADMIN — LIDOCAINE 1 PATCH: 50 PATCH TOPICAL at 10:07

## 2018-04-15 RX ADMIN — MELATONIN TAB 3 MG 3 MG: 3 TAB at 23:13

## 2018-04-15 RX ADMIN — ACETAMINOPHEN 325 MG: 650 SUPPOSITORY RECTAL at 15:00

## 2018-04-15 RX ADMIN — OXYCODONE HYDROCHLORIDE 10 MG: 10 TABLET, FILM COATED, EXTENDED RELEASE ORAL at 23:13

## 2018-04-15 RX ADMIN — LORAZEPAM 0.5 MG: 2 INJECTION, SOLUTION INTRAMUSCULAR; INTRAVENOUS at 01:06

## 2018-04-15 RX ADMIN — LEVOTHYROXINE SODIUM 88 MCG: 88 TABLET ORAL at 05:40

## 2018-04-15 RX ADMIN — APIXABAN 2.5 MG: 2.5 TABLET, FILM COATED ORAL at 10:06

## 2018-04-15 RX ADMIN — PANTOPRAZOLE SODIUM 40 MG: 40 TABLET, DELAYED RELEASE ORAL at 05:40

## 2018-04-15 RX ADMIN — GABAPENTIN 100 MG: 100 CAPSULE ORAL at 10:06

## 2018-04-15 RX ADMIN — LORAZEPAM 1 MG: 2 INJECTION INTRAMUSCULAR; INTRAVENOUS at 19:17

## 2018-04-15 RX ADMIN — CEPHALEXIN 500 MG: 500 CAPSULE ORAL at 05:40

## 2018-04-15 RX ADMIN — POLYVINYL ALCOHOL 1 DROP: 14 SOLUTION/ DROPS OPHTHALMIC at 17:17

## 2018-04-15 RX ADMIN — AMLODIPINE BESYLATE 5 MG: 5 TABLET ORAL at 10:06

## 2018-04-15 NOTE — ASSESSMENT & PLAN NOTE
· Patient has opted to go on hospice today  Despite aggressive pain control with PRN IV medications her pain still remains uncontrolled to the point where the patient does become tearful   When she is anxious her breathing becomes elizabeth and more labored  · Increase pain regimen   · Oxycontin 10 mg BID   · Morphine 2 IV Q6 PRN moderate pain  · Dilaudid 0 5 mg IV Q6 PRN severe pain   · Dilaudid 2 mg IV Q6 PRN breakthrough pain

## 2018-04-15 NOTE — SOCIAL WORK
MIGUEL, ARIANNA Trejo, nurse Garland Gale met with Pt, Pt's son, Pt's daughter in law at bedside and Pt's other children were on speakerphone in Pt's room  Goals of Care were discussed with Pt and family   Pt and family would like a referral made to hospice, CM submitted referral

## 2018-04-15 NOTE — PROGRESS NOTES
[facility] Internal Medicine  Progress Note - Lennox Litter 6/14/1931, 80 y o  female MRN: [id number]    Unit/Bed#: -01 Encounter: [id number]    Primary Care Provider: Patrick Rojas MD   Date and time admitted to hospital: 4/13/2018 11:26 AM        * Acute on chronic diastolic heart failure ([facility] )   Assessment & Plan    · POA, patient wants level 4 comfort care  Opted to discontinue all other medications   · D/C tele   · D/c medications         Chronic intractable pain   Assessment & Plan    · Patient has opted to go on hospice today  Despite aggressive pain control with PRN IV medications her pain still remains uncontrolled to the point where the patient does become tearful  When she is anxious her breathing becomes elizabeth and more labored  · Increase pain regimen   · Oxycontin 10 mg BID   · Morphine 2 IV Q6 PRN moderate pain  · Dilaudid 0 5 mg IV Q6 PRN severe pain   · Dilaudid 2 mg IV Q6 PRN breakthrough pain        Acute respiratory failure with hypoxia (HCC)   Assessment & Plan    · POA, no improvement in breathing despite diuresis  Wishes for hospice  · Continue O2 for comfort         Paroxysmal atrial fibrillation (HCC)   Assessment & Plan    · Appears to be in NSR on exam   · Stop medications         Acute cystitis without hematuria   Assessment & Plan    · POA, noted on UA   Had a fever today, however patient now only wants comfort measures  · Discontinue Keflex         URTI (acute upper respiratory infection)   Assessment & Plan    · No improvement   · Supportive/comfort care as ordered         Chronic kidney disease, stage III (moderate)   Assessment & Plan    · Creatinine has increased with diuresis, patient wants hospice   · No further blood work         [facility] )   Assessment & Plan    · Continue oxygen as above          Secondary hyperparathyroidism of renal origin [facility])   Assessment & Plan    · Stop Calcitriol             VTE Pharmacologic Prophylaxis:   Pharmacologic: None - wants comfort measures   Mechanical VTE Prophylaxis in Place: No    Patient Centered Rounds: I have performed bedside rounds with nursing staff today  Discussions with Specialists or Other Care Team Provider: Discussed with RN, CM, cardiology     Education and Discussions with Family / Patient: Discussed with patient, multiple family members at bedside and over the phone     Time Spent for Care: 1 hour  More than 50% of total time spent on counseling and coordination of care as described above  Current Length of Stay: 2 day(s)    Current Patient Status: Inpatient   Certification Statement: The patient will continue to require additional inpatient hospital stay due to arranging hospice care    Discharge Plan: Pending improvement for placement for hospice     Code Status: Level 4 - Comfort Care      Subjective:   A family meeting was held today with Edie Driscoll and her family member  Her son and his wife were present at bedside and her other son and daughter were present on speaker phone  Georgi Maldonado from case management as well as RN Bryan Castle were present as well  Today the patient during my first examination reported that her breathing was no better  She reports that she is still having uncontrolled pain despite increases in her pain regimen yesterday  By the end of the meeting the following decision was made concerning Vida's care  Multiple questions were answered from the patient as well as family members in addition to clarifications made by myself to further delineate Vida's wishes  Edie Driscoll expressed wishes that she would only want to get aggressive with her pain control  She does not want to continue to aggressively work up any other acute pathologies at this time as well as she does not want to continue the rest of her other medications for chronic conditions   She has been requiring increased dosages of IV pain medications and more will be provided as well as her IV ativan will be increased as well  She is opting for level 4 comfort care  By the end of the meeting patient and family's questions were answered to their satisfaction and I made myself available for further questions throughout the day  Objective:     Vitals:   Temp (24hrs), Av 2 °F (38 4 °C), Min:99 °F (37 2 °C), Max:104 5 °F (40 3 °C)    HR:  [] 103  Resp:  [16-22] 22  BP: (111-174)/(55-76) 122/65  SpO2:  [92 %-97 %] 97 %  Body mass index is 29 92 kg/m²  Input and Output Summary (last 24 hours): Intake/Output Summary (Last 24 hours) at 04/15/18 1632  Last data filed at 04/15/18 1201   Gross per 24 hour   Intake              150 ml   Output             1237 ml   Net            -1087 ml       Physical Exam:     Physical Exam   Constitutional: She is oriented to person, place, and time  Vital signs are normal  She appears well-developed and well-nourished  Non-toxic appearance  She appears ill  She appears distressed (moderate to severe upon movement of certain extremities )  Nasal cannula in place  HENT:   Head: Normocephalic and atraumatic  Eyes: Conjunctivae and EOM are normal  Pupils are equal, round, and reactive to light  Neck: Neck supple  Cardiovascular: Normal rate, regular rhythm, S1 normal, S2 normal, normal heart sounds and intact distal pulses  Exam reveals no S3 and no S4  No murmur heard  Pulmonary/Chest: Effort normal  No accessory muscle usage  Tachypnea (Patient becomes tachypneic at times due to pain as well as stress factors ) noted  No respiratory distress  She has no decreased breath sounds  She has no wheezes  She has no rhonchi  She has rales in the right lower field and the left lower field  She exhibits no tenderness  Abdominal: Soft  Bowel sounds are normal  She exhibits no distension and no mass  There is no tenderness  There is no rigidity, no rebound and no guarding  Neurological: She is alert and oriented to person, place, and time  She is not disoriented   GCS eye subscore is 4  GCS verbal subscore is 5  GCS motor subscore is 6  Skin: Skin is warm and dry  Additional Data:     Labs:      Results from last 7 days  Lab Units 04/15/18  1128  04/13/18  1240   WBC Thousand/uL 14 23*  < > 7 83   HEMOGLOBIN g/dL 11 2*  < > 10 3*   HEMATOCRIT % 34 3*  < > 32 5*   PLATELETS Thousands/uL 282  < > 270   LYMPHO PCT %  --   --  14   MONO PCT MAN %  --   --  10   EOSINO PCT MANUAL %  --   --  2   < > = values in this interval not displayed  Results from last 7 days  Lab Units 04/15/18  1128  04/13/18  1241   SODIUM mmol/L 139  < > 139   POTASSIUM mmol/L 3 3*  < > 3 6   CHLORIDE mmol/L 99*  < > 101   CO2 mmol/L 28  < > 28   BUN mg/dL 30*  < > 22   CREATININE mg/dL 1 74*  < > 1 58*   CALCIUM mg/dL 9 0  < > 8 6   TOTAL PROTEIN g/dL  --   --  7 6   BILIRUBIN TOTAL mg/dL  --   --  0 50   ALK PHOS U/L  --   --  69   ALT U/L  --   --  17   AST U/L  --   --  17   GLUCOSE RANDOM mg/dL 128  < > 111   < > = values in this interval not displayed  * I Have Reviewed All Lab Data Listed Above  * Additional Pertinent Lab Tests Reviewed:  [facility] Admission Reviewed    Imaging:    Imaging Reports Reviewed Today Include: None  Imaging Personally Reviewed by Myself Includes:  None    Recent Cultures (last 7 days):       Results from last 7 days  Lab Units 04/13/18  1839 04/13/18  1205   URINE CULTURE   --  50,000-59,000 cfu/ml Enterobacter aerogenes*  50,000-59,000 cfu/ml Enterococcus faecalis*   INFLUENZA A PCR  None Detected  --    INFLUENZA B PCR  None Detected  --    RSV PCR  None Detected  --        Last 24 Hours Medication List:     Current Facility-Administered Medications:  acetaminophen 325 mg Rectal Q4H PRN Neil Ramos PA-C   cycloSPORINE 1 drop Both Eyes BID Neil Duque PA-C   HYDROmorphone 0 5 mg Intravenous Q6H PRN Neil Duque PA-C   HYDROmorphone 2 mg Oral Q4H PRN Neil P Ramos, PA-C   LORazepam 2 mg Intravenous Q6H PRN Linda Erickson PA-C melatonin 3 mg Oral HS Neil Ramos PA-C   morphine injection 2 mg Intravenous Q6H PRN Neil Ramos PA-C   ondansetron 4 mg Intravenous Q6H PRN Mike Torres PA-C   oxyCODONE 10 mg Oral Q12H [facility] Neil Ramos PA-C   polyvinyl alcohol 1 drop Both Eyes BID Mike Torres PA-C        Today, Patient Was Seen By: Mike Torres PA-C    ** Please Note: Dictation voice to text software may have been used in the creation of this document   **

## 2018-04-15 NOTE — PHYSICIAN ADVISOR
Current patient class: Inpatient  The patient is currently on Hospital Day: 3      The patient was admitted to the hospital at [id number] on 4/13/18 for the following diagnosis:  Acute on chronic diastolic heart failure (HCC) [I50 33]  SOB (shortness of breath) [R06 02]  CHF exacerbation ([address] ) [I50 9]       There is documentation in the medical record of an expected length of stay of at least 2 midnights  The patient is therefore expected to satisfy the 2 midnight benchmark and given the 2 midnight presumption is appropriate for INPATIENT ADMISSION  Given this expectation of a satisfying stay, CMS instructs us that the patient is most often appropriate for inpatient admission under part A provided medical necessity is documented in the chart  After review of the relevant documentation, labs, vital signs and test results, the patient is appropriate for INPATIENT ADMISSION  Admission to the hospital as an inpatient is a complex decision making process which requires the practitioner to consider the patients presenting complaint, history and physical examination and all relevant testing  With this in mind, in this case, the patient was deemed appropriate for INPATIENT ADMISSION  After review of the documentation and testing available at the time of the admission I concur with this clinical determination of medical necessity  Rationale is as follows:     The patient is a 80 yrs old Female who presented to the ED at 4/13/2018 11:26 AM with a chief complaint of Shortness of Breath (Pt  reports to ED via EMS with increased work of breathing and Bilateral Rhonci per EMS Pt  94% on Room Air)    The patients vitals on arrival were ED Triage Vitals   Temperature Pulse Respirations Blood Pressure SpO2   04/13/18 1128 04/13/18 1128 04/13/18 1128 04/13/18 1128 04/13/18 1128   98 1 °F (36 7 °C) 90 20 135/57 94 %      Temp Source Heart Rate Source Patient Position - Orthostatic VS BP Location FiO2 (%)   04/13/18 1128 04/13/18 1410 04/13/18 1128 04/13/18 1128 --   Oral Monitor Sitting Right arm       Pain Score       04/13/18 1128       9           Past Medical History:   Diagnosis Date    A-fib ([facility] )     Anemia     Anemia     Anxiety     Asthma     CAD (coronary artery disease)     CHF (congestive heart failure) (HCC)     CHF (congestive heart failure) (HCC)     CKD (chronic kidney disease) stage 3, GFR 30-59 ml/min     ckd3    Constipation     [facility] accident[facility] )     left hemiparesis    Diastolic CHF, chronic (HCC)     Disease of thyroid gland     Dry eye     First degree AV block     Gastritis     Gastritis     GERD (gastroesophageal reflux disease)     Glaucoma     Glaucoma     Hemiparesis ([facility])     left side    Hemiparesis affecting left side as late effect of stroke ([facility])     Hyperlipidemia     Hypertension     Hypomagnesemia     Impetigo     Insomnia     Knee pain     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    Neuropathy     Osteoarthritis     Parkinson disease ([facility] )     Pulmonary HTN ([facility] )     PVD (peripheral vascular disease) ([facility] )     Restless leg     Vitamin D deficiency      Past Surgical History:   Procedure Laterality Date    APPENDECTOMY      BLADDER SURGERY      CAROTID ENDARTERECTOMY Left     ESOPHAGOGASTRODUODENOSCOPY N/A 4/8/2016    Procedure: ESOPHAGOGASTRODUODENOSCOPY (EGD); Surgeon: Adalberto Raymond MD;  Location: [facility];   Service:     RENAL ARTERY STENT      VASCULAR SURGERY             Consults have been placed to:   IP CONSULT TO CARDIOLOGY    Vitals:    04/14/18 1920 04/14/18 2124 04/14/18 2256 04/15/18 0021   BP:  114/56 (!) 174/76    BP Location:   Right arm    Pulse:  (!) 108 96    Resp:   18    Temp: 99 1 °F (37 3 °C)  (!) 102 2 °F (39 °C) (!) 102 7 °F (39 3 °C)   TempSrc: Axillary  Axillary Axillary   SpO2:   96%    Weight:       Height:           Most recent labs:    Recent Labs      04/13/18   1241 04/13/18   2139  04/14/18   0602  04/14/18   0612   WBC   --    --    --    --   8 78   HGB   --    --    --    --   9 8*   HCT   --    --    --    --   30 9*   PLT   --    --    --    --   257   K  3 6   --    --   3 4*   --    NA  139   --    --   142   --    CALCIUM  8 6   --    --   8 7   --    BUN  22   --    --   22   --    CREATININE  1 58*   --    --   1 45*   --    TROPONINI   --    < >  <0 02   --    --    AST  17   --    --    --    --    ALT  17   --    --    --    --    ALKPHOS  69   --    --    --    --    BILITOT  0 50   --    --    --    --     < > = values in this interval not displayed         Scheduled Meds:  Current Facility-Administered Medications:  acetaminophen 650 mg Oral Q4H PRN Neil Ramos PA-C   amLODIPine 5 mg Oral Daily Neil Ramos PA-C   apixaban 2 5 mg Oral BID Shawn Alfredia Holter, PA-C   calcitriol 0 25 mcg Oral Daily Shawn Alfredia Holter, PA-C   cephalexin 500 mg Oral Q8H [facility] Neil Ramos PA-C   cycloSPORINE 1 drop Both Eyes BID Neil Ramos PA-C   dextromethorphan-guaiFENesin 5 mL Oral 4x Daily Shawn Alfredia Holter, PA-C   eplerenone 25 mg Oral Daily Neil Ramos PA-C   fluticasone 2 spray Nasal BID Shawn Alfredia Holter, PA-C   furosemide 40 mg Intravenous BID Shawn Alfredia Holter, PA-C   gabapentin 100 mg Oral BID Shawn Alfredia Holter, PA-C   gabapentin 300 mg Oral HS Shawn Alfredia Holter, PA-C   HYDROmorphone 0 2 mg Intravenous Q6H PRN Kalie Finch MD   HYDROmorphone 2 mg Oral Q4H PRN Neil Ramos PA-C   latanoprost 1 drop Both Eyes HS Shawn Alfredia Holter, PA-C   levothyroxine 88 mcg Oral Early Morning Shawn Alfredia Holter, PA-C   lidocaine 1 patch Transdermal Daily Neil Ramos PA-C   LORazepam 1 mg Intravenous Q6H PRN Shawn Alfredia Holter, PA-C   melatonin 3 mg Oral HS Neil Ramos PA-C   metoprolol tartrate 25 mg Oral Q12H [facility] Neil Ramos PA-C   nystatin  Topical BID Kalie Finch MD   ondansetron 4 mg Intravenous Q6H PRN Shawn Alfredia Holter, PA-C   OXcarbazepine 150 mg Oral Daily Neil Ramos PA-C   pantoprazole 40 mg Oral Early Morning Neil Ramos PA-C   polyvinyl alcohol 1 drop Both Eyes BID Shawn Alfredia Holter, PA-C   rOPINIRole 0 5 mg Oral 4x Daily Shawn Alfredia Holter, PA-C   tamsulosin 0 4 mg Oral Daily With Dinner Shawn Alfredia Holter, PA-C   traMADol 50 mg Oral Q6H PRN Shawn Alfredia Holter, PA-C     Continuous Infusions:   PRN Meds:   acetaminophen    HYDROmorphone    HYDROmorphone    LORazepam    ondansetron    traMADol    Surgical procedures (if appropriate):

## 2018-04-15 NOTE — PLAN OF CARE
Problem: DISCHARGE PLANNING - CARE MANAGEMENT  Goal: Discharge to post-acute care or home with appropriate resources  INTERVENTIONS:  - Conduct assessment to determine patient/family and health care team treatment goals, and need for post-acute services based on payer coverage, community resources, and patient preferences, and barriers to discharge  - Address psychosocial, clinical, and financial barriers to discharge as identified in assessment in conjunction with the patient/family and health care team  - Arrange appropriate level of post-acute services according to patients   needs and preference and payer coverage in collaboration with the physician and health care team  - Communicate with and update the patient/family, physician, and health care team regarding progress on the discharge plan  - Arrange appropriate transportation to post-acute venues  Outcome: Progressing  MIGUEL, ARIANNA Trejo, nurse Elton Souza met with Pt, Pt's son, Pt's daughter in law at bedside and Pt's other children were on speakerphone in Pt's room  Goals of Care were discussed with Pt and family   Pt and family would like a referral made to hospice, CM submitted referral

## 2018-04-15 NOTE — PLAN OF CARE
Problem: Prexisting or High Potential for Compromised Skin Integrity  Goal: Skin integrity is maintained or improved  INTERVENTIONS:  - Identify patients at risk for skin breakdown  - Assess and monitor skin integrity  - Assess and monitor nutrition and hydration status  - Monitor labs (i e  albumin)  - Assess for incontinence   - Turn and reposition patient  - Assist with mobility/ambulation  - Relieve pressure over bony prominences  - Avoid friction and shearing  - Provide appropriate hygiene as needed including keeping skin clean and dry  - Evaluate need for skin moisturizer/barrier cream  - Collaborate with interdisciplinary team (i e  Nutrition, Rehabilitation, etc )   - Patient/family teaching   Outcome: Progressing      Problem: PAIN - ADULT  Goal: Verbalizes/displays adequate comfort level or baseline comfort level  Interventions:  - Encourage patient to monitor pain and request assistance  - Assess pain using appropriate pain scale  - Administer analgesics based on type and severity of pain and evaluate response  - Implement non-pharmacological measures as appropriate and evaluate response  - Consider cultural and social influences on pain and pain management  - Notify physician/advanced practitioner if interventions unsuccessful or patient reports new pain  Outcome: Progressing      Problem: SAFETY ADULT  Goal: Patient will remain free of falls  INTERVENTIONS:  - Assess patient frequently for physical needs  -  Identify cognitive and physical deficits and behaviors that affect risk of falls    -  [place] fall precautions as indicated by assessment   - Educate patient/family on patient safety including physical limitations  - Instruct patient to call for assistance with activity based on assessment  - Modify environment to reduce risk of injury  - Consider OT/PT consult to assist with strengthening/mobility  Outcome: Progressing

## 2018-04-15 NOTE — PROGRESS NOTES
Progress Note - Cardiology   Shwetha Olson 80 y o  female MRN: [id number]  Unit/Bed#: -01 Encounter: [id number]        Principal Problem:    Acute on chronic diastolic heart failure (HCC)  Active Problems:    Pulmonary hypertension (HCC)    Paroxysmal atrial fibrillation (HCC)    Chronic kidney disease, stage III (moderate)    Secondary hyperparathyroidism of renal origin ([facility] [address] )    Acute cystitis without hematuria    URTI (acute upper respiratory infection)    Acute respiratory failure with hypoxia (HCC)      Assessment/Plan    1  Acute on chronic diastolic heart failure  Echo 7/2017 -normal LV function  Grade 2 diastolic dysfunction  Moderate mitral regurgitation  Mild aortic stenosis  Lasix 40 mg IV b i d  overall -2 L  Patient feels her SOB is unchanged   Creat unaffected by diuresis  Chest x-ray 4/13- mild vascular congestion  I was in room seeing pt and son advised me that they were looking into comfort care  They are waiting for a meeting  with primary team and case management  The son said his mother is tired  No plan for repeat echocardiogram at this time according to JOVI  She apparently has had multiple hospitalizations according the son has been declining for 7 years  Patient is followed by Dr Chidi French of  [address] [place] [address] Cardiology  This is the second admission this year  This is the only admission this year with heart failure  2   Paroxysmal atrial fibrillation- patient went to rapid atrial fibrillation in setting  of fever  I will order prn lopressor  She is on eliquis 2 5 mg BID  3   Febrile illness- defer to [facility]  Family unsure they want this worked up    4  Chronic kidney disease stage 3- creat 1 2 in Jan      Subjective/Objective   Chief Complaint/Subjective  Feels unchanged from yesterday   Family at bedside       Vitals: /71 (BP Location: Right arm)   Pulse 81   Temp 99 °F (37 2 °C) (Axillary)   Resp 16   Ht 5' (1 524 m)   Wt 69 5 kg (153 lb 3 5 oz)   LMP (LMP Unknown)   SpO2 92%   BMI 29 92 kg/m²     Vitals:    04/14/18 0558 04/15/18 0600   Weight: 71 5 kg (157 lb 10 1 oz) 69 5 kg (153 lb 3 5 oz)     Orthostatic Blood Pressures    Flowsheet Row Most Recent Value   Blood Pressure  120/71 filed at 04/15/2018 0734   Patient Position - Orthostatic VS  Lying filed at 04/15/2018 0734            Intake/Output Summary (Last 24 hours) at 04/15/18 1054  Last data filed at 04/15/18 0600   Gross per 24 hour   Intake              150 ml   Output              837 ml   Net             -687 ml       Invasive Devices     Peripheral Intravenous Line            Peripheral IV 04/13/18 Right Antecubital 1 day                Current Facility-Administered Medications   Medication Dose Route Frequency    acetaminophen (TYLENOL) tablet 650 mg  650 mg Oral Q4H PRN    amLODIPine (NORVASC) tablet 5 mg  5 mg Oral Daily    apixaban (ELIQUIS) tablet 2 5 mg  2 5 mg Oral BID    calcitriol (ROCALTROL) capsule 0 25 mcg  0 25 mcg Oral Daily    cephalexin (KEFLEX) capsule 500 mg  500 mg Oral Q8H [address]    cycloSPORINE (RESTASIS) 0 05 % ophthalmic emulsion 1 drop  1 drop Both Eyes BID    dextromethorphan-guaiFENesin (ROBITUSSIN DM)  mg/5 mL oral syrup 5 mL  5 mL Oral 4x Daily    eplerenone (INSPRA) tablet 25 mg  25 mg Oral Daily    fluticasone (FLONASE) 50 mcg/act nasal spray 2 spray  2 spray Nasal BID    furosemide (LASIX) injection 40 mg  40 mg Intravenous BID    gabapentin (NEURONTIN) capsule 100 mg  100 mg Oral BID    gabapentin (NEURONTIN) capsule 300 mg  300 mg Oral HS    HYDROmorphone (DILAUDID) injection 0 2 mg  0 2 mg Intravenous Q6H PRN    HYDROmorphone (DILAUDID) tablet 2 mg  2 mg Oral Q4H PRN    latanoprost (XALATAN) 0 005 % ophthalmic solution 1 drop  1 drop Both Eyes HS    levothyroxine tablet 88 mcg  88 mcg Oral Early Morning    lidocaine (LIDODERM) 5 % patch 1 patch  1 patch Transdermal Daily    LORazepam (ATIVAN) 2 mg/mL injection 1 mg  1 mg Intravenous Q6H PRN  melatonin tablet 3 mg  3 mg Oral HS    metoprolol tartrate (LOPRESSOR) tablet 25 mg  25 mg Oral Q12H JULIETTE    nystatin (MYCOSTATIN) powder   Topical BID    ondansetron (ZOFRAN) injection 4 mg  4 mg Intravenous Q6H PRN    OXcarbazepine (TRILEPTAL) tablet 150 mg  150 mg Oral Daily    pantoprazole (PROTONIX) EC tablet 40 mg  40 mg Oral Early Morning    polyvinyl alcohol (LIQUIFILM TEARS) 1 4 % ophthalmic solution 1 drop  1 drop Both Eyes BID    rOPINIRole (REQUIP) tablet 0 5 mg  0 5 mg Oral 4x Daily    tamsulosin (FLOMAX) capsule 0 4 mg  0 4 mg Oral Daily With Dinner    traMADol (ULTRAM) tablet 50 mg  50 mg Oral Q6H PRN         Physical Exam: /71 (BP Location: Right arm)   Pulse 81   Temp 99 °F (37 2 °C) (Axillary)   Resp 16   Ht 5' (1 524 m)   Wt 69 5 kg (153 lb 3 5 oz)   LMP  (LMP Unknown)   SpO2 92%   BMI 29 92 kg/m²     General Appearance:    Alert, cooperative, no distress, appears stated age   Head:    Normocephalic, no scleral icterus   Eyes:    PERRL   Nose:   Nares normal, septum midline, no drainage    Throat:   Lips, mucosa, and tongue normal   Neck:   Supple, symmetrical, trachea midline,             Lungs:     Few crackles bases, to auscultation bilaterally, respirations unlabored at rest on n/c        Heart:    Irregular rate and rhythm, S1 and S2 normal, systolic murmur       Extremities:   Extremities normal, atraumatic, no cyanosis or edema       Skin:   Skin warm, face flushed   Neurologic:   Alert and oriented to person place and time                 Lab Results:   Recent Results (from the past 72 hour(s))   ECG 12 lead    Collection Time: 04/13/18 11:34 AM   Result Value Ref Range    Ventricular Rate 85 BPM    Atrial Rate 102 BPM    AR Interval  ms    QRSD Interval 86 ms    QT Interval 354 ms    QTC Interval 421 ms    P Axis  degrees    QRS Axis 5 degrees    T Wave [place] 57 degrees   ED Urine Macroscopic    Collection Time: 04/13/18 12:05 PM   Result Value Ref Range    Color, UA Yellow     Clarity, UA Cloudy     pH, UA 6 0 4 5 - 8 0    Leukocytes, UA Large (A) Negative    Nitrite, UA Negative Negative    Protein, UA Negative Negative mg/dl    Glucose, UA Negative Negative mg/dl    Ketones, UA Negative Negative mg/dl    Urobilinogen, UA 0 2 0 2, 1 0 E U /dl E U /dl    Bilirubin, UA Negative Negative    Blood, UA Trace (A) Negative    Specific Gravity, UA >=1 030 1 003 - 1 030   Urine Microscopic    Collection Time: 04/13/18 12:05 PM   Result Value Ref Range    RBC, UA 0-1 (A) None Seen, 0-5 /hpf    WBC, UA Innumerable (A) None Seen, 0-5, 5-55, 5-65 /hpf    Epithelial Cells Occasional None Seen, Occasional /hpf    Bacteria, UA Occasional None Seen, Occasional /hpf   Urine culture    Collection Time: 04/13/18 12:05 PM   Result Value Ref Range    Urine Culture Culture results to follow      CBC and differential    Collection Time: 04/13/18 12:40 PM   Result Value Ref Range    WBC 7 83 4 31 - 10 16 Thousand/uL    RBC 3 65 (L) 3 81 - 5 12 Million/uL    Hemoglobin 10 3 (L) 11 5 - 15 4 g/dL    Hematocrit 32 5 (L) 34 8 - 46 1 %    MCV 89 82 - 98 fL    MCH 28 2 26 8 - 34 3 pg    MCHC 31 7 31 4 - 37 4 g/dL    RDW 15 6 (H) 11 6 - 15 1 %    MPV 10 1 8 9 - 12 7 fL    Platelets 494 548 - 285 Thousands/uL   Manual Differential(PHLEBS Do Not Order)    Collection Time: 04/13/18 12:40 PM   Result Value Ref Range    Segmented % 72 43 - 75 %    Lymphocytes % 14 14 - 44 %    Monocytes % 10 4 - 12 %    Eosinophils % 2 0 - 6 %    Basophils % 1 0 - 1 %    Metamyelocytes% 1 0 - 1 %    Absolute Neutrophils 5 64 1 85 - 7 62 Thousand/uL    Lymphocytes Absolute 1 10 0 60 - 4 47 Thousand/uL    Monocytes Absolute 0 78 0 00 - 1 22 Thousand/uL    Eosinophils Absolute 0 16 0 00 - 0 40 Thousand/uL    Basophils Absolute 0 08 0 00 - 0 10 Thousand/uL    Total Counted 100     Anisocytosis Present     Platelet Estimate Adequate Adequate   Comprehensive metabolic panel    Collection Time: 04/13/18 12:41 PM   Result Value Ref Range    Sodium 139 136 - 145 mmol/L    Potassium 3 6 3 5 - 5 3 mmol/L    Chloride 101 100 - 108 mmol/L    CO2 28 21 - 32 mmol/L    Anion Gap 10 4 - 13 mmol/L    BUN 22 5 - 25 mg/dL    Creatinine 1 58 (H) 0 60 - 1 30 mg/dL    Glucose 111 65 - 140 mg/dL    Calcium 8 6 mg/dL    AST 17 5 - 45 U/L    ALT 17 12 - 78 U/L    Alkaline Phosphatase 69 46 - 116 U/L    Total Protein 7 6 6 4 - 8 2 g/dL    Albumin 3 7 3 5 - 5 0 g/dL    Total Bilirubin 0 50 0 20 - 1 00 mg/dL    eGFR 29 ml/min/1 73sq m   BNP    Collection Time: 04/13/18 12:41 PM   Result Value Ref Range    NT-proBNP 1,522 (H) <450 pg/mL   Influenza A/B and RSV by PCR (Indicated for patients > 2 mo of age)    Collection Time: 04/13/18  6:39 PM   Result Value Ref Range    INFLU A PCR None Detected None Detected    INFLU B PCR None Detected None Detected    RSV PCR None Detected None Detected   Troponin I    Collection Time: 04/13/18  6:45 PM   Result Value Ref Range    Troponin I <0 02 <=0 04 ng/mL   ECG 12 lead    Collection Time: 04/13/18  8:28 PM   Result Value Ref Range    Ventricular Rate 86 BPM    Atrial Rate 87 BPM    FL Interval  ms    QRSD Interval 84 ms    QT Interval 394 ms    QTC Interval 471 ms    P Axis  degrees    QRS Axis 10 degrees    T Wave Axis 38 degrees   ECG 12 lead    Collection Time: 04/13/18  8:29 PM   Result Value Ref Range    Ventricular Rate 86 BPM    Atrial Rate 86 BPM    FL Interval 188 ms    QRSD Interval 80 ms    QT Interval 400 ms    QTC Interval 478 ms    P Axis 1 degrees    QRS Axis 11 degrees    T Wave Axis 47 degrees   Troponin I    Collection Time: 04/13/18  9:39 PM   Result Value Ref Range    Troponin I <0 02 <=0 04 ng/mL   Basic metabolic panel    Collection Time: 04/14/18  6:02 AM   Result Value Ref Range    Sodium 142 136 - 145 mmol/L    Potassium 3 4 (L) 3 5 - 5 3 mmol/L    Chloride 102 100 - 108 mmol/L    CO2 26 21 - 32 mmol/L    Anion Gap 14 (H) 4 - 13 mmol/L    BUN 22 5 - 25 mg/dL    Creatinine 1 45 (H) 0 60 - 1 30 mg/dL Glucose 102 65 - 140 mg/dL    Calcium 8 7 mg/dL    eGFR 33 ml/min/1 73sq m   CBC (With Platelets)    Collection Time: 18  6:12 AM   Result Value Ref Range    WBC 8 78 4 31 - 10 16 Thousand/uL    RBC 3 46 (L) 3 81 - 5 12 Million/uL    Hemoglobin 9 8 (L) 11 5 - 15 4 g/dL    Hematocrit 30 9 (L) 34 8 - 46 1 %    MCV 89 82 - 98 fL    MCH 28 3 26 8 - 34 3 pg    MCHC 31 7 31 4 - 37 4 g/dL    RDW 15 4 (H) 11 6 - 15 1 %    Platelets 501 280 - 490 Thousands/uL    MPV 10 8 8 9 - 12 7 fL       [facility] [address] [facility]  [phone]     Transthoracic Echocardiogram  2D, M-mode, Doppler, and Color Doppler     Study date:  2017     Patient: Justin Gutiérrez  MR number: [id number]  Account number: [de-identified]  : 1931  Age: 80 years  Gender: Female  Status: Inpatient  Location: Bedside  Height: 60 in  Weight: 159 lb  BP: 126/ 57 mmHg     Indications: Cerebral Vascular Accident     Diagnoses: I63 9 - Cerebral infarction, unspecified     Sonographer:  Elvin Finley RDCS  Primary Physician:  Rickey Alcaraz MD  Referring Physician:  Hiro Russell DO  Group:  [facility]  Interpreting Physician:  Saad Del Rio MD     SUMMARY     LEFT VENTRICLE:  Systolic function was normal  Ejection fraction was estimated to be 65 %  There were no regional wall motion abnormalities  Wall thickness was mildly increased  There was mild concentric hypertrophy  Features were consistent with a pseudonormal left ventricular filling pattern, with concomitant abnormal relaxation and increased filling pressure (grade 2 diastolic dysfunction)      LEFT ATRIUM:  The atrium was moderately dilated      MITRAL VALVE:  There was moderate annular calcification  There was moderate regurgitation  The effective orifice of mitral regurgitation by proximal isovelocity surface area was 0 13 cm squared    The volume of mitral regurgitation by proximal isovelocity surface area was 23 ml      AORTIC VALVE:  Transaortic velocity was increased due to valvular stenosis  There was mild stenosis  There was trace regurgitation  Valve mean gradient was 9 mmHg  Estimated aortic valve area (by VTI) was 1 38 cm squared  Estimated aortic valve area (by Vmax) was 1 32 cm squared  Estimated aortic valve area (by Vmean) was 1 38 cm squared      TRICUSPID VALVE:  There was trace regurgitation      HISTORY: PRIOR HISTORY: Coronary Artery Disease, Atrial Fibrillation, Chornic Kidney Disease III, Congestive Heart Failure, Hypertension, Hyperlipidemia, Pulmonary Hypertension     PROCEDURE: The procedure was performed at the bedside  This was a routine study  The transthoracic approach was used  The study included complete 2D imaging, M-mode, complete spectral Doppler, and color Doppler  The heart rate was 78 bpm,  at the start of the study  Images were obtained from the parasternal, apical, subcostal, and suprasternal notch acoustic windows  Echocardiographic views were limited due to restricted patient mobility and lung interference  This was a  technically difficult study      LEFT VENTRICLE: Size was normal  Systolic function was normal  Ejection fraction was estimated to be 65 %  There were no regional wall motion abnormalities  Wall thickness was mildly increased  There was mild concentric hypertrophy  DOPPLER: Features were consistent with a pseudonormal left ventricular filling pattern, with concomitant abnormal relaxation and increased filling pressure (grade 2 diastolic dysfunction)      RIGHT VENTRICLE: The size was normal  Systolic function was normal  Wall thickness was normal      LEFT ATRIUM: The atrium was moderately dilated      RIGHT ATRIUM: Size was normal      MITRAL VALVE: There was moderate annular calcification  Valve structure was normal  There was normal leaflet separation  DOPPLER: The transmitral velocity was within the normal range   There was no evidence for stenosis  There was moderate  regurgitation      AORTIC VALVE: The valve was trileaflet  Leaflets exhibited mildly to moderately increased thickness, mild to moderate calcification, and mildly reduced cuspal separation  DOPPLER: Transaortic velocity was increased due to valvular  stenosis  There was mild stenosis  There was trace regurgitation      TRICUSPID VALVE: The valve structure was normal  There was normal leaflet separation  DOPPLER: The transtricuspid velocity was within the normal range  There was no evidence for stenosis  There was trace regurgitation      PULMONIC VALVE: Not well visualized      PERICARDIUM: There was no pericardial effusion  The pericardium was normal in appearance      AORTA: The root exhibited normal size      MEASUREMENT TABLES  Imaging: I have personally reviewed pertinent reports  Tele- atrial fibrillation    Counseling / Coordination of Care  Total time spent today 25 minutes  Greater than 50% of total time was spent with the patient and / or family counseling and / or coordination of care

## 2018-04-15 NOTE — ED ATTENDING ATTESTATION
Katherin Daniel MD, saw and evaluated the patient  I have discussed the patient with the resident/non-physician practitioner and agree with the resident's/non-physician practitioner's findings, Plan of Care, and MDM as documented in the resident's/non-physician practitioner's note, except where noted  All available labs and Radiology studies were reviewed  At this point I agree with the current assessment done in the Emergency Department  I have conducted an independent evaluation of this patient a history and physical is as follows: This 70-year-old female presents with acute onset shortness of breath  Patient with increased work of breathing and cough  Patient also having lower extremity edema  On exam patient has rhonchi diffusely  Patient's vital signs  are unremarkable  Patient does have 1-2 +pitting edema bilateral lower extremities  Patient with tachypnea, increased work of breathing, no distress  Patient's blood work and chest x-ray are consistent with a CHF exacerbation  Patient will be admitted, IV Lasix given in the emergency department    Critical Care Time  CritCare Time    Procedures

## 2018-04-15 NOTE — ASSESSMENT & PLAN NOTE
· POA, noted on UA   Had a fever today, however patient now only wants comfort measures  · Discontinue Keflex

## 2018-04-15 NOTE — ASSESSMENT & PLAN NOTE
· POA, patient wants level 4 comfort care   Opted to discontinue all other medications   · D/C tele   · D/c medications

## 2018-04-16 VITALS
SYSTOLIC BLOOD PRESSURE: 121 MMHG | HEART RATE: 100 BPM | WEIGHT: 152.56 LBS | TEMPERATURE: 102.1 F | OXYGEN SATURATION: 97 % | BODY MASS INDEX: 29.95 KG/M2 | RESPIRATION RATE: 22 BRPM | DIASTOLIC BLOOD PRESSURE: 56 MMHG | HEIGHT: 60 IN

## 2018-04-16 PROBLEM — A41.9 SEPSIS (HCC): Status: ACTIVE | Noted: 2018-04-16

## 2018-04-16 PROCEDURE — 99239 HOSP IP/OBS DSCHRG MGMT >30: CPT | Performed by: PHYSICIAN ASSISTANT

## 2018-04-16 RX ORDER — ONDANSETRON 2 MG/ML
4 INJECTION INTRAMUSCULAR; INTRAVENOUS EVERY 6 HOURS PRN
Qty: 5 ML | Refills: 0 | Status: SHIPPED | OUTPATIENT
Start: 2018-04-16

## 2018-04-16 RX ORDER — MORPHINE SULFATE 2 MG/ML
2 INJECTION, SOLUTION INTRAMUSCULAR; INTRAVENOUS EVERY 6 HOURS PRN
Qty: 1 ML | Refills: 0 | Status: SHIPPED | OUTPATIENT
Start: 2018-04-16 | End: 2018-04-26

## 2018-04-16 RX ORDER — HYDROMORPHONE HYDROCHLORIDE 2 MG/1
2 TABLET ORAL EVERY 4 HOURS PRN
Qty: 10 TABLET | Refills: 0 | Status: SHIPPED | OUTPATIENT
Start: 2018-04-16 | End: 2018-04-26

## 2018-04-16 RX ORDER — LORAZEPAM 2 MG/ML
2 INJECTION INTRAMUSCULAR EVERY 6 HOURS PRN
Qty: 1 ML | Refills: 0 | Status: SHIPPED | OUTPATIENT
Start: 2018-04-16 | End: 2018-04-26

## 2018-04-16 RX ADMIN — MORPHINE SULFATE 2 MG: 2 INJECTION, SOLUTION INTRAMUSCULAR; INTRAVENOUS at 18:08

## 2018-04-16 RX ADMIN — LORAZEPAM 2 MG: 2 INJECTION INTRAMUSCULAR; INTRAVENOUS at 10:43

## 2018-04-16 RX ADMIN — POLYVINYL ALCOHOL 1 DROP: 14 SOLUTION/ DROPS OPHTHALMIC at 09:21

## 2018-04-16 RX ADMIN — LORAZEPAM 2 MG: 2 INJECTION INTRAMUSCULAR; INTRAVENOUS at 19:39

## 2018-04-16 RX ADMIN — HYDROMORPHONE HYDROCHLORIDE 0.5 MG: 1 INJECTION, SOLUTION INTRAMUSCULAR; INTRAVENOUS; SUBCUTANEOUS at 02:26

## 2018-04-16 RX ADMIN — MORPHINE SULFATE 2 MG: 2 INJECTION, SOLUTION INTRAMUSCULAR; INTRAVENOUS at 09:13

## 2018-04-16 RX ADMIN — LORAZEPAM 1 MG: 2 INJECTION INTRAMUSCULAR; INTRAVENOUS at 04:15

## 2018-04-16 NOTE — PLAN OF CARE
DISCHARGE PLANNING - CARE MANAGEMENT     Discharge to post-acute care or home with appropriate resources Progressing        PAIN - ADULT     Verbalizes/displays adequate comfort level or baseline comfort level Progressing        Prexisting or High Potential for Compromised Skin Integrity     Skin integrity is maintained or improved Progressing        SAFETY ADULT     Patient will remain free of falls Progressing

## 2018-04-16 NOTE — ASSESSMENT & PLAN NOTE
· POA, patient wants level 4 comfort care   Opted to discontinue all other medications   · D/C tele   · D/c medications   · Stable for hospice today

## 2018-04-16 NOTE — DISCHARGE SUMMARY
[facility]  Discharge- Maria G Franklin 6/14/1931, 80 y o  female MRN: [id number]    Unit/Bed#: -01 Encounter: [id number]    Primary Care Provider: Bela Pulliam MD   Date and time admitted to hospital: 4/13/2018 11:26 AM        * Acute on chronic diastolic heart failure ([address] )   Assessment & Plan    · POA, patient wants level 4 comfort care  Opted to discontinue all other medications   · D/C tele   · D/c medications   · Stable for hospice today          Sepsis [facility])   Assessment & Plan    · Not POA, patient spiked fever, became tachycardic  Possibly due to urinary tract infection POA  Patient and family do not want this worked up   · Discharge to hospice         Chronic intractable pain   Assessment & Plan    · Patient has opted to go on hospice today  Despite aggressive pain control with PRN IV medications her pain still remains uncontrolled to the point where the patient does become tearful  When she is anxious her breathing becomes elizabeth and more labored  Patient did not tolerate PO pain med  Appears comfortable on IV regimen   · Increase pain regimen   · Morphine 2 IV Q6 PRN moderate pain  · Dilaudid 0 5 mg IV Q6 PRN severe pain   · Dilaudid 2 mg IV Q6 PRN breakthrough pain        Acute respiratory failure with hypoxia (HCC)   Assessment & Plan    · POA, no improvement in breathing despite diuresis  Wishes for hospice  · Continue O2 for comfort         Paroxysmal atrial fibrillation (HCC)   Assessment & Plan    · Appears to be in NSR on exam   · Stop medications         Acute cystitis without hematuria   Assessment & Plan    · POA, noted on UA   Had a fever today, however patient now only wants comfort measures  · Discontinue Keflex         URTI (acute upper respiratory infection)   Assessment & Plan    · No improvement   · Supportive/comfort care as ordered         Chronic kidney disease, stage III (moderate)   Assessment & Plan    · Creatinine has increased with diuresis, patient wants hospice   · No further blood work         Pulmonary hypertension ([address] )   Assessment & Plan    · Continue oxygen as above          Secondary hyperparathyroidism of renal origin [facility])   Assessment & Plan    · Stop Calcitriol               Discharging Physician / Practitioner: Sadie Qiu PA-C  PCP: Bertha Jaime MD  Admission Date:   Admission Orders     Ordered        04/13/18 1357  Inpatient Admission (expected length of stay for this patient is greater than two midnights)  Once             Discharge Date: 04/16/18    Disposition:      Other: [address] to [facility]:   · Not Applicable to this Patient - Not Applicable to this Patient    Reason for Admission: Shortness of breath     Discharge Diagnoses:     Please see assessment and plan section above for further details regarding discharge diagnoses  Resolved Problems  Date Reviewed: 4/16/2018    None          Consultations During Hospital Stay:  · Cardiology - Dr Oksana Tsang, Dr Carmen Ser     Procedures Performed:     · None     Medication Adjustments and Discharge Medications:  · Summary of Medication Adjustments made as a result of this hospitalization: Medications discontinued, see paper prescriptions for new pain regimen   · Medication Dosing Tapers - Please refer to Discharge Medication List for details on any medication dosing tapers (if applicable to patient)  · Medications being temporarily held (include recommended restart time): None  · Discharge Medication List: See after visit summary for reconciled discharge medications  Wound Care Recommendations:  When applicable, please see wound care section of After Visit Summary      Diet Recommendations at Discharge:  Diet -        Diet Orders            Start     Ordered    04/13/18 1805  Diet Regular; Regular House  (ED Bridging Orders Panel)  Diet effective now     Question Answer Comment   Diet Type Regular    Regular Regular House    RD to adjust diet per protocol? Yes        04/13/18 1804    04/13/18 1741  Room Service  Once     Question:  Type of Service  Answer:  Room Service - Appropriate with Assistance    04/13/18 1740          Instructions for any Catheters / Lines Present at Discharge (including removal date, if applicable): None    Significant Findings / Test Results:     XR chest 2 views   Final Result by Vania Arana MD (04/13 1329)         1  Mild pulmonary vascular congestion  2   Unchanged mild cardiomegaly  Workstation performed: [id number]           · As above     Incidental Findings:   · None     Test Results Pending at Discharge (will require follow up): · None     Outpatient Tests Requested:  · None    Complications:  Patient decided on hospice     Hospital Course:     George Johansen is a 80 y o  female patient who originally presented to the hospital on 4/13/2018 due to shortness of breath  The Patient was thought to be in an acute exacerbation of CHF therefore she was admitted and diuresed  When my attending saw the patient she was tearful and stating that she "wanted to die"  The patient's daughter decided to pursue treatment for 24-48 hours to see if she improved, however despite some diuresis the patient's breathing did not improve and her pain was still drastically uncontrolled  A family meeting was held and the patient decided that she only wanted to focus on controlling her pain and wanted to discontinue all other medications  She was made level 4 comfort care and a hospice consult was placed  Given her high need for IV pain medications she was accepted at [address] [place] [address]     Condition at Discharge: stable     Discharge Day Visit / Exam:     Subjective:  Patient is currently resting  Difficult to arouse, but breathing     Vitals: Blood Pressure: 121/56 (04/16/18 0943)  Pulse: 100 (04/16/18 0943)  Temperature: (!) 102 1 °F (38 9 °C) (04/16/18 0943)  Temp Source: Axillary (04/16/18 5675)  Respirations: 22 (04/16/18 0943)  Height: 5' (152 4 cm) (04/13/18 1732)  Weight - Scale: 69 2 kg (152 lb 8 9 oz) (04/16/18 0500)  SpO2: 97 % (04/15/18 1500)  Exam:   Physical Exam   Constitutional: She appears well-developed and well-nourished  She appears listless  She is sleeping  No distress  HENT:   Head: Normocephalic and atraumatic  Eyes: Conjunctivae and EOM are normal  Pupils are equal, round, and reactive to light  Neck: Neck supple  Cardiovascular: S1 normal, S2 normal, normal heart sounds and intact distal pulses  An irregularly irregular rhythm present  Tachycardia present  Exam reveals no S3 and no S4  No murmur heard  Pulmonary/Chest: Effort normal and breath sounds normal  No accessory muscle usage  Tachypnea noted  No respiratory distress  She has no decreased breath sounds  She has no wheezes  She has no rhonchi  She has no rales  She exhibits no tenderness  Abdominal: Soft  Bowel sounds are normal  She exhibits no distension and no mass  There is no tenderness  There is no rigidity, no rebound and no guarding  Neurological: She appears listless  Skin: Skin is warm and dry  Discussion with Family: Discussed with family at bedside     Goals of Care Discussions:  · Code Status at Discharge: Level 4 - Comfort Care  · Were there any Goals of Care Discussions during Hospitalization?: Yes  · Results of any General Goals of Care Discussions: Patient was made level 4 comfort measures    · POLST Completed: No   · If POLST Completed, Summary of POLST Agreement Provided Here: N/A   · OK to Rehospitalize if Needed? No    Discharge instructions/Information to patient and family:   See after visit summary section titled Discharge Instructions for information provided to patient and family  Planned Readmission: None      Discharge Statement:  I spent 45 minutes discharging the patient  This time was spent on the day of discharge   I had direct contact with the patient on the day of discharge  Greater than 50% of the total time was spent examining patient, answering all patient questions, arranging and discussing plan of care with patient as well as directly providing post-discharge instructions  Additional time then spent on discharge activities      ** Please Note: This note has been constructed using a voice recognition system **

## 2018-04-16 NOTE — ASSESSMENT & PLAN NOTE
· Patient has opted to go on hospice today  Despite aggressive pain control with PRN IV medications her pain still remains uncontrolled to the point where the patient does become tearful  When she is anxious her breathing becomes elizabeth and more labored  Patient did not tolerate PO pain med   Appears comfortable on IV regimen   · Increase pain regimen   · Morphine 2 IV Q6 PRN moderate pain  · Dilaudid 0 5 mg IV Q6 PRN severe pain   · Dilaudid 2 mg IV Q6 PRN breakthrough pain

## 2018-04-16 NOTE — ASSESSMENT & PLAN NOTE
· Not POA, patient spiked fever, became tachycardic  Possibly due to urinary tract infection POA   Patient and family do not want this worked up   · Discharge to hospice

## 2018-04-16 NOTE — SOCIAL WORK
LOS 3   Bundle; Not a readmission  Hospice met with family and family has provided consents for pt to go onto hospice  Pt has been assessed and is approved for IPU  Transportation has been arranged via [facility] and will be picked up at [facility] requested pt be transported after 7pm   LMN has been completed and placed in chart binder  CM contacted hospice liaison to inform her of  time  Nursing is aware  No further Cm interventions needed at this time

## 2018-04-16 NOTE — PLAN OF CARE
Problem: DISCHARGE PLANNING - CARE MANAGEMENT  Goal: Discharge to post-acute care or home with appropriate resources  INTERVENTIONS:  - Conduct assessment to determine patient/family and health care team treatment goals, and need for post-acute services based on payer coverage, community resources, and patient preferences, and barriers to discharge  - Address psychosocial, clinical, and financial barriers to discharge as identified in assessment in conjunction with the patient/family and health care team  - Arrange appropriate level of post-acute services according to patient's   needs and preference and payer coverage in collaboration with the physician and health care team  - Communicate with and update the patient/family, physician, and health care team regarding progress on the discharge plan  - Arrange appropriate transportation to post-acute venues   Outcome: Completed Date Met: 04/16/18  LOS 3  Bundle; Not a readmission  Hospice met with family and family has provided consents for pt to go onto hospice  Pt has been assessed and is approved for IPU  Transportation has been arranged via [facility] and will be picked up at [facility] requested pt be transported after 7pm   LMN has been completed and placed in chart binder  CM contacted hospice liaison to inform her of  time  Nursing is aware  No further Cm interventions needed at this time

## 2018-04-16 NOTE — HOSPICE NOTE
Met with daughter and 2 grandaughters  Pt is approved to go GIP to the unit as she is unable to take anything orally for pain and sob  Daughter is tearful   but very much wants to honor her mothers wishes as pts brother passed in our unit and it was her wish to pass there as well  Approved by Dr Chip Watson  Transport will be later this evening   ly

## 2019-06-07 NOTE — ASSESSMENT & PLAN NOTE
· Follow up with primary cardiology, Dr Marilee Singer 03/17/2017 05:55 PM        PLT                      271                 03/17/2017 05:55 PM   RENAL  Lab Results       Component                Value               Date/Time                  NA                       135 (L)             03/17/2017 05:55 PM        K                        3.3 (L)             03/17/2017 05:55 PM        CL                       96 (L)              03/17/2017 05:55 PM        CO2                      22                  03/17/2017 05:55 PM        BUN                      7                   03/17/2017 05:55 PM        CREATININE               0.6                 03/17/2017 05:55 PM        GLUCOSE                  108 (H)             03/17/2017 05:55 PM   COAGS  No results found for: PROTIME, INR, APTT    Intake & Output: In: 700 (I.V.:700)  Out: -     Nausea & Vomiting:  No    Level of Consciousness:  Awake    Pain Assessment:  Adequate analgesia    Anesthesia Complications:  No apparent anesthetic complications    SUMMARY      Vital signs stable  OK to discharge from Stage I post anesthesia care.   Care transferred from Anesthesiology department on discharge from perioperative area

## 2021-09-02 NOTE — ASSESSMENT & PLAN NOTE
Initial liver disease    Referring MD:  Jeromy/Huber Johns    DX:  Hep C    Insurance in epic    Records in [facility]    Patient was seen by Dr. Pinzon in 2018 for hep C.  Was pregnant at the time and told to schedule when she was finished breastfeeding.    Consultation scheduled with Dr. Eron Vazquez on 9/29/21.   · Stable at this point  · No active bleeding  · Likely component of hemodilution from IV fluids  · Likely anemia of chronic kidney disease

## 2022-10-13 NOTE — SOCIAL WORK
CM met with Pt at bedside to discuss [facility]I  Pt reports she is from [employer] [facility]  CM called Pt's POA Kassy Lew to discuss transportation and was unable to reach her  CM then received a voicemail from pt's Jatin Ibarra stating that CM should contact Pt's son Ariana Nieto as Kassy Lew may not be able to answer her phone  CM contact Ariana Nieto reports he will  Pt and transport her [facility] here [facility] 2:15pm  CM made Dr Sami Hager, and nurse Yamileth Carson made Pt aware 
There are no Wet Read(s) to document.